# Patient Record
Sex: FEMALE | Race: WHITE | NOT HISPANIC OR LATINO | Employment: UNEMPLOYED | URBAN - METROPOLITAN AREA
[De-identification: names, ages, dates, MRNs, and addresses within clinical notes are randomized per-mention and may not be internally consistent; named-entity substitution may affect disease eponyms.]

---

## 2018-07-24 ENCOUNTER — DOCUMENTATION ONLY (OUTPATIENT)
Dept: FAMILY MEDICINE | Facility: CLINIC | Age: 58
End: 2018-07-24

## 2018-07-24 NOTE — PROGRESS NOTES
Health Maintenance Due   Topic Date Due    Hepatitis C Screening  1960    Lipid Panel  1960    TETANUS VACCINE  08/18/1980    Pap Smear with HPV Cotest  08/18/1983    Mammogram  08/18/2002

## 2018-07-25 ENCOUNTER — TELEPHONE (OUTPATIENT)
Dept: FAMILY MEDICINE | Facility: CLINIC | Age: 58
End: 2018-07-25

## 2018-07-25 ENCOUNTER — OFFICE VISIT (OUTPATIENT)
Dept: FAMILY MEDICINE | Facility: CLINIC | Age: 58
End: 2018-07-25
Payer: COMMERCIAL

## 2018-07-25 VITALS
TEMPERATURE: 98 F | HEART RATE: 90 BPM | OXYGEN SATURATION: 98 % | SYSTOLIC BLOOD PRESSURE: 126 MMHG | DIASTOLIC BLOOD PRESSURE: 88 MMHG | HEIGHT: 63 IN | BODY MASS INDEX: 26.37 KG/M2 | RESPIRATION RATE: 16 BRPM | WEIGHT: 148.81 LBS

## 2018-07-25 DIAGNOSIS — G47.00 INSOMNIA, UNSPECIFIED TYPE: ICD-10-CM

## 2018-07-25 DIAGNOSIS — G25.81 RESTLESS LEG SYNDROME: ICD-10-CM

## 2018-07-25 DIAGNOSIS — F13.20 BENZODIAZEPINE DEPENDENCE: ICD-10-CM

## 2018-07-25 DIAGNOSIS — F90.2 ATTENTION DEFICIT HYPERACTIVITY DISORDER (ADHD), COMBINED TYPE: ICD-10-CM

## 2018-07-25 DIAGNOSIS — L02.612 FOOT ABSCESS, LEFT: Primary | ICD-10-CM

## 2018-07-25 PROCEDURE — 99203 OFFICE O/P NEW LOW 30 MIN: CPT | Mod: S$GLB,,, | Performed by: INTERNAL MEDICINE

## 2018-07-25 PROCEDURE — 3008F BODY MASS INDEX DOCD: CPT | Mod: CPTII,S$GLB,, | Performed by: INTERNAL MEDICINE

## 2018-07-25 RX ORDER — LORAZEPAM 1 MG/1
1 TABLET ORAL EVERY 6 HOURS PRN
Qty: 30 TABLET | Refills: 1 | Status: SHIPPED | OUTPATIENT
Start: 2018-07-25 | End: 2018-11-27 | Stop reason: SDUPTHER

## 2018-07-25 RX ORDER — DEXTROAMPHETAMINE SACCHARATE, AMPHETAMINE ASPARTATE MONOHYDRATE, DEXTROAMPHETAMINE SULFATE AND AMPHETAMINE SULFATE 5; 5; 5; 5 MG/1; MG/1; MG/1; MG/1
20 CAPSULE, EXTENDED RELEASE ORAL EVERY MORNING
COMMUNITY
End: 2020-02-26

## 2018-07-25 RX ORDER — DOXYCYCLINE 100 MG/1
100 CAPSULE ORAL EVERY 12 HOURS
Qty: 20 CAPSULE | Refills: 0 | Status: ON HOLD | OUTPATIENT
Start: 2018-07-25 | End: 2018-07-27 | Stop reason: HOSPADM

## 2018-07-25 RX ORDER — FLUOXETINE 20 MG/1
20 TABLET ORAL DAILY
Qty: 90 TABLET | Refills: 1 | Status: SHIPPED | OUTPATIENT
Start: 2018-07-25 | End: 2018-10-03

## 2018-07-25 RX ORDER — PRAMIPEXOLE DIHYDROCHLORIDE 1 MG/1
1 TABLET ORAL NIGHTLY
COMMUNITY
End: 2018-07-25 | Stop reason: SDUPTHER

## 2018-07-25 RX ORDER — FLUOXETINE 20 MG/1
20 TABLET ORAL DAILY
COMMUNITY
End: 2018-07-25 | Stop reason: SDUPTHER

## 2018-07-25 RX ORDER — PRAMIPEXOLE DIHYDROCHLORIDE 1 MG/1
2 TABLET ORAL NIGHTLY
Qty: 180 TABLET | Refills: 1 | Status: SHIPPED | OUTPATIENT
Start: 2018-07-25 | End: 2021-06-16

## 2018-07-25 RX ORDER — LORAZEPAM 2 MG/1
0.5 TABLET ORAL NIGHTLY
COMMUNITY
End: 2018-07-25

## 2018-07-25 NOTE — PATIENT INSTRUCTIONS
Warm soaks with Epsom salts as much as possible.  Get Drawing salve and then cover with Thick gauze between the left 3rd and 4th toes    [email or web address] has a website that has a course that teaches you how to sleep.  I highly recommend it.

## 2018-07-25 NOTE — TELEPHONE ENCOUNTER
----- Message from Gini Trivedi sent at 7/25/2018 12:51 PM CDT -----  Contact: [facility]  Calling to get a clarification of instructions on Rx pramipexole (MIRAPEX) 1 MG tablet 180 tablet      [id number] - [address]  [address]  [facility] [place] [address]  Phone: [phone] Fax: [phone]    thanks

## 2018-07-25 NOTE — PROGRESS NOTES
Subjective:       Patient ID: Mary Mike is a 57 y.o. female.    Chief Complaint: Establish Care (refills); wound on foot (possible insect bite); and Foot Pain    HPI         CHIEF COMPLAINT: Rash  HPI: squeezed it.     ONSET/TIMING: Onset        4 d   ago. Sudden: no.. Work related: no. Similar_problems_in_the_past: no.    DURATION:  Continuous..    QUALITY/COURSE:   worse.     LOCATION:     Left foot.     INTENSITY/SEVERITY:  Severity is #   8    (10 point scale).    CONTEXT/WHEN: .--Similar problems: no . .  Past treatments: none  . Exposure_to_others_with_similar_symptoms: no . . Exposure_to_poison _ivy: no. .   New exposures (soaps, lotions, laundry detergents, foods, medications, plants, insects or animals). no    SYMPTOMS/RELATED: .--Possible medication side effect:    The following symptoms are positive if BOLD, negative otherwise.     REVIEW OF SYMPTOMS:  Itching.  Pain. Sharp_pain. Dull_pain. Burning_pain.  Erythema-Skin. Hypopigmentation.  hyperpigmentation . Inflammation. Herald_Patch.. fixed . evanescent.  Blisters. Purulence. Fever. Fatigue. Tick_Bites.         Hx of adhd.  Running out of adderall.     Using ativan to sleep for 15 y.         .             Review of Systems   Constitutional: Positive for fatigue. Negative for fever and unexpected weight change.   HENT: Negative for dental problem, hearing loss, nosebleeds, rhinorrhea, tinnitus, trouble swallowing and voice change.    Eyes: Negative for itching and visual disturbance.   Respiratory: Negative for cough, shortness of breath and wheezing.    Cardiovascular: Negative for chest pain and palpitations.   Gastrointestinal: Negative for abdominal pain, blood in stool, constipation, diarrhea, nausea and vomiting.   Endocrine: Negative for cold intolerance, heat intolerance, polydipsia and polyphagia.   Genitourinary: Negative for difficulty urinating and dysuria.   Musculoskeletal: Negative for arthralgias.   Allergic/Immunologic:  "Negative for environmental allergies and immunocompromised state.   Neurological: Positive for headaches (migraines, occasional). Negative for dizziness, seizures, weakness and numbness.   Hematological: Does not bruise/bleed easily.   Psychiatric/Behavioral: Positive for sleep disturbance. Negative for agitation, dysphoric mood and suicidal ideas. The patient is nervous/anxious.          Objective:      Vitals:    07/25/18 1045   BP: 126/88   Pulse: 90   Resp: 16   Temp: 98.3 °F (36.8 °C)   TempSrc: Oral   SpO2: 98%   Weight: 67.5 kg (148 lb 13 oz)   Height: 5' 3" (1.6 m)   PainSc:   7   PainLoc: Foot     Physical Exam   Constitutional: She is oriented to person, place, and time. She appears well-nourished. No distress.   HENT:   Head: Normocephalic and atraumatic.   Right Ear: External ear normal.   Left Ear: External ear normal.   Mouth/Throat: Oropharynx is clear and moist. No oropharyngeal exudate.   Eyes: Conjunctivae and EOM are normal. Pupils are equal, round, and reactive to light. No scleral icterus.   Neck: Normal range of motion. Neck supple. No thyromegaly present.   Cardiovascular: Normal rate, regular rhythm, normal heart sounds and intact distal pulses.  Exam reveals no gallop and no friction rub.    No murmur heard.  Pulmonary/Chest: Effort normal and breath sounds normal. No respiratory distress. She has no wheezes. She has no rales. She exhibits no tenderness.   Abdominal: Soft. Bowel sounds are normal. She exhibits no distension. There is no tenderness.   Musculoskeletal: Normal range of motion. She exhibits no edema or tenderness.        Feet:    Lymphadenopathy:     She has no cervical adenopathy.   Neurological: She is alert and oriented to person, place, and time. She displays normal reflexes. No cranial nerve deficit. She exhibits normal muscle tone. Coordination normal.   Skin: Skin is warm and dry. Rash noted.   Psychiatric: She has a normal mood and affect. Her behavior is normal. " Judgment and thought content normal.   Nursing note and vitals reviewed.        Assessment:       1. Foot abscess, left    2. Attention deficit hyperactivity disorder (ADHD), combined type    3. Insomnia, unspecified type    4. Benzodiazepine dependence    5. Restless leg syndrome          Plan:   Will wean off the benzodiazepine.    Foot abscess, left  -     doxycycline (VIBRAMYCIN) 100 MG Cap; Take 1 capsule (100 mg total) by mouth every 12 (twelve) hours.  Dispense: 20 capsule; Refill: 0    Attention deficit hyperactivity disorder (ADHD), combined type  -     Ambulatory consult to Psychiatry  -     FLUoxetine 20 MG tablet; Take 1 tablet (20 mg total) by mouth once daily.  Dispense: 90 tablet; Refill: 1    Insomnia, unspecified type    Benzodiazepine dependence  -     LORazepam (ATIVAN) 1 MG tablet; Take 1 tablet (1 mg total) by mouth every 6 (six) hours as needed for Anxiety.  Dispense: 30 tablet; Refill: 1    Restless leg syndrome    Other orders  -     pramipexole (MIRAPEX) 1 MG tablet; Take 2 tablets (2 mg total) by mouth every evening. 2 tabs qhs  Dispense: 180 tablet; Refill: 1      No Follow-up on file.

## 2018-07-26 ENCOUNTER — TELEPHONE (OUTPATIENT)
Dept: FAMILY MEDICINE | Facility: CLINIC | Age: 58
End: 2018-07-26

## 2018-07-26 ENCOUNTER — HOSPITAL ENCOUNTER (OUTPATIENT)
Facility: HOSPITAL | Age: 58
Discharge: HOME | End: 2018-07-27
Attending: EMERGENCY MEDICINE | Admitting: HOSPITALIST
Payer: COMMERCIAL

## 2018-07-26 DIAGNOSIS — L03.116 CELLULITIS OF LEFT LOWER EXTREMITY: Primary | ICD-10-CM

## 2018-07-26 PROBLEM — F98.8 ADD (ATTENTION DEFICIT DISORDER): Status: ACTIVE | Noted: 2018-07-26

## 2018-07-26 PROBLEM — F33.0 MILD EPISODE OF RECURRENT MAJOR DEPRESSIVE DISORDER: Status: ACTIVE | Noted: 2018-07-26

## 2018-07-26 PROBLEM — S91.302A OPEN WOUND OF LEFT FOOT: Status: ACTIVE | Noted: 2018-07-26

## 2018-07-26 PROBLEM — M79.672 LEFT FOOT PAIN: Status: ACTIVE | Noted: 2018-07-26

## 2018-07-26 PROBLEM — G25.81 RLS (RESTLESS LEGS SYNDROME): Status: ACTIVE | Noted: 2018-07-26

## 2018-07-26 LAB
ALBUMIN SERPL BCP-MCNC: 3.7 G/DL
ALP SERPL-CCNC: 81 U/L
ALT SERPL W/O P-5'-P-CCNC: 20 U/L
ANION GAP SERPL CALC-SCNC: 10 MMOL/L
AST SERPL-CCNC: 21 U/L
BASOPHILS # BLD AUTO: 0 K/UL
BASOPHILS NFR BLD: 0.4 %
BILIRUB SERPL-MCNC: 0.6 MG/DL
BUN SERPL-MCNC: 8 MG/DL
CALCIUM SERPL-MCNC: 9.2 MG/DL
CHLORIDE SERPL-SCNC: 105 MMOL/L
CO2 SERPL-SCNC: 26 MMOL/L
CREAT SERPL-MCNC: 0.7 MG/DL
CRP SERPL-MCNC: 35.3 MG/L
DIFFERENTIAL METHOD: ABNORMAL
EOSINOPHIL # BLD AUTO: 0.3 K/UL
EOSINOPHIL NFR BLD: 5 %
ERYTHROCYTE [DISTWIDTH] IN BLOOD BY AUTOMATED COUNT: 13.5 %
EST. GFR  (AFRICAN AMERICAN): >60 ML/MIN/1.73 M^2
EST. GFR  (NON AFRICAN AMERICAN): >60 ML/MIN/1.73 M^2
ESTIMATED AVG GLUCOSE: 94 MG/DL
GLUCOSE SERPL-MCNC: 100 MG/DL
HBA1C MFR BLD HPLC: 4.9 %
HCT VFR BLD AUTO: 38.2 %
HGB BLD-MCNC: 12.6 G/DL
LYMPHOCYTES # BLD AUTO: 0.5 K/UL
LYMPHOCYTES NFR BLD: 8.3 %
MCH RBC QN AUTO: 28.1 PG
MCHC RBC AUTO-ENTMCNC: 33 G/DL
MCV RBC AUTO: 85 FL
MONOCYTES # BLD AUTO: 0.6 K/UL
MONOCYTES NFR BLD: 9.4 %
NEUTROPHILS # BLD AUTO: 5.1 K/UL
NEUTROPHILS NFR BLD: 76.9 %
PLATELET # BLD AUTO: 256 K/UL
PMV BLD AUTO: 7.8 FL
POTASSIUM SERPL-SCNC: 3.9 MMOL/L
PROT SERPL-MCNC: 7 G/DL
RBC # BLD AUTO: 4.49 M/UL
SODIUM SERPL-SCNC: 141 MMOL/L
WBC # BLD AUTO: 6.6 K/UL

## 2018-07-26 PROCEDURE — S0077 INJECTION, CLINDAMYCIN PHOSP: HCPCS | Performed by: NURSE PRACTITIONER

## 2018-07-26 PROCEDURE — 96374 THER/PROPH/DIAG INJ IV PUSH: CPT

## 2018-07-26 PROCEDURE — G0378 HOSPITAL OBSERVATION PER HR: HCPCS

## 2018-07-26 PROCEDURE — 25000003 PHARM REV CODE 250: Performed by: EMERGENCY MEDICINE

## 2018-07-26 PROCEDURE — 83036 HEMOGLOBIN GLYCOSYLATED A1C: CPT

## 2018-07-26 PROCEDURE — 63600175 PHARM REV CODE 636 W HCPCS: Performed by: EMERGENCY MEDICINE

## 2018-07-26 PROCEDURE — 87040 BLOOD CULTURE FOR BACTERIA: CPT

## 2018-07-26 PROCEDURE — 99284 EMERGENCY DEPT VISIT MOD MDM: CPT

## 2018-07-26 PROCEDURE — 80053 COMPREHEN METABOLIC PANEL: CPT

## 2018-07-26 PROCEDURE — 85025 COMPLETE CBC W/AUTO DIFF WBC: CPT

## 2018-07-26 PROCEDURE — 86140 C-REACTIVE PROTEIN: CPT

## 2018-07-26 PROCEDURE — 36415 COLL VENOUS BLD VENIPUNCTURE: CPT

## 2018-07-26 PROCEDURE — 87070 CULTURE OTHR SPECIMN AEROBIC: CPT

## 2018-07-26 PROCEDURE — 63600175 PHARM REV CODE 636 W HCPCS: Performed by: NURSE PRACTITIONER

## 2018-07-26 PROCEDURE — 25000003 PHARM REV CODE 250: Performed by: NURSE PRACTITIONER

## 2018-07-26 RX ORDER — HYDROCODONE BITARTRATE AND ACETAMINOPHEN 5; 325 MG/1; MG/1
1 TABLET ORAL EVERY 6 HOURS PRN
Status: DISCONTINUED | OUTPATIENT
Start: 2018-07-26 | End: 2018-07-27 | Stop reason: HOSPADM

## 2018-07-26 RX ORDER — PRAMIPEXOLE DIHYDROCHLORIDE 1 MG/1
2 TABLET ORAL NIGHTLY
Status: DISCONTINUED | OUTPATIENT
Start: 2018-07-26 | End: 2018-07-27 | Stop reason: HOSPADM

## 2018-07-26 RX ORDER — DOCUSATE SODIUM 100 MG/1
100 CAPSULE, LIQUID FILLED ORAL 2 TIMES DAILY
Status: DISCONTINUED | OUTPATIENT
Start: 2018-07-26 | End: 2018-07-27 | Stop reason: HOSPADM

## 2018-07-26 RX ORDER — RAMELTEON 8 MG/1
8 TABLET ORAL NIGHTLY PRN
Status: DISCONTINUED | OUTPATIENT
Start: 2018-07-26 | End: 2018-07-27 | Stop reason: HOSPADM

## 2018-07-26 RX ORDER — ENOXAPARIN SODIUM 100 MG/ML
40 INJECTION SUBCUTANEOUS EVERY 24 HOURS
Status: DISCONTINUED | OUTPATIENT
Start: 2018-07-26 | End: 2018-07-27 | Stop reason: HOSPADM

## 2018-07-26 RX ORDER — VANCOMYCIN HCL IN 5 % DEXTROSE 1G/250ML
1000 PLASTIC BAG, INJECTION (ML) INTRAVENOUS
Status: COMPLETED | OUTPATIENT
Start: 2018-07-26 | End: 2018-07-26

## 2018-07-26 RX ORDER — ONDANSETRON 2 MG/ML
4 INJECTION INTRAMUSCULAR; INTRAVENOUS EVERY 4 HOURS PRN
Status: DISCONTINUED | OUTPATIENT
Start: 2018-07-26 | End: 2018-07-27 | Stop reason: HOSPADM

## 2018-07-26 RX ORDER — CEFTRIAXONE 1 G/50ML
1 INJECTION, SOLUTION INTRAVENOUS
Status: DISCONTINUED | OUTPATIENT
Start: 2018-07-26 | End: 2018-07-26

## 2018-07-26 RX ORDER — VANCOMYCIN HCL IN 5 % DEXTROSE 1G/250ML
1000 PLASTIC BAG, INJECTION (ML) INTRAVENOUS
Status: DISCONTINUED | OUTPATIENT
Start: 2018-07-26 | End: 2018-07-26

## 2018-07-26 RX ORDER — DEXTROAMPHETAMINE SACCHARATE, AMPHETAMINE ASPARTATE MONOHYDRATE, DEXTROAMPHETAMINE SULFATE AND AMPHETAMINE SULFATE 5; 5; 5; 5 MG/1; MG/1; MG/1; MG/1
20 CAPSULE, EXTENDED RELEASE ORAL EVERY MORNING
Status: DISCONTINUED | OUTPATIENT
Start: 2018-07-27 | End: 2018-07-27

## 2018-07-26 RX ORDER — SODIUM CHLORIDE 9 MG/ML
INJECTION, SOLUTION INTRAVENOUS CONTINUOUS
Status: DISCONTINUED | OUTPATIENT
Start: 2018-07-26 | End: 2018-07-27 | Stop reason: HOSPADM

## 2018-07-26 RX ORDER — LORAZEPAM 1 MG/1
1 TABLET ORAL EVERY 6 HOURS PRN
Status: DISCONTINUED | OUTPATIENT
Start: 2018-07-26 | End: 2018-07-27 | Stop reason: HOSPADM

## 2018-07-26 RX ORDER — KETOROLAC TROMETHAMINE 30 MG/ML
15 INJECTION, SOLUTION INTRAMUSCULAR; INTRAVENOUS EVERY 6 HOURS
Status: COMPLETED | OUTPATIENT
Start: 2018-07-26 | End: 2018-07-27

## 2018-07-26 RX ORDER — FLUOXETINE HYDROCHLORIDE 20 MG/1
20 CAPSULE ORAL DAILY
Status: DISCONTINUED | OUTPATIENT
Start: 2018-07-26 | End: 2018-07-27 | Stop reason: HOSPADM

## 2018-07-26 RX ORDER — ACETAMINOPHEN 325 MG/1
650 TABLET ORAL EVERY 6 HOURS PRN
Status: DISCONTINUED | OUTPATIENT
Start: 2018-07-26 | End: 2018-07-27 | Stop reason: HOSPADM

## 2018-07-26 RX ORDER — CLINDAMYCIN PHOSPHATE 900 MG/50ML
900 INJECTION, SOLUTION INTRAVENOUS
Status: DISCONTINUED | OUTPATIENT
Start: 2018-07-26 | End: 2018-07-27 | Stop reason: HOSPADM

## 2018-07-26 RX ADMIN — VANCOMYCIN HYDROCHLORIDE 1000 MG: 1 INJECTION, POWDER, LYOPHILIZED, FOR SOLUTION INTRAVENOUS at 11:07

## 2018-07-26 RX ADMIN — PRAMIPEXOLE DIHYDROCHLORIDE 2 MG: 1 TABLET ORAL at 08:07

## 2018-07-26 RX ADMIN — CLINDAMYCIN IN 5 PERCENT DEXTROSE 900 MG: 18 INJECTION, SOLUTION INTRAVENOUS at 08:07

## 2018-07-26 RX ADMIN — SODIUM CHLORIDE: 0.9 INJECTION, SOLUTION INTRAVENOUS at 01:07

## 2018-07-26 RX ADMIN — CEFTRIAXONE 1 G: 1 INJECTION, SOLUTION INTRAVENOUS at 06:07

## 2018-07-26 RX ADMIN — LORAZEPAM 1 MG: 1 TABLET ORAL at 08:07

## 2018-07-26 RX ADMIN — CLINDAMYCIN IN 5 PERCENT DEXTROSE 900 MG: 18 INJECTION, SOLUTION INTRAVENOUS at 01:07

## 2018-07-26 RX ADMIN — ENOXAPARIN SODIUM 40 MG: 100 INJECTION SUBCUTANEOUS at 06:07

## 2018-07-26 RX ADMIN — HYDROCODONE BITARTRATE AND ACETAMINOPHEN 1 TABLET: 5; 325 TABLET ORAL at 09:07

## 2018-07-26 RX ADMIN — DOCUSATE SODIUM 100 MG: 100 CAPSULE, LIQUID FILLED ORAL at 08:07

## 2018-07-26 RX ADMIN — HYDROCODONE BITARTRATE AND ACETAMINOPHEN 1 TABLET: 5; 325 TABLET ORAL at 01:07

## 2018-07-26 RX ADMIN — RAMELTEON 8 MG: 8 TABLET, FILM COATED ORAL at 08:07

## 2018-07-26 RX ADMIN — KETOROLAC TROMETHAMINE 15 MG: 30 INJECTION, SOLUTION INTRAMUSCULAR at 06:07

## 2018-07-26 NOTE — ASSESSMENT & PLAN NOTE
With left foot wound and left foot pain/ Possible Insect bite-  Continue Iv vancomycin  Add Iv clindamycin and Iv rocephin  Consult ID  Prn pain medication  Add Iv toradol x 3 doses  Monitor closely   Check Blood cultures x 2  TD booster if not up to date

## 2018-07-26 NOTE — H&P
[facility] Hospital Medicine  History & Physical    Patient Name: Mary Mike  MRN: [id number]  Admission Date: 7/26/2018  Attending Physician: Yordan Vasques MD   Primary Care Provider: Juan Gomes MD         Patient information was obtained from patient and ER records.     Subjective:     Principal Problem:Cellulitis of left foot    Chief Complaint:   Chief Complaint   Patient presents with    Cellulitis     seen by pcp yesterday , 2 doses of antibiotics taken. left foot toe lesion with left foot redness and swelling.        HPI: This  is a 57 y.o. female who presents to the ED 07/26/2018 who underwent emergent evaluation for left foot cellulitis.  Patient states she noted a blister on the bottom of her foot that became more painful and reddened over the last 2 days.  Patient was seen by her PCP yesterday saw her on doxycycline for what he said was an abscess.  The patient denies any fever.  She has no history of diabetes mellitus.  She states she started the antibiotics at approximately 11:00 p.m. yesterday afternoon.  Today she noticed that it was worse then called her PCP but cannot get in to be seen today so she presented to the emergency department.  Patient has been able to walk on the foot with some pain.  She denies any trauma to the area.  She denies any numbness or weakness.  She is unsure if she was bitten by anything.  She states her sandals possibly caused the initial blister on the bottom of her foot.     Patient reports she was wearing her flip flops this past Saturday and first noticed some itching Saturday. Within the nest day or so, she noticed redness and swelling. Her toe became discolored. Yesterday she saw  and was placed on Abx which she took 2 doses of but her toe progressively got worse. She came to ER today for evaluation where she was noted to have significant cellulits and blisters also to the area. Patient placed in hospital for further  evaluation and treatment.     Past Medical History:   Diagnosis Date    ADHD (attention deficit hyperactivity disorder)     Anemia     Depression     Fatty liver     Insomnia     Migraines     Restless leg syndrome        Past Surgical History:   Procedure Laterality Date    BREAST SURGERY       SECTION      GASTRIC RESTRICTION SURGERY      GASTRIC SLEEVE          Review of patient's allergies indicates:  No Known Allergies    No current facility-administered medications on file prior to encounter.      Current Outpatient Prescriptions on File Prior to Encounter   Medication Sig    dextroamphetamine-amphetamine (ADDERALL XR) 20 MG 24 hr capsule Take 20 mg by mouth every morning.    doxycycline (VIBRAMYCIN) 100 MG Cap Take 1 capsule (100 mg total) by mouth every 12 (twelve) hours.    FLUoxetine 20 MG tablet Take 1 tablet (20 mg total) by mouth once daily.    LORazepam (ATIVAN) 1 MG tablet Take 1 tablet (1 mg total) by mouth every 6 (six) hours as needed for Anxiety.    pramipexole (MIRAPEX) 1 MG tablet Take 2 tablets (2 mg total) by mouth every evening. 2 tabs qhs     Family History     None        Social History Main Topics    Smoking status: Never Smoker    Smokeless tobacco: Not on file    Alcohol use Yes      Comment: occassional    Drug use: No    Sexual activity: Not on file     Review of Systems   Constitutional: Negative for activity change, appetite change, chills, fatigue and fever.   HENT: Negative for ear discharge, ear pain, facial swelling and hearing loss.    Eyes: Negative for pain and redness.   Respiratory: Negative for cough and shortness of breath.    Cardiovascular: Negative for chest pain, palpitations and leg swelling.   Gastrointestinal: Positive for constipation and nausea. Negative for abdominal distention, abdominal pain, anal bleeding and vomiting.   Endocrine: Negative for polydipsia and polyphagia.   Genitourinary: Negative for difficulty urinating,  dysuria, flank pain and hematuria.   Musculoskeletal: Negative for neck pain and neck stiffness.   Skin: Positive for color change.        Redness left top foot and left third toe discolored with blisters   Allergic/Immunologic: Negative for food allergies.   Neurological: Negative for seizures, syncope, facial asymmetry, speech difficulty and weakness.   Hematological: Does not bruise/bleed easily.     Objective:     Vital Signs (Most Recent):  Temp: 98 °F (36.7 °C) (07/26/18 1500)  Pulse: 74 (07/26/18 1500)  Resp: 20 (07/26/18 1500)  BP: 122/83 (07/26/18 1500)  SpO2: 98 % (07/26/18 1500) Vital Signs (24h Range):  Temp:  [97.9 °F (36.6 °C)-98.4 °F (36.9 °C)] 98 °F (36.7 °C)  Pulse:  [70-91] 74  Resp:  [16-20] 20  SpO2:  [98 %-100 %] 98 %  BP: (122-160)/(72-83) 122/83     Weight: 67.1 kg (148 lb)  Body mass index is 26.22 kg/m².    Physical Exam   Constitutional: She is oriented to person, place, and time. She appears well-developed and well-nourished. No distress.   HENT:   Head: Normocephalic and atraumatic.   Eyes: Conjunctivae and EOM are normal. Pupils are equal, round, and reactive to light. Right eye exhibits no discharge. Left eye exhibits no discharge.   Neck: Normal range of motion. Neck supple. No JVD present.   Cardiovascular: Normal rate, regular rhythm, normal heart sounds and intact distal pulses.  Exam reveals no gallop and no friction rub.    No murmur heard.  Pulmonary/Chest: Effort normal and breath sounds normal. No stridor. No respiratory distress. She has no wheezes. She has no rales. She exhibits no tenderness.   Abdominal: Soft. Bowel sounds are normal. She exhibits no distension. There is no tenderness. There is no guarding.   Genitourinary:   Genitourinary Comments: Not examined   Musculoskeletal: Normal range of motion. She exhibits edema.   Edema Left foot   Neurological: She is alert and oriented to person, place, and time. No cranial nerve deficit.   Skin: Skin is warm and dry.  Capillary refill takes less than 2 seconds. She is not diaphoretic. There is erythema.   Left foot redness and edema  Cellulitis left third toe with blisters- See admit picture   Psychiatric: She has a normal mood and affect. Her behavior is normal. Judgment and thought content normal.         CRANIAL NERVES     CN III, IV, VI   Pupils are equal, round, and reactive to light.  Extraocular motions are normal.        Significant Labs: Reviewed    Significant Imaging: Reviewed    Assessment/Plan:     * Cellulitis of left foot    With left foot wound and left foot pain/ Possible Insect bite-  Continue Iv vancomycin  Add Iv clindamycin and Iv rocephin  Consult ID  Prn pain medication  Add Iv toradol x 3 doses  Monitor closely   Check Blood cultures x 2  TD booster if not up to date          RLS (restless legs syndrome)    Continue home medication          Mild episode of recurrent major depressive disorder    Continue home medication          ADD (attention deficit disorder)    Continue home medication            VTE Risk Mitigation         Ordered     enoxaparin injection 40 mg  Daily      07/26/18 1720     IP VTE LOW RISK PATIENT  Once      07/26/18 1254           EUSEBIO Fan  Department of Hospital Medicine   [facility]    Time spent seeing patient( greater than 1/2 spent in direct contact) : 74 minutes

## 2018-07-26 NOTE — CONSULTS
Mary Stout [place] [id number] is a 57 y.o. female who has been consulted for vancomycin dosing.    The patient has the following labs: Scr 0.7 Crcl 83.6 WBC 6.6     Current weight is 67.1 kg (148 lb)    Pt being treated with vancomycin for cellulitis of foot.    The patient received 1000 mg on 7/26/18 at 1112.    The patient will be started on vancomycin at a dose of 1000 mg every 12 hours (14.9 mg/kg/dose). The vancomycin trough has been ordered for 7/28/18 at 1030.  Target goal is 10-15.     Patient will be followed by pharmacy for changes in renal function, toxicity, and efficacy.      Thank you for allowing us to participate in this patient's care.     Osvaldo Beltran, PharmD

## 2018-07-26 NOTE — HPI
This  is a 57 y.o. female who presents to the ED 07/26/2018 who underwent emergent evaluation for left foot cellulitis.  Patient states she noted a blister on the bottom of her foot that became more painful and reddened over the last 2 days.  Patient was seen by her PCP yesterday saw her on doxycycline for what he said was an abscess.  The patient denies any fever.  She has no history of diabetes mellitus.  She states she started the antibiotics at approximately 11:00 p.m. yesterday afternoon.  Today she noticed that it was worse then called her PCP but cannot get in to be seen today so she presented to the emergency department.  Patient has been able to walk on the foot with some pain.  She denies any trauma to the area.  She denies any numbness or weakness.  She is unsure if she was bitten by anything.  She states her sandals possibly caused the initial blister on the bottom of her foot.     Patient reports she was wearing her flip flops this past Saturday and first noticed some itching Saturday. Within the nest day or so, she noticed redness and swelling. Her toe became discolored. Yesterday she saw  and was placed on Abx which she took 2 doses of but her toe progressively got worse. She came to ER today for evaluation where she was noted to have significant cellulits and blisters also to the area. Patient placed in hospital for further evaluation and treatment.

## 2018-07-26 NOTE — CONSULTS
Mary Stout Charlo [id number] is a 57 y.o. female who has been consulted for vancomycin dosing.    Vancomycin trough has been changed to 7/27 at 2230.      Patient will be followed by pharmacy for changes in renal function, toxicity, and efficacy.    Thank you for allowing us to participate in this patient's care.     Olena Ortiz, CorneliusD

## 2018-07-26 NOTE — SUBJECTIVE & OBJECTIVE
Past Medical History:   Diagnosis Date    ADHD (attention deficit hyperactivity disorder)     Anemia     Depression     Fatty liver     Insomnia     Migraines     Restless leg syndrome        Past Surgical History:   Procedure Laterality Date    BREAST SURGERY       SECTION      GASTRIC RESTRICTION SURGERY      GASTRIC SLEEVE          Review of patient's allergies indicates:  No Known Allergies    No current facility-administered medications on file prior to encounter.      Current Outpatient Prescriptions on File Prior to Encounter   Medication Sig    dextroamphetamine-amphetamine (ADDERALL XR) 20 MG 24 hr capsule Take 20 mg by mouth every morning.    doxycycline (VIBRAMYCIN) 100 MG Cap Take 1 capsule (100 mg total) by mouth every 12 (twelve) hours.    FLUoxetine 20 MG tablet Take 1 tablet (20 mg total) by mouth once daily.    LORazepam (ATIVAN) 1 MG tablet Take 1 tablet (1 mg total) by mouth every 6 (six) hours as needed for Anxiety.    pramipexole (MIRAPEX) 1 MG tablet Take 2 tablets (2 mg total) by mouth every evening. 2 tabs qhs     Family History     None        Social History Main Topics    Smoking status: Never Smoker    Smokeless tobacco: Not on file    Alcohol use Yes      Comment: occassional    Drug use: No    Sexual activity: Not on file     Review of Systems   Constitutional: Negative for activity change, appetite change, chills, fatigue and fever.   HENT: Negative for ear discharge, ear pain, facial swelling and hearing loss.    Eyes: Negative for pain and redness.   Respiratory: Negative for cough and shortness of breath.    Cardiovascular: Negative for chest pain, palpitations and leg swelling.   Gastrointestinal: Positive for constipation and nausea. Negative for abdominal distention, abdominal pain, anal bleeding and vomiting.   Endocrine: Negative for polydipsia and polyphagia.   Genitourinary: Negative for difficulty urinating, dysuria, flank pain and hematuria.    Musculoskeletal: Negative for neck pain and neck stiffness.   Skin: Positive for color change.        Redness left top foot and left third toe discolored with blisters   Allergic/Immunologic: Negative for food allergies.   Neurological: Negative for seizures, syncope, facial asymmetry, speech difficulty and weakness.   Hematological: Does not bruise/bleed easily.     Objective:     Vital Signs (Most Recent):  Temp: 98 °F (36.7 °C) (07/26/18 1500)  Pulse: 74 (07/26/18 1500)  Resp: 20 (07/26/18 1500)  BP: 122/83 (07/26/18 1500)  SpO2: 98 % (07/26/18 1500) Vital Signs (24h Range):  Temp:  [97.9 °F (36.6 °C)-98.4 °F (36.9 °C)] 98 °F (36.7 °C)  Pulse:  [70-91] 74  Resp:  [16-20] 20  SpO2:  [98 %-100 %] 98 %  BP: (122-160)/(72-83) 122/83     Weight: 67.1 kg (148 lb)  Body mass index is 26.22 kg/m².    Physical Exam   Constitutional: She is oriented to person, place, and time. She appears well-developed and well-nourished. No distress.   HENT:   Head: Normocephalic and atraumatic.   Eyes: Conjunctivae and EOM are normal. Pupils are equal, round, and reactive to light. Right eye exhibits no discharge. Left eye exhibits no discharge.   Neck: Normal range of motion. Neck supple. No JVD present.   Cardiovascular: Normal rate, regular rhythm, normal heart sounds and intact distal pulses.  Exam reveals no gallop and no friction rub.    No murmur heard.  Pulmonary/Chest: Effort normal and breath sounds normal. No stridor. No respiratory distress. She has no wheezes. She has no rales. She exhibits no tenderness.   Abdominal: Soft. Bowel sounds are normal. She exhibits no distension. There is no tenderness. There is no guarding.   Genitourinary:   Genitourinary Comments: Not examined   Musculoskeletal: Normal range of motion. She exhibits edema.   Edema Left foot   Neurological: She is alert and oriented to person, place, and time. No cranial nerve deficit.   Skin: Skin is warm and dry. Capillary refill takes less than 2 seconds.  She is not diaphoretic. There is erythema.   Left foot redness and edema  Cellulitis left third toe with blisters- See admit picture   Psychiatric: She has a normal mood and affect. Her behavior is normal. Judgment and thought content normal.         CRANIAL NERVES     CN III, IV, VI   Pupils are equal, round, and reactive to light.  Extraocular motions are normal.        Significant Labs: Reviewed    Significant Imaging: Reviewed

## 2018-07-26 NOTE — ED PROVIDER NOTES
Encounter Date: 2018       History     Chief Complaint   Patient presents with    Cellulitis     seen by pcp yesterday , 2 doses of antibiotics taken. left foot toe lesion with left foot redness and swelling.     Patient is a 57 y.o. female who presents to the ED 2018 who underwent emergent evaluation for left foot cellulitis.  Patient states she noted a blister on the bottom of her foot that became more painful and reddened over the last 2 days.  Patient was seen by her PCP yesterday saw her on doxycycline for what he said was an abscess.  The patient denies any fever.  She has no history of diabetes mellitus.  She states she started the antibiotics at approximately 11:00 p.m. yesterday afternoon.  Today she noticed that it was worse then called her PCP but cannot get in to be seen today so she presented to the emergency department.  Patient has been able to walk on the foot with some pain.  She denies any trauma to the area.  She denies any numbness or weakness.  She is unsure if she was bitten by anything.  She states her sandals possibly caused the initial blister on the bottom of her foot.                Review of patient's allergies indicates:  No Known Allergies  Past Medical History:   Diagnosis Date    ADHD (attention deficit hyperactivity disorder)     Depression     Insomnia     Restless leg syndrome      Past Surgical History:   Procedure Laterality Date    BREAST SURGERY       SECTION      GASTRIC RESTRICTION SURGERY       History reviewed. No pertinent family history.  Social History   Substance Use Topics    Smoking status: Never Smoker    Smokeless tobacco: Not on file    Alcohol use Yes      Comment: occassional     Review of Systems   Constitutional: Negative for chills and fever.   Respiratory: Negative for chest tightness and shortness of breath.    Cardiovascular: Negative for chest pain.   Musculoskeletal: Positive for arthralgias (left foot pain). Negative for  myalgias.   Skin: Positive for wound. Negative for rash.   Allergic/Immunologic: Negative for immunocompromised state.   Neurological: Negative for weakness and numbness.   Hematological: Negative for adenopathy.       Physical Exam     Initial Vitals   BP Pulse Resp Temp SpO2   07/26/18 0941 07/26/18 0941 07/26/18 0939 07/26/18 0941 07/26/18 0941   (!) 158/74 91 18 98.4 °F (36.9 °C) 98 %      MAP       --                Physical Exam    Nursing note and vitals reviewed.  Constitutional: Vital signs are normal. She appears well-developed and well-nourished.   HENT:   Head: Normocephalic and atraumatic.   Eyes: Pupils are equal, round, and reactive to light.   Neck: Neck supple.   Cardiovascular: Normal rate, regular rhythm, normal heart sounds and intact distal pulses. Exam reveals no gallop and no friction rub.    No murmur heard.  Pulmonary/Chest: Breath sounds normal. She has no wheezes. She has no rhonchi. She has no rales.   Abdominal: Normal appearance.   Musculoskeletal: She exhibits edema and tenderness.        Left ankle: Normal.        Left foot: There is tenderness and swelling. There is normal range of motion, no bony tenderness, normal capillary refill, no crepitus, no deformity and no laceration.        Feet:    Neurological: She is alert and oriented to person, place, and time. She has normal strength.   Skin: Skin is warm, dry and intact. There is erythema.   Left 3rd toe with inter web fissure and erythema with swelling; proximal dorsal surface of foot with blistering; no palpable abscess or fluctuance; dorsal surface of foot with erythema and mild swelling.    Psychiatric: She has a normal mood and affect. Her speech is normal and behavior is normal.         ED Course   Procedures  Labs Reviewed   CBC W/ AUTO DIFFERENTIAL - Abnormal; Notable for the following:        Result Value    MPV 7.8 (*)     Lymph # 0.5 (*)     Gran% 76.9 (*)     Lymph% 8.3 (*)     All other components within normal limits    COMPREHENSIVE METABOLIC PANEL          Imaging Results    None          Medical Decision Making:   Differential Diagnosis:   Cellulitis  Insect bite  necrotizing fascitis       APC / Resident Notes:   Patient is a 57 y.o. female who presents to the ED 07/26/2018 who underwent emergent evaluation for cellulitis of dorsal surface of left foot and left 3rd toe.  Patient denies any injury or trauma.  I do not think there is any acute fracture or dislocation.  She does have swelling and erythema to the dorsal surface of her left foot as well as an inter web 3rd and 4th toe fissure with dorsal surface blistering.  There is no drainage or fluctuance.  There is no abscess.  I do not think this is drainable.  Patient has been on antibiotics for approximately 24 hr and states that the area of erythema pain or worsening.  Patient is afebrile with normal vital signs. She is not diabetic.  She has no signs of neurovascular compromise.  Plus two distal pulses.  Do not think sepsis.  However given patient has been on antibiotics for approximately 24 hr and is having worsening erythema and swelling with blistering will admit patient for IV antibiotic therapy for cellulitis.  Case discussed with hospital medicine team who is accepting of this admission for further evaluation and treatment.  CBC noted. No leukocytosis.  CMP is pending.  Hospital team to follow-up.  Patient is admitted to hospital medicine team stable condition.              Attending Attestation:           Physician Attestation for Scribe:  Physician Attestation Statement for Scribe #1: I, Uzma Hsu, reviewed documentation, as scribed by in my presence, and it is both accurate and complete.     Comments: IUzma, NP-C, personally performed the services described in this documentation. All medical record entries made by the scribe were at my direction and in my presence.  I have reviewed the chart and agree that the record reflects my personal performance  and is accurate and complete. CHRISTINE Sexton.  12:26 PM 07/26/2018                Clinical Impression:   The encounter diagnosis was Cellulitis of left lower extremity.      Disposition:   Disposition: Placed in Observation  Condition: Fair                        Uzma Hsu NP  07/26/18 1221

## 2018-07-26 NOTE — TELEPHONE ENCOUNTER
----- Message from Ashley Ayoub sent at 7/26/2018  8:26 AM CDT -----  Contact: pt  Pt is requesting to speak with nurse in regards to an abscess on her toe. Pt was seen by Dr Gomes on yesterday but states she is in a lot of pain, need advise on what to do for pain,please call pt to advise  Call back   Thanks

## 2018-07-27 VITALS
OXYGEN SATURATION: 96 % | BODY MASS INDEX: 26.22 KG/M2 | HEART RATE: 93 BPM | SYSTOLIC BLOOD PRESSURE: 86 MMHG | WEIGHT: 148 LBS | DIASTOLIC BLOOD PRESSURE: 50 MMHG | RESPIRATION RATE: 18 BRPM | HEIGHT: 63 IN | TEMPERATURE: 99 F

## 2018-07-27 PROBLEM — E83.42 HYPOMAGNESEMIA: Status: ACTIVE | Noted: 2018-07-27
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
## 2018-07-27 LAB
ANION GAP SERPL CALC-SCNC: 8 MMOL/L
BASOPHILS NFR BLD: 0 %
BUN SERPL-MCNC: 8 MG/DL
CALCIUM SERPL-MCNC: 8.5 MG/DL
CHLORIDE SERPL-SCNC: 109 MMOL/L
CO2 SERPL-SCNC: 22 MMOL/L
CREAT SERPL-MCNC: 0.7 MG/DL
DIFFERENTIAL METHOD: ABNORMAL
EOSINOPHIL NFR BLD: 0 %
ERYTHROCYTE [DISTWIDTH] IN BLOOD BY AUTOMATED COUNT: 14 %
EST. GFR  (AFRICAN AMERICAN): >60 ML/MIN/1.73 M^2
EST. GFR  (NON AFRICAN AMERICAN): >60 ML/MIN/1.73 M^2
GLUCOSE SERPL-MCNC: 127 MG/DL
HCT VFR BLD AUTO: 36 %
HGB BLD-MCNC: 11.9 G/DL
LYMPHOCYTES NFR BLD: 2 %
MAGNESIUM SERPL-MCNC: 1.6 MG/DL
MCH RBC QN AUTO: 28.2 PG
MCHC RBC AUTO-ENTMCNC: 33 G/DL
MCV RBC AUTO: 86 FL
MONOCYTES NFR BLD: 3 %
NEUTROPHILS NFR BLD: 74 %
NEUTS BAND NFR BLD MANUAL: 21 %
PLATELET # BLD AUTO: 168 K/UL
PMV BLD AUTO: 7.9 FL
POTASSIUM SERPL-SCNC: 3.8 MMOL/L
RBC # BLD AUTO: 4.2 M/UL
SODIUM SERPL-SCNC: 139 MMOL/L
WBC # BLD AUTO: 3.6 K/UL

## 2018-07-27 PROCEDURE — 85007 BL SMEAR W/DIFF WBC COUNT: CPT | Mod: NCS

## 2018-07-27 PROCEDURE — 90714 TD VACC NO PRESV 7 YRS+ IM: CPT | Performed by: NURSE PRACTITIONER

## 2018-07-27 PROCEDURE — 25000003 PHARM REV CODE 250: Performed by: EMERGENCY MEDICINE

## 2018-07-27 PROCEDURE — 85027 COMPLETE CBC AUTOMATED: CPT

## 2018-07-27 PROCEDURE — 36415 COLL VENOUS BLD VENIPUNCTURE: CPT

## 2018-07-27 PROCEDURE — 83735 ASSAY OF MAGNESIUM: CPT

## 2018-07-27 PROCEDURE — 90471 IMMUNIZATION ADMIN: CPT | Performed by: NURSE PRACTITIONER

## 2018-07-27 PROCEDURE — S0077 INJECTION, CLINDAMYCIN PHOSP: HCPCS | Performed by: NURSE PRACTITIONER

## 2018-07-27 PROCEDURE — 80048 BASIC METABOLIC PNL TOTAL CA: CPT

## 2018-07-27 PROCEDURE — 63600175 PHARM REV CODE 636 W HCPCS: Performed by: NURSE PRACTITIONER

## 2018-07-27 PROCEDURE — 25000003 PHARM REV CODE 250: Performed by: NURSE PRACTITIONER

## 2018-07-27 PROCEDURE — G0378 HOSPITAL OBSERVATION PER HR: HCPCS

## 2018-07-27 RX ORDER — DOCUSATE SODIUM 100 MG/1
100 CAPSULE, LIQUID FILLED ORAL 2 TIMES DAILY
Refills: 0 | COMMUNITY
Start: 2018-07-27 | End: 2018-08-20

## 2018-07-27 RX ORDER — ACETAMINOPHEN 325 MG/1
650 TABLET ORAL EVERY 6 HOURS PRN
Refills: 0 | COMMUNITY
Start: 2018-07-27 | End: 2018-08-20

## 2018-07-27 RX ORDER — CLINDAMYCIN HYDROCHLORIDE 300 MG/1
300 CAPSULE ORAL EVERY 6 HOURS
Qty: 40 CAPSULE | Refills: 0 | Status: SHIPPED | OUTPATIENT
Start: 2018-07-27 | End: 2018-07-27

## 2018-07-27 RX ORDER — LANOLIN ALCOHOL/MO/W.PET/CERES
400 CREAM (GRAM) TOPICAL 2 TIMES DAILY
Status: DISCONTINUED | OUTPATIENT
Start: 2018-07-27 | End: 2018-07-27 | Stop reason: HOSPADM

## 2018-07-27 RX ORDER — LANOLIN ALCOHOL/MO/W.PET/CERES
400 CREAM (GRAM) TOPICAL DAILY
Refills: 0 | COMMUNITY
Start: 2018-07-27 | End: 2020-05-18

## 2018-07-27 RX ORDER — CLINDAMYCIN HYDROCHLORIDE 300 MG/1
300 CAPSULE ORAL EVERY 8 HOURS
Qty: 30 CAPSULE | Refills: 0 | Status: SHIPPED | OUTPATIENT
Start: 2018-07-27 | End: 2018-08-06

## 2018-07-27 RX ORDER — IBUPROFEN 200 MG
800 TABLET ORAL EVERY 8 HOURS PRN
Refills: 0 | COMMUNITY
Start: 2018-07-27 | End: 2018-08-03

## 2018-07-27 RX ORDER — MAGNESIUM SULFATE HEPTAHYDRATE 40 MG/ML
2 INJECTION, SOLUTION INTRAVENOUS ONCE
Status: COMPLETED | OUTPATIENT
Start: 2018-07-27 | End: 2018-07-27

## 2018-07-27 RX ADMIN — KETOROLAC TROMETHAMINE 15 MG: 30 INJECTION, SOLUTION INTRAMUSCULAR at 12:07

## 2018-07-27 RX ADMIN — CLINDAMYCIN IN 5 PERCENT DEXTROSE 900 MG: 18 INJECTION, SOLUTION INTRAVENOUS at 05:07

## 2018-07-27 RX ADMIN — SODIUM CHLORIDE: 0.9 INJECTION, SOLUTION INTRAVENOUS at 12:07

## 2018-07-27 RX ADMIN — DOCUSATE SODIUM 100 MG: 100 CAPSULE, LIQUID FILLED ORAL at 08:07

## 2018-07-27 RX ADMIN — THERA TABS 1 TABLET: TAB at 10:07

## 2018-07-27 RX ADMIN — CLOSTRIDIUM TETANI TOXOID ANTIGEN (FORMALDEHYDE INACTIVATED) AND CORYNEBACTERIUM DIPHTHERIAE TOXOID ANTIGEN (FORMALDEHYDE INACTIVATED) 0.5 ML: 5; 2 INJECTION, SUSPENSION INTRAMUSCULAR at 06:07

## 2018-07-27 RX ADMIN — HYDROCODONE BITARTRATE AND ACETAMINOPHEN 1 TABLET: 5; 325 TABLET ORAL at 10:07

## 2018-07-27 RX ADMIN — MAGNESIUM SULFATE IN WATER 2 G: 40 INJECTION, SOLUTION INTRAVENOUS at 10:07

## 2018-07-27 RX ADMIN — SODIUM CHLORIDE: 0.9 INJECTION, SOLUTION INTRAVENOUS at 10:07

## 2018-07-27 RX ADMIN — MAGNESIUM OXIDE TAB 400 MG (241.3 MG ELEMENTAL MG) 400 MG: 400 (241.3 MG) TAB at 10:07

## 2018-07-27 RX ADMIN — KETOROLAC TROMETHAMINE 15 MG: 30 INJECTION, SOLUTION INTRAMUSCULAR at 05:07

## 2018-07-27 RX ADMIN — FLUOXETINE 20 MG: 20 CAPSULE ORAL at 08:07

## 2018-07-27 NOTE — PROGRESS NOTES
Left foot/toe pictures taken, see chart review media photographs.  Left foot cleaned with wound cleanser.   Clean dry mark applied between toes.

## 2018-07-27 NOTE — PLAN OF CARE
07/27/18 1133   Final Note   Assessment Type Final Discharge Note   Discharge Disposition Home   What phone number can be called within the next 1-3 days to see how you are doing after discharge? ([phone])   Hospital Follow Up  Appt(s) scheduled? No  (MD nurse will call pt with appointment time)

## 2018-07-27 NOTE — CONSULTS
Consult Note  Infectious Disease    Reason for Consult:  cellulitis    HPI: Mary Mike is a 57 y.o. female who first felt itching and discomfort between left 3/4 toes 5 days ago. Within 2 -3 days she had developed blistering followed by bruising and blistering. She saw Dr. Gomes yesterday and was prescribed doxycycline for cellulitis/abscess. She took 2 doses but experienced increased swelling and erythema of the foot and came to the ED and was admitted, and placed on Vanc initially, then clindamycin and rocephin were added. She had no fever or chills, had mild nausea, which was potentially from the doxycycline and had some tenderness in the left groin. She has no history of cellulitis or of Staph skin infections. Last tetanus was . She does not recall witnessing an insect on her skin. She has not been swimming. She has not been barefoot in her yard.     Review of patient's allergies indicates:  No Known Allergies  Past Medical History:   Diagnosis Date    ADHD (attention deficit hyperactivity disorder)     Anemia     Depression     Fatty liver     Insomnia     Migraines     Restless leg syndrome      Past Surgical History:   Procedure Laterality Date    BREAST SURGERY       SECTION      GASTRIC RESTRICTION SURGERY      GASTRIC SLEEVE        Social History     Social History    Marital status:      Spouse name: N/A    Number of children: N/A    Years of education: N/A     Social History Main Topics    Smoking status: Never Smoker    Smokeless tobacco: None    Alcohol use Yes      Comment: occassional    Drug use: No    Sexual activity: Not Asked     Other Topics Concern    None     Social History Narrative    None     History reviewed. No pertinent family history.    Pertinent medications noted:     Review of Systems:   No chills, fever, sweats  mild nausea, no vomiting, diarrhea,  or focal abd pain  No swelling of joints, redness of joints, injuries,   No  neurological symptoms  No diabetes,  Shed id apply pressure to the plantar portion of the blister to try to express whatever was in it. It was subsequent to this that the bruising and worsening escalated.     EXAM & DIAGNOSTICS REVIEWED:   Vitals:     Temp:  [97.9 °F (36.6 °C)-98.4 °F (36.9 °C)]   Temp: 97.9 °F (36.6 °C) (07/26/18 1952)  Pulse: 73 (07/26/18 1952)  Resp: 17 (07/26/18 1952)  BP: 121/82 (07/26/18 1952)  SpO2: 100 % (07/26/18 1952)  No intake or output data in the 24 hours ending 07/26/18 2009    General:  In NAD. Looks non toxic. Alert and attentive, cooperative  Eyes:  Anicteric, PERRL, EOMI  ENT:  Mouth w/ pink MMM, no lesions/exudate, good dentition  Neck:  Trachea midline, supple, no adenopathy appreciated  Lungs: clear  Heart:  RRR, no gallop/murmur noted  Abd:  soft, NT, ND, normal BS, no masses/organomegaly appreciated.   :  Voids  Musc:  Joints without effusion, swelling,  erythema, synovitis,   Skin:  Generally warm, dry, normal for color. No rashes. No palmar or plantar lesions. No subungual petechiae  Wound: Left foot, 3/4 interspace, is macerated/moist. There is a multiloculated blister, the dorsal portion of which is hemorrhagic. There is mild cellulitis to the anterior ankle. There is no lymphangitis. She appreciates a tender area in left groin but I do not feel any enlarge lymph nodes.  I cleansed the area with betadine and used an 18g needle to puncture the blisters and I took a culture.   Neuro: AAOx3, speech clear, moves all extrems equally  Extrem: No edema, erythema, phlebitis, cellulitis,   VAD:    Lines/Tubes/Drains:    General Labs reviewed:    Recent Labs  Lab 07/26/18  1125   WBC 6.60   RBC 4.49   HGB 12.6   HCT 38.2      MCV 85   MCH 28.1   MCHC 33.0       Recent Labs  Lab 07/26/18  1125   CALCIUM 9.2   PROT 7.0      K 3.9   CO2 26      BUN 8   CREATININE 0.7   ALKPHOS 81   ALT 20   AST 21   BILITOT 0.6     Micro:  Microbiology Results (last 7 days)      Procedure Component Value Units Date/Time    Aerobic culture [[id number]] Collected:  07/26/18 1932    Order Status:  Sent Specimen:  Wound from Foot, Left Updated:  07/26/18 1933    Blood culture [[id number]] Collected:  07/26/18 1842    Order Status:  Sent Specimen:  Blood Updated:  07/26/18 1843        Imaging Reviewed:      IMPRESSION & PLAN     Imp: insect bite, doubt brown recluse, with secondary cellulitis    Rec: vanc or clinda, no need for both. Agree with NSAID. Rocephin could be stopped           I took a sterile culture of the small blisters in interdigital space          Anticipate home tomorrow on po clindamycin instead of doxy

## 2018-07-27 NOTE — CONSULTS
Mary [place]out [place] [id number] is a 57 y.o. female who had been consulted for vancomycin dosing.    Vancomycin has been discontinued.  Pharmacy consult for vancomycin dosing in no longer required.      Thank you for allowing us to participate in this patient's care.     Odin Jose, PharmD

## 2018-07-27 NOTE — PROGRESS NOTES
Pt. Discharged at this time via wheelchair with spouse.  Discharge instructions reviewed with patient with stated understanding.  New medication and follow up appointments reviewed at this time.  No questions/concerns at this time.

## 2018-07-27 NOTE — PLAN OF CARE
Problem: Patient Care Overview  Goal: Plan of Care Review  Patient alert and oriented.  Up ad purvi.  Wound culture obtained per Dr. Méndez, sent to lab.  Full code.  PRN medication given for pain.  Call light in reach.  Bed in low/locked position.  No complaints at this time.

## 2018-07-27 NOTE — HOSPITAL COURSE
The patient was monitored closely during her stay. She was initially given Iv clindamycin, vancomycin, and a dose of Iv rocephin. An xray of right foot and there was soft tissue swelling in the forefoot noted.  A foreign body was not identified.  A fracture was not seen.  Bone erosion or periosteal reaction consistent with osteomyelitis was not seen on these plain films of the left foot. The patient received an ID consult and was continued on Iv clindamycin. She was noted to have overall improvement in her condition and was discharged to home on po clindamycin. The patient was to follow up with ID as outpatient per patient request. Blood culture showed no growth at time of discharge and wound culture was pending.

## 2018-07-27 NOTE — PLAN OF CARE
Met with pt to complete her assessment.  Pt, who is independent with her self care, denies having any DME or home health services lives with her spouse.  Pt verified her PCP as BC/BS and stated that she has only seen Dr. Gomes one time and is planning to find someone else to be her PCP.  Pt's discharge disposition is home with no anticipated needs.      Called Dr. Wang's office to schedule pt's follow up and was informed that the nurse would call pt with the appointment time.  Updated the AVS and pt's nurse.     07/27/18 1010   Discharge Assessment   Assessment Type Discharge Planning Assessment   Confirmed/corrected address and phone number on facesheet? Yes   Assessment information obtained from? Patient   Communicated expected length of stay with patient/caregiver yes   Prior to hospitilization cognitive status: Alert/Oriented   Prior to hospitalization functional status: Independent   Current cognitive status: Alert/Oriented   Current Functional Status: Independent   Lives With spouse   Able to Return to Prior Arrangements yes   Is patient able to care for self after discharge? Yes   Who are your caregiver(s) and their phone number(s)? (spouse Lucius Cee,  [phone])   Patient's perception of discharge disposition home or selfcare   Readmission Within The Last 30 Days no previous admission in last 30 days   Patient currently being followed by outpatient case management? No   Patient currently receives any other outside agency services? No   Equipment Currently Used at Home none   Do you have any problems affording any of your prescribed medications? No  (pharmacy is [facility])   Is the patient taking medications as prescribed? yes   Does the patient have transportation home? Yes   Transportation Available family or friend will provide   Discharge Plan A Home   Patient/Family In Agreement With Plan yes

## 2018-07-27 NOTE — DISCHARGE SUMMARY
[facility] Hospital Medicine  Discharge Summary      Patient Name: Mary Mike  MRN: [id number]  Admission Date: 7/26/2018  Hospital Length of Stay: 0 days  Discharge Date and Time:  07/27/2018 11:20 AM  Attending Physician: Yordan Vasques MD   Discharging Provider: EUSEBIO Fan  Primary Care Provider: Juan Gomes MD    HPI:   This  is a 57 y.o. female who presents to the ED 07/26/2018 who underwent emergent evaluation for left foot cellulitis.  Patient states she noted a blister on the bottom of her foot that became more painful and reddened over the last 2 days.  Patient was seen by her PCP yesterday saw her on doxycycline for what he said was an abscess.  The patient denies any fever.  She has no history of diabetes mellitus.  She states she started the antibiotics at approximately 11:00 p.m. yesterday afternoon.  Today she noticed that it was worse then called her PCP but cannot get in to be seen today so she presented to the emergency department.  Patient has been able to walk on the foot with some pain.  She denies any trauma to the area.  She denies any numbness or weakness.  She is unsure if she was bitten by anything.  She states her sandals possibly caused the initial blister on the bottom of her foot.     Patient reports she was wearing her flip flops this past Saturday and first noticed some itching Saturday. Within the nest day or so, she noticed redness and swelling. Her toe became discolored. Yesterday she saw  and was placed on Abx which she took 2 doses of but her toe progressively got worse. She came to ER today for evaluation where she was noted to have significant cellulits and blisters also to the area. Patient placed in hospital for further evaluation and treatment.     * No surgery found *      Hospital Course:   The patient was monitored closely during her stay. She was initially given Iv clindamycin, vancomycin, and a dose of Iv rocephin. An  xray of right foot and there was soft tissue swelling in the forefoot noted.  A foreign body was not identified.  A fracture was not seen.  Bone erosion or periosteal reaction consistent with osteomyelitis was not seen on these plain films of the left foot. The patient received an ID consult and was continued on Iv clindamycin. She was noted to have overall improvement in her condition and was discharged to home on po clindamycin. The patient was to follow up with ID as outpatient per patient request. Blood culture showed no growth at time of discharge and wound culture was pending.      Consults:   Consults         Status Ordering Provider     Inpatient consult to Infectious Diseases  Once     Provider:  Concha Wang MD    Completed RAD MAJANO        Service: Hospital Medicine    Final Active Diagnoses:    Diagnosis Date Noted POA    PRINCIPAL PROBLEM:  Cellulitis of left foot [L03.116] 07/26/2018 Yes    Hypomagnesemia [E83.42] 07/27/2018 Yes    Insect bite [W57.XXXA] 07/27/2018 Yes    Open wound of left foot [S91.302A] 07/26/2018 Yes    Left foot pain [M79.672] 07/26/2018 Yes    ADD (attention deficit disorder) [F98.8] 07/26/2018 Yes    Mild episode of recurrent major depressive disorder [F33.0] 07/26/2018 Yes    RLS (restless legs syndrome) [G25.81] 07/26/2018 Yes      Problems Resolved During this Admission:    Diagnosis Date Noted Date Resolved POA       Discharged Condition: stable    Disposition: Home or Self Care    Follow Up:  Follow-up Information     Concha Wang MD In 1 week.    Specialty:  Infectious Diseases  Contact information:  [address] [place] [address]  [address] [place] [address] [id number] [phone]                 Patient Instructions:     Diet Cardiac   Order Comments: Drink plenty of fluids     Notify your health care provider if you experience any of the following:  temperature >100.4     Notify your health care provider if you experience any of the following:  persistent nausea and  vomiting or diarrhea     Notify your health care provider if you experience any of the following:  severe uncontrolled pain     Notify your health care provider if you experience any of the following:  redness, tenderness, or signs of infection (pain, swelling, redness, odor or green/yellow discharge around incision site)     Notify your health care provider if you experience any of the following:   Order Comments: Any decline in condition     Activity as tolerated   Order Comments: Keep foot wound clean and dry     Significant Diagnostic Studies:     CRP 35.3    HGA1C 4.9    X-Ray Foot Complete Left- Soft tissue swelling otherwise negative plain x-rays of the left foot.    Labs:   CMP   Recent Labs  Lab 07/26/18  1125 07/27/18  0502    139   K 3.9 3.8    109   CO2 26 22*    127*   BUN 8 8   CREATININE 0.7 0.7   CALCIUM 9.2 8.5*   PROT 7.0  --    ALBUMIN 3.7  --    BILITOT 0.6  --    ALKPHOS 81  --    AST 21  --    ALT 20  --    ANIONGAP 10 8   ESTGFRAFRICA >60 >60   EGFRNONAA >60 >60    and CBC   Recent Labs  Lab 07/26/18  1125 07/27/18  0502   WBC 6.60 3.60*   HGB 12.6 11.9*   HCT 38.2 36.0*    168       Pending Diagnostic Studies:     None         Medications:  Reconciled Home Medications:      Medication List      START taking these medications    acetaminophen 325 MG tablet  Commonly known as:  TYLENOL  Take 2 tablets (650 mg total) by mouth every 6 (six) hours as needed.     clindamycin 300 MG capsule  Commonly known as:  CLEOCIN  Take 1 capsule (300 mg total) by mouth every 8 (eight) hours. for 10 days     docusate sodium 100 MG capsule  Commonly known as:  COLACE  Take 1 capsule (100 mg total) by mouth 2 (two) times daily.     ibuprofen 200 MG tablet  Commonly known as:  ADVIL,MOTRIN  Take 4 tablets (800 mg total) by mouth every 8 (eight) hours as needed for Pain (TAke with food- Ok to alternate with tylenol).     magnesium oxide 400 mg tablet  Commonly known as:  MAG-OX  Take 1  tablet (400 mg total) by mouth once daily.     multivitamin tablet  Commonly known as:  THERAGRAN  Take 1 tablet by mouth once daily.  Start taking on:  7/28/2018        CONTINUE taking these medications    dextroamphetamine-amphetamine 20 MG 24 hr capsule  Commonly known as:  ADDERALL XR  Take 20 mg by mouth every morning.     FLUoxetine 20 MG tablet  Take 1 tablet (20 mg total) by mouth once daily.     LORazepam 1 MG tablet  Commonly known as:  ATIVAN  Take 1 tablet (1 mg total) by mouth every 6 (six) hours as needed for Anxiety.     pramipexole 1 MG tablet  Commonly known as:  MIRAPEX  Take 2 tablets (2 mg total) by mouth every evening. 2 tabs qhs        STOP taking these medications    doxycycline 100 MG Cap  Commonly known as:  VIBRAMYCIN            Indwelling Lines/Drains at time of discharge:   Lines/Drains/Airways          No matching active lines, drains, or airways        Time spent on the discharge of patient: 54 minutes  Patient was seen and examined on the date of discharge and determined to be suitable for discharge.       EUSEBIO Fan  Department of Hospital Medicine  [facility]

## 2018-07-30 ENCOUNTER — TELEPHONE (OUTPATIENT)
Dept: MEDSURG UNIT | Facility: HOSPITAL | Age: 58
End: 2018-07-30

## 2018-07-30 ENCOUNTER — TELEPHONE (OUTPATIENT)
Dept: FAMILY MEDICINE | Facility: CLINIC | Age: 58
End: 2018-07-30

## 2018-07-30 LAB — BACTERIA SPEC AEROBE CULT: NORMAL

## 2018-07-30 NOTE — TELEPHONE ENCOUNTER
----- Message from Gini Trivedi sent at 7/30/2018 12:09 PM CDT -----  Contact: tj  Calling to see if she can email proof of ADD. She does not have a fax and states it is on her cell phone. Please call her [phone] (home)   thanks

## 2018-07-31 ENCOUNTER — PATIENT MESSAGE (OUTPATIENT)
Dept: FAMILY MEDICINE | Facility: CLINIC | Age: 58
End: 2018-07-31

## 2018-07-31 DIAGNOSIS — Z12.39 BREAST CANCER SCREENING: ICD-10-CM

## 2018-07-31 DIAGNOSIS — Z12.11 COLON CANCER SCREENING: ICD-10-CM

## 2018-07-31 DIAGNOSIS — Z11.59 NEED FOR HEPATITIS C SCREENING TEST: Primary | ICD-10-CM

## 2018-08-01 LAB — BACTERIA BLD CULT: NORMAL

## 2018-08-20 ENCOUNTER — OFFICE VISIT (OUTPATIENT)
Dept: FAMILY MEDICINE | Facility: CLINIC | Age: 58
End: 2018-08-20
Payer: COMMERCIAL

## 2018-08-20 VITALS
HEART RATE: 78 BPM | DIASTOLIC BLOOD PRESSURE: 76 MMHG | SYSTOLIC BLOOD PRESSURE: 130 MMHG | WEIGHT: 148 LBS | HEIGHT: 63 IN | BODY MASS INDEX: 26.22 KG/M2 | OXYGEN SATURATION: 98 %

## 2018-08-20 DIAGNOSIS — M81.8 OTHER OSTEOPOROSIS WITHOUT CURRENT PATHOLOGICAL FRACTURE: ICD-10-CM

## 2018-08-20 DIAGNOSIS — D50.9 MICROCYTIC ANEMIA: ICD-10-CM

## 2018-08-20 DIAGNOSIS — M79.671 RIGHT FOOT PAIN: Primary | ICD-10-CM

## 2018-08-20 DIAGNOSIS — Z00.00 HEALTHCARE MAINTENANCE: ICD-10-CM

## 2018-08-20 DIAGNOSIS — Z11.59 NEED FOR HEPATITIS C SCREENING TEST: ICD-10-CM

## 2018-08-20 DIAGNOSIS — Z12.11 COLON CANCER SCREENING: ICD-10-CM

## 2018-08-20 DIAGNOSIS — F51.01 PRIMARY INSOMNIA: ICD-10-CM

## 2018-08-20 DIAGNOSIS — Z80.0 FAMILY HISTORY OF COLON CANCER: ICD-10-CM

## 2018-08-20 PROBLEM — S91.302A OPEN WOUND OF LEFT FOOT: Status: RESOLVED | Noted: 2018-07-26 | Resolved: 2018-08-20
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
## 2018-08-20 PROBLEM — L03.116 CELLULITIS OF LEFT FOOT: Status: RESOLVED | Noted: 2018-07-26 | Resolved: 2018-08-20

## 2018-08-20 PROCEDURE — 3008F BODY MASS INDEX DOCD: CPT | Mod: ,,, | Performed by: NURSE PRACTITIONER

## 2018-08-20 PROCEDURE — 99214 OFFICE O/P EST MOD 30 MIN: CPT | Mod: ,,, | Performed by: NURSE PRACTITIONER

## 2018-08-20 RX ORDER — TRAZODONE HYDROCHLORIDE 50 MG/1
100 TABLET ORAL NIGHTLY
Qty: 60 TABLET | Refills: 3 | Status: SHIPPED | OUTPATIENT
Start: 2018-08-20 | End: 2019-04-10 | Stop reason: SDUPTHER

## 2018-08-20 NOTE — PATIENT INSTRUCTIONS
Insomnia  Insomnia is repeated difficulty going to sleep or staying asleep, or both. Whether you have insomnia is not defined by a specific amount of sleep. Different people need different amounts of sleep, and you may need more or less sleep at different times of your life.  There are 3 major types of insomnia:  short-term, chronic, and other.  Short-term, or acute insomnia lasts less than 3 months.  The symptoms are temporary and can be linked directly to a stressor, such as the death of a loved one, financial problems, or a new physical problem.  Short-term insomnia stops when the stressor resolves or the person adapts to its presence.  Chronic insomnia occurs at least 3 times a week and lasts longer than 3 months.  Chronic insomnia can occur when either the cause of the sleeping problem is not clear, or the insomnia does not get better when the stressor is resolved. A number of other criteria are also used to make the diagnosis of chronic insomnia.   Other insomnia is the third type of insomnia-related sleep disorders.  This description applies to people who have problems getting to sleep or staying asleep, but do not meet all of the factors that describe either short-term or chronic insomnia.    Many things cause insomnia. Different people may have different causes. It can be from an underlying medical or psychological condition, or lifestyle. It can also be primary insomnia, which means no cause can be found.  Causes of insomnia include:  · Chronic medical problems- heart disease, gastrointestinal problems, hormonal changes, breathing problems  · Anxiety  · Stress  · Depression  · Pain  · Work schedule  · Sleep apnea  · Illegal drugs  · Certain medicines  Many different medidcines can affect your sleep, such as stimulants, caffeine, alcohol, some decongestants, and diet pills. Other medicines may include some types of blood pressure pills, steroids, asthma medicines, antihistamines, antidepressants,  seizure medicines and statins. Not all of these will affect your sleep, and they shouldnt be stopped without talking to your doctor.  Symptoms of insomnia can include:  · Lying awake for long periods at night before falling asleep  · Waking up several times during the night  · Waking up early in the morning and not being able to get back to sleep  · Feeling tired and not refreshed by sleep  · Not being able to function properly during the day and finding it hard to concentrate  · Irritability  · Tiredness and fatigue during the day  Home care  1. Review your medicines with your doctor or pharmacist to find out if they can cause insomnia. Not all medicines will affect your sleep, but they shouldn't be stopped without reviewing them with your doctor. There may be serious side effects and consequences from suddenly stopping your medicines. Not taking them may cause strokes, heart attacks, and many other problems.  2. Caffeine, smoking and alcohol also affect sleep. Limit your daily use and do not use these before bedtime. Alcohol may make you sleepy at first, but as its effects wear off, you may awaken a few hours later and have trouble returning to sleep.  3. Do not exercise, eat or drink large amounts of liquid within 2 hours of your bedtime.  4. Improve your sleep habits. Have a fixed bed and wake-up time. Try to keep noise, light and heat in your bedroom at a comfortable level. Try using earplugs or eyeshades if needed.   5. Avoid watching TV in bed.  6. If you do not fall asleep within 30 minutes, try to relax by reading or listening to soft music.  7. Limit daytime napping to one 30 minute period, early in the day.  8. Get regular exercise. Find other ways to lessen your stress level.  9. If a medicine was prescribed to help reset your sleep patterns, take it as directed. Sleeping pills are intended for short-term use, only. If taken for too long, the effect wears off while the risk of physical addiction and  psychological dependence increases.  Sleep diary  If the cause isnt obvious and it is not improving, try keeping a sleep diary for a couple of weeks. Include in it:  · The time you go to bed  · How long it takes to fall asleep  · How many times you wake up  · What time you wake up  · Your meal times and what you eat  · What time you drink alcohol  · Your exercise habits and times  Follow-up care  Follow up with your healthcare provider, or as advised. If X-rays or CT scans were done, you will be notified if there is a change in the reading, especially if it affects treatment.  Call 911  Call 911 if any of these occur:  · Trouble breathing  · Confusion or trouble waking  · Fainting or loss of consciousness  · Rapid heart rate  · New chest, arm, shoulder, neck or upper back pain  · Trouble with speech or vision, weakness of an arm or leg  · Trouble walking or talking, loss of balance, numbness or weakness in one side of your body, facial droop  When to seek medical advice  Call your healthcare provider right away if any of these occur:  · Extreme restlessness or irritability  · Confusion or hallucinations (seeing or hearing things that are not there)  · Anxiety, depression  · Several days without sleeping  Date Last Reviewed: 11/19/2015  © 9016-9882 [employer]. [address] [place] [address], [place] [address]. All rights reserved. This information is not intended as a substitute for professional medical care. Always follow your healthcare professional's instructions.

## 2018-08-20 NOTE — PROGRESS NOTES
SUBJECTIVE:      Patient ID: Mary Mike is a 58 y.o. female.    Chief Complaint: Establish Care (referrals colonoscopy and anemia)    Pt is here to establish care.  She recently moved back to the area and reports she needs to have some tests done.  She has a family history of colon cancer and states that it is time for her to have colonoscopy.  She has been anemic in the past and has received iron infusions.  She also has c/o right foot pain with a bunion that is worsening.  She reports history of osteoporosis in her spine but states that her last bone density was in the last 1-2 years.  She is on medications for ADD and insomnia as well as anxiety.  She states she does not need refill on medication at this time.      Pain   This is a chronic problem. The current episode started more than 1 year ago. The problem occurs constantly. The problem has been gradually worsening. Associated symptoms include arthralgias. Pertinent negatives include no abdominal pain, anorexia, change in bowel habit, chest pain, chills, congestion, coughing, diaphoresis, fatigue, fever, headaches, joint swelling, myalgias, nausea, neck pain, numbness, rash, sore throat, swollen glands, urinary symptoms, vertigo, visual change, vomiting or weakness. The symptoms are aggravated by standing. She has tried NSAIDs and acetaminophen for the symptoms. The treatment provided mild relief.   Mental Health Problem   The primary symptoms include dysphoric mood. The primary symptoms do not include delusions, hallucinations, bizarre behavior, disorganized speech, negative symptoms or somatic symptoms. This is a chronic problem.   The degree of incapacity that she is experiencing as a consequence of her illness is moderate. Additional symptoms of the illness include anhedonia, insomnia, attention impairment and distractible. Additional symptoms of the illness do not include hypersomnia, appetite change, unexpected weight change, fatigue,  agitation, psychomotor retardation, feelings of worthlessness, euphoric mood, increased goal-directed activity, flight of ideas, inflated self-esteem, decreased need for sleep, poor judgment, visual change, headaches, abdominal pain or seizures. She does not have a plan to commit suicide. She does not contemplate harming herself. She has not already injured self. She does not contemplate injuring another person. She has not already  injured another person.       Past Surgical History:   Procedure Laterality Date    BREAST SURGERY       SECTION      FRACTURE SURGERY      GASTRIC RESTRICTION SURGERY      GASTRIC SLEEVE        Family History   Problem Relation Age of Onset    Arthritis Mother     Colon cancer Mother     Diabetes Mother     Early death Mother     Miscarriages / Stillbirths Mother     Arthritis Father     Heart disease Father     Hypertension Father     Stroke Father     Vision loss Father     Vaginal cancer Paternal Grandmother     Heart disease Paternal Grandfather       Social History     Socioeconomic History    Marital status:      Spouse name: None    Number of children: None    Years of education: None    Highest education level: None   Social Needs    Financial resource strain: None    Food insecurity - worry: None    Food insecurity - inability: None    Transportation needs - medical: None    Transportation needs - non-medical: None   Occupational History    None   Tobacco Use    Smoking status: Never Smoker    Smokeless tobacco: Never Used   Substance and Sexual Activity    Alcohol use: Yes     Comment: occassional    Drug use: No    Sexual activity: Yes   Other Topics Concern    None   Social History Narrative    None     Current Outpatient Medications   Medication Sig Dispense Refill    dextroamphetamine-amphetamine (ADDERALL XR) 20 MG 24 hr capsule Take 20 mg by mouth every morning.      FLUoxetine 20 MG tablet Take 1 tablet (20 mg  total) by mouth once daily. 90 tablet 1    LORazepam (ATIVAN) 1 MG tablet Take 1 tablet (1 mg total) by mouth every 6 (six) hours as needed for Anxiety. 30 tablet 1    magnesium oxide (MAG-OX) 400 mg tablet Take 1 tablet (400 mg total) by mouth once daily.  0    multivitamin (THERAGRAN) tablet Take 1 tablet by mouth once daily.      pramipexole (MIRAPEX) 1 MG tablet Take 2 tablets (2 mg total) by mouth every evening. 2 tabs qhs 180 tablet 1    traZODone (DESYREL) 50 MG tablet Take 2 tablets (100 mg total) by mouth every evening. Take 1-2 tabs at night prn insomnia 60 tablet 3     No current facility-administered medications for this visit.      Review of patient's allergies indicates:  No Known Allergies   Past Medical History:   Diagnosis Date    ADHD (attention deficit hyperactivity disorder)     Allergy     Anemia     Anxiety     Depression     Fatty liver     Insomnia     Migraines     Osteoporosis     Restless leg syndrome      Past Surgical History:   Procedure Laterality Date    BREAST SURGERY       SECTION      FRACTURE SURGERY      GASTRIC RESTRICTION SURGERY      GASTRIC SLEEVE          Review of Systems   Constitutional: Negative for activity change, appetite change, chills, diaphoresis, fatigue, fever and unexpected weight change.   HENT: Negative for congestion, postnasal drip, rhinorrhea, sore throat and voice change.    Eyes: Negative for pain, discharge and visual disturbance.   Respiratory: Negative for apnea, cough, shortness of breath and wheezing.    Cardiovascular: Negative for chest pain, palpitations and leg swelling.   Gastrointestinal: Negative for abdominal pain, anorexia, change in bowel habit, constipation, diarrhea, nausea and vomiting.   Endocrine: Negative for polydipsia, polyphagia and polyuria.   Genitourinary: Negative for difficulty urinating, dysuria, frequency, urgency and vaginal discharge.   Musculoskeletal: Positive for arthralgias. Negative for  "gait problem, joint swelling, myalgias and neck pain.   Skin: Negative for color change, pallor and rash.   Allergic/Immunologic: Negative for immunocompromised state.   Neurological: Negative for dizziness, vertigo, seizures, syncope, weakness, numbness and headaches.   Hematological: Negative for adenopathy.   Psychiatric/Behavioral: Positive for decreased concentration, dysphoric mood and sleep disturbance. Negative for agitation, confusion, hallucinations, self-injury and suicidal ideas. The patient is nervous/anxious and has insomnia.       OBJECTIVE:      Vitals:    08/20/18 1451   BP: 130/76   Pulse: 78   SpO2: 98%   Weight: 67.1 kg (148 lb)   Height: 5' 3" (1.6 m)     Physical Exam   Constitutional: She is oriented to person, place, and time. She appears well-developed and well-nourished. No distress.   HENT:   Head: Normocephalic and atraumatic.   Right Ear: Tympanic membrane, external ear and ear canal normal.   Left Ear: Tympanic membrane, external ear and ear canal normal.   Nose: Nose normal.   Mouth/Throat: Uvula is midline and oropharynx is clear and moist. No oropharyngeal exudate, posterior oropharyngeal edema or posterior oropharyngeal erythema.   Eyes: Conjunctivae, EOM and lids are normal. Pupils are equal, round, and reactive to light. Right eye exhibits no discharge. Left eye exhibits no discharge. No scleral icterus.   Neck: Normal range of motion. Neck supple. Carotid bruit is not present. No thyromegaly present.   Cardiovascular: Normal rate, regular rhythm, normal heart sounds and intact distal pulses. Exam reveals no gallop and no friction rub.   No murmur heard.  Pulmonary/Chest: Effort normal and breath sounds normal. No stridor. No respiratory distress. She has no wheezes. She has no rales.   Abdominal: Soft. Bowel sounds are normal. She exhibits no distension and no mass. There is no hepatosplenomegaly. There is no tenderness. There is no rigidity, no rebound, no guarding and no CVA " tenderness.   Musculoskeletal: Normal range of motion. She exhibits tenderness (right foot). She exhibits no edema.        Right foot: There is bony tenderness and deformity (bunion).   Lymphadenopathy:     She has no cervical adenopathy.   Neurological: She is alert and oriented to person, place, and time. She displays normal reflexes. Coordination normal.   Skin: Skin is warm, dry and intact. Capillary refill takes less than 2 seconds. No rash noted. She is not diaphoretic. No erythema.   Psychiatric: She has a normal mood and affect. Her behavior is normal. Thought content normal. She expresses no suicidal plans.      Assessment:       1. Right foot pain    2. Primary insomnia    3. Colon cancer screening    4. Family history of colon cancer    5. Microcytic anemia    6. Other osteoporosis without current pathological fracture    7. Need for hepatitis C screening test    8. Healthcare maintenance        Plan:       Right foot pain  -     Ambulatory Referral to Podiatry    Primary insomnia   Side effects of new medication discussed with patient; understanding voiced.  Wean off of Ativan with 1/2 tab with 1/2 tab trazodone at hs for 3-5 days then stop ativan and increase trazodone to 1 tab at hs; may take up to two tabs at hs  -     traZODone (DESYREL) 50 MG tablet; Take 2 tablets (100 mg total) by mouth every evening. Take 1-2 tabs at night prn insomnia  Dispense: 60 tablet; Refill: 3    Colon cancer screening  -     Ambulatory referral to Gastroenterology    Family history of colon cancer   Needs colonoscopy    Microcytic anemia  -     CBC auto differential; Future; Expected date: 08/20/2018  -     Ferritin; Future; Expected date: 08/20/2018  -     Iron and TIBC; Future; Expected date: 08/20/2018  -     Reticulocytes; Future; Expected date: 08/20/2018  -     TSH; Future; Expected date: 08/20/2018    Other osteoporosis without current pathological fracture  -     Vitamin D; Future; Expected date:  08/20/2018    Need for hepatitis C screening test   Need old records  -     Hepatitis C antibody; Future; Expected date: 08/20/2018    Healthcare maintenance  -     Lipid panel; Future; Expected date: 08/20/2018    ADD in Adult   Need old records of psychological testing prior to prescribing stimulant medication    Follow-up if symptoms worsen or fail to improve.      8/20/2018 JOCELYN Parikh, FNP

## 2018-10-03 ENCOUNTER — OFFICE VISIT (OUTPATIENT)
Dept: FAMILY MEDICINE | Facility: CLINIC | Age: 58
End: 2018-10-03
Payer: COMMERCIAL

## 2018-10-03 VITALS
HEART RATE: 80 BPM | BODY MASS INDEX: 25.49 KG/M2 | DIASTOLIC BLOOD PRESSURE: 70 MMHG | HEIGHT: 63 IN | WEIGHT: 143.88 LBS | SYSTOLIC BLOOD PRESSURE: 118 MMHG | RESPIRATION RATE: 18 BRPM | OXYGEN SATURATION: 99 %

## 2018-10-03 DIAGNOSIS — F51.01 PRIMARY INSOMNIA: ICD-10-CM

## 2018-10-03 DIAGNOSIS — M79.671 RIGHT FOOT PAIN: Primary | ICD-10-CM

## 2018-10-03 DIAGNOSIS — Z01.818 PRE-OP EXAM: ICD-10-CM

## 2018-10-03 DIAGNOSIS — Z23 NEED FOR INFLUENZA VACCINATION: ICD-10-CM

## 2018-10-03 PROCEDURE — 90686 IIV4 VACC NO PRSV 0.5 ML IM: CPT | Mod: ,,, | Performed by: NURSE PRACTITIONER

## 2018-10-03 PROCEDURE — 90471 IMMUNIZATION ADMIN: CPT | Mod: ,,, | Performed by: NURSE PRACTITIONER

## 2018-10-03 PROCEDURE — 99213 OFFICE O/P EST LOW 20 MIN: CPT | Mod: 25,,, | Performed by: NURSE PRACTITIONER

## 2018-10-03 PROCEDURE — 3008F BODY MASS INDEX DOCD: CPT | Mod: ,,, | Performed by: NURSE PRACTITIONER

## 2018-10-03 RX ORDER — FLUOXETINE HYDROCHLORIDE 20 MG/1
1 CAPSULE ORAL NIGHTLY
Refills: 1 | COMMUNITY
Start: 2018-08-27 | End: 2021-10-21 | Stop reason: SDUPTHER

## 2018-10-03 NOTE — PROGRESS NOTES
SUBJECTIVE:      Patient ID: Mary Mike is a 58 y.o. female.    Chief Complaint: Pre-op Exam (shortening osteotomy 2nd metatarsal, repair ligament 2nd MPJ right foot, bunionectomy with osteotomy right foot, 18 Dr. Henderson)    Kary is here today for pre-op clearance with Dr. Henderson.  She is scheduled on 10/17.  She states she has been doing well since her last visit.  She denies current symptoms of infection. She denies chest pain and SOB at rest and on exertion.  She is able to complete 4 METs (cllimbing up steps, sweeping and mopping without a break) of activity without symptoms as well.          Past Surgical History:   Procedure Laterality Date    BREAST SURGERY       SECTION      FRACTURE SURGERY      GASTRIC RESTRICTION SURGERY      GASTRIC SLEEVE        Family History   Problem Relation Age of Onset    Arthritis Mother     Colon cancer Mother     Diabetes Mother     Early death Mother     Miscarriages / Stillbirths Mother     Arthritis Father     Heart disease Father     Hypertension Father     Stroke Father     Vision loss Father     Vaginal cancer Paternal Grandmother     Heart disease Paternal Grandfather       Social History     Socioeconomic History    Marital status:      Spouse name: None    Number of children: None    Years of education: None    Highest education level: None   Social Needs    Financial resource strain: None    Food insecurity - worry: None    Food insecurity - inability: None    Transportation needs - medical: None    Transportation needs - non-medical: None   Occupational History    None   Tobacco Use    Smoking status: Never Smoker    Smokeless tobacco: Never Used   Substance and Sexual Activity    Alcohol use: Yes     Comment: occassional    Drug use: No    Sexual activity: Yes   Other Topics Concern    None   Social History Narrative    None     Current Outpatient Medications   Medication Sig Dispense Refill     dextroamphetamine-amphetamine (ADDERALL XR) 20 MG 24 hr capsule Take 20 mg by mouth every morning.      LORazepam (ATIVAN) 1 MG tablet Take 1 tablet (1 mg total) by mouth every 6 (six) hours as needed for Anxiety. 30 tablet 1    magnesium oxide (MAG-OX) 400 mg tablet Take 1 tablet (400 mg total) by mouth once daily.  0    multivitamin (THERAGRAN) tablet Take 1 tablet by mouth once daily.      pramipexole (MIRAPEX) 1 MG tablet Take 2 tablets (2 mg total) by mouth every evening. 2 tabs qhs 180 tablet 1    traZODone (DESYREL) 50 MG tablet Take 2 tablets (100 mg total) by mouth every evening. Take 1-2 tabs at night prn insomnia 60 tablet 3    FLUoxetine (PROZAC) 20 MG capsule Take 1 capsule by mouth nightly.  1     No current facility-administered medications for this visit.      Review of patient's allergies indicates:  No Known Allergies   Past Medical History:   Diagnosis Date    ADHD (attention deficit hyperactivity disorder)     Allergy     Anemia     Anxiety     Depression     Fatty liver     Insomnia     Migraines     Osteoporosis     Restless leg syndrome      Past Surgical History:   Procedure Laterality Date    BREAST SURGERY       SECTION      FRACTURE SURGERY      GASTRIC RESTRICTION SURGERY      GASTRIC SLEEVE 2012         Review of Systems   Constitutional: Negative for activity change, appetite change, chills, diaphoresis, fatigue, fever and unexpected weight change.   HENT: Negative for congestion, nosebleeds, postnasal drip, rhinorrhea, sore throat and voice change.    Eyes: Negative for pain, discharge and visual disturbance.   Respiratory: Negative for apnea, cough, chest tightness, shortness of breath and wheezing.    Cardiovascular: Negative for chest pain, palpitations and leg swelling.   Gastrointestinal: Negative for abdominal pain, constipation, diarrhea, nausea and vomiting.   Endocrine: Negative for cold intolerance, heat intolerance, polydipsia, polyphagia and  "polyuria.   Genitourinary: Negative for difficulty urinating, dysuria, flank pain, frequency, hematuria, urgency and vaginal discharge.   Musculoskeletal: Positive for arthralgias. Negative for gait problem and myalgias.   Skin: Negative for color change, pallor and rash.   Allergic/Immunologic: Negative for immunocompromised state.   Neurological: Negative for dizziness, syncope, weakness, light-headedness, numbness and headaches.   Hematological: Negative for adenopathy. Does not bruise/bleed easily.   Psychiatric/Behavioral: Positive for decreased concentration and sleep disturbance. Negative for confusion, dysphoric mood, self-injury and suicidal ideas.      OBJECTIVE:      Vitals:    10/03/18 1450   BP: 118/70   Pulse: 80   Resp: 18   SpO2: 99%   Weight: 65.3 kg (143 lb 14.4 oz)   Height: 5' 3" (1.6 m)     Physical Exam   Constitutional: She is oriented to person, place, and time. She appears well-developed and well-nourished. No distress.   HENT:   Head: Normocephalic and atraumatic.   Right Ear: Tympanic membrane, external ear and ear canal normal.   Left Ear: Tympanic membrane, external ear and ear canal normal.   Nose: Nose normal.   Mouth/Throat: Uvula is midline and oropharynx is clear and moist. No oropharyngeal exudate, posterior oropharyngeal edema or posterior oropharyngeal erythema.   Eyes: Conjunctivae, EOM and lids are normal. Pupils are equal, round, and reactive to light. Right eye exhibits no discharge. Left eye exhibits no discharge. No scleral icterus.   Neck: Normal range of motion. Neck supple. Carotid bruit is not present. No tracheal deviation present. No thyromegaly present.   Cardiovascular: Normal rate, regular rhythm, normal heart sounds and intact distal pulses. Exam reveals no gallop and no friction rub.   No murmur heard.  Pulmonary/Chest: Effort normal and breath sounds normal. No stridor. No respiratory distress. She has no wheezes. She has no rales.   Abdominal: Soft. Bowel " sounds are normal. She exhibits no distension and no mass. There is no hepatosplenomegaly. There is no tenderness. There is no rigidity, no rebound, no guarding and no CVA tenderness.   Musculoskeletal: Normal range of motion. She exhibits no edema or tenderness.   Lymphadenopathy:     She has no cervical adenopathy.   Neurological: She is alert and oriented to person, place, and time. She displays normal reflexes.   Skin: Skin is warm, dry and intact. Capillary refill takes less than 2 seconds. No rash noted. She is not diaphoretic. No erythema.   Psychiatric: She has a normal mood and affect. Her behavior is normal. Judgment and thought content normal. She expresses no suicidal plans.   Vitals reviewed.     Assessment:       1. Right foot pain    2. Pre-op exam    3. Primary insomnia    4. Need for influenza vaccination        Plan:       Right foot pain   Continue follow up with Dr. Henderson    Pre-op exam   Medically cleared for surgery; able to complete 4 mets of activity without difficulty    Primary insomnia   Discussed weaning off of ativan again today.  She states she is currently taking 1/2 of an ativan and 1/2 of a trazodone and sleeping well.  States she will try stopping the ativan and increasing the trazodone to a whole pill and 2 if needed.    Need for influenza vaccination  -     Influenza - Quadrivalent (3 years & older) (PF)        Follow-up if symptoms worsen or fail to improve.      10/3/2018 JOCELYN Parikh, FNP

## 2018-10-03 NOTE — PATIENT INSTRUCTIONS
Aerobic Exercise for a Healthy Heart  Exercise is a lot more than an energy booster and a stress reliever. It also strengthens your heart muscle, lowers your blood pressure and cholesterol, and burns calories. It can also improve your resting muscle tone, and your mood.     Remember, some activity is better than none.    Choose an aerobic activity  Choose an activity that makes your heart and lungs work harder than they do when you rest or walk normally. This aerobic exercise can improve the way your heart and other muscles use oxygen. Make it fun by exercising with a friend and choosing an activity you enjoy. Here are some ideas:  · Walking  · Swimming  · Bicycling  · Stair climbing  · Dancing  · Jogging  · Gardening  Exercise regularly  If you havent been exercising regularly,  get your doctors OK first. Then start slowly.  Here are some tips:  · Begin exercising 3 times a week for 5 to 10 minutes at a time.  · When you feel comfortable, add a few minutes each session.  · Slowly build up to exercising 3 to 4 times each week. Each session should last for 40 minutes, on average, and involve moderate- to vigorous-intensity physical activity.  · If you have been given nitroglycerin, be sure to carry it when you exercise.  · If you get chest pain (angina) when youre exercising, stop what youre doing, take your nitroglycerin, and call your doctor.  Date Last Reviewed: 6/2/2016 © 2000-2017 [employer]. [address] [place] [address]. All rights reserved. This information is not intended as a substitute for professional medical care. Always follow your healthcare professional's instructions.        Eating Heart-Healthy Food: Using the DASH Plan    Eating for your heart doesnt have to be hard or boring. You just need to know how to make healthier choices. The DASH eating plan has been developed to help you do just that. DASH stands for Dietary Approaches to Stop Hypertension. It is a plan  that has been proven to be healthier for your heart and to lower your risk for high blood pressure. It can also help lower your risk for cancer, heart disease, osteoporosis, and diabetes.  Choosing from each food group  Choose foods from each of the food groups below each day. Try to get the recommended number of servings for each food group. The serving numbers are based on a diet of 2,000 calories a day. Talk to your doctor if youre unsure about your calorie needs. Along with getting the correct servings, the DASH plan also recommends a sodium intake less than 2,300 mg per day.        Grains  Servings: 6 to 8 a day  A serving is:  · 1 slice bread  · 1 ounce dry cereal  · Half a cup cooked rice, pasta or cereal  Best choices: Whole grains and any grains high in fiber. Vegetables  Servings: 4 to 5 a day  A serving is:  · 1 cup raw leafy vegetable  · Half a cup cut-up raw or cooked vegetable  · Half a cup vegetable juice  Best choices: Fresh or frozen vegetables prepared without added salt or fat.   Fruits  Servings: 4 to 5 a day  A serving is:  · 1 medium fruit  · One-quarter cup dried fruit  · Half a cup fresh, frozen, or canned fruit  · Half a cup of 100% fruit juices  Best choices: A variety of fresh fruits of different colors. Whole fruits are a better choice than fruit juices. Low-fat or fat-free dairy  Servings: 2 to 3 a day  A serving is:  · 1 cup milk  · 1 cup yogurt  · One and a half ounces cheese  Best choices: Skim or 1% milk, low-fat or fat-free yogurt or buttermilk, and low-fat cheeses.         Lean meats, poultry, fish  Servings: 6 or fewer a day  A serving is:  · 1 ounce cooked meats, poultry, or fish  · 1 egg  Best choices: Lean poultry and fish. Trim away visible fat. Broil, grill, roast, or boil instead of frying. Remove skin from poultry before eating. Limit how much red meat you eat.  Nuts, seeds, beans  Servings: 4 to 5 a week  A serving is:  · One-third cup nuts (one and a half ounces)  · 2  tablespoons nut butter or seeds  · Half a cup cooked dry beans or legumes  Best choices: Dry roasted nuts with no salt added, lentils, kidney beans, garbanzo beans, and whole castillo beans.   Fats and oils  Servings: 2 to 3 a day  A serving is:  · 1 teaspoon vegetable oil  · 1 teaspoon soft margarine  · 1 tablespoon mayonnaise  · 2 tablespoons salad dressing  Best choices: Nut and vegetable oils (nontropical vegetable oils), such as olive and canola oil. Sweets  Servings: 5 a week or fewer  A serving is:  · 1 tablespoon sugar, maple syrup, or honey  · 1 tablespoon jam or jelly  · 1 half-ounce jelly beans (about 15)  · 1 cup lemonade  Best choices: Dried fruit can be a satisfying sweet. Choose low-fat sweets. And watch your serving sizes!      For more on the DASH eating plan, visit:  www.nhlbi.nih.gov/health/health-topics/topics/dash   Date Last Reviewed: 6/1/2016  © 0987-4422 [employer]. [address] [place] [address]. All rights reserved. This information is not intended as a substitute for professional medical care. Always follow your healthcare professional's instructions.        Getting a Flu Vaccine  The flu (influenza) is caused by a virus that is easily spread. A flu vaccine is your best chance to avoid the flu. The vaccine is given in the form of a shot (injection) or a nasal spray. Its best to get vaccinated each year when the flu vaccine is available in your area. This can be done at your healthcare providers office or a health clinic. Drugstores, senior centers, and workplaces often offer flu vaccines, too. If you want to know when the vaccine is available or if you have questions about getting vaccinated, ask your healthcare provider.  Flu facts  · The flu vaccine will not give you the flu.  · The flu is caused by a virus. It cant be treated with antibiotics.  · The flu can be life-threatening, especially for people in high-risk groups.  · Influenza is not the same as stomach  "flu, the 24-hour bug that causes vomiting and diarrhea. This is most likely because of a GI (gastrointestinal) infection--not the flu.   Flu symptoms  Flu symptoms tend to come on quickly. Fever, headache, fatigue, cough, sore throat, runny nose, and muscle aches are symptoms of the flu. Children may have upset stomach or vomiting, but adults usually dont. Some symptoms such as fatigue and cough can last a few weeks.  How a flu vaccine protects you    There are many types (strains) of flu viruses. Medical experts predict which strains are most likely to make people sick each year. Flu vaccines are made from these strains. With the shot, "killed" (inactivated) flu viruses are injected into your body. With the nasal spray, live and weakened viruses are sprayed into your nose. The viruses in both vaccines cannot make you sick. But they do cause the body to make antibodies to fight these flu strains. If you are exposed to the same strains later in the flu season, the antibodies will fight off the virus. Your healthcare provider can tell you which type of flu vaccine is right for you.  Who should get the flu vaccine?  The CDC recommends that infants over the age of 6 months and all children and adults should get a flu shot every year.  Some people are at an increased risk of developing serious complications from the flu. It's extremely important that these people get the vaccine. They include those with:  · Long-term heart and lung conditions  · Other serious health conditions:  ¨ Endocrine disorders such as diabetes  ¨ Kidney or liver disorders  ¨ Weak immune system from disease or medical treatment. This could be a person with HIV or AIDS or those taking long-term steroids or medicines to treat cancer  ¨ Blood disorders such as sickle cell disease  It is also very important that others who have an increased risk of being exposed to the flu or are around people with increased risk for complications get the vaccine. " They are:  · Healthcare providers and other staff who provide care in hospitals, nursing homes, home health, and other facilities  · Household members, including children of people in high-risk groups  Types of flu vaccines  The flu vaccine is available as a shot and as a nasal spray. Your healthcare provider will recommend the vaccine that is best for you.  Flu shot  The shot is available in a few different forms. There is a high-dose vaccine for those over age 65 and a vaccine for those with egg allergies. It's safe for most people. Talk with your provider if you have had:  · A severe allergic reaction to a previous flu vaccine  · Guillain-Barré syndrome. This is a severe paralyzing condition.  Nasal spray  The nasal spray is not recommended for the 1016-2629 flu season. The CDC says this is because the nasal spray did not seem to protect against the flu over the last several flu seasons. In the past, it was meant for people ages 2 to 49.  Date Last Reviewed: 8/27/2014  © 0029-1235 [employer]. [address] [place] [address], [place] [address]. All rights reserved. This information is not intended as a substitute for professional medical care. Always follow your healthcare professional's instructions.

## 2018-10-15 ENCOUNTER — PATIENT OUTREACH (OUTPATIENT)
Dept: ADMINISTRATIVE | Facility: HOSPITAL | Age: 58
End: 2018-10-15

## 2018-10-15 NOTE — LETTER
October 23, 2018    Mary Mike  [address]  [place] [address]             [facility]  [address]  [facility] [address]  Phone: [phone] DearPfamilia Mike       [facility] is committed to your overall health.  To help you get the most out of each of your visits, we will review your information to make sure you are up to date on all of your recommended tests and/or procedures.       As a new patient to Dr.Julia Medina , we may not have your complete medical records.  She has found that your chart shows you may be due for a Lipid Panel, Hep. C Screen, Mammogram, & Colorectal Cancer Screening.     If you have had any of the above done at another facility, please bring the records or information with you so that your record at [facility] will be complete.       If you are currently taking medication, please bring it with you to your appointment for review.     Also, if you have any type of Advanced Directives, please bring them with you to your office visit so we may scan them into your chart.         Nirali Boudreaux LPN Clinical Care Coordinator   [facility]   [address] [facility] [facility] [place] [address]   Phone [phone]   Fax [phone]

## 2018-10-23 NOTE — PROGRESS NOTES
"Attempted to outreach patient for pre-visit via "[employer]", no answer after a week. Sending outreach via Mail Out Letter now.    "

## 2018-10-25 ENCOUNTER — DOCUMENTATION ONLY (OUTPATIENT)
Dept: FAMILY MEDICINE | Facility: CLINIC | Age: 58
End: 2018-10-25

## 2018-10-25 NOTE — PROGRESS NOTES
Pre-Visit Chart Review  For Appointment Scheduled on 10/29/2018    Health Maintenance Due   Topic Date Due    Hepatitis C Screening  1960    Lipid Panel  1960    Mammogram  07/07/2017    Colonoscopy  07/29/2018

## 2018-11-27 DIAGNOSIS — F13.20 BENZODIAZEPINE DEPENDENCE: ICD-10-CM

## 2018-11-27 RX ORDER — LORAZEPAM 1 MG/1
1 TABLET ORAL NIGHTLY PRN
Qty: 30 TABLET | Refills: 0 | Status: SHIPPED | OUTPATIENT
Start: 2018-11-27 | End: 2019-01-09 | Stop reason: SDUPTHER

## 2018-11-28 LAB — CRC RECOMMENDATION EXT: NORMAL

## 2019-01-09 DIAGNOSIS — F13.20 BENZODIAZEPINE DEPENDENCE: ICD-10-CM

## 2019-01-09 RX ORDER — LORAZEPAM 1 MG/1
1 TABLET ORAL NIGHTLY PRN
Qty: 30 TABLET | Refills: 0 | Status: SHIPPED | OUTPATIENT
Start: 2019-01-09 | End: 2019-10-16

## 2019-01-14 ENCOUNTER — OFFICE VISIT (OUTPATIENT)
Dept: PODIATRY | Facility: CLINIC | Age: 59
End: 2019-01-14
Payer: COMMERCIAL

## 2019-01-14 VITALS
TEMPERATURE: 97 F | BODY MASS INDEX: 26.93 KG/M2 | HEIGHT: 63 IN | DIASTOLIC BLOOD PRESSURE: 84 MMHG | HEART RATE: 100 BPM | SYSTOLIC BLOOD PRESSURE: 128 MMHG | WEIGHT: 152 LBS

## 2019-01-14 DIAGNOSIS — M79.671 FOOT PAIN, RIGHT: ICD-10-CM

## 2019-01-14 DIAGNOSIS — M20.41 HAMMER TOE OF RIGHT FOOT: ICD-10-CM

## 2019-01-14 DIAGNOSIS — M20.11 HALLUX VALGUS OF RIGHT FOOT: Primary | ICD-10-CM

## 2019-01-14 PROCEDURE — 99024 POSTOP FOLLOW-UP VISIT: CPT | Mod: ,,, | Performed by: PODIATRIST

## 2019-01-14 PROCEDURE — 99024 PR POST-OP FOLLOW-UP VISIT: ICD-10-PCS | Mod: ,,, | Performed by: PODIATRIST

## 2019-01-14 PROCEDURE — 73630 PR  X-RAY FOOT 3+ VW: ICD-10-PCS | Mod: RT,,, | Performed by: PODIATRIST

## 2019-01-14 PROCEDURE — 73630 X-RAY EXAM OF FOOT: CPT | Mod: RT,,, | Performed by: PODIATRIST

## 2019-01-14 RX ORDER — LORAZEPAM 1 MG/1
TABLET ORAL
COMMUNITY
End: 2019-03-26

## 2019-01-14 RX ORDER — RISEDRONATE SODIUM 35 MG/1
TABLET, FILM COATED ORAL
Refills: 11 | COMMUNITY
Start: 2018-12-27 | End: 2020-05-18

## 2019-01-14 NOTE — PROGRESS NOTES
[address] [facility] [place] [place] [address]  Phone: [phone]   [phone]    Patient's PCP:EUSEBIO Parikh  Referring Provider:No ref. provider found    Subjective:      Chief Complaint: Post-Op    Date of Surgery: 10/17/18  Procedure: Bunionectomy with osteotomy and Plantar plate repair right second metatarsal and shortening right second metatarsal    HPI:   Mary Mike is a 58 y.o. female who returns to the clinic today for her fourth postop visit.  Mary Mike rates pain a 7//10 on a pain scale. Compliance of Care: Running shoes,Stretching,Non-impact exercising.    Vitals:    19 1651   BP: 128/84   Pulse: 100   Temp: 97.2 °F (36.2 °C)       Past Surgical History:   Procedure Laterality Date    BREAST SURGERY      BUNIONECTOMY Right      SECTION      FRACTURE SURGERY      GASTRIC RESTRICTION SURGERY      GASTRIC SLEEVE        Past Medical History:   Diagnosis Date    ADHD (attention deficit hyperactivity disorder)     Allergy     Anemia     Anxiety     Depression     Fatty liver     Insomnia     Migraines     Osteoporosis     Restless leg syndrome      Family History   Problem Relation Age of Onset    Arthritis Mother     Colon cancer Mother     Diabetes Mother     Early death Mother     Miscarriages / Stillbirths Mother     Arthritis Father     Heart disease Father     Hypertension Father     Stroke Father     Vision loss Father     Vaginal cancer Paternal Grandmother     Heart disease Paternal Grandfather         Social History:   Marital Status:   Alcohol History:  reports that she drinks alcohol.  Tobacco History:  reports that  has never smoked. she has never used smokeless tobacco.  Drug History:  reports that she does not use drugs.    Review of patient's allergies indicates:  No Known Allergies    Current Outpatient Medications   Medication Sig Dispense Refill    FLUoxetine (PROZAC) 20 MG capsule Take 1 capsule  by mouth nightly.  1    LORazepam (ATIVAN) 1 MG tablet Take 1 tablet (1 mg total) by mouth nightly as needed. 30 tablet 0    multivitamin (THERAGRAN) tablet Take 1 tablet by mouth once daily.      pramipexole (MIRAPEX) 1 MG tablet Take 2 tablets (2 mg total) by mouth every evening. 2 tabs qhs 180 tablet 1    risedronate (ACTONEL) 35 MG tablet   11    traZODone (DESYREL) 50 MG tablet Take 2 tablets (100 mg total) by mouth every evening. Take 1-2 tabs at night prn insomnia 60 tablet 3    dextroamphetamine-amphetamine (ADDERALL XR) 20 MG 24 hr capsule Take 20 mg by mouth every morning.      LORazepam (ATIVAN) 1 MG tablet take as directed      magnesium oxide (MAG-OX) 400 mg tablet Take 1 tablet (400 mg total) by mouth once daily.  0     No current facility-administered medications for this visit.        Review of Systems   Constitutional: Negative for chills, fatigue, fever and unexpected weight change.   HENT: Negative for hearing loss and trouble swallowing.    Eyes: Negative for photophobia and visual disturbance.   Respiratory: Negative for cough, shortness of breath and wheezing.    Cardiovascular: Negative for chest pain, palpitations and leg swelling.   Gastrointestinal: Negative for abdominal pain and nausea.   Genitourinary: Negative for dysuria and frequency.   Musculoskeletal: Positive for arthralgias. Negative for back pain and joint swelling.   Skin: Negative for rash.   Neurological: Negative for tremors, seizures, weakness, numbness and headaches.   Hematological: Bruises/bleeds easily.         Objective:        Post-op surgery of the right bunionectomy with first metatarsal osteotomy and screw fixation, shortening osteotomy second metatarsal right foot with plantar plate repair in arthrodesis PIPJ second toe with normal healing, no signs of infection or dehiscence of wound. Hardware in place. Normal post op exam today. No redness, no drainage, no increase in local temperature, no significant  swelling, sutures.steri-strips are intact.     Imaging: AP, lateral, lateral oblique weightbearing right foot  X-rays reveal normal healing of the first metatarsal osteotomy for the bunionectomy and osteotomy with screws in place no problems with hardware. Normal healing of the shortening osteotomy of the second metatarsal in normal healing of the arthrodesis of the PIPJ of the second toe of the right foot.    Ortho/SPM Exam  Physical Exam        Assessment and Plan:         12 weeks status post bunionectomy with osteotomy with normal healing right foot #212 weeks status post shortening osteotomy second metatarsal with screw fixation normal healing. #3 normal healing arthrodesis PIPJ second toe right foot    Plan:  Gradually increase activity level gradually tried different shoes work on range of motion of the joints ice as needed OTC NSAIDs as needed. Return to see me in 2 months.                  This note was created using Dragon voice recognition software that occasionally misinterpreted phrases or words.

## 2019-02-12 ENCOUNTER — OFFICE VISIT (OUTPATIENT)
Dept: PODIATRY | Facility: CLINIC | Age: 59
End: 2019-02-12
Payer: COMMERCIAL

## 2019-02-12 VITALS
HEART RATE: 86 BPM | DIASTOLIC BLOOD PRESSURE: 86 MMHG | BODY MASS INDEX: 27.29 KG/M2 | TEMPERATURE: 98 F | SYSTOLIC BLOOD PRESSURE: 128 MMHG | WEIGHT: 154 LBS | HEIGHT: 63 IN

## 2019-02-12 DIAGNOSIS — M20.41 HAMMER TOE OF RIGHT FOOT: ICD-10-CM

## 2019-02-12 DIAGNOSIS — M79.671 FOOT PAIN, RIGHT: Primary | ICD-10-CM

## 2019-02-12 DIAGNOSIS — M20.11 HALLUX VALGUS OF RIGHT FOOT: ICD-10-CM

## 2019-02-12 PROCEDURE — 3008F PR BODY MASS INDEX (BMI) DOCUMENTED: ICD-10-PCS | Mod: ,,, | Performed by: PODIATRIST

## 2019-02-12 PROCEDURE — 99213 OFFICE O/P EST LOW 20 MIN: CPT | Mod: 25,,, | Performed by: PODIATRIST

## 2019-02-12 PROCEDURE — 3008F BODY MASS INDEX DOCD: CPT | Mod: ,,, | Performed by: PODIATRIST

## 2019-02-12 PROCEDURE — 73630 X-RAY EXAM OF FOOT: CPT | Mod: RT,,, | Performed by: PODIATRIST

## 2019-02-12 PROCEDURE — 73630 PR  X-RAY FOOT 3+ VW: ICD-10-PCS | Mod: RT,,, | Performed by: PODIATRIST

## 2019-02-12 PROCEDURE — 99213 PR OFFICE/OUTPT VISIT, EST, LEVL III, 20-29 MIN: ICD-10-PCS | Mod: 25,,, | Performed by: PODIATRIST

## 2019-02-12 NOTE — PROGRESS NOTES
[address] [facility] [place]. [place] [address]  Phone: [phone]   [phone]    Patient's PCP:EUSEBIO Parikh  Referring Provider: No ref. provider found    Subjective:      Chief Complaint:: Post-op Evaluation (right second)    HPI  Mary Mike is a 58 y.o. female who presents with a complaint of right second toe won't bend or lay flat on floor lasting. Current symptoms include pain.  Aggravating factors are walking. Symptoms have increased.Treatment to date have included none. Patients rates pain 4/10 on pain scale.      Vitals:    19 1515   BP: 128/86   Pulse: 86   Temp: 98.3 °F (36.8 °C)       Past Surgical History:   Procedure Laterality Date    BREAST SURGERY      BUNIONECTOMY Right      SECTION      FRACTURE SURGERY      GASTRIC RESTRICTION SURGERY      GASTRIC SLEEVE        Past Medical History:   Diagnosis Date    ADHD (attention deficit hyperactivity disorder)     Allergy     Anemia     Anxiety     Depression     Fatty liver     Insomnia     Migraines     Osteoporosis     Restless leg syndrome      Family History   Problem Relation Age of Onset    Arthritis Mother     Colon cancer Mother     Diabetes Mother     Early death Mother     Miscarriages / Stillbirths Mother     Arthritis Father     Heart disease Father     Hypertension Father     Stroke Father     Vision loss Father     Vaginal cancer Paternal Grandmother     Heart disease Paternal Grandfather         Social History:   Marital Status:   Alcohol History:  reports that she drinks alcohol.  Tobacco History:  reports that  has never smoked. she has never used smokeless tobacco.  Drug History:  reports that she does not use drugs.    Review of patient's allergies indicates:  No Known Allergies    Current Outpatient Medications   Medication Sig Dispense Refill    dextroamphetamine-amphetamine (ADDERALL XR) 20 MG 24 hr capsule Take 20 mg by mouth every morning.       FLUoxetine (PROZAC) 20 MG capsule Take 1 capsule by mouth nightly.  1    LORazepam (ATIVAN) 1 MG tablet Take 1 tablet (1 mg total) by mouth nightly as needed. 30 tablet 0    LORazepam (ATIVAN) 1 MG tablet take as directed      magnesium oxide (MAG-OX) 400 mg tablet Take 1 tablet (400 mg total) by mouth once daily.  0    multivitamin (THERAGRAN) tablet Take 1 tablet by mouth once daily.      pramipexole (MIRAPEX) 1 MG tablet Take 2 tablets (2 mg total) by mouth every evening. 2 tabs qhs 180 tablet 1    risedronate (ACTONEL) 35 MG tablet   11    traZODone (DESYREL) 50 MG tablet Take 2 tablets (100 mg total) by mouth every evening. Take 1-2 tabs at night prn insomnia 60 tablet 3     No current facility-administered medications for this visit.        Review of Systems   Constitutional: Negative for chills, fatigue, fever and unexpected weight change.   HENT: Negative for hearing loss and trouble swallowing.    Eyes: Negative for photophobia and visual disturbance.   Respiratory: Negative for cough, shortness of breath and wheezing.    Cardiovascular: Negative for chest pain, palpitations and leg swelling.   Gastrointestinal: Negative for abdominal pain and nausea.   Genitourinary: Negative for dysuria and frequency.   Musculoskeletal: Negative for arthralgias, back pain and joint swelling.   Skin: Negative for rash.   Neurological: Negative for tremors, seizures, weakness, numbness and headaches.   Hematological: Does not bruise/bleed easily.         Objective:        Physical Exam:   Foot Exam    Right Foot/Ankle     Comments  Right second toe surgery site looks well-healed second toe slightly elevated at MPJ does not purchase the floor. There is some swelling and stiffness in the joint.        Physical Exam    Imaging:   X-Ray Foot Complete Right  AP, lateral, lateral oblique weightbearing x-rays right foot:  Well-healed bunionectomy with osteotomy and screw fixation. The second metatarsal osteotomy is well  healed the second MPJs and rectus position and transverse plane. There is slight elevation of the second toe at the second MPJ. No other pathology seen.           Assessment:       1. Foot pain, right    2. Hammer toe of right foot    3. Hallux valgus of right foot - Right Foot      Plan:   Foot pain, right  -     X-Ray Foot Complete Right  -     Ambulatory Referral to Physical/Occupational Therapy    Hammer toe of right foot  -     X-Ray Foot Complete Right  -     Ambulatory Referral to Physical/Occupational Therapy    Hallux valgus of right foot - Right Foot  -     X-Ray Foot Complete Right  -     Ambulatory Referral to Physical/Occupational Therapy    Will send patient to physical therapy 3 times a week for 4 weeks for increase range of motion decreased pain and swelling second MPJ right foot. She will return to see me and 6 weeks.  Follow-up in about 6 weeks (around 3/26/2019).    Procedures - None    Counseling:     I provided patient education verbally regarding:   Patient diagnosis, treatment options, as well as alternatives, risks, and benefits.     This note was created using Dragon voice recognition software that occasionally misinterpreted phrases or words.

## 2019-03-26 ENCOUNTER — OFFICE VISIT (OUTPATIENT)
Dept: PODIATRY | Facility: CLINIC | Age: 59
End: 2019-03-26
Payer: COMMERCIAL

## 2019-03-26 VITALS
HEIGHT: 63 IN | WEIGHT: 154 LBS | SYSTOLIC BLOOD PRESSURE: 131 MMHG | DIASTOLIC BLOOD PRESSURE: 85 MMHG | HEART RATE: 82 BPM | BODY MASS INDEX: 27.29 KG/M2

## 2019-03-26 DIAGNOSIS — M77.41 METATARSALGIA OF RIGHT FOOT: ICD-10-CM

## 2019-03-26 DIAGNOSIS — M19.079 INFLAMMATION OF FOOT JOINT: Primary | ICD-10-CM

## 2019-03-26 PROCEDURE — 3008F PR BODY MASS INDEX (BMI) DOCUMENTED: ICD-10-PCS | Mod: ,,, | Performed by: PODIATRIST

## 2019-03-26 PROCEDURE — 99213 OFFICE O/P EST LOW 20 MIN: CPT | Mod: 25,,, | Performed by: PODIATRIST

## 2019-03-26 PROCEDURE — 73630 X-RAY EXAM OF FOOT: CPT | Mod: RT,,, | Performed by: PODIATRIST

## 2019-03-26 PROCEDURE — 99213 PR OFFICE/OUTPT VISIT, EST, LEVL III, 20-29 MIN: ICD-10-PCS | Mod: 25,,, | Performed by: PODIATRIST

## 2019-03-26 PROCEDURE — 3008F BODY MASS INDEX DOCD: CPT | Mod: ,,, | Performed by: PODIATRIST

## 2019-03-26 PROCEDURE — 73630 PR  X-RAY FOOT 3+ VW: ICD-10-PCS | Mod: RT,,, | Performed by: PODIATRIST

## 2019-03-26 NOTE — PROGRESS NOTES
[address] [facility] [address]  [place] [address]  Phone: [phone]   [phone]    Patient's PCP:EUSEBIO Parikh  Referring Provider: No ref. provider found    Subjective:      Chief Complaint:: No chief complaint on file.    HPI  Mary Mike is a 58 y.o. female who presents with a follow up from surgery on 10-17-19 Bunionectomy with osteotomy right, repair plantar plate tear right second metatarsal and shortening osteotomy right second metatarsal .  Current symptoms include pain and burning pad of foot.It feels like I am walking on the bones.I have been doing physical therapy which is helping my second toe. Aggravating factors are walking. Patients rates pain 7/10 on pain scale.      Vitals:    19 1516   BP: 131/85   Pulse: 82     Shoe Size:     Past Surgical History:   Procedure Laterality Date    BREAST SURGERY      BUNIONECTOMY Right      SECTION      FRACTURE SURGERY      GASTRIC RESTRICTION SURGERY      GASTRIC SLEEVE        Past Medical History:   Diagnosis Date    ADHD (attention deficit hyperactivity disorder)     Allergy     Anemia     Anxiety     Depression     Fatty liver     Insomnia     Migraines     Osteoporosis     Restless leg syndrome      Family History   Problem Relation Age of Onset    Arthritis Mother     Colon cancer Mother     Diabetes Mother     Early death Mother     Miscarriages / Stillbirths Mother     Arthritis Father     Heart disease Father     Hypertension Father     Stroke Father     Vision loss Father     Vaginal cancer Paternal Grandmother     Heart disease Paternal Grandfather         Social History:   Marital Status:   Alcohol History:  reports that she drinks alcohol.  Tobacco History:  reports that she has never smoked. She has never used smokeless tobacco.  Drug History:  reports that she does not use drugs.    Review of patient's allergies indicates:  No Known Allergies    Current Outpatient  Medications   Medication Sig Dispense Refill    dextroamphetamine-amphetamine (ADDERALL XR) 20 MG 24 hr capsule Take 20 mg by mouth every morning.      FLUoxetine (PROZAC) 20 MG capsule Take 1 capsule by mouth nightly.  1    LORazepam (ATIVAN) 1 MG tablet Take 1 tablet (1 mg total) by mouth nightly as needed. 30 tablet 0    magnesium oxide (MAG-OX) 400 mg tablet Take 1 tablet (400 mg total) by mouth once daily.  0    multivitamin (THERAGRAN) tablet Take 1 tablet by mouth once daily.      pramipexole (MIRAPEX) 1 MG tablet Take 2 tablets (2 mg total) by mouth every evening. 2 tabs qhs 180 tablet 1    risedronate (ACTONEL) 35 MG tablet   11    traZODone (DESYREL) 50 MG tablet Take 2 tablets (100 mg total) by mouth every evening. Take 1-2 tabs at night prn insomnia 60 tablet 3     No current facility-administered medications for this visit.        Review of Systems   Constitutional: Negative for chills, fatigue, fever and unexpected weight change.   HENT: Negative for hearing loss and trouble swallowing.    Eyes: Negative for photophobia and visual disturbance.   Respiratory: Negative for cough, shortness of breath and wheezing.    Cardiovascular: Negative for chest pain, palpitations and leg swelling.   Gastrointestinal: Negative for abdominal pain and nausea.   Genitourinary: Negative for dysuria and frequency.   Musculoskeletal: Negative for arthralgias, back pain and joint swelling.   Skin: Negative for rash.   Neurological: Negative for tremors, seizures, weakness, numbness and headaches.   Hematological: Does not bruise/bleed easily.         Objective:        Physical Exam:   Foot Exam  Physical Exam   Musculoskeletal:        Feet:        Imaging:   X-Ray Foot Complete Right  AP, lateral, lateral oblique weightbearing x-rays right foot:  Well-healed bunionectomy with osteotomy with screws in place good alignment of first MPJ. Well-healed shortening osteotomy/Weil osteotomy second metatarsal right foot with  screw in place. Good alignment of second MPJ. Fusion of PIPJ second toe. Some slight changes on condyle. Good alignment of third fourth and fifth metatarsal phalangeal joint no fractures no tumors.           Assessment:       1. Inflammation of foot joint    2. Metatarsalgia of right foot      Plan:   Inflammation of foot joint  -     X-Ray Foot Complete Right    Metatarsalgia of right foot  -     X-Ray Foot Complete Right    Today were applying a accommodative pad metatarsal pad to over-the-counter Spenco in attempt to relieve pressure on the metatarsal heads of the right foot. She is a Place season running shoes in no barefoot ice the area daily take anti-inflammatories as needed. She'll continue physical therapy increase range of motion decreased pain and swelling. Return to see me in 4 weeks.  Follow up in about 1 month (around 4/23/2019).    Procedures - None    Counseling:     I provided patient education verbally regarding:   Patient diagnosis, treatment options, as well as alternatives, risks, and benefits.     This note was created using Dragon voice recognition software that occasionally misinterpreted phrases or words.

## 2019-04-10 DIAGNOSIS — F51.01 PRIMARY INSOMNIA: ICD-10-CM

## 2019-04-10 RX ORDER — TRAZODONE HYDROCHLORIDE 50 MG/1
TABLET ORAL
Qty: 60 TABLET | Refills: 0 | Status: SHIPPED | OUTPATIENT
Start: 2019-04-10 | End: 2019-05-09 | Stop reason: SDUPTHER

## 2019-04-25 ENCOUNTER — OFFICE VISIT (OUTPATIENT)
Dept: PODIATRY | Facility: CLINIC | Age: 59
End: 2019-04-25
Payer: COMMERCIAL

## 2019-04-25 VITALS
HEART RATE: 85 BPM | BODY MASS INDEX: 27.29 KG/M2 | SYSTOLIC BLOOD PRESSURE: 130 MMHG | WEIGHT: 154 LBS | HEIGHT: 63 IN | DIASTOLIC BLOOD PRESSURE: 80 MMHG

## 2019-04-25 DIAGNOSIS — M79.671 FOOT PAIN, RIGHT: ICD-10-CM

## 2019-04-25 DIAGNOSIS — M19.079 INFLAMMATION OF FOOT JOINT: Primary | ICD-10-CM

## 2019-04-25 DIAGNOSIS — M77.41 METATARSALGIA OF RIGHT FOOT: ICD-10-CM

## 2019-04-25 PROCEDURE — 99213 PR OFFICE/OUTPT VISIT, EST, LEVL III, 20-29 MIN: ICD-10-PCS | Mod: ,,, | Performed by: PODIATRIST

## 2019-04-25 PROCEDURE — 3008F BODY MASS INDEX DOCD: CPT | Mod: ,,, | Performed by: PODIATRIST

## 2019-04-25 PROCEDURE — 3008F PR BODY MASS INDEX (BMI) DOCUMENTED: ICD-10-PCS | Mod: ,,, | Performed by: PODIATRIST

## 2019-04-25 PROCEDURE — 99213 OFFICE O/P EST LOW 20 MIN: CPT | Mod: ,,, | Performed by: PODIATRIST

## 2019-04-25 NOTE — PROGRESS NOTES
[address] [facility] [address] [place] [address]  Phone: [phone]   [phone]    Patient's PCP:EUSEBIO Parikh  Referring Provider: No ref. provider found    Subjective:      Chief Complaint:: No chief complaint on file.    HPI  Mary Mike is a 58 y.o. female who presents with a follow up right second toe.Doing about the same.I am done with physical therapy .Most of my pain is under my foot.I am able to move the toes more.  Pain scale 8/10.  Vitals:    19 1510   BP: 130/80   Pulse: 85     Shoe Size:     Past Surgical History:   Procedure Laterality Date    BREAST SURGERY      BUNIONECTOMY Right      SECTION      FRACTURE SURGERY      GASTRIC RESTRICTION SURGERY      GASTRIC SLEEVE        Past Medical History:   Diagnosis Date    ADHD (attention deficit hyperactivity disorder)     Allergy     Anemia     Anxiety     Depression     Fatty liver     Insomnia     Migraines     Osteoporosis     Restless leg syndrome      Family History   Problem Relation Age of Onset    Arthritis Mother     Colon cancer Mother     Diabetes Mother     Early death Mother     Miscarriages / Stillbirths Mother     Arthritis Father     Heart disease Father     Hypertension Father     Stroke Father     Vision loss Father     Vaginal cancer Paternal Grandmother     Heart disease Paternal Grandfather         Social History:   Marital Status:   Alcohol History:  reports that she drinks alcohol.  Tobacco History:  reports that she has never smoked. She has never used smokeless tobacco.  Drug History:  reports that she does not use drugs.    Review of patient's allergies indicates:  No Known Allergies    Current Outpatient Medications   Medication Sig Dispense Refill    dextroamphetamine-amphetamine (ADDERALL XR) 20 MG 24 hr capsule Take 20 mg by mouth every morning.      FLUoxetine (PROZAC) 20 MG capsule Take 1 capsule by mouth nightly.  1    LORazepam (ATIVAN) 1  MG tablet Take 1 tablet (1 mg total) by mouth nightly as needed. 30 tablet 0    magnesium oxide (MAG-OX) 400 mg tablet Take 1 tablet (400 mg total) by mouth once daily.  0    multivitamin (THERAGRAN) tablet Take 1 tablet by mouth once daily.      pramipexole (MIRAPEX) 1 MG tablet Take 2 tablets (2 mg total) by mouth every evening. 2 tabs qhs 180 tablet 1    risedronate (ACTONEL) 35 MG tablet   11    traZODone (DESYREL) 50 MG tablet TAKE 1-2 TABLETS BY MOUTH AT NIGHT AS NEEDED FOR INSOMNIA 60 tablet 0     No current facility-administered medications for this visit.        Review of Systems      Objective:        Physical Exam:   Foot Exam    General  General Appearance: appears stated age and healthy   Orientation: alert and oriented to person, place, and time   Affect: appropriate   Gait: antalgic       Right Foot/Ankle     Inspection and Palpation  Ecchymosis: none  Tenderness: lesser metatarsophalangeal joints (Third, fourth MPJ)  Swelling: lesser metatarsophalangeal joints (Third, fourth MPJ)  Arch: normal  Hammertoes: absent  Claw Toes: absent  Hallux valgus: no  Skin Exam: skin intact;     Neurovascular  Dorsalis pedis: 2+  Posterior tibial: 2+  Saphenous nerve sensation: normal  Tibial nerve sensation: normal  Superficial peroneal nerve sensation: normal  Deep peroneal nerve sensation: normal  Sural nerve sensation: normal          Physical Exam   Cardiovascular:   Pulses:       Dorsalis pedis pulses are 2+ on the right side.        Posterior tibial pulses are 2+ on the right side.   Musculoskeletal:        Feet:        Imaging:            Assessment:       1. Foot pain, right    2. Inflammation of foot joint    3. Metatarsalgia of right foot      Plan:   Foot pain, right    Inflammation of foot joint    Metatarsalgia of right foot    1 because of continued pain in the right foot and pain being shifted to the third and fourth metatarsophalangeal joints. Because of failure of conservative care including  physical therapy I'm ordering an MRI of the right foot to evaluate the plantar plate of third and fourth MPJs.  No follow-ups on file.    Procedures - None    Counseling:     I provided patient education verbally regarding:   Patient diagnosis, treatment options, as well as alternatives, risks, and benefits.     This note was created using Dragon voice recognition software that occasionally misinterpreted phrases or words.

## 2019-05-09 ENCOUNTER — OFFICE VISIT (OUTPATIENT)
Dept: PODIATRY | Facility: CLINIC | Age: 59
End: 2019-05-09
Payer: COMMERCIAL

## 2019-05-09 VITALS
HEIGHT: 63 IN | WEIGHT: 154 LBS | SYSTOLIC BLOOD PRESSURE: 120 MMHG | BODY MASS INDEX: 27.29 KG/M2 | DIASTOLIC BLOOD PRESSURE: 80 MMHG | RESPIRATION RATE: 18 BRPM

## 2019-05-09 DIAGNOSIS — M19.079 INFLAMMATION OF FOOT JOINT: Primary | ICD-10-CM

## 2019-05-09 DIAGNOSIS — M79.671 FOOT PAIN, RIGHT: ICD-10-CM

## 2019-05-09 DIAGNOSIS — F51.01 PRIMARY INSOMNIA: ICD-10-CM

## 2019-05-09 PROCEDURE — 3008F BODY MASS INDEX DOCD: CPT | Mod: ,,, | Performed by: PODIATRIST

## 2019-05-09 PROCEDURE — 99213 OFFICE O/P EST LOW 20 MIN: CPT | Mod: ,,, | Performed by: PODIATRIST

## 2019-05-09 PROCEDURE — 3008F PR BODY MASS INDEX (BMI) DOCUMENTED: ICD-10-PCS | Mod: ,,, | Performed by: PODIATRIST

## 2019-05-09 PROCEDURE — 99213 PR OFFICE/OUTPT VISIT, EST, LEVL III, 20-29 MIN: ICD-10-PCS | Mod: ,,, | Performed by: PODIATRIST

## 2019-05-09 RX ORDER — TRAZODONE HYDROCHLORIDE 50 MG/1
TABLET ORAL
Qty: 60 TABLET | Refills: 0 | Status: SHIPPED | OUTPATIENT
Start: 2019-05-09 | End: 2019-06-11 | Stop reason: SDUPTHER

## 2019-05-09 NOTE — PROGRESS NOTES
[address] [facility] [address] [place] [address]  Phone: [phone]   [phone]    Patient's PCP:EUSEBIO Parikh  Referring Provider: No ref. provider found    Subjective:      Chief Complaint:: Results (review mri right foot)    VERNELL Mike is a 58 y.o. female who presents for follow up MRI of right foot (review). Current symptoms include constant pain and throbbing.  Aggravating factors are prolonged standing/walking. Symptoms have not improved since last visit. Patients rates pain 5/10 on pain scale.      Vitals:    19 1632   BP: 120/80   Resp: 18     Shoe Size: 8    Past Surgical History:   Procedure Laterality Date    BREAST SURGERY      BUNIONECTOMY Right      SECTION      FRACTURE SURGERY      GASTRIC RESTRICTION SURGERY      GASTRIC SLEEVE        Past Medical History:   Diagnosis Date    ADHD (attention deficit hyperactivity disorder)     Allergy     Anemia     Anxiety     Depression     Fatty liver     Insomnia     Migraines     Osteoporosis     Restless leg syndrome      Family History   Problem Relation Age of Onset    Arthritis Mother     Colon cancer Mother     Diabetes Mother     Early death Mother     Miscarriages / Stillbirths Mother     Arthritis Father     Heart disease Father     Hypertension Father     Stroke Father     Vision loss Father     Vaginal cancer Paternal Grandmother     Heart disease Paternal Grandfather         Social History:   Marital Status:   Alcohol History:  reports that she drinks alcohol.  Tobacco History:  reports that she has never smoked. She has never used smokeless tobacco.  Drug History:  reports that she does not use drugs.    Review of patient's allergies indicates:  No Known Allergies    Current Outpatient Medications   Medication Sig Dispense Refill    dextroamphetamine-amphetamine (ADDERALL XR) 20 MG 24 hr capsule Take 20 mg by mouth every morning.      FLUoxetine (PROZAC) 20 MG  capsule Take 1 capsule by mouth nightly.  1    LORazepam (ATIVAN) 1 MG tablet Take 1 tablet (1 mg total) by mouth nightly as needed. 30 tablet 0    magnesium oxide (MAG-OX) 400 mg tablet Take 1 tablet (400 mg total) by mouth once daily.  0    multivitamin (THERAGRAN) tablet Take 1 tablet by mouth once daily.      pramipexole (MIRAPEX) 1 MG tablet Take 2 tablets (2 mg total) by mouth every evening. 2 tabs qhs 180 tablet 1    risedronate (ACTONEL) 35 MG tablet   11    traZODone (DESYREL) 50 MG tablet TAKE 1-2 TABLETS BY MOUTH AT NIGHT AS NEEDED FOR INSOMNIA 60 tablet 0     No current facility-administered medications for this visit.        Review of Systems      Objective:        Physical Exam:   Foot Exam  Physical Exam   Musculoskeletal:        Feet:        Imaging:   MRI Lower Extremity Without Contrast Right  MRI of the right foot    Clinical history is pain    Multiplanar images of the right foot were obtained.    There are postsurgical changes from bunionectomy in the distal first metatarsal  and the second metatarsal phalangeal joint. There is magnetic susceptibility  artifact from the hardware.    There is no marrow signal abnormality. There is no joint effusion. The  visualized flexor and extensor tendons are normal. The soft tissues are  unremarkable.    IMPRESSION: Postsurgical changes involving the distal first metatarsal and  second metatarsal phalangeal joint. There is no marrow edema, joint effusion or  soft tissue mass.    Read and electronically signed by: Charito Rodas MD on 5/2/2019 5:11 PM CDT    CHARITO RODAS MD           Assessment:       1. Inflammation of foot joint    2. Foot pain, right      Plan:   Inflammation of foot joint    Foot pain, right    I recommend she work on range of motion of the second MPJ ice the area anti-inflammatories as needed and gradually increase activity level. She is return at her desire for injection of cortisone to second MPJ and manipulation of the  joint.  Follow up if symptoms worsen or fail to improve.    Procedures - None    Counseling:     I provided patient education verbally regarding:   Patient diagnosis, treatment options, as well as alternatives, risks, and benefits.     This note was created using Dragon voice recognition software that occasionally misinterpreted phrases or words.

## 2019-06-11 DIAGNOSIS — F51.01 PRIMARY INSOMNIA: ICD-10-CM

## 2019-06-11 RX ORDER — TRAZODONE HYDROCHLORIDE 50 MG/1
TABLET ORAL
Qty: 60 TABLET | Refills: 0 | Status: SHIPPED | OUTPATIENT
Start: 2019-06-11 | End: 2021-10-21 | Stop reason: SDUPTHER

## 2019-08-11 ENCOUNTER — HOSPITAL ENCOUNTER (EMERGENCY)
Facility: HOSPITAL | Age: 59
Discharge: HOME | End: 2019-08-11
Attending: EMERGENCY MEDICINE
Payer: COMMERCIAL

## 2019-08-11 VITALS
BODY MASS INDEX: 27.29 KG/M2 | RESPIRATION RATE: 16 BRPM | DIASTOLIC BLOOD PRESSURE: 85 MMHG | TEMPERATURE: 98 F | WEIGHT: 154 LBS | SYSTOLIC BLOOD PRESSURE: 177 MMHG | HEIGHT: 63 IN | HEART RATE: 74 BPM | OXYGEN SATURATION: 98 %

## 2019-08-11 DIAGNOSIS — L25.9 CONTACT DERMATITIS, UNSPECIFIED CONTACT DERMATITIS TYPE, UNSPECIFIED TRIGGER: Primary | ICD-10-CM

## 2019-08-11 LAB
ALBUMIN SERPL BCP-MCNC: 3.8 G/DL (ref 3.5–5.2)
ALP SERPL-CCNC: 69 U/L (ref 55–135)
ALT SERPL W/O P-5'-P-CCNC: 29 U/L (ref 10–44)
ANION GAP SERPL CALC-SCNC: 9 MMOL/L (ref 8–16)
AST SERPL-CCNC: 28 U/L (ref 10–40)
BASOPHILS # BLD AUTO: 0.01 K/UL (ref 0–0.2)
BASOPHILS NFR BLD: 0.3 % (ref 0–1.9)
BILIRUB SERPL-MCNC: 0.2 MG/DL (ref 0.1–1)
BUN SERPL-MCNC: 8 MG/DL (ref 6–20)
CALCIUM SERPL-MCNC: 9.3 MG/DL (ref 8.7–10.5)
CHLORIDE SERPL-SCNC: 106 MMOL/L (ref 95–110)
CO2 SERPL-SCNC: 28 MMOL/L (ref 23–29)
CREAT SERPL-MCNC: 0.8 MG/DL (ref 0.5–1.4)
CRP SERPL-MCNC: 0.6 MG/L (ref 0–8.2)
DIFFERENTIAL METHOD: ABNORMAL
EOSINOPHIL # BLD AUTO: 0.1 K/UL (ref 0–0.5)
EOSINOPHIL NFR BLD: 3.9 % (ref 0–8)
ERYTHROCYTE [DISTWIDTH] IN BLOOD BY AUTOMATED COUNT: 13 % (ref 11.5–14.5)
EST. GFR  (AFRICAN AMERICAN): >60 ML/MIN/1.73 M^2
EST. GFR  (NON AFRICAN AMERICAN): >60 ML/MIN/1.73 M^2
GLUCOSE SERPL-MCNC: 92 MG/DL (ref 70–110)
HCT VFR BLD AUTO: 38.3 % (ref 37–48.5)
HGB BLD-MCNC: 12.1 G/DL (ref 12–16)
IMM GRANULOCYTES # BLD AUTO: 0.01 K/UL (ref 0–0.04)
LYMPHOCYTES # BLD AUTO: 0.8 K/UL (ref 1–4.8)
LYMPHOCYTES NFR BLD: 23.4 % (ref 18–48)
MCH RBC QN AUTO: 27.4 PG (ref 27–31)
MCHC RBC AUTO-ENTMCNC: 31.6 G/DL (ref 32–36)
MCV RBC AUTO: 87 FL (ref 82–98)
MONOCYTES # BLD AUTO: 0.4 K/UL (ref 0.3–1)
MONOCYTES NFR BLD: 10.4 % (ref 4–15)
NEUTROPHILS # BLD AUTO: 2.1 K/UL (ref 1.8–7.7)
NEUTROPHILS NFR BLD: 61.7 % (ref 38–73)
NRBC BLD-RTO: 0 /100 WBC
PLATELET # BLD AUTO: 232 K/UL (ref 150–350)
PMV BLD AUTO: 10.1 FL (ref 9.2–12.9)
POTASSIUM SERPL-SCNC: 3.9 MMOL/L (ref 3.5–5.1)
PROT SERPL-MCNC: 6.7 G/DL (ref 6–8.4)
RBC # BLD AUTO: 4.41 M/UL (ref 4–5.4)
SODIUM SERPL-SCNC: 143 MMOL/L (ref 136–145)
WBC # BLD AUTO: 3.37 K/UL (ref 3.9–12.7)

## 2019-08-11 PROCEDURE — 36415 COLL VENOUS BLD VENIPUNCTURE: CPT

## 2019-08-11 PROCEDURE — 80053 COMPREHEN METABOLIC PANEL: CPT

## 2019-08-11 PROCEDURE — 86140 C-REACTIVE PROTEIN: CPT

## 2019-08-11 PROCEDURE — 99283 EMERGENCY DEPT VISIT LOW MDM: CPT

## 2019-08-11 PROCEDURE — 85025 COMPLETE CBC W/AUTO DIFF WBC: CPT

## 2019-08-11 RX ORDER — TRIAMCINOLONE ACETONIDE 5 MG/G
CREAM TOPICAL 2 TIMES DAILY
Qty: 15 G | Refills: 1 | Status: SHIPPED | OUTPATIENT
Start: 2019-08-11 | End: 2019-10-16

## 2019-08-12 NOTE — ED NOTES
"Mary Mike presents to the ED with c/o rash to chest and posterior back that has been persistent for the past few months. Associated complaints are "Feeling run down." Patient reports having blood work done recently and reports a "Low WBC." AAOx3. Patient denies any fever and is afebrile upon arrival to the ED. Mucous membranes are pink and moist. Skin is warm, dry and intact. Lungs are clear bilaterally, respirations are regular and unlabored. Denies SOB, cough, congestion or rhinorrhea. BS active x4, no tenderness with palpation, abd is soft and not distended. Denies any appetite or activity change. S1S2, capillary refill is < 2 seconds. Denies dysuria, difficulty urinating, frequency, numbness, tingling or weakness. ELVIS MAZARIEGOS    "

## 2019-08-12 NOTE — ED PROVIDER NOTES
"Encounter Date: 2019    SCRIBE #1 NOTE: I, France Alves, am scribing for, and in the presence of, Dr. Josh Irving.       History     Chief Complaint   Patient presents with    Rash     Worening over past couple months       Time seen by provider: 8:46 PM on 2019    Mary Mike is a 58 y.o. female who presents the ED with complaints of worsening and spreading rash since x2 months ago. The patient reports that the rashes are hot, burning, and itching. She states that that the spots have spread to her chest and down her back. The patient mentions having blood work done x1 month ago which showed a very low white blood cell count. She constantly feels "rundown". The patient denies onset of any other symptoms at this time. PMHx of ADHD, insomnia, RLS, depression, fatty liver, migraines, anemia, anxiety, and osteoporosis. SHx of  section, breast surgery, gastric restriction surgery, gastric sleeve, fracture, and bunionectomy. NKDA noted.    The history is provided by the patient.     Review of patient's allergies indicates:  No Known Allergies  Past Medical History:   Diagnosis Date    ADHD (attention deficit hyperactivity disorder)     Allergy     Anemia     Anxiety     Depression     Fatty liver     Insomnia     Migraines     Osteoporosis     Restless leg syndrome      Past Surgical History:   Procedure Laterality Date    BREAST SURGERY      BUNIONECTOMY Right      SECTION      FRACTURE SURGERY      GASTRIC RESTRICTION SURGERY      GASTRIC SLEEVE        Family History   Problem Relation Age of Onset    Arthritis Mother     Colon cancer Mother     Diabetes Mother     Early death Mother     Miscarriages / Stillbirths Mother     Arthritis Father     Heart disease Father     Hypertension Father     Stroke Father     Vision loss Father     Vaginal cancer Paternal Grandmother     Heart disease Paternal Grandfather      Social History     Tobacco Use    " Smoking status: Never Smoker    Smokeless tobacco: Never Used   Substance Use Topics    Alcohol use: Yes     Comment: occassional    Drug use: No     Review of Systems   Constitutional: Negative for activity change, appetite change, chills, fatigue and fever.   Eyes: Negative for visual disturbance.   Respiratory: Negative for apnea and shortness of breath.    Cardiovascular: Negative for chest pain and palpitations.   Gastrointestinal: Negative for abdominal distention and abdominal pain.   Genitourinary: Negative for difficulty urinating.   Musculoskeletal: Negative for neck pain.   Skin: Positive for rash. Negative for pallor.   Neurological: Negative for headaches.   Hematological: Does not bruise/bleed easily.   Psychiatric/Behavioral: Negative for agitation.       Physical Exam     Initial Vitals [08/11/19 2037]   BP Pulse Resp Temp SpO2   (!) 177/85 74 16 98.1 °F (36.7 °C) 98 %      MAP       --         Physical Exam    Nursing note and vitals reviewed.  Constitutional: She appears well-developed and well-nourished.   HENT:   Head: Normocephalic and atraumatic.   Eyes: Conjunctivae are normal.   Neck: Normal range of motion. Neck supple.   Cardiovascular: Normal rate, regular rhythm and normal heart sounds. Exam reveals no gallop and no friction rub.    No murmur heard.  Pulmonary/Chest: Effort normal and breath sounds normal. No respiratory distress. She has no wheezes. She has no rhonchi. She has no rales.   Abdominal: Soft. She exhibits no distension. There is no splenomegaly or hepatomegaly. There is no tenderness.   No splenomegaly or hepatomegaly.   Musculoskeletal: Normal range of motion.   Neurological: She is alert and oriented to person, place, and time.   Skin: Skin is warm and dry. Rash noted. Rash is maculopapular. There is erythema.   3 cm erythematous maculopapular patches noted.   Psychiatric: She has a normal mood and affect.         ED Course   Procedures  Labs Reviewed   CBC W/ AUTO  DIFFERENTIAL   COMPREHENSIVE METABOLIC PANEL   C-REACTIVE PROTEIN          Imaging Results    None          Medical Decision Making:   History:   Old Medical Records: I decided to obtain old medical records.  Clinical Tests:   Lab Tests: Reviewed and Ordered  ED Management:  58-year-old female presents with a maculopapular erythematous rash of the shoulders and back.  She does have 1 area that is symmetrical raising suspicion of possible contact dermatitis most likely secondary to nickel allergy.  She has a history of persistent leukopenia.  WBC 3 weeks ago was 2.1 but today is 3.4.  She is given topical steroids for possible contact dermatitis and encouraged to follow up with dermatology for consideration of biopsy if symptoms persist.       APC / Resident Notes:   I, Dr. Josh Irving III, personally performed the services described in this documentation. All medical record entries made by the scribe were at my direction and in my presence.  I have reviewed the chart and agree that the record reflects my personal performance and is accurate and complete       Scribe Attestation:   Scribe #1: I performed the above scribed service and the documentation accurately describes the services I performed. I attest to the accuracy of the note.               Clinical Impression:   No diagnosis found.      Disposition:   Disposition: Discharged  Condition: Stable                        Josh Irving III, MD  08/11/19 1730

## 2019-08-12 NOTE — ED NOTES
Upon discharge, patient is AAOx4, no cardiac or respiratory complications. Follow up care and  Medications have been reviewed with patient and has been instructed to return to the ER if needed. Patient verbalized understanding and ambulated to the lobby without difficulty. DELILAH LEAL.

## 2019-10-07 ENCOUNTER — TELEPHONE (OUTPATIENT)
Dept: ORTHOPEDICS | Facility: CLINIC | Age: 59
End: 2019-10-07

## 2019-10-07 DIAGNOSIS — M25.572 ACUTE LEFT ANKLE PAIN: Primary | ICD-10-CM

## 2019-10-07 NOTE — TELEPHONE ENCOUNTER
Spoke to patient. Scheduled with Dr. Canela for 10/8/19 and Uzma Maynard for 10/9/19. Directions given. Patient stated understanding.

## 2019-10-07 NOTE — TELEPHONE ENCOUNTER
----- Message from Mikayla Payton sent at 10/7/2019  3:26 PM CDT -----  Contact: patient  Patient called to schedule an appointment with orthopedics due to a fall   And wishes to speak with a nurse regarding this matter.      she  can be reached at [phone]      Thanks  KB

## 2019-10-08 ENCOUNTER — HOSPITAL ENCOUNTER (OUTPATIENT)
Dept: RADIOLOGY | Facility: HOSPITAL | Age: 59
Discharge: HOME | End: 2019-10-08
Attending: ORTHOPAEDIC SURGERY
Payer: COMMERCIAL

## 2019-10-08 ENCOUNTER — OFFICE VISIT (OUTPATIENT)
Dept: ORTHOPEDICS | Facility: CLINIC | Age: 59
End: 2019-10-08
Payer: COMMERCIAL

## 2019-10-08 VITALS — HEART RATE: 75 BPM | SYSTOLIC BLOOD PRESSURE: 139 MMHG | DIASTOLIC BLOOD PRESSURE: 78 MMHG

## 2019-10-08 DIAGNOSIS — S93.492A SPRAIN OF ANTERIOR TALOFIBULAR LIGAMENT OF LEFT ANKLE, INITIAL ENCOUNTER: Primary | ICD-10-CM

## 2019-10-08 DIAGNOSIS — M25.572 ACUTE LEFT ANKLE PAIN: ICD-10-CM

## 2019-10-08 PROCEDURE — 73610 XR ANKLE COMPLETE 3 VIEW LEFT: ICD-10-PCS | Mod: 26,LT,, | Performed by: RADIOLOGY

## 2019-10-08 PROCEDURE — 73610 X-RAY EXAM OF ANKLE: CPT | Mod: 26,LT,, | Performed by: RADIOLOGY

## 2019-10-08 PROCEDURE — 99202 PR OFFICE/OUTPT VISIT, NEW, LEVL II, 15-29 MIN: ICD-10-PCS | Mod: S$GLB,,, | Performed by: ORTHOPAEDIC SURGERY

## 2019-10-08 PROCEDURE — 73610 X-RAY EXAM OF ANKLE: CPT | Mod: TC,PO,LT

## 2019-10-08 PROCEDURE — 99202 OFFICE O/P NEW SF 15 MIN: CPT | Mod: S$GLB,,, | Performed by: ORTHOPAEDIC SURGERY

## 2019-10-08 PROCEDURE — 99999 PR PBB SHADOW E&M-EST. PATIENT-LVL II: ICD-10-PCS | Mod: PBBFAC,,, | Performed by: ORTHOPAEDIC SURGERY

## 2019-10-08 PROCEDURE — 99999 PR PBB SHADOW E&M-EST. PATIENT-LVL II: CPT | Mod: PBBFAC,,, | Performed by: ORTHOPAEDIC SURGERY

## 2019-10-08 RX ORDER — ERGOCALCIFEROL 1.25 MG/1
CAPSULE ORAL
Refills: 1 | COMMUNITY
Start: 2019-08-26 | End: 2020-07-01

## 2019-10-08 RX ORDER — ESOMEPRAZOLE MAGNESIUM 40 MG/1
CAPSULE, DELAYED RELEASE ORAL
COMMUNITY
End: 2019-10-16

## 2019-10-09 ENCOUNTER — OFFICE VISIT (OUTPATIENT)
Dept: SPINE | Facility: CLINIC | Age: 59
End: 2019-10-09
Payer: COMMERCIAL

## 2019-10-09 VITALS
DIASTOLIC BLOOD PRESSURE: 82 MMHG | WEIGHT: 159.06 LBS | HEIGHT: 63 IN | BODY MASS INDEX: 28.18 KG/M2 | HEART RATE: 80 BPM | SYSTOLIC BLOOD PRESSURE: 127 MMHG

## 2019-10-09 DIAGNOSIS — M54.50 ACUTE LEFT-SIDED LOW BACK PAIN WITHOUT SCIATICA: Primary | ICD-10-CM

## 2019-10-09 PROCEDURE — 3008F BODY MASS INDEX DOCD: CPT | Mod: CPTII,S$GLB,, | Performed by: PHYSICIAN ASSISTANT

## 2019-10-09 PROCEDURE — 99999 PR PBB SHADOW E&M-EST. PATIENT-LVL IV: CPT | Mod: PBBFAC,,, | Performed by: PHYSICIAN ASSISTANT

## 2019-10-09 PROCEDURE — 99999 PR PBB SHADOW E&M-EST. PATIENT-LVL IV: ICD-10-PCS | Mod: PBBFAC,,, | Performed by: PHYSICIAN ASSISTANT

## 2019-10-09 PROCEDURE — 99203 PR OFFICE/OUTPT VISIT, NEW, LEVL III, 30-44 MIN: ICD-10-PCS | Mod: S$GLB,,, | Performed by: PHYSICIAN ASSISTANT

## 2019-10-09 PROCEDURE — 3008F PR BODY MASS INDEX (BMI) DOCUMENTED: ICD-10-PCS | Mod: CPTII,S$GLB,, | Performed by: PHYSICIAN ASSISTANT

## 2019-10-09 PROCEDURE — 99203 OFFICE O/P NEW LOW 30 MIN: CPT | Mod: S$GLB,,, | Performed by: PHYSICIAN ASSISTANT

## 2019-10-09 NOTE — PATIENT INSTRUCTIONS
How Your Back Works  A healthy back allows you to bend and stretch without pain. The spine has three natural curves, which keep your body balanced. Strong, flexible muscles support your spine. Soft, cushioning disks separate the hard bones of your spine, allowing it to bend and move.    The parts of the spine  · The vertebrae are the 24 bones that make up the spine.  · The spinous process is the part of each vertebra you can feel through your skin.  · Each of these bones has a canal that runs top to bottom. Together these canals form a tunnel called the spinal canal.  · The lamina of each vertebra forms the back of the spinal canal.  · Running through the canal are nerves.  · A foramen is a small opening where a nerve leaves the spinal canal.  · Disks serve as cushions between vertebrae. A disks soft center absorbs shock during movement.     Two vertebrae and a disk     The supporting muscles  Strong, flexible muscles help maintain your three natural curves. They hold your spine in proper alignment. This helps support your upper body. Strong core muscular including the stomach, buttock, and thigh muscles help take the strain off your back.  Date Last Reviewed: 8/31/2015  © 4789-3155 [employer]. [address] [place] [address]. All rights reserved. This information is not intended as a substitute for professional medical care. Always follow your healthcare professional's instructions.        Relieving Back Pain  Back pain is a common problem. You can strain back muscles by lifting too much weight or just by moving the wrong way. Back strain can be uncomfortable, even painful. And it can take weeks or months to improve. To help yourself feel better and prevent future back strains, try these tips.  Important Note: Do not give aspirin to children or teens without first discussing it with your healthcare provider.      ? Ice    Ice reduces muscle pain and swelling. It helps most during the  first 24 to 48 hours after an injury.  · Wrap an ice pack or a bag of frozen peas in a thin towel. (Never place ice directly on your skin.)  · Place the ice where your back hurts the most.  · Dont ice for more than 20 minutes at a time.  · You can use ice several times a day.  ? Medicines  Over-the-counter pain relievers can include acetaminophen and anti-inflammatory medicines, which includes aspirin or ibuprofen. They can help ease discomfort. Some also reduce swelling.  · Tell your healthcare provider about any medicines you are already taking.  · Take medicines only as directed.  ? Heat  After the first 48 hours, heat can relax sore muscles and improve blood flow.  · Try a warm bath or shower. Or use a heating pad set on low. To prevent a burn, keep a cloth between you and the heating pad.  · Dont use a heating pad for more than 15 minutes at a time. Never sleep on a heating pad.  Date Last Reviewed: 9/1/2015  © 3740-2223 "[employer] [address] [place] [address]. All rights reserved. This information is not intended as a substitute for professional medical care. Always follow your healthcare professional's instructions.        Self-Care for Low Back Pain    Most people have low back pain now and then. In many cases, it isnt serious and self-care can help. Sometimes low back pain can be a sign of a bigger problem. Call your healthcare provider if your pain returns often or gets worse over time. For the long-term care of your back, get regular exercise, lose any excess weight and learn good posture.  Take a short rest  Lying down during the day may be beneficial for short periods of time if severe pain increases with sitting or standing. Long-term bed rest could be detrimental.  Reduce pain and swelling  Cold reduces swelling. Both cold and heat can reduce pain. Protect your skin by placing a towel between your body and the ice or heat source.  · For the first few days, apply an ice  pack for 15 to 20 minutes .  · After the first few days, try heat for 15 minutes at a time to ease pain. Never sleep on a heating pad.  · Over-the-counter medicine can help control pain and swelling. Try aspirin or ibuprofen.  Exercise  Exercise can help your back heal. It also helps your back get stronger and more flexible, preventing any reinjury. Ask your healthcare provider about specific exercises for your back.  Use good posture to avoid reinjury  · When moving, bend at the hips and knees. Dont bend at the waist or twist around.  · When lifting, keep the object close to your body. Dont try to lift more than you can handle.  · When sitting, keep your lower back supported. Use a rolled-up towel as needed.  Seek immediate medical care if:  · Youre unable to stand or walk.  · You have a temperature over 100.4°F (38.0°C)  · You have frequent, painful, or bloody urination.  · You have severe abdominal pain.  · You have a sharp, stabbing pain.  · Your pain is constant.  · You have pain or numbness in your leg.  · You feel pain in a new area of your back.  · You notice that the pain isnt decreasing after more than a week.   Date Last Reviewed: 9/29/2015  © 1109-6418 [employer]. [address] [place] [address], [place] [address]. All rights reserved. This information is not intended as a substitute for professional medical care. Always follow your healthcare professional's instructions.        Exercises to Strengthen Your Lower Back  Strong lower back and abdominal muscles work together to support your spine. The exercises below will help strengthen the lower back. It is important that you begin exercising slowly and increase levels gradually.  Always begin any exercise program with stretching. If you feel pain while doing any of these exercises, stop and talk to your doctor about a more specific exercise program that better suits your condition.   Low back stretch  The point of stretching is to make you more  flexible and increase your range of motion. Stretch only as much as you are able. Stretch slowly. Do not push your stretch to the limit. If at any point you feel pain while stretching, this is your (temporary) limit.  · Lie on your back with your knees bent and both feet on the ground.  · Slowly raise your left knee to your chest as you flatten your lower back against the floor. Hold for 5 seconds.  · Relax and repeat the exercise with your right knee.  · Do 10 of these exercises for each leg.  · Repeat hugging both knees to your chest at the same time.  Building lower back strength  Start your exercise routine with 10 to 30 minutes a day, 1 to 3 times a day.  Initial exercises  Lying on your back:  1. Ankle pumps: Move your foot up and down, towards your head, and then away. Repeat 10 times with each foot.  2. Heel slides: Slowly bend your knee, drawing the heel of your foot towards you. Then slide your heel/foot from you, straightening your knee. Do not lift your foot off the floor (this is not a leg lift).  3. Abdominal contraction: Bend your knees and put your hands on your stomach. Tighten your stomach muscles. Hold for 5 seconds, then relax. Repeat 10 times.  4. Straight leg raise: Bend one leg at the knee and keep the other leg straight. Tighten your stomach muscles. Slowly lift your straight leg 6 to 12 inches off the floor and hold for up to 5 seconds. Repeat 10 times on each side.  Standin. Wall squats: Stand with your back against the wall. Move your feet about 12 inches away from the wall. Tighten your stomach muscles, and slowly bend your knees until they are at about a 45 degree angle. Do not go down too far. Hold about 5 seconds. Then slowly return to your starting position. Repeat 10 times.  2. Heel raises: Stand facing the wall. Slowly raise the heels of your feet up and down, while keeping your toes on the floor. If you have trouble balancing, you can touch the wall with your hands. Repeat 10  times.  More advanced exercises  When you feel comfortable enough, try these exercises.  1. Kneeling lumbar extension: Begin on your hands and knees. At the same time, raise and straighten your right arm and left leg until they are parallel to the ground. Hold for 2 seconds and come back slowly to a starting position. Repeat with left arm and right leg, alternating 10 times.  2. Prone lumbar extension: Lie face down, arms extended overhead, palms on the floor. At the same time, raise your right arm and left leg as high as comfortably possible. Hold for 10 seconds and slowly return to start. Repeat with left arm and right leg, alternating 10 times. Gradually build up to 20 times. (Advanced: Repeat this exercise raising both arms and both legs a few inches off the floor at the same time. Hold for 5 seconds and release.)  3. Pelvic tilt: Lie on the floor on your back with your knees bent at 90 degrees. Your feet should be flat on the floor. Inhale, exhale, then slowly contract your abdominal muscles bringing your navel toward your spine. Let your pelvis rock back until your lower back is flat on the floor. Hold for 10 seconds while breathing smoothly.  4. Abdominal crunch: Perform a pelvic tilt (above) flattening your lower back against the floor. Holding the tension in your abdominal muscles, take another breath and raise your shoulder blades off the ground (this is not a full sit-up). Keep your head in line with your body (dont bend your neck forward). Hold for 2 seconds, then slowly lower.  Date Last Reviewed: 6/1/2016  © 6125-2811 [employer]. [address] [place] [address]. All rights reserved. This information is not intended as a substitute for professional medical care. Always follow your healthcare professional's instructions.        Back Exercises: Arm Reach    Do this exercise on your hands and knees. Keep your knees under your hips and your hands under your shoulders. Keep your  spine in a neutral position (not arched or sagging). Be sure to maintain your necks natural curve:  · Stretch one arm straight out in front of you. Dont raise your head or let your supporting shoulder sag.  · Hold for 5 seconds.  · Return to starting position.  · Repeat 5 times.  · Switch arms.  Date Last Reviewed: 8/16/2015  © 2955-3232 [employer]. [address] [place] [address]. All rights reserved. This information is not intended as a substitute for professional medical care. Always follow your healthcare professional's instructions.        Back Exercises: Leg Reach         Do this exercise on your hands and knees. Keep your knees under your hips and your hands under your shoulders. Keep your spine in a neutral position (not arched or sagging). Be sure to maintain your necks natural curve:  · Extend one leg straight back. Dont arch your back or let your head or body sag.  · Hold for 5 seconds. Return to starting position.  · Repeat 5 times.  · Switch legs.   Date Last Reviewed: 8/16/2015 © 2000-2017 [employer]. [address] [place] [address]. All rights reserved. This information is not intended as a substitute for professional medical care. Always follow your healthcare professional's instructions.        Back Exercises: Back Release  Do this exercise on your hands and knees. Keep your knees under your hips and your hands under your shoulders.      · Relax your abdominal and buttocks muscles, lift your head, and let your back sag. Be sure to keep your weight evenly distributed. Dont sit back on your hips.   · Hold for 5 seconds.  · Return to starting position.  · Tuck your head and lift (arch) your back.  · Hold for 5 seconds  · Return to starting position.  · Repeat 5 times.  Date Last Reviewed: 8/16/2015 © 2000-2017 [employer]. [address] [place] [address]. All rights reserved. This information is not intended as a  substitute for professional medical care. Always follow your healthcare professional's instructions.        Back Exercises: Bridge  The bridge exercise strengthens your abdominal, buttock, and hamstring muscles. This helps keep your back stable and aligned when you walk.  · Lie on the floor with your back and palms flat. Bend your knees. Keep your feet flat on the floor.  · Contract your abdominal and buttock muscles. Slowly lift your buttocks off the floor until there is a straight line from your knees to your shoulders.  · Hold for 5 to 15  seconds. Repeat 5 to10 times.    Date Last Reviewed: 8/31/2015  © 6445-7996 [employer]. [address] [place] [address]. All rights reserved. This information is not intended as a substitute for professional medical care. Always follow your healthcare professional's instructions.        Back Exercises: Back Press    Do this exercise on your hands and knees. Keep your knees under your hips and your hands under your shoulders. Keep your spine in a neutral position (not arched or sagging). Be sure to maintain your necks natural curve:  · Tighten your stomach and buttock muscles to press your back upward. Let your head drop slightly.  · Hold for 5 seconds. Return to starting position.  · Repeat 5 times.  Date Last Reviewed: 10/11/2015  © 9905-6199 [employer]. [address] [place] [address]. All rights reserved. This information is not intended as a substitute for professional medical care. Always follow your healthcare professional's instructions.        Back Exercises: Leg Pull    To start, lie on your back with your knees bent and feet flat on the floor. Dont press your neck or lower back to the floor. Breathe deeply. You should feel comfortable and relaxed in this position.  · Pull one knee to your chest.  · Hold for 30 to 60 seconds. Return to starting position.  · Repeat 2 times.  · Switch legs.  · For a double leg pull,  pull both legs to your chest at the same time. Repeat 2 times.  For your safety, check with your healthcare provider before starting an exercise program.   Date Last Reviewed: 8/16/2015  © 3132-2007 [employer]. [address] [place] [address]. All rights reserved. This information is not intended as a substitute for professional medical care. Always follow your healthcare professional's instructions.        Back Exercises: Back Extension with Elbow Press    To start, lie face down on your stomach, feet slightly apart, forehead on the floor. Breathe deeply. You should feel comfortable and relaxed in this position.  · Press up on your forearms. Keep your stomach and hips on the floor. Stay within your painfree range.  · Hold for 20 seconds. Lower slowly.  · Repeat 2 times.  · Return to starting position.  Date Last Reviewed: 10/13/2015  © 5943-1227 [employer]. [address] [place] [address]. All rights reserved. This information is not intended as a substitute for professional medical care. Always follow your healthcare professional's instructions.        Back Exercises: Lower Back Rotation    To start, lie on your back with your knees bent and feet flat on the floor. Dont press your neck or lower back to the floor. Breathe deeply. You should feel comfortable and relaxed in this position.  · Drop both knees to one side. Turn your head to the other side. Keep your shoulders flat on the floor.  · Do not push through pain.  · Hold for 20 seconds.  · Slowly switch sides.  · Repeat 2 to 5 times.  Date Last Reviewed: 10/11/2015  © 0551-7293 [employer]. [address] [place] [address]. All rights reserved. This information is not intended as a substitute for professional medical care. Always follow your healthcare professional's instructions.

## 2019-10-09 NOTE — PROGRESS NOTES
Back and Spine New Patient    Patient ID: Mary Mike is a 59 y.o. female.    Chief Complaint   Patient presents with    Low-back Pain     She has had low back pain and it has gradually gotten worse over the last year. She fell 3 weeks ago and has had pain on the left side in the area of her left hip. Pain is constant. Ice and creams like icy hot help pain. 1st thing in the morning and walking up stairs, and raising her left leg makes pain worse.       Review of Systems   Constitutional: Negative for activity change, appetite change, chills, fever and unexpected weight change.   HENT: Negative for tinnitus, trouble swallowing and voice change.    Respiratory: Negative for apnea, cough, chest tightness and shortness of breath.    Cardiovascular: Negative for chest pain and palpitations.   Gastrointestinal: Negative for constipation, diarrhea, nausea and vomiting.   Genitourinary: Negative for difficulty urinating, dysuria, frequency and urgency.   Musculoskeletal: Positive for arthralgias, back pain and myalgias. Negative for gait problem, neck pain and neck stiffness.   Skin: Negative for wound.   Neurological: Negative for dizziness, tremors, seizures, facial asymmetry, speech difficulty, weakness, light-headedness, numbness and headaches.   Psychiatric/Behavioral: Negative for confusion and decreased concentration. The patient is nervous/anxious.        Past Medical History:   Diagnosis Date    ADHD (attention deficit hyperactivity disorder)     Allergy     Anemia     Anxiety     Depression     Fatty liver     Insomnia     Migraines     Osteoporosis     Restless leg syndrome      Social History     Socioeconomic History    Marital status:      Spouse name: Not on file    Number of children: Not on file    Years of education: Not on file    Highest education level: Not on file   Occupational History    Not on file   Social Needs    Financial resource strain: Not on file    Food  insecurity:     Worry: Not on file     Inability: Not on file    Transportation needs:     Medical: Not on file     Non-medical: Not on file   Tobacco Use    Smoking status: Never Smoker    Smokeless tobacco: Never Used   Substance and Sexual Activity    Alcohol use: Yes     Comment: occassional    Drug use: No    Sexual activity: Yes   Lifestyle    Physical activity:     Days per week: Not on file     Minutes per session: Not on file    Stress: Not on file   Relationships    Social connections:     Talks on phone: Not on file     Gets together: Not on file     Attends Jewish service: Not on file     Active member of club or organization: Not on file     Attends meetings of clubs or organizations: Not on file     Relationship status: Not on file   Other Topics Concern    Not on file   Social History Narrative    Not on file     Family History   Problem Relation Age of Onset    Arthritis Mother     Colon cancer Mother     Diabetes Mother     Early death Mother     Miscarriages / Stillbirths Mother     Arthritis Father     Heart disease Father     Hypertension Father     Stroke Father     Vision loss Father     Vaginal cancer Paternal Grandmother     Heart disease Paternal Grandfather      Review of patient's allergies indicates:  No Known Allergies    Current Outpatient Medications:     dextroamphetamine-amphetamine (ADDERALL XR) 20 MG 24 hr capsule, Take 20 mg by mouth every morning., Disp: , Rfl:     ergocalciferol (ERGOCALCIFEROL) 50,000 unit Cap, TK 1 C PO 1 TIME A WK, Disp: , Rfl: 1    esomeprazole (NEXIUM) 40 MG capsule, take as directed, Disp: , Rfl:     FLUoxetine (PROZAC) 20 MG capsule, Take 1 capsule by mouth nightly., Disp: , Rfl: 1    LORazepam (ATIVAN) 1 MG tablet, Take 1 tablet (1 mg total) by mouth nightly as needed., Disp: 30 tablet, Rfl: 0    multivitamin (THERAGRAN) tablet, Take 1 tablet by mouth once daily., Disp: , Rfl:     pramipexole (MIRAPEX) 1 MG tablet, Take 2  "tablets (2 mg total) by mouth every evening. 2 tabs qhs, Disp: 180 tablet, Rfl: 1    risedronate (ACTONEL) 35 MG tablet, , Disp: , Rfl: 11    traZODone (DESYREL) 50 MG tablet, TAKE 1-2 TABLETS BY MOUTH AT NIGHT AS NEEDED FOR INSOMNIA, Disp: 60 tablet, Rfl: 0    triamcinolone acetonide 0.5% (KENALOG) 0.5 % Crea, Apply topically 2 (two) times daily., Disp: 15 g, Rfl: 1    magnesium oxide (MAG-OX) 400 mg tablet, Take 1 tablet (400 mg total) by mouth once daily. (Patient not taking: Reported on 10/9/2019), Disp: , Rfl: 0    Vitals:    10/09/19 1434   BP: 127/82   BP Location: Left arm   Patient Position: Sitting   BP Method: Medium (Automatic)   Pulse: 80   Weight: 72.1 kg (159 lb 1 oz)   Height: 5' 3" (1.6 m)       Physical Exam   Constitutional: She is oriented to person, place, and time. She appears well-developed and well-nourished.   HENT:   Head: Normocephalic and atraumatic.   Eyes: Pupils are equal, round, and reactive to light.   Neck: Normal range of motion. Neck supple.   Cardiovascular: Normal rate.   Pulmonary/Chest: Effort normal.   Musculoskeletal: Normal range of motion. She exhibits no edema.   Neurological: She is alert and oriented to person, place, and time. She has a normal Finger-Nose-Finger Test, a normal Heel to Shin Test, a normal Romberg Test and a normal Tandem Gait Test. Gait normal.   Reflex Scores:       Tricep reflexes are 2+ on the right side and 2+ on the left side.       Bicep reflexes are 2+ on the right side and 2+ on the left side.       Brachioradialis reflexes are 2+ on the right side and 2+ on the left side.       Patellar reflexes are 2+ on the right side and 2+ on the left side.       Achilles reflexes are 2+ on the right side and 2+ on the left side.  Skin: Skin is warm, dry and intact.   Psychiatric: She has a normal mood and affect. Her speech is normal and behavior is normal. Judgment and thought content normal.   Nursing note and vitals reviewed.      Neurologic Exam "     Mental Status   Oriented to person, place, and time.   Oriented to person.   Oriented to place.   Oriented to time.   Follows 3 step commands.   Attention: normal. Concentration: normal.   Speech: speech is normal   Level of consciousness: alert  Knowledge: consistent with education.   Able to name object. Able to read. Able to repeat. Able to write. Normal comprehension.     Cranial Nerves     CN II   Visual acuity: normal  Right visual field deficit: none  Left visual field deficit: none     CN III, IV, VI   Pupils are equal, round, and reactive to light.  Right pupil: Size: 3 mm. Shape: regular. Reactivity: brisk. Consensual response: intact.   Left pupil: Size: 3 mm. Shape: regular. Reactivity: brisk. Consensual response: intact.   CN III: no CN III palsy  CN VI: no CN VI palsy  Nystagmus: none   Diplopia: none  Ophthalmoparesis: none  Conjugate gaze: present    CN V   Right facial sensation deficit: none  Left facial sensation deficit: none    CN VII   Right facial weakness: none  Left facial weakness: none    CN VIII   Hearing: intact    CN IX, X   CN IX normal.   CN X normal.     CN XI   Right sternocleidomastoid strength: normal  Left sternocleidomastoid strength: normal  Right trapezius strength: normal  Left trapezius strength: normal    CN XII   Fasciculations: absent  Tongue deviation: none    Motor Exam   Muscle bulk: normal  Overall muscle tone: normal  Right arm pronator drift: absent  Left arm pronator drift: absent    Strength   Right neck flexion: 5/5  Left neck flexion: 5/5  Right neck extension: 5/5  Left neck extension: 5/5  Right deltoid: 5/5  Left deltoid: 5/5  Right biceps: 5/5  Left biceps: 5/5  Right triceps: 5/5  Left triceps: 5/5  Right wrist flexion: 5/5  Left wrist flexion: 5/5  Right wrist extension: 5/5  Left wrist extension: 5/5  Right interossei: 5/5  Left interossei: 5/5  Right abdominals: 5/5  Left abdominals: 5/5  Right iliopsoas: 5/5  Left iliopsoas: 5/5  Right quadriceps:  5/5  Left quadriceps: 5/5  Right hamstrin/5  Left hamstrin/5  Right glutei: 5/5  Left glutei: 5/5  Right anterior tibial: 5/5  Left anterior tibial: 5/5  Right posterior tibial: 5/5  Left posterior tibial: 5/5  Right peroneal: 5/5  Left peroneal: 5/5  Right gastroc: 5/5  Left gastroc: 5/5    Sensory Exam   Right arm light touch: normal  Left arm light touch: normal  Right leg light touch: normal  Left leg light touch: normal  Right arm vibration: normal  Left arm vibration: normal  Right arm pinprick: normal  Left arm pinprick: normal    Gait, Coordination, and Reflexes     Gait  Gait: normal    Coordination   Romberg: negative  Finger to nose coordination: normal  Heel to shin coordination: normal  Tandem walking coordination: normal    Tremor   Resting tremor: absent  Intention tremor: absent  Action tremor: absent    Reflexes   Right brachioradialis: 2+  Left brachioradialis: 2+  Right biceps: 2+  Left biceps: 2+  Right triceps: 2+  Left triceps: 2+  Right patellar: 2+  Left patellar: 2+  Right achilles: 2+  Left achilles: 2+  Right Toussaint: absent  Left Toussaint: absent  Right ankle clonus: absent  Left ankle clonus: absent      Provider dictation:  59 year old female with ADD, RLS, Anxiety, depression, osteoporosis is self referred for evaluation of lower back pain.  She has had chronic central and left paracentral back pain for years that has worsened over the last year after foot/ ankle surgery.  She fell 3 weeks ago from a chair when trying to take a trellis down.  She fell on the left side of the body spraining the left ankle and developing increased left back pain.  She has pain in the left lower back and superior buttock.  She denies radicular leg pain, numbness, tingling. Pain increases with lifting the left leg to go up stairs.  She is not taking any medications for pain.  Nor has she tried PT or injections through the years.  Oswestry score: 37%.  PHQ:  14.    She is neurologically intact with  5/5 strength, 2+ DTR, no sensory deficits.  There is no tenderness to percussion over the spine and no no tenderness over the SI joints.    She has not had any imaging.     In conclusion, Ms. Mike has chronic back pain with acute exacerbation after a recent fall.  She has no neurological deficits and no radicular symptoms supporting a myofascial cause to pain - sprain/ strain.  I recommend home exercise and NSAIDs.  We also discussed PT, which she prefers to hold on until completing home exercise.  Follow up if no improvement.      Visit Diagnosis:  Acute left-sided low back pain without sciatica        Total time spent counseling greater than fifty percent of total visit time.  Counseling included discussion regarding imaging findings, diagnosis possibilities, treatment options, risks and benefits.   The patient had many questions regarding the options and long-term effects.

## 2019-10-18 NOTE — PROGRESS NOTES
"HPI: Mary Mike is a  59 y.o. female who complains of left ankle pain. The pain began acutely 3 weeks ago when she had a fall off a chair she was standing on to reach something. DOI: 9/17/2019. She rates her pain as 5/10 today. The pain is worse with walking and standing.     PAST MEDICAL/SURGICAL/FAMILY/SOCIAL/ HISTORY: REVIEWED    ALLERGIES/MEDICATIONS: REVIEWED       Review of Systems:     Constitution: Negative.   HEENT: Negative.   Eyes: Negative.   Cardiovascular: Negative.   Respiratory: Negative.   Endocrine: Negative.   Hematologic/Lymphatic: Negative.   Skin: Negative.   Musculoskeletal: Positive for left ankle pain   Gastrointestinal: Negative.   Genitourinary: Negative.   Neurological: Negative.   Psychiatric/Behavioral: Negative.   Allergic/Immunologic: Negative.       PHYSICAL EXAM:  Vitals:    10/08/19 1442   BP: 139/78   Pulse: 75     Ht Readings from Last 1 Encounters:   10/09/19 5' 3" (1.6 m)     Wt Readings from Last 1 Encounters:   10/09/19 72.1 kg (159 lb 1 oz)       GENERAL: Well developed, well nourished, no acute distress.  Very pleasant.   SKIN: Skin is intact. No atrophy, abrasions or lesions are noted.   Neurological: Normal mental status. Appropriate and conversant. Alert and oriented x 3.  GAIT: Walks with non-antalgic gait.    Left  lower extremity:  2+ dorsalis pedis pulse.  Capillary refill < 3 seconds.  Mildly decreased range of motion tibiotalar and subtalar joints. Normal alignment of the forefoot and the hindfoot.  5/5 strength EHL, FHL, tibialis anterior, gastrocsoleus, tibialis posterior and peroneals. Sensation to light touch intact sural, saphenous, superficial peroneal and deep peroneal nerves. Mild  swelling, no ecchymosis or deformity. No lymphadenopathy, no masses or tumors palpated.   tenderness to palpation at the ATFL. Negative anterior drawer test.     XRAYS:   3 views of left ankle obtained and reviewed today reveal possible small fibular avulsion fragment. " Ankle mortise is intact.       ASSESSMENT: Left ankle sprain      PLAN:  I spent 20 minutes in consulation with the patient today. More than half the time was spent counseling the patient on her condition. Ankle sleeve and handout on foot and ankle exercises given. Return to clinic if symptoms persist.

## 2020-02-26 ENCOUNTER — OFFICE VISIT (OUTPATIENT)
Dept: FAMILY MEDICINE | Facility: CLINIC | Age: 60
End: 2020-02-26
Payer: COMMERCIAL

## 2020-02-26 VITALS
HEIGHT: 63 IN | SYSTOLIC BLOOD PRESSURE: 139 MMHG | WEIGHT: 161 LBS | HEART RATE: 93 BPM | DIASTOLIC BLOOD PRESSURE: 88 MMHG | BODY MASS INDEX: 28.53 KG/M2

## 2020-02-26 DIAGNOSIS — R53.83 OTHER FATIGUE: ICD-10-CM

## 2020-02-26 DIAGNOSIS — R10.84 GENERALIZED ABDOMINAL PAIN: ICD-10-CM

## 2020-02-26 DIAGNOSIS — R10.32 LLQ PAIN: ICD-10-CM

## 2020-02-26 DIAGNOSIS — R10.9 LEFT FLANK PAIN: Primary | ICD-10-CM

## 2020-02-26 PROCEDURE — 3008F PR BODY MASS INDEX (BMI) DOCUMENTED: ICD-10-PCS | Mod: S$GLB,,, | Performed by: INTERNAL MEDICINE

## 2020-02-26 PROCEDURE — 99214 OFFICE O/P EST MOD 30 MIN: CPT | Mod: S$GLB,,, | Performed by: INTERNAL MEDICINE

## 2020-02-26 PROCEDURE — 3008F BODY MASS INDEX DOCD: CPT | Mod: S$GLB,,, | Performed by: INTERNAL MEDICINE

## 2020-02-26 PROCEDURE — 99214 PR OFFICE/OUTPT VISIT, EST, LEVL IV, 30-39 MIN: ICD-10-PCS | Mod: S$GLB,,, | Performed by: INTERNAL MEDICINE

## 2020-02-26 RX ORDER — DEXTROAMPHETAMINE SACCHARATE, AMPHETAMINE ASPARTATE, DEXTROAMPHETAMINE SULFATE AND AMPHETAMINE SULFATE 2.5; 2.5; 2.5; 2.5 MG/1; MG/1; MG/1; MG/1
1 TABLET ORAL DAILY
COMMUNITY
Start: 2020-02-11 | End: 2021-06-16

## 2020-02-26 NOTE — PATIENT INSTRUCTIONS
Flank Pain, Uncertain Cause  The flank is the area between your upper abdomen and your back. Pain there is often caused by a problem with your kidneys. It might be a kidney infection or a kidney stone. Other causes of flank pain include spinal arthritis, a pinched nerve from a back injury, or a back muscle strain or spasm.  The cause of your flank pain is not certain. You may need other tests.  Home care  Follow these tips when caring for yourself at home:  · You may use acetaminophen or ibuprofen to control pain, unless your health care provider prescribed another medicine. If you have chronic liver or kidney disease, talk with your provider before taking these medicines. Also talk with your provider first if youve ever had a stomach ulcer or GI bleeding.  · If the pain is coming from your muscles, you may get relief with ice or heat. During the first 2 days after the injury, put an ice pack on the painful area for 20 minutes every 2 to 4 hours. This will reduce swelling and pain. A hot shower, hot bath, or heating pad works well for a muscle spasm. You can start with ice, then switch to heat after 2 days. You might find that alternating ice and heat works well. Use the method that feels the best to you.  Follow-up care  Follow up with your healthcare provider if your symptoms dont get better over the next few days.  When to seek medical advice  Call your healthcare provider right away if any of these happen:  · Repeated vomiting  · Fever of 100.4ºF (38ºC) or higher, or as directed by your health care provider  · Flank pain that gets worse  · Pain that spreads to the front of your belly (abdomen)  · Dizziness, weakness, or fainting  · Blood in your urine  · Burning feeling when you urinate or the need to urinate often  · Pain in one of your legs that gets worse  · Numbness or weakness in a leg  Date Last Reviewed: 10/1/2016  © 9332-6378 [employer]. [address] [place] [address], [place] [address]. All  rights reserved. This information is not intended as a substitute for professional medical care. Always follow your healthcare professional's instructions.

## 2020-02-26 NOTE — PROGRESS NOTES
Subjective:       Patient ID: Mary Mike is a 59 y.o. female.    Chief Complaint: Flank Pain (in left side ); Left Hip pain; and Fatigue (I am not feeling myself)    Patient is a 59-year-old  female who usually follows up with miss Laurel Neal who knows her well.  Today her visit in the office is primarily to address her left flank, left lower quadrant and left hip pain which started approximately 2 months ago.  She wants to discuss multiple other issues including weight gain, feeling tired, having fatty liver and the need to go on diet pills.  But these will be deferred back to her provider Ms. Laurel Neal when she feels better or the issue of left flank pain is resolved.    Again, she does not clearly recall any history of fall or injury though she vaguely recall that she might have fallen or hit her left hip.  There is no significant alteration in her bowel movement.  She does get colonoscopies regularly which are unremarkable and thus far has not been diagnosed with diverticulitis.  Her mother had history of colon cancer.  There is no weight loss at this point in fact she has gained weight.    She also complains of I am not feeling myself but is unable to pinpoint.  She states that she has gained weight.  She states that she has fatty liver and was prescribed diet pills to lose weight.  (I did not see any abnormal liver function tests).    She also takes amphetamines in past.  She was also room air packs, Prozac and trazodone.  I am not sure if these medications contribute to some degree of sedation or fatigue.    History of abdominal plasty following weight loss also has been noted.  She also had a breast reduction surgery in past.    Flank Pain   This is a new problem. The current episode started more than 1 month ago (Two months approximately). The problem occurs constantly. The problem has been waxing and waning since onset. The pain is present in the lumbar spine, gluteal and  costovertebral angle. The quality of the pain is described as aching, cramping and stabbing. The pain is at a severity of 5/10. The pain is moderate. Associated symptoms include abdominal pain (llq pain). Pertinent negatives include no chest pain, dysuria, fever, headaches or pelvic pain.   Back Pain   This is a new problem. The current episode started more than 1 month ago (2 months). The problem occurs constantly. The problem has been waxing and waning since onset. The pain is present in the lumbar spine and sacro-iliac. The quality of the pain is described as aching and cramping. The pain does not radiate. The pain is at a severity of 5/10. The pain is moderate. The pain is the same all the time. Associated symptoms include abdominal pain (llq pain). Pertinent negatives include no chest pain, dysuria, fever, headaches or pelvic pain. Risk factors include sedentary lifestyle and menopause. The treatment provided no relief.   Fatigue   This is a new problem. The current episode started more than 1 month ago. The problem occurs constantly. The problem has been unchanged. Associated symptoms include abdominal pain (llq pain) and fatigue. Pertinent negatives include no anorexia (wt gain), arthralgias, chest pain, chills, congestion, coughing, fever, headaches, joint swelling or rash. Nothing aggravates the symptoms. She has tried nothing for the symptoms. The treatment provided no relief.       Past Medical History:   Diagnosis Date    ADHD (attention deficit hyperactivity disorder)     Allergy     Anemia     Anxiety     Depression     Fatty liver     Insomnia     Migraines     Osteoporosis     Restless leg syndrome      Social History     Socioeconomic History    Marital status:      Spouse name: Not on file    Number of children: Not on file    Years of education: Not on file    Highest education level: Not on file   Occupational History    Not on file   Social Needs    Financial resource  strain: Not on file    Food insecurity:     Worry: Not on file     Inability: Not on file    Transportation needs:     Medical: Not on file     Non-medical: Not on file   Tobacco Use    Smoking status: Never Smoker    Smokeless tobacco: Never Used   Substance and Sexual Activity    Alcohol use: Yes     Comment: occassional    Drug use: No    Sexual activity: Yes   Lifestyle    Physical activity:     Days per week: Not on file     Minutes per session: Not on file    Stress: Not at all   Relationships    Social connections:     Talks on phone: Not on file     Gets together: Not on file     Attends [employer] service: Not on file     Active member of club or organization: Not on file     Attends meetings of clubs or organizations: Not on file     Relationship status: Not on file   Other Topics Concern    Not on file   Social History Narrative    Not on file     Past Surgical History:   Procedure Laterality Date    BREAST SURGERY      BUNIONECTOMY Right      SECTION      FRACTURE SURGERY      GASTRIC RESTRICTION SURGERY      GASTRIC SLEEVE 2012       Family History   Problem Relation Age of Onset    Arthritis Mother     Colon cancer Mother     Diabetes Mother     Early death Mother     Miscarriages / Stillbirths Mother     Arthritis Father     Heart disease Father     Hypertension Father     Stroke Father     Vision loss Father     Vaginal cancer Paternal Grandmother     Heart disease Paternal Grandfather        Review of Systems   Constitutional: Positive for fatigue. Negative for activity change, chills, fever and unexpected weight change (Weight gain over years.).   HENT: Negative for congestion, postnasal drip and sinus pressure.    Eyes: Negative for pain, discharge and visual disturbance.   Respiratory: Negative for cough, chest tightness and shortness of breath.    Cardiovascular: Negative for chest pain, palpitations and leg swelling.   Gastrointestinal: Positive for  "abdominal pain (llq pain). Negative for abdominal distention, anal bleeding, anorexia (wt gain), constipation and diarrhea.        History of abdominoplasty.   Endocrine: Negative for cold intolerance, heat intolerance, polydipsia, polyphagia and polyuria.   Genitourinary: Positive for flank pain. Negative for difficulty urinating, dysuria, frequency, menstrual problem and pelvic pain.        History of ovarian cyst removal.  Side of removal is unclear.   Musculoskeletal: Positive for back pain. Negative for arthralgias and joint swelling.   Skin: Negative for color change, pallor and rash.   Allergic/Immunologic: Negative for environmental allergies, food allergies and immunocompromised state.   Neurological: Negative for dizziness, tremors, seizures, syncope, light-headedness and headaches.   Hematological: Negative for adenopathy. Does not bruise/bleed easily.   Psychiatric/Behavioral: Negative for agitation, confusion and dysphoric mood. The patient is not nervous/anxious.          Objective:      Blood pressure 139/88, pulse 93, height 5' 3" (1.6 m), weight 73 kg (161 lb). Body mass index is 28.52 kg/m².  Physical Exam   Constitutional: She is oriented to person, place, and time. She appears well-developed and well-nourished. She is cooperative. No distress.   HENT:   Head: Normocephalic and atraumatic.   Right Ear: Tympanic membrane normal.   Left Ear: Tympanic membrane normal.   Eyes: Pupils are equal, round, and reactive to light. Conjunctivae, EOM and lids are normal. Lids are everted and swept, no foreign bodies found. Right pupil is round and reactive. Left pupil is round and reactive.   Neck: Trachea normal and normal range of motion. Neck supple.   Cardiovascular: Normal rate, regular rhythm, S1 normal, S2 normal and normal heart sounds.   Pulmonary/Chest: Breath sounds normal.   Abdominal: Soft. Bowel sounds are normal. There is no rigidity and no guarding.       Musculoskeletal: Normal range of " motion. She exhibits no edema or deformity.        Lumbar back: She exhibits tenderness. She exhibits normal range of motion, no bony tenderness, no swelling, no edema and no deformity.        Back:    Lymphadenopathy:     She has no cervical adenopathy.     She has no axillary adenopathy.   Neurological: She is alert and oriented to person, place, and time. She displays no atrophy and no tremor. No sensory deficit. She exhibits normal muscle tone.   Reflex Scores:       Tricep reflexes are 1+ on the right side and 1+ on the left side.       Bicep reflexes are 1+ on the right side and 1+ on the left side.       Brachioradialis reflexes are 1+ on the right side and 1+ on the left side.       Patellar reflexes are 2+ on the right side and 1+ on the left side.       Achilles reflexes are 1+ on the right side and 1+ on the left side.  Patient's knee jerk is slightly brisk on the left side as compared to the right side.  SLR is negative.  MARILIN test is negative.  Patient is able to bend on her back.   Skin: Skin is warm and dry.   Psychiatric: She has a normal mood and affect. Her behavior is normal.   Nursing note and vitals reviewed.        Assessment:       1. Left flank pain    2. Generalized abdominal pain    3. LLQ pain    4. Other fatigue           No visits with results within 3 Month(s) from this visit.   Latest known visit with results is:   Admission on 08/11/2019, Discharged on 08/11/2019   Component Date Value Ref Range Status    WBC 08/11/2019 3.37* 3.90 - 12.70 K/uL Final    RBC 08/11/2019 4.41  4.00 - 5.40 M/uL Final    Hemoglobin 08/11/2019 12.1  12.0 - 16.0 g/dL Final    Hematocrit 08/11/2019 38.3  37.0 - 48.5 % Final    Mean Corpuscular Volume 08/11/2019 87  82 - 98 fL Final    Mean Corpuscular Hemoglobin 08/11/2019 27.4  27.0 - 31.0 pg Final    Mean Corpuscular Hemoglobin Conc 08/11/2019 31.6* 32.0 - 36.0 g/dL Final    RDW 08/11/2019 13.0  11.5 - 14.5 % Final    Platelets 08/11/2019 232  150  - 350 K/uL Final    MPV 08/11/2019 10.1  9.2 - 12.9 fL Final    Gran # (ANC) 08/11/2019 2.1  1.8 - 7.7 K/uL Final    Immature Grans (Abs) 08/11/2019 0.01  0.00 - 0.04 K/uL Final    Lymph # 08/11/2019 0.8* 1.0 - 4.8 K/uL Final    Mono # 08/11/2019 0.4  0.3 - 1.0 K/uL Final    Eos # 08/11/2019 0.1  0.0 - 0.5 K/uL Final    Baso # 08/11/2019 0.01  0.00 - 0.20 K/uL Final    nRBC 08/11/2019 0  0 /100 WBC Final    Gran% 08/11/2019 61.7  38.0 - 73.0 % Final    Lymph% 08/11/2019 23.4  18.0 - 48.0 % Final    Mono% 08/11/2019 10.4  4.0 - 15.0 % Final    Eosinophil% 08/11/2019 3.9  0.0 - 8.0 % Final    Basophil% 08/11/2019 0.3  0.0 - 1.9 % Final    Differential Method 08/11/2019 Automated   Final    Sodium 08/11/2019 143  136 - 145 mmol/L Final    Potassium 08/11/2019 3.9  3.5 - 5.1 mmol/L Final    Chloride 08/11/2019 106  95 - 110 mmol/L Final    CO2 08/11/2019 28  23 - 29 mmol/L Final    Glucose 08/11/2019 92  70 - 110 mg/dL Final    BUN, Bld 08/11/2019 8  6 - 20 mg/dL Final    Creatinine 08/11/2019 0.8  0.5 - 1.4 mg/dL Final    Calcium 08/11/2019 9.3  8.7 - 10.5 mg/dL Final    Total Protein 08/11/2019 6.7  6.0 - 8.4 g/dL Final    Albumin 08/11/2019 3.8  3.5 - 5.2 g/dL Final    Total Bilirubin 08/11/2019 0.2  0.1 - 1.0 mg/dL Final    Alkaline Phosphatase 08/11/2019 69  55 - 135 U/L Final    AST 08/11/2019 28  10 - 40 U/L Final    ALT 08/11/2019 29  10 - 44 U/L Final    Anion Gap 08/11/2019 9  8 - 16 mmol/L Final    eGFR if African American 08/11/2019 >60  >60 mL/min/1.73 m^2 Final    eGFR if non African American 08/11/2019 >60  >60 mL/min/1.73 m^2 Final    CRP 08/11/2019 0.6  0.0 - 8.2 mg/L Final         Plan:           Left flank pain  -     Urinalysis; Future; Expected date: 02/26/2020  -     Comprehensive metabolic panel; Future; Expected date: 02/26/2020    Generalized abdominal pain    LLQ pain  -     CT Abdomen Pelvis With Contrast; Future; Expected date: 02/26/2020    Other fatigue  -      Comprehensive metabolic panel; Future; Expected date: 02/26/2020  -     TSH; Future; Expected date: 02/26/2020  -     CBC auto differential; Future; Expected date: 02/26/2020      The main issue for patient today seems to be a relatively new onset of left lower quadrant and left flank pain.  This has been going for 2 months.    My differential diagnosis is as follows  .  1.-kidney stone with ureteral problems or bladder problems.  2.-possibility of Uro gynecological issues including ovarian cysts.  She had 1 ovary removed.  3.-possibility of diverticulosis or diverticulitis.  4.-any other intra-abdominal pathologies.  History of endometriosis    Certainly her abdominoplasty and pulling of her skin may alter the dermatomal perception of pain.  Has these may not reflect the 2 nerve roots or origins of the pain.    He she also has back pain and it looks like and feels like degenerative disc though her motor strength and sensory is normal.  SLR is negative.    She also complains of fatigue and I will check a blood count, chemistry and thyroid to be sure that we are not missing anything.  Fatigue might be as a result of weight gain and I am not sure if she still has the fatty liver.  Last set of labs done in August 2019 had shown normal liver function test.  Certainly multiple medications including trazodone, Mirapex and the Prozac in combination with amphetamines could create this problem.  Sure about sleep apnea or caffeine use at this point.  I will initiate the workup at this point and convey my impression to MsTony Neal also.  I will encourage her to follow up with Ms. Laurel Neal also.  In the interim I will notify the patient of results of initial investigations.    Follow-up with Ms. Neal in couple of weeks.    Patient seen with miss Joselyn simmons as chaperone.    Spent megan 25 minutes with patient which involved review of pts medical conditions, labs, medications and with 50% of time face-to-face  discussion about medical problems, management and any applicable changes.      Current Outpatient Medications:     dextroamphetamine-amphetamine 10 mg Tab, 1 tablet once daily., Disp: , Rfl:     FLUoxetine (PROZAC) 20 MG capsule, Take 1 capsule by mouth nightly., Disp: , Rfl: 1    multivitamin (THERAGRAN) tablet, Take 1 tablet by mouth once daily., Disp: , Rfl:     pramipexole (MIRAPEX) 1 MG tablet, Take 2 tablets (2 mg total) by mouth every evening. 2 tabs qhs, Disp: 180 tablet, Rfl: 1    traZODone (DESYREL) 50 MG tablet, TAKE 1-2 TABLETS BY MOUTH AT NIGHT AS NEEDED FOR INSOMNIA, Disp: 60 tablet, Rfl: 0    ergocalciferol (ERGOCALCIFEROL) 50,000 unit Cap, TK 1 C PO 1 TIME A WK, Disp: , Rfl: 1    magnesium oxide (MAG-OX) 400 mg tablet, Take 1 tablet (400 mg total) by mouth once daily. (Patient not taking: Reported on 2/26/2020), Disp: , Rfl: 0    risedronate (ACTONEL) 35 MG tablet, , Disp: , Rfl: 11

## 2020-02-27 LAB
ALBUMIN SERPL-MCNC: 4.1 G/DL (ref 3.6–5.1)
ALBUMIN/GLOB SERPL: 1.5 (CALC) (ref 1–2.5)
ALP SERPL-CCNC: 63 U/L (ref 37–153)
ALT SERPL-CCNC: 15 U/L (ref 6–29)
AST SERPL-CCNC: 15 U/L (ref 10–35)
BASOPHILS # BLD AUTO: 31 CELLS/UL (ref 0–200)
BASOPHILS NFR BLD AUTO: 0.7 %
BILIRUB SERPL-MCNC: 0.4 MG/DL (ref 0.2–1.2)
BUN SERPL-MCNC: 13 MG/DL (ref 7–25)
BUN/CREAT SERPL: NORMAL (CALC) (ref 6–22)
CALCIUM SERPL-MCNC: 9.6 MG/DL (ref 8.6–10.4)
CHLORIDE SERPL-SCNC: 102 MMOL/L (ref 98–110)
CO2 SERPL-SCNC: 31 MMOL/L (ref 20–32)
CREAT SERPL-MCNC: 0.8 MG/DL (ref 0.5–1.05)
EOSINOPHIL # BLD AUTO: 101 CELLS/UL (ref 15–500)
EOSINOPHIL NFR BLD AUTO: 2.3 %
ERYTHROCYTE [DISTWIDTH] IN BLOOD BY AUTOMATED COUNT: 13.4 % (ref 11–15)
GFRSERPLBLD MDRD-ARVRAT: 82 ML/MIN/1.73M2
GLOBULIN SER CALC-MCNC: 2.8 G/DL (CALC) (ref 1.9–3.7)
GLUCOSE SERPL-MCNC: 83 MG/DL (ref 65–99)
HCT VFR BLD AUTO: 40 % (ref 35–45)
HGB BLD-MCNC: 12.9 G/DL (ref 11.7–15.5)
LYMPHOCYTES # BLD AUTO: 911 CELLS/UL (ref 850–3900)
LYMPHOCYTES NFR BLD AUTO: 20.7 %
MCH RBC QN AUTO: 27 PG (ref 27–33)
MCHC RBC AUTO-ENTMCNC: 32.3 G/DL (ref 32–36)
MCV RBC AUTO: 83.9 FL (ref 80–100)
MONOCYTES # BLD AUTO: 405 CELLS/UL (ref 200–950)
MONOCYTES NFR BLD AUTO: 9.2 %
NEUTROPHILS # BLD AUTO: 2952 CELLS/UL (ref 1500–7800)
NEUTROPHILS NFR BLD AUTO: 67.1 %
PLATELET # BLD AUTO: 261 THOUSAND/UL (ref 140–400)
PMV BLD REES-ECKER: 10.9 FL (ref 7.5–12.5)
POTASSIUM SERPL-SCNC: 4.3 MMOL/L (ref 3.5–5.3)
PROT SERPL-MCNC: 6.9 G/DL (ref 6.1–8.1)
RBC # BLD AUTO: 4.77 MILLION/UL (ref 3.8–5.1)
SODIUM SERPL-SCNC: 140 MMOL/L (ref 135–146)
TSH SERPL-ACNC: 0.55 MIU/L (ref 0.4–4.5)
WBC # BLD AUTO: 4.4 THOUSAND/UL (ref 3.8–10.8)

## 2020-04-02 ENCOUNTER — CLINICAL SUPPORT (OUTPATIENT)
Dept: URGENT CARE | Facility: CLINIC | Age: 60
End: 2020-04-02
Payer: COMMERCIAL

## 2020-04-02 VITALS
DIASTOLIC BLOOD PRESSURE: 78 MMHG | OXYGEN SATURATION: 98 % | RESPIRATION RATE: 14 BRPM | SYSTOLIC BLOOD PRESSURE: 124 MMHG | WEIGHT: 189 LBS | HEIGHT: 63 IN | BODY MASS INDEX: 33.49 KG/M2 | TEMPERATURE: 100 F | HEART RATE: 74 BPM

## 2020-04-02 DIAGNOSIS — S22.41XA CLOSED FRACTURE OF MULTIPLE RIBS OF RIGHT SIDE, INITIAL ENCOUNTER: Primary | ICD-10-CM

## 2020-04-02 PROCEDURE — 71101 PR  X-RAY RIBS, CHEST 3+ VW: ICD-10-PCS | Mod: S$GLB,,, | Performed by: NURSE PRACTITIONER

## 2020-04-02 PROCEDURE — 71101 X-RAY EXAM UNILAT RIBS/CHEST: CPT | Mod: S$GLB,,, | Performed by: NURSE PRACTITIONER

## 2020-04-02 PROCEDURE — 99204 OFFICE O/P NEW MOD 45 MIN: CPT | Mod: 25,S$GLB,, | Performed by: NURSE PRACTITIONER

## 2020-04-02 PROCEDURE — 99204 PR OFFICE/OUTPT VISIT, NEW, LEVL IV, 45-59 MIN: ICD-10-PCS | Mod: 25,S$GLB,, | Performed by: NURSE PRACTITIONER

## 2020-04-02 RX ORDER — TRAMADOL HYDROCHLORIDE 50 MG/1
50 TABLET ORAL EVERY 6 HOURS
Qty: 20 TABLET | Refills: 0 | Status: SHIPPED | OUTPATIENT
Start: 2020-04-02 | End: 2020-04-07
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
## 2020-04-02 NOTE — PATIENT INSTRUCTIONS
Rib Fracture (Broken Rib)  Your ribs are curved bones in your chest. They help protect your lungs and expand and contract when you breathe. Children's ribs bend easily and can often withstand a blow or fall. But adult ribs are more likely to break (fracture) under stress. Even coughing or a hard sneeze can fracture a rib.    When to go to the Emergency Room (ER)  Although they can be painful, most rib fractures aren't serious. But they often make it hard to cough or breathe deeply. Get medical care right away if you have:  · Trouble breathing.  · Nausea, vomiting, or stomach pain with a sore or bruised rib.  · Pain that worsens over time.  · An injury to the chest or stomach.  What to expect in the ER  Here is what will happen in the ER:   · A healthcare provider will ask about your injury and examine you carefully.  · An X-ray of your chest will likely be taken to show any major damage to ribs and lungs. However, ribs can undergo small breaks that do not show up on X-rays, even though they still hurt.  · You may be given medicine to ease your discomfort.  · Rarely, rib fractures can cause a lung to collapse or lead to bleeding in the chest. In these cases, a tube will be inserted into the chest to reinflate the lung or drain the blood.  Follow-up  You are likely to heal in 6 to 8 weeks. Most rib fractures heal on their own with no lasting effects. Call your healthcare provider right away if you notice any of these symptoms:  · Increased chest pain  · Shortness of breath  · Fever  · Coughing up blood  Date Last Reviewed: 9/26/2015  © 0184-5922 [employer]. [address] [place] [address], [place] [address]. All rights reserved. This information is not intended as a substitute for professional medical care. Always follow your healthcare professional's instructions.        Rib Fracture    You broke one or more ribs. This is called a rib fracture. Rib fractures do not require a cast like other bones. They will  heal by themselves in about 4-6 weeks. The first 3-4 weeks will be the most painful because deep breathing, coughing, or changing position from sitting to lying down, may cause the broken ends to move slightly.  Home care  · Rest. You should not be doing any heavy lifting or strenuous exertion until the pain goes away.  · It hurts to breathe when you have a broken rib. This puts you at risk of getting pneumonia from poor airflow through your lungs. To prevent this:  ¨ Take several very deep breaths once an hour while you're awake. Exhale through pursed lips as if you are blowing up a balloon. If possible, actually blow up a balloon or a rubber glove. This exercise builds up pressure inside the lung and prevents collapse of the small air sacs of the lung. This exercise may cause some pain at the site of injury, which is normal.  ¨ You may have gotten a breathing exercise device called an incentive spirometer. Use it at least 4 times a day, or as directed.  · Apply an ice pack over the injured area for 15 to 20 minutes every 1 to 2 hours. You should do this for the first 24 to 48 hours. You can make an ice pack by filling a plastic bag that seals at the top with ice cubes and then wrapping it with a thin towel. Continue with ice packs as needed for the relief of pain and swelling.  · You may use over-the-counter pain medicine to control pain, unless another pain medicine was prescribed. If you have chronic liver or kidney disease or ever had a stomach ulcer or GI bleeding, talk with your healthcare provider before using these medicines.  · If your pain is not controlled, contact your healthcare provider. Sometimes a stronger pain medicine may be needed. A nerve block can be done in case of severe pain. It will numb the nerve between the ribs.  Follow-up care  Follow up with your healthcare provider, or as advised. Rarely, a broken rib will cause complications within the first few days that may not be evident during  your initial exam. This can include collapsed lung, bleeding around the lung or into the abdomen, or pneumonia. Therefore, watch for the signs below.  If X-rays were taken, you will be told of any new findings that may affect your care.  Call 911  Call 911 if you have:  · Dizziness, weakness or fainting  · Shortness of breath with or without chest discomfort  · New or worsening abdominal pain  · Discomfort in other areas of your upper body such as your shoulders, jaw, neck, or arms  When to seek medical advice  Call your healthcare provider right away if any of these occur:  · Increasing chest pain with breathing  · Fever of 100.4°F (38°C) or above lasting for 24 to 48 hours  · Congested cough  Date Last Reviewed: 12/3/2015  © 1129-7984 [employer]. [address] [place] [address], [place] [address]. All rights reserved. This information is not intended as a substitute for professional medical care. Always follow your healthcare professional's instructions.

## 2020-05-13 ENCOUNTER — HOSPITAL ENCOUNTER (OUTPATIENT)
Dept: RADIOLOGY | Facility: HOSPITAL | Age: 60
Discharge: HOME | End: 2020-05-13
Attending: INTERNAL MEDICINE
Payer: COMMERCIAL

## 2020-05-13 DIAGNOSIS — R10.32 LLQ PAIN: ICD-10-CM

## 2020-05-13 PROCEDURE — 74177 CT ABD & PELVIS W/CONTRAST: CPT | Mod: TC,PO

## 2020-05-13 PROCEDURE — 25500020 PHARM REV CODE 255: Mod: PO | Performed by: INTERNAL MEDICINE

## 2020-05-13 RX ADMIN — IOHEXOL 100 ML: 350 INJECTION, SOLUTION INTRAVENOUS at 01:05

## 2020-05-13 NOTE — PROGRESS NOTES
I had seen this patient in February and ordered a CT scan which she has been able to accomplish now.  Probably COVID-19 delay.  Does she have a follow-up with you?  The CT scan looks okay and does not confirm any of my suspicion of diverticulitis or kidney stone.

## 2020-05-18 ENCOUNTER — OFFICE VISIT (OUTPATIENT)
Dept: FAMILY MEDICINE | Facility: CLINIC | Age: 60
End: 2020-05-18
Payer: COMMERCIAL

## 2020-05-18 VITALS
DIASTOLIC BLOOD PRESSURE: 89 MMHG | BODY MASS INDEX: 28.17 KG/M2 | WEIGHT: 159 LBS | TEMPERATURE: 99 F | HEIGHT: 63 IN | SYSTOLIC BLOOD PRESSURE: 129 MMHG | HEART RATE: 81 BPM

## 2020-05-18 DIAGNOSIS — R53.83 OTHER FATIGUE: ICD-10-CM

## 2020-05-18 DIAGNOSIS — M54.50 CHRONIC LEFT-SIDED LOW BACK PAIN WITHOUT SCIATICA: ICD-10-CM

## 2020-05-18 DIAGNOSIS — G89.29 CHRONIC LEFT-SIDED LOW BACK PAIN WITHOUT SCIATICA: ICD-10-CM

## 2020-05-18 DIAGNOSIS — K57.90: Primary | ICD-10-CM

## 2020-05-18 PROCEDURE — 3008F BODY MASS INDEX DOCD: CPT | Mod: S$GLB,,, | Performed by: INTERNAL MEDICINE

## 2020-05-18 PROCEDURE — 99213 PR OFFICE/OUTPT VISIT, EST, LEVL III, 20-29 MIN: ICD-10-PCS | Mod: S$GLB,,, | Performed by: INTERNAL MEDICINE

## 2020-05-18 PROCEDURE — 3008F PR BODY MASS INDEX (BMI) DOCUMENTED: ICD-10-PCS | Mod: S$GLB,,, | Performed by: INTERNAL MEDICINE

## 2020-05-18 PROCEDURE — 99213 OFFICE O/P EST LOW 20 MIN: CPT | Mod: S$GLB,,, | Performed by: INTERNAL MEDICINE

## 2020-05-18 NOTE — PATIENT INSTRUCTIONS
Diverticulosis    Diverticulosis means that small pouches have formed in the wall of your large intestine (colon). Most often, this problem causes no symptoms and is common as people age. But the pouches in the colon are at risk of becoming infected. When this happens, the condition is called diverticulitis. Although most people with diverticulosis never develop diverticulitis, it is still not uncommon. Rectal bleeding can also occur and in less common situations, a type of colon inflammation called colitis.  While most people do not have symptoms, some people with diverticulosis may have:  · Abdominal cramps and pain  · Bloating  · Constipation  · Change in bowel habits  Causes  The exact cause of diverticulosis (and diverticulitis) has not been proved, but a few things are associated with the condition:  · Low-fiber diet  · Constipation  · Lack of exercise  Your healthcare provider will talk with you about how to manage your condition. Diet changes may be all that are needed to help control diverticulosis and prevent progression to diverticulitis. If you develop diverticulitis, you will likely need other treatments.  Home care  You may be told to take fiber supplements daily. Fiber adds bulk to the stool so that it passes through the colon more easily. Stool softeners may be recommended. You may also be given medications for pain relief. Be sure to take all medications as directed.  In the past, people were told to avoid corn, nuts, and seeds. This is no longer necessary.  Follow these guidelines when caring for yourself at home:  · Eat unprocessed foods that are high in fiber. Whole grains, fruits, and vegetables are good choices.  · Drink 6 to 8 glasses of water every day unless your healthcare provider has you limit how much fluid you should have.  · Watch for changes in your bowel movements. Tell your provider if you notice any changes.  · Begin an exercise program. Ask your provider how to get started.  Generally, walking is the best.  · Get plenty of rest and sleep.  Follow-up care  Follow up with your healthcare provider, or as advised. Regular visits may be needed to check on your health. Sometimes special procedures such as colonoscopy, are needed after an episode of diverticulitis or blooding. Be sure to keep all your appointments.  If a stool sample was taken, or cultures were done, you should be told if they are positive, or if your treatment needs to be changed. You can call as directed for the results.  If X-rays were done, a radiologist will look at them. You will be told if there is a change in your treatment.  If antibiotics were prescribed, be sure to finish them all.  When to seek medical advice  Call your healthcare provider right away if any of these occur:  · Fever of 100.4°F (38°C) or higher, or as directed by your healthcare provider  · Severe cramps in the lower left side of the abdomen or pain that is getting worse  · Tenderness in the lower left side of the abdomen or worsening pain throughout the abdomen  · Diarrhea or constipation that doesn't get better within 24 hours  · Nausea and vomiting  · Bleeding from the rectum  Call 911  Call emergency services if any of the following occur:  · Trouble breathing  · Confusion  · Very drowsy or trouble awakening  · Fainting or loss of consciousness  · Rapid heart rate  · Chest pain  Date Last Reviewed: 12/30/2015 © 2000-2017 [employer]. [address] [place] [address]. All rights reserved. This information is not intended as a substitute for professional medical care. Always follow your healthcare professional's instructions.        Understanding Diverticulosis and Diverticulitis     Pouches or diverticula usually occur in the lower part of the colon called the sigmoid.     The colon (large intestine) is the last part of the digestive tract. It absorbs water from stool and changes it from a liquid to a solid. In certain  cases, small pouches called diverticula can form in the colon wall. This condition is called diverticulosis. The pouches can become infected. If this happens, it becomes a more serious problem called diverticulitis. These problems can be painful. But they can be managed.  Managing your condition  Diet changes or medicines may be prescribed.   If you have diverticulosis  Recommendations include:  · Diet changes are often enough to control symptoms. The main changes are adding fiber (roughage) and drinking more water. Fiber absorbs water as it travels through your colon. This helps your stool stay soft and move smoothly. Water helps this process.  · If needed, you may be told to take over-the-counter stool softeners.  · To help relieve pain, antispasmodic medicines may be prescribed.  · Watch for changes in your bowel movements. Tell the healthcare provider if you notice any changes.  · Begin an exercise program. Ask your healthcare provider how to get started.  · Get plenty of rest and sleep.   If you have diverticulitis  Treatment depends on how bad your symptoms are.  · For mild symptoms. You may be put on a liquid diet for a short time. Antibiotics are usually prescribed. If these two steps relieve your symptoms, you may then be prescribed a high-fiber diet. If you still have symptoms, your healthcare provider will discuss more treatment choices with you.  · For severe symptoms. You may need to be admitted to the hospital. There, you can be given IV antibiotics and fluids. You will also be put on a low-fiber or liquid diet. Although not common, surgery is needed in some people with severe symptoms.  [place] to colon health     Diverticulitis occurs when the pouches become infected or inflamed.     Help keep your colon healthy with a diet that includes plenty of high-fiber fruits, vegetables, and whole grains. Drink plenty of liquids like water and juice. Maintain a healthy lifestyle including regular exercise, stress  management, and adequate rest and sleep.   Date Last Reviewed: 7/1/2016  © 1781-6910 [employer], [employer]. [address] [place] [address], [place] [address]. All rights reserved. This information is not intended as a substitute for professional medical care. Always follow your healthcare professional's instructions.        Back Care Tips    Caring for your back  These are things you can do to prevent a recurrence of acute back pain and to reduce symptoms from chronic back pain:  · Maintain a healthy weight. If you are overweight, losing weight will help most types of back pain.  · Exercise is an important part of recovery from most types of back pain. The muscles behind and in front of the spine support the back. This means strengthening both the back muscles and the abdominal muscles will provide better support for your spine.   · Swimming and brisk walking are good overall exercises to improve your fitness level.  · Practice safe lifting methods (below).  · Practice good posture when sitting, standing and walking. Avoid prolonged sitting. This puts more stress on the lower back than standing or walking.  · Wear quality shoes with sufficient arch support. Foot and ankle alignment can affect back symptoms. Women should avoid wearing high heels.  · Therapeutic massage can help relax the back muscles without stretching them.  · During the first 24 to 72 hours after an acute injury or flare-up of chronic back pain, apply an ice pack to the painful area for 20 minutes and then remove it for 20 minutes, over a period of 60 to 90 minutes, or several times a day. As a safety precaution, do not use a heating pad at bedtime. Sleeping on a heating pad can lead to skin burns or tissue damage.  · You can alternate ice and heat therapies.  Medications  Talk to your healthcare provider before using medicines, especially if you have other medical problems or are taking other medicines.  · You may use acetaminophen or ibuprofen to  control pain, unless your healthcare provider prescribed other pain medicine. If you have chronic conditions like diabetes, liver or kidney disease, stomach ulcers, or gastrointestinal bleeding, or are taking blood thinners, talk with your healthcare provider before taking any medicines.  · Be careful if you are given prescription pain medicines, narcotics, or medicine for muscle spasm. They can cause drowsiness, affect your coordination, reflexes, and judgment. Do not drive or operate heavy machinery while taking these types of medicines. Take prescription pain medicine only as prescribed by your healthcare provider.  Lumbar stretch  Here is a simple stretching exercise that will help relax muscle spasm and keep your back more limber. If exercise makes your back pain worse, dont do it.  · Lie on your back with your knees bent and both feet on the ground.  · Slowly raise your left knee to your chest as you flatten your lower back against the floor. Hold for 5 seconds.  · Relax and repeat the exercise with your right knee.  · Do 10 of these exercises for each leg.  Safe lifting method  · Dont bend over at the waist to lift an object off the floor.  Instead, bend your knees and hips in a squat.   · Keep your back and head upright  · Hold the object close to your body, directly in front of you.  · Straighten your legs to lift the object.   · Lower the object to the floor in the reverse fashion.  · If you must slide something across the floor, push it.  Posture tips  Sitting  Sit in chairs with straight backs or low-back support. Keep your knees lower than your hips, with your feet flat on the floor.  When driving, sit up straight. Adjust the seat forward so you are not leaning toward the steering wheel.  A small pillow or rolled towel behind your lower back may help if you are driving long distances.   Standing  When standing for long periods, shift most of your weight to one leg at a time. Alternate legs every few  minutes.   Sleeping  The best way to sleep is on your side with your knees bent. Put a low pillow under your head to support your neck in a neutral spine position. Avoid thick pillows that bend your neck to one side. Put a pillow between your legs to further relax your lower back. If you sleep on your back, put pillows under your knees to support your legs in a slightly flexed position. Use a firm mattress. If your mattress sags, replace it, or use a 1/2-inch plywood board under the mattress to add support.  Follow-up care  Follow up with your healthcare provider, or as advised.  If X-rays, a CT scan or an MRI scan were taken, they will be reviewed by a radiologist. You will be notified of any new findings that may affect your care.  Call 911  Seek emergency medical care if any of the following occur:  · Trouble breathing  · Confusion  · Very drowsy  · Fainting or loss of consciousness  · Rapid or very slow heart rate  · Loss of  bowel or bladder control  When to seek medical care  Call your healthcare provider if any of the following occur:  · Pain becomes worse or spreads to your arms or legs  · Weakness or numbness in one or both arms or legs  · Numbness in the groin area  Date Last Reviewed: 6/1/2016  © 4112-9783 [employer]. [address] [place] [address], [place] [address]. All rights reserved. This information is not intended as a substitute for professional medical care. Always follow your healthcare professional's instructions.

## 2020-07-01 ENCOUNTER — OFFICE VISIT (OUTPATIENT)
Dept: FAMILY MEDICINE | Facility: CLINIC | Age: 60
End: 2020-07-01
Payer: COMMERCIAL

## 2020-07-01 VITALS
SYSTOLIC BLOOD PRESSURE: 120 MMHG | HEART RATE: 78 BPM | WEIGHT: 162 LBS | HEIGHT: 63 IN | DIASTOLIC BLOOD PRESSURE: 76 MMHG | BODY MASS INDEX: 28.7 KG/M2

## 2020-07-01 DIAGNOSIS — M54.50 ACUTE LEFT-SIDED LOW BACK PAIN WITHOUT SCIATICA: Primary | ICD-10-CM

## 2020-07-01 DIAGNOSIS — R53.83 OTHER FATIGUE: ICD-10-CM

## 2020-07-01 PROCEDURE — 3008F PR BODY MASS INDEX (BMI) DOCUMENTED: ICD-10-PCS | Mod: S$GLB,,, | Performed by: INTERNAL MEDICINE

## 2020-07-01 PROCEDURE — 99214 OFFICE O/P EST MOD 30 MIN: CPT | Mod: S$GLB,,, | Performed by: INTERNAL MEDICINE

## 2020-07-01 PROCEDURE — 99214 PR OFFICE/OUTPT VISIT, EST, LEVL IV, 30-39 MIN: ICD-10-PCS | Mod: S$GLB,,, | Performed by: INTERNAL MEDICINE

## 2020-07-01 PROCEDURE — 3008F BODY MASS INDEX DOCD: CPT | Mod: S$GLB,,, | Performed by: INTERNAL MEDICINE

## 2020-07-01 RX ORDER — MELOXICAM 15 MG/1
15 TABLET ORAL DAILY
Qty: 30 TABLET | Refills: 0 | Status: SHIPPED | OUTPATIENT
Start: 2020-07-01 | End: 2020-07-30 | Stop reason: SDUPTHER

## 2020-07-01 RX ORDER — METHOCARBAMOL 750 MG/1
750 TABLET, FILM COATED ORAL NIGHTLY PRN
Qty: 20 TABLET | Refills: 0 | Status: SHIPPED | OUTPATIENT
Start: 2020-07-01 | End: 2020-07-21

## 2020-07-01 NOTE — PATIENT INSTRUCTIONS
Relieving Back Pain  Back pain is a common problem. You can strain back muscles by lifting too much weight or just by moving the wrong way. Back strain can be uncomfortable, even painful. And it can take weeks or months to improve. To help yourself feel better and prevent future back strains, try these tips.  Important Note: Do not give aspirin to children or teens without first discussing it with your healthcare provider.      ? Ice    Ice reduces muscle pain and swelling. It helps most during the first 24 to 48 hours after an injury.  · Wrap an ice pack or a bag of frozen peas in a thin towel. (Never place ice directly on your skin.)  · Place the ice where your back hurts the most.  · Dont ice for more than 20 minutes at a time.  · You can use ice several times a day.  ? Medicines  Over-the-counter pain relievers can include acetaminophen and anti-inflammatory medicines, which includes aspirin or ibuprofen. They can help ease discomfort. Some also reduce swelling.  · Tell your healthcare provider about any medicines you are already taking.  · Take medicines only as directed.  ? Heat  After the first 48 hours, heat can relax sore muscles and improve blood flow.  · Try a warm bath or shower. Or use a heating pad set on low. To prevent a burn, keep a cloth between you and the heating pad.  · Dont use a heating pad for more than 15 minutes at a time. Never sleep on a heating pad.  Date Last Reviewed: 9/1/2015  © 9776-7809 [employer]. [address] [place] [address]. All rights reserved. This information is not intended as a substitute for professional medical care. Always follow your healthcare professional's instructions.        Back Safety: Poor Posture Hurts  An unhealthy spine often starts with bad habits. Poor movement patterns and posture problems are common causes of back pain. Disk, bone, nerve, and soft tissue problems can all be affected by poor posture. They can lead to pain,  stiffness, and other symptoms.    Poor posture backfires  Poor posture can cause pain. Too much slouching puts increased pressure on the disks. An excessive lumbar curve can overload and inflame the vertebrae. As a result, the back muscles may tighten or spasm to splint and protect the spine. This adds to the pain you feel.    Proper posture: The key to safe movement  Your spine bears your weight throughout the day. This is true whether youre sleeping, standing, or bending. Certain positions strain your spine more than others. But by maintaining proper posture in all positions, you can reduce the stress on your spine.       To improve your standing posture, follow these steps:  · Breathe deeply.  · Relax your shoulders, hips, and ankles. · Think of the ears, shoulders, hips, and ankles as a series of dots. Now, adjust your body to connect the dots in a straight line.  · Tuck your buttocks in just a bit if you need to.      Date Last Reviewed: 10/28/2015  © 0143-7895 [employer]. [address] [place] [address]. All rights reserved. This information is not intended as a substitute for professional medical care. Always follow your healthcare professional's instructions.        Caring for Your Back Throughout the Day  Take care of your back throughout the day. You will likely have fewer back problems if you do. Try to warm up before you move. Shift positions often. Also do your best to form healthy habits.    Warm up for the day  Do a few slow, catlike stretches before starting your day. This simple warmup can soften your disks, stretch your back muscles, and help prevent injuries.  Shift positions often  At work and at home, change positions often. This helps keep your body from getting stiff. Stand up or lean back while you sit. If you can, get up and move every 1/2 hour.  Form healthy habits  Here are some suggestions:   · Keep a healthy weight. When you weigh too much, your back is under  excess strain. But losing just a few extra pounds can help a lot.  · Try not to overeat. Learn about serving sizes. The size of a serving depends on the food and the food group. Many foods list serving sizes on the labels.  · Handle minor aches with cold and heat. Apply cold the first 24 to 48 hours. Use heat after that. Always place a thin cloth between your skin and the source of cold or heat.  · Take medicines as directed. This helps keep pain under control. Always read labels, and call your healthcare provider or pharmacist if you have any questions.  Walk each day  A daily walk keeps your back and thigh muscles stretched and strong. This gives your back better support. Be sure to walk with your spines three curves aligned, by keeping your head, hips, and toes connected by a vertical line.   Date Last Reviewed: 10/18/2015  © 5920-3031 [employer]. [address] [place] [address], [place] [address]. All rights reserved. This information is not intended as a substitute for professional medical care. Always follow your healthcare professional's instructions.

## 2020-07-01 NOTE — PROGRESS NOTES
Subjective:       Patient ID: Mary Mike is a 59 y.o. female.    Chief Complaint: Back Pain and Fatigue    Miss Hernandez is a 59-year-old  female who comes for an urgent follow-up complaining of moderately significant pain in the left lower back and sacroiliac region.  This seems to be radiating to the left lateral thigh and she also complains of some incontinence in bowels.    Back Pain  This is a new problem. The current episode started in the past 7 days. The problem occurs constantly. The problem is unchanged. The pain is present in the gluteal and lumbar spine. The quality of the pain is described as shooting, aching and cramping. The pain radiates to the left thigh. The pain is moderate. The pain is the same all the time. The symptoms are aggravated by position and twisting. Associated symptoms include bladder incontinence, bowel incontinence and headaches. Pertinent negatives include no abdominal pain, chest pain, paresis or perianal numbness. Risk factors include lack of exercise and sedentary lifestyle. The treatment provided moderate relief.   Fatigue  This is a new problem. The current episode started more than 1 month ago (6 months). The problem occurs constantly. The problem has been gradually worsening. Associated symptoms include fatigue, headaches and urinary symptoms (Mild incontinence.). Pertinent negatives include no abdominal pain, chest pain, chills, congestion, coughing, neck pain or vertigo.       Past Medical History:   Diagnosis Date    ADHD (attention deficit hyperactivity disorder)     Allergy     Anemia     Anxiety     Depression     Fatty liver     Insomnia     Migraines     Osteoporosis     Restless leg syndrome      Social History     Socioeconomic History    Marital status:      Spouse name: Not on file    Number of children: Not on file    Years of education: Not on file    Highest education level: Not on file   Occupational History    Not on  file   Social Needs    Financial resource strain: Not on file    Food insecurity     Worry: Not on file     Inability: Not on file    Transportation needs     Medical: Not on file     Non-medical: Not on file   Tobacco Use    Smoking status: Never Smoker    Smokeless tobacco: Never Used   Substance and Sexual Activity    Alcohol use: Yes     Comment: occassional    Drug use: No    Sexual activity: Yes   Lifestyle    Physical activity     Days per week: Not on file     Minutes per session: Not on file    Stress: Not at all   Relationships    Social connections     Talks on phone: Not on file     Gets together: Not on file     Attends [employer] service: Not on file     Active member of club or organization: Not on file     Attends meetings of clubs or organizations: Not on file     Relationship status: Not on file   Other Topics Concern    Not on file   Social History Narrative    Not on file     Past Surgical History:   Procedure Laterality Date    BREAST SURGERY      BUNIONECTOMY Right      SECTION      FRACTURE SURGERY      GASTRIC RESTRICTION SURGERY      GASTRIC SLEEVE 2012       Family History   Problem Relation Age of Onset    Arthritis Mother     Colon cancer Mother     Diabetes Mother     Early death Mother     Miscarriages / Stillbirths Mother     Arthritis Father     Heart disease Father     Hypertension Father     Stroke Father     Vision loss Father     Vaginal cancer Paternal Grandmother     Heart disease Paternal Grandfather        Review of Systems   Constitutional: Positive for activity change and fatigue. Negative for appetite change, chills and unexpected weight change.   HENT: Negative for congestion, hearing loss, rhinorrhea and sinus pressure.    Respiratory: Negative for apnea, cough, chest tightness and shortness of breath.    Cardiovascular: Negative for chest pain.   Gastrointestinal: Positive for bowel incontinence. Negative for abdominal pain and blood  "in stool.   Genitourinary: Positive for bladder incontinence.        Mildof bowel and bladder incontinence.   Musculoskeletal: Positive for back pain. Negative for neck pain.   Neurological: Positive for headaches. Negative for vertigo.         Objective:      Blood pressure 120/76, pulse 78, height 5' 3" (1.6 m), weight 73.5 kg (162 lb). Body mass index is 28.7 kg/m².  Physical Exam  Vitals signs and nursing note reviewed.   Constitutional:       General: She is not in acute distress.     Appearance: She is well-developed.   HENT:      Head: Normocephalic and atraumatic.      Right Ear: Tympanic membrane normal.      Left Ear: Tympanic membrane normal.   Eyes:      General: Lids are normal. Lids are everted, no foreign bodies appreciated.      Conjunctiva/sclera: Conjunctivae normal.      Pupils:      Right eye: Pupil is round and reactive.      Left eye: Pupil is round and reactive.   Neck:      Musculoskeletal: Normal range of motion and neck supple.      Trachea: Trachea normal.   Cardiovascular:      Rate and Rhythm: Normal rate and regular rhythm.      Heart sounds: Normal heart sounds, S1 normal and S2 normal.   Pulmonary:      Breath sounds: Normal breath sounds.   Abdominal:      General: Bowel sounds are normal.      Palpations: Abdomen is soft. Abdomen is not rigid.      Tenderness: There is no guarding.       Musculoskeletal: Normal range of motion.         General: No deformity.      Lumbar back: She exhibits tenderness. She exhibits normal range of motion, no bony tenderness, no swelling, no edema and no deformity.        Back:    Lymphadenopathy:      Cervical: No cervical adenopathy.   Skin:     General: Skin is warm and dry.   Neurological:      Mental Status: She is alert and oriented to person, place, and time.      Sensory: No sensory deficit.      Motor: No weakness, tremor, atrophy or abnormal muscle tone.      Coordination: Coordination is intact.      Gait: Gait is intact.      Deep Tendon " Reflexes:      Reflex Scores:       Tricep reflexes are 2+ on the right side and 2+ on the left side.       Bicep reflexes are 2+ on the right side and 2+ on the left side.       Brachioradialis reflexes are 2+ on the right side and 2+ on the left side.       Patellar reflexes are 2+ on the right side and 2+ on the left side.       Achilles reflexes are 2+ on the right side and 2+ on the left side.     Comments: SLR is slightly positive from the left side.  MARILIN test is negative.  Bilateral trochanter tenderness   Psychiatric:         Behavior: Behavior normal. Behavior is cooperative.           Assessment:       1. Acute left-sided low back pain without sciatica    2. Other fatigue           No visits with results within 3 Month(s) from this visit.   Latest known visit with results is:   Office Visit on 02/26/2020   Component Date Value Ref Range Status    Glucose 02/26/2020 83  65 - 99 mg/dL Final    BUN, Bld 02/26/2020 13  7 - 25 mg/dL Final    Creatinine 02/26/2020 0.80  0.50 - 1.05 mg/dL Final    eGFR if non African American 02/26/2020 82  > OR = 60 mL/min/1.73m2 Final    eGFR if African American 02/26/2020 95  > OR = 60 mL/min/1.73m2 Final    BUN/Creatinine Ratio 02/26/2020 NOT APPLICABLE  6 - 22 (calc) Final    Sodium 02/26/2020 140  135 - 146 mmol/L Final    Potassium 02/26/2020 4.3  3.5 - 5.3 mmol/L Final    Chloride 02/26/2020 102  98 - 110 mmol/L Final    CO2 02/26/2020 31  20 - 32 mmol/L Final    Calcium 02/26/2020 9.6  8.6 - 10.4 mg/dL Final    Total Protein 02/26/2020 6.9  6.1 - 8.1 g/dL Final    Albumin 02/26/2020 4.1  3.6 - 5.1 g/dL Final    Globulin, Total 02/26/2020 2.8  1.9 - 3.7 g/dL (calc) Final    Albumin/Globulin Ratio 02/26/2020 1.5  1.0 - 2.5 (calc) Final    Total Bilirubin 02/26/2020 0.4  0.2 - 1.2 mg/dL Final    Alkaline Phosphatase 02/26/2020 63  37 - 153 U/L Final    AST 02/26/2020 15  10 - 35 U/L Final    ALT 02/26/2020 15  6 - 29 U/L Final    TSH 02/26/2020 0.55   0.40 - 4.50 mIU/L Final    WBC 02/26/2020 4.4  3.8 - 10.8 Thousand/uL Final    RBC 02/26/2020 4.77  3.80 - 5.10 Million/uL Final    Hemoglobin 02/26/2020 12.9  11.7 - 15.5 g/dL Final    Hematocrit 02/26/2020 40.0  35.0 - 45.0 % Final    Mean Corpuscular Volume 02/26/2020 83.9  80.0 - 100.0 fL Final    Mean Corpuscular Hemoglobin 02/26/2020 27.0  27.0 - 33.0 pg Final    Mean Corpuscular Hemoglobin Conc 02/26/2020 32.3  32.0 - 36.0 g/dL Final    RDW 02/26/2020 13.4  11.0 - 15.0 % Final    Platelets 02/26/2020 261  140 - 400 Thousand/uL Final    MPV 02/26/2020 10.9  7.5 - 12.5 fL Final    Neutrophils Absolute 02/26/2020 2,952  1,500 - 7,800 cells/uL Final    Lymph # 02/26/2020 911  850 - 3,900 cells/uL Final    Mono # 02/26/2020 405  200 - 950 cells/uL Final    Eos # 02/26/2020 101  15 - 500 cells/uL Final    Baso # 02/26/2020 31  0 - 200 cells/uL Final    Neutrophils Relative 02/26/2020 67.1  % Final    Lymph% 02/26/2020 20.7  % Final    Mono% 02/26/2020 9.2  % Final    Eosinophil% 02/26/2020 2.3  % Final    Basophil% 02/26/2020 0.7  % Final         Plan:           Acute left-sided low back pain without sciatica  -     meloxicam (MOBIC) 15 MG tablet; Take 1 tablet (15 mg total) by mouth once daily.  Dispense: 30 tablet; Refill: 0  -     methocarbamoL (ROBAXIN) 750 MG Tab; Take 1 tablet (750 mg total) by mouth nightly as needed (Nighttime as needed for muscle spasms.).  Dispense: 20 tablet; Refill: 0    Other fatigue     Patient has acute low back pain.  The possibilities are as follows.    1.  Lumbar radiculopathy without sciatica.  2.-kidney stone  3.-diverticulosis with possibly early diverticulitis  4.-very unlikely that this is representation of shingles.    I will treat her for lumbar radiculopathy with rest and some stretch exercises.  Try meloxicam and some muscle relaxer at nighttime.  Meloxicam is for the arthritis.  By Monday she should let us know.    She also watch her urine for any  blood or any discomfort while watching urine.    She will watch her stool for any more constipation, blood or lower abdominal pain    .  She also watch for any rash.    I am not sure if the fatigue is a combination of stress, COVID-19 pandemic  fatigue or her pain and will keep an eye on that.    The Adderall at any rate does not seem to give her any relief and focus and she should gradually wean off that medication.  Subsequently based upon her situation will consider tapering off trazodone also.    Follow-up in a couple of weeks.    Spent megan 25 minutes with patient which involved review of pts medical conditions, labs, medications and with 50% of time face-to-face discussion about medical problems, management and any applicable changes.        Current Outpatient Medications:     dextroamphetamine-amphetamine 10 mg Tab, 1 tablet once daily., Disp: , Rfl:     FLUoxetine (PROZAC) 20 MG capsule, Take 1 capsule by mouth nightly., Disp: , Rfl: 1    multivitamin (THERAGRAN) tablet, Take 1 tablet by mouth once daily., Disp: , Rfl:     pramipexole (MIRAPEX) 1 MG tablet, Take 2 tablets (2 mg total) by mouth every evening. 2 tabs qhs, Disp: 180 tablet, Rfl: 1    meloxicam (MOBIC) 15 MG tablet, Take 1 tablet (15 mg total) by mouth once daily., Disp: 30 tablet, Rfl: 0    methocarbamoL (ROBAXIN) 750 MG Tab, Take 1 tablet (750 mg total) by mouth nightly as needed (Nighttime as needed for muscle spasms.)., Disp: 20 tablet, Rfl: 0    traZODone (DESYREL) 50 MG tablet, TAKE 1-2 TABLETS BY MOUTH AT NIGHT AS NEEDED FOR INSOMNIA, Disp: 60 tablet, Rfl: 0

## 2020-07-18 ENCOUNTER — OFFICE VISIT (OUTPATIENT)
Dept: PRIMARY CARE CLINIC | Facility: CLINIC | Age: 60
End: 2020-07-18
Payer: COMMERCIAL

## 2020-07-18 VITALS
OXYGEN SATURATION: 97 % | TEMPERATURE: 98 F | SYSTOLIC BLOOD PRESSURE: 136 MMHG | DIASTOLIC BLOOD PRESSURE: 81 MMHG | HEART RATE: 87 BPM | RESPIRATION RATE: 18 BRPM

## 2020-07-18 DIAGNOSIS — Z03.818 ENCOUNTER FOR OBSERVATION FOR SUSPECTED EXPOSURE TO OTHER BIOLOGICAL AGENTS RULED OUT: ICD-10-CM

## 2020-07-18 PROCEDURE — U0003 INFECTIOUS AGENT DETECTION BY NUCLEIC ACID (DNA OR RNA); SEVERE ACUTE RESPIRATORY SYNDROME CORONAVIRUS 2 (SARS-COV-2) (CORONAVIRUS DISEASE [COVID-19]), AMPLIFIED PROBE TECHNIQUE, MAKING USE OF HIGH THROUGHPUT TECHNOLOGIES AS DESCRIBED BY CMS-2020-01-R: HCPCS

## 2020-07-18 PROCEDURE — 99213 OFFICE O/P EST LOW 20 MIN: CPT | Mod: S$GLB,,, | Performed by: NURSE PRACTITIONER

## 2020-07-18 PROCEDURE — 99213 PR OFFICE/OUTPT VISIT, EST, LEVL III, 20-29 MIN: ICD-10-PCS | Mod: S$GLB,,, | Performed by: NURSE PRACTITIONER

## 2020-07-18 NOTE — PATIENT INSTRUCTIONS

## 2020-07-18 NOTE — PROGRESS NOTES
Subjective:        Time seen by provider: 3:09 PM on 07/18/2020    Mary Mike is a 59 y.o. female with anemia who presents for an evaluation of possible COVID-19. She complains of sore throat and HA's. The patient states her symptoms began a few days ago and reports she was exposed to her nephew, who recently received positive results since she was last with him 1 week ago. She denies taking OTC medication or any other symptoms at this time. No pertinent PSHx.    Review of Systems   Constitutional: Negative for activity change, appetite change, fatigue and fever.   HENT: Positive for sore throat. Negative for congestion and rhinorrhea.    Respiratory: Negative for cough, chest tightness, shortness of breath and wheezing.    Cardiovascular: Negative for chest pain and palpitations.   Gastrointestinal: Negative for diarrhea, nausea and vomiting.   Musculoskeletal: Negative for arthralgias and myalgias.   Skin: Negative for rash.   Neurological: Positive for headaches. Negative for weakness, light-headedness and numbness.       Objective:      Physical Exam  Vitals signs and nursing note reviewed.   Constitutional:       General: She is not in acute distress.     Appearance: She is well-developed. She is not diaphoretic.   HENT:      Head: Normocephalic and atraumatic.      Nose: Nose normal.   Eyes:      Conjunctiva/sclera: Conjunctivae normal.   Neck:      Musculoskeletal: Normal range of motion.   Cardiovascular:      Rate and Rhythm: Normal rate and regular rhythm.      Heart sounds: Normal heart sounds. No murmur.   Pulmonary:      Effort: No respiratory distress.      Breath sounds: Normal breath sounds. No wheezing.   Musculoskeletal: Normal range of motion.   Skin:     General: Skin is warm and dry.   Neurological:      Mental Status: She is alert and oriented to person, place, and time.         Assessment:       1. Encounter for observation for suspected exposure to other biological agents ruled out         Plan:       1. Encounter for observation for suspected exposure to other biological agents ruled out  - COVID-19 Routine Screening    2. Discharge home and await results.   3. Return to clinic or ED for new or worsening symptoms.   4. Follow-up with PCP as needed.     Scribe Attestation:   I, Marge Ortiz, am scribing for, and in the presence of, CHRISTINE Hurt. I performed the above scribed service and the documentation accurately describes the services I performed. I attest to the accuracy of the note.    I, CHRISTINE Albarran, personally performed the services described in this documentation. All medical record entries made by the scribe were at my direction and in my presence.  I have reviewed the chart and agree that the record reflects my personal performance and is accurate and complete. CHRISTINE Albarran.  3:16 PM 07/18/2020

## 2020-07-19 LAB — SARS-COV-2 RNA RESP QL NAA+PROBE: NOT DETECTED

## 2020-07-21 ENCOUNTER — OFFICE VISIT (OUTPATIENT)
Dept: FAMILY MEDICINE | Facility: CLINIC | Age: 60
End: 2020-07-21
Payer: COMMERCIAL

## 2020-07-21 VITALS
DIASTOLIC BLOOD PRESSURE: 89 MMHG | SYSTOLIC BLOOD PRESSURE: 128 MMHG | HEIGHT: 63 IN | BODY MASS INDEX: 28.7 KG/M2 | HEART RATE: 83 BPM | WEIGHT: 162 LBS

## 2020-07-21 DIAGNOSIS — R10.33 PERIUMBILICAL ABDOMINAL PAIN: Primary | ICD-10-CM

## 2020-07-21 DIAGNOSIS — Z12.31 ENCOUNTER FOR SCREENING MAMMOGRAM FOR BREAST CANCER: ICD-10-CM

## 2020-07-21 DIAGNOSIS — Z11.59 NEED FOR HEPATITIS C SCREENING TEST: ICD-10-CM

## 2020-07-21 DIAGNOSIS — M53.3 PAIN OF LEFT SACROILIAC JOINT: ICD-10-CM

## 2020-07-21 PROCEDURE — 3008F BODY MASS INDEX DOCD: CPT | Mod: S$GLB,,, | Performed by: INTERNAL MEDICINE

## 2020-07-21 PROCEDURE — 99214 PR OFFICE/OUTPT VISIT, EST, LEVL IV, 30-39 MIN: ICD-10-PCS | Mod: S$GLB,,, | Performed by: INTERNAL MEDICINE

## 2020-07-21 PROCEDURE — 3008F PR BODY MASS INDEX (BMI) DOCUMENTED: ICD-10-PCS | Mod: S$GLB,,, | Performed by: INTERNAL MEDICINE

## 2020-07-21 PROCEDURE — 99214 OFFICE O/P EST MOD 30 MIN: CPT | Mod: S$GLB,,, | Performed by: INTERNAL MEDICINE

## 2020-07-21 NOTE — Clinical Note
I am requesting this patient for a Pap smear and gynecological checkup.  Also just to be sure if she has any gynecological cause of abdominal pain.

## 2020-07-21 NOTE — PATIENT INSTRUCTIONS
Abdominal Pain    Abdominal pain is pain in the stomach or belly area. Everyone has this pain from time to time. In many cases it goes away on its own. But abdominal pain can sometimes be due to a serious problem, such as appendicitis. So its important to know when to seek help.  Causes of abdominal pain  There are many possible causes of abdominal pain. Common causes in adults include:  · Constipation, diarrhea, or gas  · Stomach acid flowing back up into the esophagus (acid reflux or heartburn)  · Severe acid reflux, called GERD (gastroesophageal reflux disease)  · A sore in the lining of the stomach or small intestine (peptic ulcer)  · Inflammation of the gallbladder, liver, or pancreas  · Gallstones or kidney stones  · Appendicitis   · Intestinal blockage   · An internal organ pushing through a muscle or other tissue (hernia)  · Urinary tract infections  · In women, menstrual cramps, fibroids, or endometriosis  · Inflammation or infection of the intestines  Diagnosing the cause of abdominal pain  Your healthcare provider will do a physical exam help find the cause of your pain. If needed, tests will be ordered. Belly pain has many possible causes. So it can be hard to find the reason for your pain. Giving details about your pain can help. Tell your provider where and when you feel the pain, and what makes it better or worse. Also let your provider know if you have other symptoms such as:  · Fever  · Tiredness  · Upset stomach (nausea)  · Vomiting  · Changes in bathroom habits  Treating abdominal pain  Some causes of pain need emergency medical treatment right away. These include appendicitis or a bowel blockage. Other problems can be treated with rest, fluids, or medicines. Your healthcare provider can give you specific instructions for treatment or self-care based on what is causing your pain.  If you have vomiting or diarrhea, sip water or other clear fluids. When you are ready to eat solid foods again,  start with small amounts of easy-to-digest, low-fat foods. These include apple sauce, toast, or crackers.   When to seek medical care  Call 911 or go to the hospital right away if you:  · Cant pass stool and are vomiting  · Are vomiting blood or have bloody diarrhea or black, tarry diarrhea  · Have chest, neck, or shoulder pain  · Feel like you might pass out  · Have pain in your shoulder blades with nausea  · Have sudden, severe belly pain  · Have new, severe pain unlike any you have felt before  · Have a belly that is rigid, hard, and tender to touch  Call your healthcare provider if you have:  · Pain for more than 5 days  · Bloating for more than 2 days  · Diarrhea for more than 5 days  · A fever of 100.4°F (38.0°C) or higher, or as directed by your provider  · Pain that gets worse  · Weight loss for no reason  · Continued lack of appetite  · Blood in your stool  How to prevent abdominal pain  Here are some tips to help prevent abdominal pain:  · Eat smaller amounts of food at one time.  · Avoid greasy, fried, or other high-fat foods.  · Avoid foods that give you gas.  · Exercise regularly.  · Drink plenty of fluids.  To help prevent GERD symptoms:  · Quit smoking.  · Reduce alcohol and certain foods that increase stomach acid.  · Avoid aspirin and over-the-counter pain and fever medicines (NSAIDS or nonsteroidal anti-inflammatory drugs), if possible  · Lose extra weight.  · Finish eating at least 2 hours before you go to bed or lie down.  · Raise the head of your bed.  Date Last Reviewed: 7/1/2016  © 6169-2051 [employer]. [address] [place] [address], [place] [address]. All rights reserved. This information is not intended as a substitute for professional medical care. Always follow your healthcare professional's instructions.

## 2020-07-21 NOTE — PROGRESS NOTES
Subjective:       Patient ID: Mary Mike is a 59 y.o. female.    Chief Complaint: Back Pain and Abdominal Pain    Miss Hernandez is a pleasant 59-year-old  female who comes back for follow-up.  For the last several months or so we have been struggling with unexplained intermittent but regular mid abdominal and periumbilical pain which seems to start the low back and radiates to both sides of abdomen.  This has essentially unchanged.  She cannot relate to any food or frequency at this point.  Mild nausea but no kobe vomiting.  No blood in the stool.  She has not lost any weight.  No fevers.    I had done some blood work including chemistry and blood counts which were unremarkable.  I also followed up with CT scan and it was essentially unremarkable except for findings of diverticulosis but without diverticulitis.  Liver, kidney, adrenal, gallbladder, pancreas were all unremarkable.  Examined and visualized part of the colon was unremarkable.  There was no evidence of aneurysm.  Appendix was normal.  Mild atherosclerosis was noted in the aorta which was nonsignificant.  No lymphadenopathy was noted.  No evidence of gynecological problem was noted.    Back Pain  This is a chronic problem. The current episode started more than 1 month ago. The problem occurs intermittently. The problem has been waxing and waning since onset. The pain is present in the costovertebral angle. Associated symptoms include abdominal pain (llq pain). Pertinent negatives include no chest pain, dysuria, fever, headaches or pelvic pain. Risk factors include sedentary lifestyle, menopause and obesity.   Abdominal Pain  This is a recurrent problem. The current episode started more than 1 month ago. The onset quality is undetermined. The problem occurs intermittently. The problem has been waxing and waning. The pain is located in the periumbilical region, right flank and left flank. Pertinent negatives include no arthralgias,  constipation, diarrhea, dysuria, fever, frequency or headaches.       Past Medical History:   Diagnosis Date    ADHD (attention deficit hyperactivity disorder)     Allergy     Anemia     Anxiety     Depression     Fatty liver     Insomnia     Migraines     Osteoporosis     Restless leg syndrome      Social History     Socioeconomic History    Marital status:      Spouse name: Not on file    Number of children: Not on file    Years of education: Not on file    Highest education level: Not on file   Occupational History    Not on file   Social Needs    Financial resource strain: Not on file    Food insecurity     Worry: Not on file     Inability: Not on file    Transportation needs     Medical: Not on file     Non-medical: Not on file   Tobacco Use    Smoking status: Never Smoker    Smokeless tobacco: Never Used   Substance and Sexual Activity    Alcohol use: Yes     Comment: occassional    Drug use: No    Sexual activity: Yes   Lifestyle    Physical activity     Days per week: Not on file     Minutes per session: Not on file    Stress: Not at all   Relationships    Social connections     Talks on phone: Not on file     Gets together: Not on file     Attends Buddhist service: Not on file     Active member of club or organization: Not on file     Attends meetings of clubs or organizations: Not on file     Relationship status: Not on file   Other Topics Concern    Not on file   Social History Narrative    Not on file     Past Surgical History:   Procedure Laterality Date    BREAST SURGERY      BUNIONECTOMY Right      SECTION      FRACTURE SURGERY      GASTRIC RESTRICTION SURGERY      GASTRIC SLEEVE        Family History   Problem Relation Age of Onset    Arthritis Mother     Colon cancer Mother     Diabetes Mother     Early death Mother     Miscarriages / Stillbirths Mother     Arthritis Father     Heart disease Father     Hypertension Father     Stroke  "Father     Vision loss Father     Vaginal cancer Paternal Grandmother     Heart disease Paternal Grandfather        Review of Systems   Constitutional: Positive for fatigue. Negative for activity change, chills, fever and unexpected weight change (Weight gain over years.).   HENT: Negative for congestion, postnasal drip and sinus pressure.    Eyes: Negative for pain, discharge and visual disturbance.   Respiratory: Negative for cough, chest tightness and shortness of breath.    Cardiovascular: Negative for chest pain, palpitations and leg swelling.   Gastrointestinal: Positive for abdominal pain (llq pain). Negative for abdominal distention, anal bleeding, constipation and diarrhea.        History of abdominoplasty.   Endocrine: Negative for cold intolerance, heat intolerance, polydipsia, polyphagia and polyuria.   Genitourinary: Positive for flank pain. Negative for difficulty urinating, dysuria, frequency, menstrual problem and pelvic pain.        History of ovarian cyst removal.  Side of removal is unclear.   Musculoskeletal: Positive for back pain. Negative for arthralgias and joint swelling.   Skin: Negative for color change, pallor and rash.   Allergic/Immunologic: Negative for environmental allergies, food allergies and immunocompromised state.   Neurological: Negative for dizziness, tremors, seizures, syncope, light-headedness and headaches.   Hematological: Negative for adenopathy. Does not bruise/bleed easily.   Psychiatric/Behavioral: Negative for agitation, confusion and dysphoric mood. The patient is not nervous/anxious.          Objective:      Blood pressure 128/89, pulse 83, height 5' 3" (1.6 m), weight 73.5 kg (162 lb). Body mass index is 28.7 kg/m².  Physical Exam  Vitals signs and nursing note reviewed.   Constitutional:       General: She is not in acute distress.     Appearance: Normal appearance. She is well-developed.   HENT:      Head: Normocephalic and atraumatic.      Nose: Nose normal. "      Mouth/Throat:      Mouth: Mucous membranes are moist.   Eyes:      General: Lids are normal. Lids are everted, no foreign bodies appreciated.      Conjunctiva/sclera: Conjunctivae normal.      Pupils:      Right eye: Pupil is round and reactive.      Left eye: Pupil is round and reactive.   Neck:      Musculoskeletal: Normal range of motion and neck supple. No muscular tenderness.      Trachea: Trachea normal.   Cardiovascular:      Rate and Rhythm: Normal rate and regular rhythm.      Pulses: Normal pulses.      Heart sounds: Normal heart sounds, S1 normal and S2 normal.   Pulmonary:      Effort: Pulmonary effort is normal.      Breath sounds: Normal breath sounds.   Abdominal:      General: Abdomen is flat. Bowel sounds are normal.      Palpations: Abdomen is soft. Abdomen is not rigid.      Tenderness: There is no guarding.       Musculoskeletal: Normal range of motion.        Back:    Lymphadenopathy:      Cervical: No cervical adenopathy.   Skin:     General: Skin is warm and dry.   Neurological:      General: No focal deficit present.      Mental Status: She is alert and oriented to person, place, and time.   Psychiatric:         Mood and Affect: Mood normal.         Behavior: Behavior normal. Behavior is cooperative.           Assessment:       1. Periumbilical abdominal pain    2. Encounter for screening mammogram for breast cancer    3. Need for hepatitis C screening test    4. Pain of left sacroiliac joint           Office Visit on 07/18/2020   Component Date Value Ref Range Status    SARS-CoV2 (COVID-19) Qualitative P* 07/18/2020 Not Detected  Not Detected Final         Plan:           Periumbilical abdominal pain  -     Urinalysis; Future; Expected date: 07/21/2020    Encounter for screening mammogram for breast cancer  -     Mammo Digital Screening Bilat; Future; Expected date: 07/28/2020    Need for hepatitis C screening test  -     Hepatitis C antibody; Future; Expected date: 07/21/2020    Pain  of left sacroiliac joint  -     Sedimentation rate; Future; Expected date: 07/21/2020  -     C-Reactive Protein; Future; Expected date: 07/21/2020      Patient continues to be somewhat tender around the periumbilical region.  CT scan is essentially unremarkable.    I am going to suggest patient the following.    Cut off all any artificial sweeteners likes planned off, NutraSweet or suite and low for 1 week.  See how she does.    Next week-if no better cut off all dairy products like milk, cheese, yogurt or milk based products.    If still no better in the 3rd week, stop all gluten products like wheat, bread, starches, past the or anything med of weight.  She can eat rice or she can eat other seeds like Quinoa.    Try to take some Dulcolax or Colace 2 or 3 times a day to keep the bowels moving and slightly more on the more she or and semi solid side.  She should not get constipated.    Try to find any correlation between taking meals and having pain.  For example some people have pain in the abdomen 1 hr after meals or some people have 2 or 3 hr after meals and make a correlation.  Also the timing and frequency of pains.  Keep it daily kind and diarrhea.  Very less likely though she might have abdominal angina or superior mesenteric artery syndrome.  Possible symptoms of intestinal ischemia.  However no blood in the stool.    More than likely probably she might have IBS constipation type.    Keep gynecological checkup with miss Kaya rossi.    Labs ordered.  Urinalysis ordered.    Current Outpatient Medications:     dextroamphetamine-amphetamine 10 mg Tab, 1 tablet once daily., Disp: , Rfl:     FLUoxetine (PROZAC) 20 MG capsule, Take 1 capsule by mouth nightly., Disp: , Rfl: 1    meloxicam (MOBIC) 15 MG tablet, Take 1 tablet (15 mg total) by mouth once daily., Disp: 30 tablet, Rfl: 0    methocarbamoL (ROBAXIN) 750 MG Tab, Take 1 tablet (750 mg total) by mouth nightly as needed (Nighttime as needed for muscle  spasms.)., Disp: 20 tablet, Rfl: 0    multivitamin (THERAGRAN) tablet, Take 1 tablet by mouth once daily., Disp: , Rfl:     pramipexole (MIRAPEX) 1 MG tablet, Take 2 tablets (2 mg total) by mouth every evening. 2 tabs qhs, Disp: 180 tablet, Rfl: 1    traZODone (DESYREL) 50 MG tablet, TAKE 1-2 TABLETS BY MOUTH AT NIGHT AS NEEDED FOR INSOMNIA, Disp: 60 tablet, Rfl: 0

## 2020-07-22 ENCOUNTER — TELEPHONE (OUTPATIENT)
Dept: FAMILY MEDICINE | Facility: CLINIC | Age: 60
End: 2020-07-22

## 2020-07-22 DIAGNOSIS — Z78.0 ASYMPTOMATIC MENOPAUSE: Primary | ICD-10-CM

## 2020-07-30 DIAGNOSIS — M54.50 ACUTE LEFT-SIDED LOW BACK PAIN WITHOUT SCIATICA: ICD-10-CM

## 2020-07-30 RX ORDER — MELOXICAM 15 MG/1
15 TABLET ORAL DAILY
Qty: 30 TABLET | Refills: 0 | Status: SHIPPED | OUTPATIENT
Start: 2020-07-30 | End: 2021-06-16

## 2020-07-31 ENCOUNTER — HOSPITAL ENCOUNTER (OUTPATIENT)
Dept: RADIOLOGY | Facility: HOSPITAL | Age: 60
Discharge: HOME | End: 2020-07-31
Attending: INTERNAL MEDICINE
Payer: COMMERCIAL

## 2020-07-31 VITALS — HEIGHT: 63 IN | WEIGHT: 162.06 LBS | BODY MASS INDEX: 28.71 KG/M2

## 2020-07-31 DIAGNOSIS — Z12.31 ENCOUNTER FOR SCREENING MAMMOGRAM FOR BREAST CANCER: ICD-10-CM

## 2020-07-31 DIAGNOSIS — Z78.0 ASYMPTOMATIC MENOPAUSE: ICD-10-CM

## 2020-07-31 PROCEDURE — 77080 DXA BONE DENSITY AXIAL: CPT | Mod: TC,PO

## 2020-07-31 PROCEDURE — 77067 SCR MAMMO BI INCL CAD: CPT | Mod: TC,PO

## 2020-08-01 NOTE — PROGRESS NOTES
Miss Mary Mike does have a follow-up with you sometimes next month for a gynecological checkup.  I did review her bone density testing and she does have osteoporosis in the lumbar spine.  Kindly discuss this with her and encouraged her to continue with calcium and vitamin D3 supplement and consideration for medications like alendronate.  Dr. Cristy MONTILLA

## 2020-08-12 ENCOUNTER — TELEPHONE (OUTPATIENT)
Dept: FAMILY MEDICINE | Facility: CLINIC | Age: 60
End: 2020-08-12

## 2020-08-12 NOTE — TELEPHONE ENCOUNTER
----- Message from Jimbo Muniz MD sent at 8/11/2020  6:04 PM CDT -----  Mammogram is within normal range.  Next mammogram in 1 year time.

## 2020-08-13 ENCOUNTER — OFFICE VISIT (OUTPATIENT)
Dept: FAMILY MEDICINE | Facility: CLINIC | Age: 60
End: 2020-08-13
Payer: COMMERCIAL

## 2020-08-13 VITALS
OXYGEN SATURATION: 97 % | TEMPERATURE: 99 F | WEIGHT: 164.5 LBS | HEART RATE: 81 BPM | DIASTOLIC BLOOD PRESSURE: 88 MMHG | BODY MASS INDEX: 29.15 KG/M2 | SYSTOLIC BLOOD PRESSURE: 118 MMHG | HEIGHT: 63 IN

## 2020-08-13 DIAGNOSIS — Z01.419 ENCOUNTER FOR WELL WOMAN EXAM WITH ROUTINE GYNECOLOGICAL EXAM: Primary | ICD-10-CM

## 2020-08-13 DIAGNOSIS — M81.0 OSTEOPOROSIS WITHOUT CURRENT PATHOLOGICAL FRACTURE, UNSPECIFIED OSTEOPOROSIS TYPE: ICD-10-CM

## 2020-08-13 DIAGNOSIS — N94.10 DYSPAREUNIA IN FEMALE: ICD-10-CM

## 2020-08-13 DIAGNOSIS — R10.2 PELVIC PAIN: ICD-10-CM

## 2020-08-13 PROCEDURE — 3008F PR BODY MASS INDEX (BMI) DOCUMENTED: ICD-10-PCS | Mod: S$GLB,,, | Performed by: NURSE PRACTITIONER

## 2020-08-13 PROCEDURE — 99396 PREV VISIT EST AGE 40-64: CPT | Mod: S$GLB,,, | Performed by: NURSE PRACTITIONER

## 2020-08-13 PROCEDURE — 3008F BODY MASS INDEX DOCD: CPT | Mod: S$GLB,,, | Performed by: NURSE PRACTITIONER

## 2020-08-13 PROCEDURE — 99396 PR PREVENTIVE VISIT,EST,40-64: ICD-10-PCS | Mod: S$GLB,,, | Performed by: NURSE PRACTITIONER

## 2020-08-13 RX ORDER — ALENDRONATE SODIUM 70 MG/1
70 TABLET ORAL
Qty: 4 TABLET | Refills: 5 | Status: SHIPPED | OUTPATIENT
Start: 2020-08-13 | End: 2021-06-16

## 2020-08-13 NOTE — PROGRESS NOTES
SUBJECTIVE:      Patient ID: Mary Mike is a 59 y.o. female.    Chief Complaint: Gynecologic Exam (stomach pains )    Established patient of Dr. Muniz here for well-woman exam today.  She is currently up-to-date on her screening mammogram and denies any breast complaints today.  She reports her last Pap smear was about 2-3 years ago by gyn and was normal.  She is currently  but is not currently sexually active due to pelvic pain and vaginal pain during sex.  She is unsure what age she went through menopause as she has a history of endometriosis and did Lupron shots in her 30s due to excessive bleeding and pain.  She does also have a history fibroids and 1 ovary was removed.  She also notes a lot of vaginal dryness and burning.  She denies any urinary complaints and is currently seeing a GI doctor for constipation and diverticulosis  She had a recent bone density test completed which showed osteoporosis and osteopenia.  She has no current fractures and admits she does not exercise regularly.      Family History   Problem Relation Age of Onset    Arthritis Mother     Colon cancer Mother     Diabetes Mother     Early death Mother     Miscarriages / Stillbirths Mother     Arthritis Father     Heart disease Father     Hypertension Father     Stroke Father     Vision loss Father     Vaginal cancer Paternal Grandmother     Heart disease Paternal Grandfather     Breast cancer Maternal Aunt     Breast cancer Maternal Aunt       Social History     Socioeconomic History    Marital status:      Spouse name: Not on file    Number of children: Not on file    Years of education: Not on file    Highest education level: Not on file   Occupational History    Not on file   Social Needs    Financial resource strain: Not on file    Food insecurity     Worry: Not on file     Inability: Not on file    Transportation needs     Medical: Not on file     Non-medical: Not on file   Tobacco Use     Smoking status: Never Smoker    Smokeless tobacco: Never Used   Substance and Sexual Activity    Alcohol use: Yes     Comment: occassional    Drug use: No    Sexual activity: Yes   Lifestyle    Physical activity     Days per week: Not on file     Minutes per session: Not on file    Stress: Not at all   Relationships    Social connections     Talks on phone: Not on file     Gets together: Not on file     Attends Sikh service: Not on file     Active member of club or organization: Not on file     Attends meetings of clubs or organizations: Not on file     Relationship status: Not on file   Other Topics Concern    Not on file   Social History Narrative    Not on file     Current Outpatient Medications   Medication Sig Dispense Refill    dextroamphetamine-amphetamine 10 mg Tab 1 tablet once daily.      FLUoxetine (PROZAC) 20 MG capsule Take 1 capsule by mouth nightly.  1    meloxicam (MOBIC) 15 MG tablet Take 1 tablet (15 mg total) by mouth once daily. 30 tablet 0    multivitamin (THERAGRAN) tablet Take 1 tablet by mouth once daily.      pramipexole (MIRAPEX) 1 MG tablet Take 2 tablets (2 mg total) by mouth every evening. 2 tabs qhs 180 tablet 1    traZODone (DESYREL) 50 MG tablet TAKE 1-2 TABLETS BY MOUTH AT NIGHT AS NEEDED FOR INSOMNIA 60 tablet 0    alendronate (FOSAMAX) 70 MG tablet Take 1 tablet (70 mg total) by mouth every 7 days. 4 tablet 5     No current facility-administered medications for this visit.      Review of patient's allergies indicates:  No Known Allergies   Past Medical History:   Diagnosis Date    ADHD (attention deficit hyperactivity disorder)     Allergy     Anemia     Anxiety     Depression     Fatty liver     Insomnia     Migraines     Osteoporosis     Restless leg syndrome      Past Surgical History:   Procedure Laterality Date    BREAST BIOPSY Left     benign 15 +years ago    BREAST SURGERY      BUNIONECTOMY Right      SECTION      FRACTURE  "SURGERY      GASTRIC RESTRICTION SURGERY      GASTRIC SLEEVE 2012      TOTAL REDUCTION MAMMOPLASTY      2016       Review of Systems   Constitutional: Negative for appetite change, chills, fever and unexpected weight change.   HENT: Negative for congestion, rhinorrhea and sore throat.    Respiratory: Negative for cough and shortness of breath.    Cardiovascular: Negative for chest pain and palpitations.   Gastrointestinal: Positive for abdominal pain (already being addressed by GI and PCP ) and constipation. Negative for blood in stool, diarrhea, nausea and vomiting.   Genitourinary: Positive for dyspareunia, pelvic pain and vaginal pain. Negative for difficulty urinating, dysuria, frequency, genital sores, hematuria, menstrual problem, urgency, vaginal bleeding and vaginal discharge.        Hx of ovary removal and endometriosis- unsure which side    Musculoskeletal: Negative for myalgias.   Skin: Negative for pallor and rash.   Neurological: Negative for dizziness, weakness and headaches.   Hematological: Negative for adenopathy.   Psychiatric/Behavioral: Negative for dysphoric mood and sleep disturbance. The patient is not nervous/anxious.       OBJECTIVE:      Vitals:    08/13/20 1505   BP: 118/88   BP Location: Left arm   Patient Position: Sitting   BP Method: Large (Manual)   Pulse: 81   Temp: 98.5 °F (36.9 °C)   TempSrc: Oral   SpO2: 97%   Weight: 74.6 kg (164 lb 8 oz)   Height: 5' 3" (1.6 m)     Physical Exam  Vitals signs and nursing note reviewed. Exam conducted with a chaperone present.   Constitutional:       General: She is not in acute distress.     Appearance: Normal appearance. She is obese. She is not ill-appearing or diaphoretic.   HENT:      Head: Normocephalic and atraumatic.      Mouth/Throat:      Mouth: Mucous membranes are moist.   Eyes:      General: No scleral icterus.     Conjunctiva/sclera: Conjunctivae normal.      Pupils: Pupils are equal, round, and reactive to light.   Neck:      " Musculoskeletal: Normal range of motion and neck supple.   Cardiovascular:      Rate and Rhythm: Normal rate and regular rhythm.      Pulses: Normal pulses.      Heart sounds: No murmur.   Pulmonary:      Effort: Pulmonary effort is normal. No respiratory distress.      Breath sounds: Normal breath sounds. No wheezing or rhonchi.   Abdominal:      General: Bowel sounds are normal. There is no distension.      Palpations: Abdomen is soft.      Tenderness: There is abdominal tenderness in the suprapubic area and left lower quadrant. There is no right CVA tenderness, left CVA tenderness, guarding or rebound.   Genitourinary:     Pubic Area: No rash.       Labia:         Right: No rash, tenderness, lesion or injury.         Left: No rash, tenderness, lesion or injury.       Vagina: No signs of injury and foreign body. Tenderness present. No vaginal discharge, erythema, bleeding, lesions or prolapsed vaginal walls.      Cervix: Normal.      Uterus: Tender. Not deviated, not enlarged, not fixed and no uterine prolapse.       Comments: Vaginal atrophy noted on exam   Musculoskeletal: Normal range of motion.   Lymphadenopathy:      Lower Body: No right inguinal adenopathy. No left inguinal adenopathy.   Skin:     General: Skin is warm and dry.      Coloration: Skin is not pale.      Findings: No erythema or rash.   Neurological:      Mental Status: She is alert and oriented to person, place, and time.   Psychiatric:         Mood and Affect: Mood normal.         Behavior: Behavior normal.        Assessment:       1. Encounter for well woman exam with routine gynecological exam    2. Pelvic pain    3. Dyspareunia in female    4. Osteoporosis without current pathological fracture, unspecified osteoporosis type    5. BMI 29.0-29.9,adult        Plan:       Encounter for well woman exam with routine gynecological exam  -     Pap Test - Chlamydia/Gonococcus/Trichomonas, CHONG & HPV    Pelvic pain  -     US Transvaginal Non OB;  Future; Expected date: 08/13/2020    Dyspareunia in female  -     US Transvaginal Non OB; Future; Expected date: 08/13/2020    Osteoporosis without current pathological fracture, unspecified osteoporosis type  -     alendronate (FOSAMAX) 70 MG tablet; Take 1 tablet (70 mg total) by mouth every 7 days.  Dispense: 4 tablet; Refill: 5    *Side effects of new medication discussed with patient; discussed specific instruction on how to take Fosamax and provided handouts; advised otc supplementation with Vit D and calcium daily; understanding voiced.    BMI 29.0-29.9,adult        Follow up if symptoms worsen or fail to improve.      8/13/2020 JOCELYN Horton, FNP

## 2020-08-13 NOTE — PATIENT INSTRUCTIONS
Osteoporosis Medications: Bisphosphonates  Depending on your needs, your healthcare provider may prescribe medicines to prevent or treat osteoporosis.    Bisphosphonates  Several medicines make up the class of drugs known as bisphosphonates. Bisphosphonates are the most common type of medicine used to help prevent and treat bone loss. Bisphosphonates are given in pill or injectable form as an IV infusion. They must be taken exactly as directed. Benefits may include:  · Reducing bone loss  · Increasing bone density in the hip and spine  · Reducing risk of fractures in the spine, hip, and wrist  Side effects may include:  · Heartburn  · Nausea  · Abdominal pain  · Bone or muscle pain  Taking bisphosphonates pills  Always read medicine information closely. Certain bisphosphonates must be taken:  · On an empty stomach.  · With a full glass of water (8 oz) first thing in the morning.  · At least 30 minutes to one hour before any food, drink, or other medications.  · While sitting or standing. You should not lie down for at least 30 minutes after taking the medicine.  Newer medicines can be taken weekly or monthly. Talk with your doctor to find out which one is right for you.   Date Last Reviewed: 10/11/2015  © 6311-7490 [employer]. [address] [place] [address]. All rights reserved. This information is not intended as a substitute for professional medical care. Always follow your healthcare professional's instructions.        What Is Osteoporosis?  Osteoporosis is a disease that weakens the bones. Weakened bones are more likely to fracture (break). Osteoporosis affects men and women, but postmenopausal women are most at risk. To help prevent osteoporosis, you need to exercise and nourish your bones throughout your life.    Childhood  The body builds the most bone during these years. That's why boys and girls need foods rich in calcium. They also need plenty of exercise. A healthy diet and  exercise helps bones grow strong.  Young adulthood to age 30  During young adulthood, bones become their strongest. This is called peak bone mass. The same good habits that kept bones healthy in childhood help keep bone healthy in adulthood.  Age 30 to menopause  Bone mass declines slightly during these years. Your body makes just enough new bone to maintain peak bone mass. To keep your bones at their peak mass, be sure to exercise and get plenty of calcium.  After menopause  Menopause is when a woman stops having monthly periods. After menopause, the body makes less estrogen (female hormone). This increases bone loss. At this point, treatment may be needed to reduce the risk for fracture. Exercise and calcium can also help keep your bones strong.  Later in life  In later years, both men and women need to take extra care of their bones. By this point, the body loses more bone than it makes. If too much bone is lost, you may be at risk for fractures. With age, the quality and quantity of bone declines. You can lessen bone loss by staying active and increasing your calcium intake. Calcium supplements and other osteoporosis treatments do have risks, so talk to your healthcare provider if you have concerns. If you have osteoporosis, you can also learn ways to increase everyday safety.  Date Last Reviewed: 10/17/2015  ©2007 [employer]. [address] [place] [address], [place] [address]. All Rights reserved. This information is not intended as a substitute for professional medical care. Always follow your healthcare providers instructions.        Living with Osteoporosis: Regular Exercise  If you have osteoporosis, exercise is vital for your health. It can prevent bone fractures and spine changes. It will slow bone loss. Exercise will strengthen your body. It can also be fun. A variety of exercises is best. See below for exercises that can help you. Before you start, though, talk to your healthcare provider to be  sure these exercises are right for you.    Resistance exercises. These build muscle strength and maintain bone mass. They also make you less prone to injury. Exercises include lifting small weights, doing push-ups and sit-ups, using elastic exercise bands, and using weight machines.  Weight-bearing activities. These help your whole body. They also help you maintain bone mass. Activities include walking, dancing, and housework.  Nonweight-bearing exercises. These help prevent back strain and pain. They do this by building the trunk and leg muscles. Exercises that help with flexibility can prevent falls. Examples include swimming, water exercise, and stretching.  Staying safe  Here are tips to stay safe:   · Always check with your healthcare provider before starting any new exercise program.  · Use weights only as instructed.  · Stop any exercise that causes pain.   Date Last Reviewed: 10/17/2015  © 9408-5437 [employer]. [address] [place] [address], [place] [address]. All rights reserved. This information is not intended as a substitute for professional medical care. Always follow your healthcare professional's instructions.

## 2020-08-14 ENCOUNTER — LAB VISIT (OUTPATIENT)
Dept: LAB | Facility: HOSPITAL | Age: 60
End: 2020-08-14
Attending: INTERNAL MEDICINE
Payer: COMMERCIAL

## 2020-08-14 DIAGNOSIS — M53.3 PAIN OF LEFT SACROILIAC JOINT: ICD-10-CM

## 2020-08-14 DIAGNOSIS — R10.33 PERIUMBILICAL ABDOMINAL PAIN: ICD-10-CM

## 2020-08-14 DIAGNOSIS — Z11.59 NEED FOR HEPATITIS C SCREENING TEST: ICD-10-CM

## 2020-08-14 LAB
BILIRUB UR QL STRIP: NEGATIVE
CLARITY UR: CLEAR
COLOR UR: COLORLESS
CRP SERPL-MCNC: 0.11 MG/DL
ERYTHROCYTE [SEDIMENTATION RATE] IN BLOOD BY WESTERGREN METHOD: 10 MM/HR (ref 0–20)
GLUCOSE UR QL STRIP: NEGATIVE
HGB UR QL STRIP: NEGATIVE
KETONES UR QL STRIP: NEGATIVE
LEUKOCYTE ESTERASE UR QL STRIP: NEGATIVE
NITRITE UR QL STRIP: NEGATIVE
PH UR STRIP: 7 [PH] (ref 5–8)
PROT UR QL STRIP: NEGATIVE
SP GR UR STRIP: 1.01 (ref 1–1.03)
URN SPEC COLLECT METH UR: ABNORMAL
UROBILINOGEN UR STRIP-ACNC: NEGATIVE EU/DL

## 2020-08-14 PROCEDURE — 86140 C-REACTIVE PROTEIN: CPT

## 2020-08-14 PROCEDURE — 86803 HEPATITIS C AB TEST: CPT

## 2020-08-14 PROCEDURE — 81003 URINALYSIS AUTO W/O SCOPE: CPT

## 2020-08-14 PROCEDURE — 85651 RBC SED RATE NONAUTOMATED: CPT

## 2020-08-14 PROCEDURE — 36415 COLL VENOUS BLD VENIPUNCTURE: CPT

## 2020-08-15 LAB — HCV AB S/CO SERPL IA: <0.1 S/CO RATIO (ref 0–0.9)

## 2020-08-19 LAB
C TRACH RRNA CVX QL NAA+PROBE: NEGATIVE
CYTOLOGIST CVX/VAG CYTO: NORMAL
CYTOLOGY CVX/VAG DOC CYTO: NORMAL
DX ICD CODE: NORMAL
HPV I/H RISK 1 DNA CVX QL PROBE+SIG AMP: NEGATIVE
Lab: NORMAL
N GONORRHOEA RRNA CVX QL NAA+PROBE: NEGATIVE
OTHER STN SPEC: NORMAL
STAT OF ADQ CVX/VAG CYTO-IMP: NORMAL
T VAGINALIS RRNA SPEC QL NAA+PROBE: NEGATIVE

## 2020-08-20 ENCOUNTER — HOSPITAL ENCOUNTER (OUTPATIENT)
Dept: RADIOLOGY | Facility: HOSPITAL | Age: 60
Discharge: HOME | End: 2020-08-20
Attending: NURSE PRACTITIONER
Payer: COMMERCIAL

## 2020-08-20 DIAGNOSIS — R10.2 PELVIC PAIN: ICD-10-CM

## 2020-08-20 DIAGNOSIS — N94.10 DYSPAREUNIA IN FEMALE: ICD-10-CM

## 2020-08-20 PROCEDURE — 76830 TRANSVAGINAL US NON-OB: CPT | Mod: TC,PO

## 2020-11-04 ENCOUNTER — PATIENT MESSAGE (OUTPATIENT)
Dept: FAMILY MEDICINE | Facility: CLINIC | Age: 60
End: 2020-11-04

## 2020-11-06 ENCOUNTER — OFFICE VISIT (OUTPATIENT)
Dept: FAMILY MEDICINE | Facility: CLINIC | Age: 60
End: 2020-11-06
Payer: COMMERCIAL

## 2020-11-06 ENCOUNTER — LAB VISIT (OUTPATIENT)
Dept: LAB | Facility: HOSPITAL | Age: 60
End: 2020-11-06
Attending: NURSE PRACTITIONER
Payer: COMMERCIAL

## 2020-11-06 VITALS
HEIGHT: 63 IN | OXYGEN SATURATION: 98 % | HEART RATE: 86 BPM | SYSTOLIC BLOOD PRESSURE: 146 MMHG | BODY MASS INDEX: 30.3 KG/M2 | DIASTOLIC BLOOD PRESSURE: 82 MMHG | WEIGHT: 171 LBS | TEMPERATURE: 98 F

## 2020-11-06 DIAGNOSIS — E66.09 CLASS 1 OBESITY DUE TO EXCESS CALORIES WITH SERIOUS COMORBIDITY AND BODY MASS INDEX (BMI) OF 30.0 TO 30.9 IN ADULT: ICD-10-CM

## 2020-11-06 DIAGNOSIS — R14.0 ABDOMINAL DISTENSION (GASEOUS): Primary | ICD-10-CM

## 2020-11-06 DIAGNOSIS — R10.32 LEFT LOWER QUADRANT PAIN: ICD-10-CM

## 2020-11-06 DIAGNOSIS — R10.12 LEFT UPPER QUADRANT PAIN: ICD-10-CM

## 2020-11-06 DIAGNOSIS — Z23 IMMUNIZATION DUE: ICD-10-CM

## 2020-11-06 DIAGNOSIS — R14.0 ABDOMINAL DISTENSION (GASEOUS): ICD-10-CM

## 2020-11-06 LAB
ALBUMIN SERPL BCP-MCNC: 3.9 G/DL (ref 3.5–5.2)
ALP SERPL-CCNC: 66 U/L (ref 55–135)
ALT SERPL W/O P-5'-P-CCNC: 32 U/L (ref 10–44)
ANION GAP SERPL CALC-SCNC: 11 MMOL/L (ref 8–16)
AST SERPL-CCNC: 26 U/L (ref 10–40)
BACTERIA #/AREA URNS HPF: NEGATIVE /HPF
BILIRUB SERPL-MCNC: 0.7 MG/DL (ref 0.1–1)
BILIRUB UR QL STRIP: NEGATIVE
BUN SERPL-MCNC: 10 MG/DL (ref 6–20)
CALCIUM SERPL-MCNC: 9.2 MG/DL (ref 8.7–10.5)
CHLORIDE SERPL-SCNC: 104 MMOL/L (ref 95–110)
CLARITY UR: CLEAR
CO2 SERPL-SCNC: 25 MMOL/L (ref 23–29)
COLOR UR: YELLOW
CREAT SERPL-MCNC: 0.8 MG/DL (ref 0.5–1.4)
EST. GFR  (AFRICAN AMERICAN): >60 ML/MIN/1.73 M^2
EST. GFR  (NON AFRICAN AMERICAN): >60 ML/MIN/1.73 M^2
GLUCOSE SERPL-MCNC: 78 MG/DL (ref 70–110)
GLUCOSE UR QL STRIP: NEGATIVE
HGB UR QL STRIP: NEGATIVE
HYALINE CASTS #/AREA URNS LPF: 0 /LPF
KETONES UR QL STRIP: NEGATIVE
LEUKOCYTE ESTERASE UR QL STRIP: ABNORMAL
MICROSCOPIC COMMENT: ABNORMAL
NITRITE UR QL STRIP: NEGATIVE
PH UR STRIP: 6 [PH] (ref 5–8)
POTASSIUM SERPL-SCNC: 4.3 MMOL/L (ref 3.5–5.1)
PROT SERPL-MCNC: 7.2 G/DL (ref 6–8.4)
PROT UR QL STRIP: NEGATIVE
RBC #/AREA URNS HPF: 1 /HPF (ref 0–4)
SODIUM SERPL-SCNC: 140 MMOL/L (ref 136–145)
SP GR UR STRIP: 1.01 (ref 1–1.03)
SQUAMOUS #/AREA URNS HPF: 1 /HPF
URN SPEC COLLECT METH UR: ABNORMAL
UROBILINOGEN UR STRIP-ACNC: NEGATIVE EU/DL
WBC #/AREA URNS HPF: 1 /HPF (ref 0–5)

## 2020-11-06 PROCEDURE — 83013 H PYLORI (C-13) BREATH: CPT

## 2020-11-06 PROCEDURE — 90471 IMMUNIZATION ADMIN: CPT | Mod: S$GLB,,, | Performed by: NURSE PRACTITIONER

## 2020-11-06 PROCEDURE — 90686 FLU VACCINE (QUAD) GREATER THAN OR EQUAL TO 3YO PRESERVATIVE FREE IM: ICD-10-PCS | Mod: S$GLB,,, | Performed by: NURSE PRACTITIONER

## 2020-11-06 PROCEDURE — 81001 URINALYSIS AUTO W/SCOPE: CPT

## 2020-11-06 PROCEDURE — 36415 COLL VENOUS BLD VENIPUNCTURE: CPT

## 2020-11-06 PROCEDURE — 99214 OFFICE O/P EST MOD 30 MIN: CPT | Mod: 25,S$GLB,, | Performed by: NURSE PRACTITIONER

## 2020-11-06 PROCEDURE — 80053 COMPREHEN METABOLIC PANEL: CPT

## 2020-11-06 PROCEDURE — 90471 FLU VACCINE (QUAD) GREATER THAN OR EQUAL TO 3YO PRESERVATIVE FREE IM: ICD-10-PCS | Mod: S$GLB,,, | Performed by: NURSE PRACTITIONER

## 2020-11-06 PROCEDURE — 3008F PR BODY MASS INDEX (BMI) DOCUMENTED: ICD-10-PCS | Mod: S$GLB,,, | Performed by: NURSE PRACTITIONER

## 2020-11-06 PROCEDURE — 99214 PR OFFICE/OUTPT VISIT, EST, LEVL IV, 30-39 MIN: ICD-10-PCS | Mod: 25,S$GLB,, | Performed by: NURSE PRACTITIONER

## 2020-11-06 PROCEDURE — 90686 IIV4 VACC NO PRSV 0.5 ML IM: CPT | Mod: S$GLB,,, | Performed by: NURSE PRACTITIONER

## 2020-11-06 PROCEDURE — 3008F BODY MASS INDEX DOCD: CPT | Mod: S$GLB,,, | Performed by: NURSE PRACTITIONER

## 2020-11-06 NOTE — PATIENT INSTRUCTIONS
Abdominal Pain    Abdominal pain is pain in the stomach or belly area. Everyone has this pain from time to time. In many cases it goes away on its own. But abdominal pain can sometimes be due to a serious problem, such as appendicitis. So its important to know when to seek help.  Causes of abdominal pain  There are many possible causes of abdominal pain. Common causes in adults include:  · Constipation, diarrhea, or gas  · Stomach acid flowing back up into the esophagus (acid reflux or heartburn)  · Severe acid reflux, called GERD (gastroesophageal reflux disease)  · A sore in the lining of the stomach or small intestine (peptic ulcer)  · Inflammation of the gallbladder, liver, or pancreas  · Gallstones or kidney stones  · Appendicitis   · Intestinal blockage   · An internal organ pushing through a muscle or other tissue (hernia)  · Urinary tract infections  · In women, menstrual cramps, fibroids, or endometriosis  · Inflammation or infection of the intestines  Diagnosing the cause of abdominal pain  Your healthcare provider will do a physical exam help find the cause of your pain. If needed, tests will be ordered. Belly pain has many possible causes. So it can be hard to find the reason for your pain. Giving details about your pain can help. Tell your provider where and when you feel the pain, and what makes it better or worse. Also let your provider know if you have other symptoms such as:  · Fever  · Tiredness  · Upset stomach (nausea)  · Vomiting  · Changes in bathroom habits  Treating abdominal pain  Some causes of pain need emergency medical treatment right away. These include appendicitis or a bowel blockage. Other problems can be treated with rest, fluids, or medicines. Your healthcare provider can give you specific instructions for treatment or self-care based on what is causing your pain.  If you have vomiting or diarrhea, sip water or other clear fluids. When you are ready to eat solid foods again,  start with small amounts of easy-to-digest, low-fat foods. These include apple sauce, toast, or crackers.   When to seek medical care  Call 911 or go to the hospital right away if you:  · Cant pass stool and are vomiting  · Are vomiting blood or have bloody diarrhea or black, tarry diarrhea  · Have chest, neck, or shoulder pain  · Feel like you might pass out  · Have pain in your shoulder blades with nausea  · Have sudden, severe belly pain  · Have new, severe pain unlike any you have felt before  · Have a belly that is rigid, hard, and tender to touch  Call your healthcare provider if you have:  · Pain for more than 5 days  · Bloating for more than 2 days  · Diarrhea for more than 5 days  · A fever of 100.4°F (38.0°C) or higher, or as directed by your provider  · Pain that gets worse  · Weight loss for no reason  · Continued lack of appetite  · Blood in your stool  How to prevent abdominal pain  Here are some tips to help prevent abdominal pain:  · Eat smaller amounts of food at one time.  · Avoid greasy, fried, or other high-fat foods.  · Avoid foods that give you gas.  · Exercise regularly.  · Drink plenty of fluids.  To help prevent GERD symptoms:  · Quit smoking.  · Reduce alcohol and certain foods that increase stomach acid.  · Avoid aspirin and over-the-counter pain and fever medicines (NSAIDS or nonsteroidal anti-inflammatory drugs), if possible  · Lose extra weight.  · Finish eating at least 2 hours before you go to bed or lie down.  · Raise the head of your bed.  Date Last Reviewed: 7/1/2016  © 8882-0939 [employer]. [address] [place] [address], [place] [address]. All rights reserved. This information is not intended as a substitute for professional medical care. Always follow your healthcare professional's instructions.

## 2020-11-06 NOTE — PROGRESS NOTES
Subjective:       Patient ID: Mary Mike is a 60 y.o. female.    Chief Complaint: Abdominal Pain and Back Pain    Abdominal Pain  This is a new problem. The current episode started 1 to 4 weeks ago. The onset quality is gradual. The problem occurs constantly. The problem has been gradually worsening. The pain is located in the LLQ, LUQ and epigastric region. The pain is at a severity of 7/10. The pain is moderate. The quality of the pain is a sensation of fullness and colicky. The abdominal pain does not radiate. Associated symptoms include belching, constipation, diarrhea, flatus and nausea. Pertinent negatives include no anorexia, arthralgias, dysuria, fever, frequency, headaches, hematochezia, hematuria, melena, myalgias, vomiting or weight loss. Associated symptoms comments: Weight gain, bloating. The pain is aggravated by eating, movement and certain positions. The pain is relieved by recumbency and being still (bowel movements). She has tried antacids (rest) for the symptoms. The treatment provided no relief.   Nausea  This is a new problem. The current episode started 1 to 4 weeks ago. The problem occurs daily. The problem has been waxing and waning. Associated symptoms include abdominal pain, a change in bowel habit, fatigue and nausea. Pertinent negatives include no anorexia, arthralgias, chest pain, congestion, coughing, fever, headaches, myalgias, rash, sore throat, urinary symptoms or vomiting. The symptoms are aggravated by eating. She has tried rest, position changes and lying down for the symptoms. The treatment provided no relief.     Review of Systems   Constitutional: Positive for fatigue. Negative for activity change, appetite change, fever and weight loss.        Obesity   HENT: Negative for congestion, ear discharge, ear pain, sore throat, trouble swallowing and voice change.    Eyes: Negative for photophobia, pain, discharge and visual disturbance.   Respiratory: Negative for cough,  chest tightness and shortness of breath.    Cardiovascular: Negative for chest pain and palpitations.   Gastrointestinal: Positive for abdominal distention, abdominal pain, change in bowel habit, constipation, diarrhea, flatus and nausea. Negative for anal bleeding, anorexia, blood in stool, hematochezia, melena, rectal pain and vomiting.   Endocrine: Negative for cold intolerance and heat intolerance.   Genitourinary: Negative for difficulty urinating, dysuria, frequency and hematuria.   Musculoskeletal: Negative for arthralgias, gait problem and myalgias.   Skin: Negative for rash.   Allergic/Immunologic: Negative for immunocompromised state.   Neurological: Negative for speech difficulty and headaches.   Psychiatric/Behavioral: Negative for confusion, self-injury and suicidal ideas.       Past Medical History:   Diagnosis Date    ADHD (attention deficit hyperactivity disorder)     Allergy     Anemia     Anxiety     Depression     Fatty liver     Insomnia     Migraines     Osteoporosis     Restless leg syndrome       Past Surgical History:   Procedure Laterality Date    BREAST BIOPSY Left     benign 15 +years ago    BREAST SURGERY      BUNIONECTOMY Right      SECTION      FRACTURE SURGERY      GASTRIC RESTRICTION SURGERY      GASTRIC SLEEVE 2012      TOTAL REDUCTION MAMMOPLASTY      2016       Family History   Problem Relation Age of Onset    Arthritis Mother     Colon cancer Mother     Diabetes Mother     Early death Mother     Miscarriages / Stillbirths Mother     Arthritis Father     Heart disease Father     Hypertension Father     Stroke Father     Vision loss Father     Vaginal cancer Paternal Grandmother     Heart disease Paternal Grandfather     Breast cancer Maternal Aunt     Breast cancer Maternal Aunt        Social History     Socioeconomic History    Marital status:      Spouse name: Not on file    Number of children: Not on file    Years of education:  Not on file    Highest education level: Not on file   Occupational History    Not on file   Social Needs    Financial resource strain: Not on file    Food insecurity     Worry: Not on file     Inability: Not on file    Transportation needs     Medical: Not on file     Non-medical: Not on file   Tobacco Use    Smoking status: Never Smoker    Smokeless tobacco: Never Used   Substance and Sexual Activity    Alcohol use: Yes     Comment: occassional    Drug use: No    Sexual activity: Yes   Lifestyle    Physical activity     Days per week: Not on file     Minutes per session: Not on file    Stress: Not at all   Relationships    Social connections     Talks on phone: Not on file     Gets together: Not on file     Attends [employer] service: Not on file     Active member of club or organization: Not on file     Attends meetings of clubs or organizations: Not on file     Relationship status: Not on file   Other Topics Concern    Not on file   Social History Narrative    Not on file       Current Outpatient Medications   Medication Sig Dispense Refill    alendronate (FOSAMAX) 70 MG tablet Take 1 tablet (70 mg total) by mouth every 7 days. 4 tablet 5    dextroamphetamine-amphetamine 10 mg Tab 1 tablet once daily.      FLUoxetine (PROZAC) 20 MG capsule Take 1 capsule by mouth nightly.  1    linaCLOtide (LINZESS) 145 mcg Cap capsule Take 145 mcg by mouth before breakfast.      meloxicam (MOBIC) 15 MG tablet Take 1 tablet (15 mg total) by mouth once daily. 30 tablet 0    multivitamin (THERAGRAN) tablet Take 1 tablet by mouth once daily.      pramipexole (MIRAPEX) 1 MG tablet Take 2 tablets (2 mg total) by mouth every evening. 2 tabs qhs 180 tablet 1    traZODone (DESYREL) 50 MG tablet TAKE 1-2 TABLETS BY MOUTH AT NIGHT AS NEEDED FOR INSOMNIA 60 tablet 0     No current facility-administered medications for this visit.        Review of patient's allergies indicates:  No Known Allergies  Objective:      Blood  "pressure (!) 146/82, pulse 86, temperature 98.1 °F (36.7 °C), height 5' 3" (1.6 m), weight 77.6 kg (171 lb), SpO2 98 %. Body mass index is 30.29 kg/m².   Physical Exam  Vitals signs and nursing note reviewed.   Constitutional:       General: She is not in acute distress.     Appearance: Normal appearance. She is well-developed.   HENT:      Head: Normocephalic and atraumatic.      Right Ear: Tympanic membrane, ear canal and external ear normal.      Left Ear: Tympanic membrane, ear canal and external ear normal.      Nose: Nose normal.      Mouth/Throat:      Mouth: Mucous membranes are moist.   Eyes:      General: Lids are normal. Lids are everted, no foreign bodies appreciated.      Conjunctiva/sclera: Conjunctivae normal.      Pupils: Pupils are equal, round, and reactive to light.      Right eye: Pupil is round and reactive.      Left eye: Pupil is round and reactive.   Neck:      Musculoskeletal: Normal range of motion and neck supple. No muscular tenderness.      Trachea: Trachea normal.   Cardiovascular:      Rate and Rhythm: Normal rate and regular rhythm.      Pulses: Normal pulses.      Heart sounds: Normal heart sounds, S1 normal and S2 normal.   Pulmonary:      Effort: Pulmonary effort is normal.      Breath sounds: Normal breath sounds.   Abdominal:      General: Abdomen is flat. Bowel sounds are normal.      Palpations: Abdomen is soft. Abdomen is not rigid.      Tenderness: There is abdominal tenderness in the epigastric area, left upper quadrant and left lower quadrant. There is no right CVA tenderness, left CVA tenderness, guarding or rebound.   Musculoskeletal: Normal range of motion.   Lymphadenopathy:      Cervical: No cervical adenopathy.   Skin:     General: Skin is warm and dry.      Capillary Refill: Capillary refill takes less than 2 seconds.   Neurological:      General: No focal deficit present.      Mental Status: She is alert and oriented to person, place, and time.   Psychiatric:        "  Mood and Affect: Mood normal.         Behavior: Behavior normal. Behavior is cooperative.         Thought Content: Thought content normal.         Judgment: Judgment normal.             Assessment:       1. Abdominal distension (gaseous)    2. Left lower quadrant pain    3. Left upper quadrant pain    4. Immunization due    5. Class 1 obesity due to excess calories with serious comorbidity and body mass index (BMI) of 30.0 to 30.9 in adult        Plan:       Mary was seen today for abdominal pain and back pain.    Diagnoses and all orders for this visit:    Abdominal distension (gaseous)  -     CT Abdomen Pelvis W Wo Contrast; Future  -     Helicobacter pylori Urea Breath Test; Future    Left lower quadrant pain  -     CT Abdomen Pelvis W Wo Contrast; Future  -     Helicobacter pylori Urea Breath Test; Future  -     Comprehensive Metabolic Panel; Future  -     Urinalysis; Future    Left upper quadrant pain  -     CT Abdomen Pelvis W Wo Contrast; Future  -     Helicobacter pylori Urea Breath Test; Future    Immunization due  -     Influenza - Quadrivalent (PF)  Completed.    Class 1 obesity due to excess calories with serious comorbidity and body mass index (BMI) of 30.0 to 30.9 in adult  The patient is asked to make an attempt to improve diet and exercise patterns to aid in medical management of this problem.       Will call and notify of lab results and CT results when received.

## 2020-11-08 ENCOUNTER — HOSPITAL ENCOUNTER (OUTPATIENT)
Dept: RADIOLOGY | Facility: HOSPITAL | Age: 60
Discharge: HOME | End: 2020-11-08
Attending: NURSE PRACTITIONER
Payer: COMMERCIAL

## 2020-11-08 DIAGNOSIS — R14.0 ABDOMINAL DISTENSION (GASEOUS): ICD-10-CM

## 2020-11-08 DIAGNOSIS — R10.12 LEFT UPPER QUADRANT PAIN: ICD-10-CM

## 2020-11-08 DIAGNOSIS — R10.32 LEFT LOWER QUADRANT PAIN: ICD-10-CM

## 2020-11-08 PROCEDURE — 74178 CT ABD&PLV WO CNTR FLWD CNTR: CPT | Mod: TC

## 2020-11-08 PROCEDURE — 25500020 PHARM REV CODE 255: Performed by: NURSE PRACTITIONER

## 2020-11-08 RX ADMIN — IOHEXOL 100 ML: 350 INJECTION, SOLUTION INTRAVENOUS at 10:11

## 2020-11-09 ENCOUNTER — TELEPHONE (OUTPATIENT)
Dept: FAMILY MEDICINE | Facility: CLINIC | Age: 60
End: 2020-11-09

## 2020-11-09 LAB — UREA BREATH TEST QL: NEGATIVE

## 2020-12-08 ENCOUNTER — TELEPHONE (OUTPATIENT)
Dept: HEMATOLOGY/ONCOLOGY | Facility: CLINIC | Age: 60
End: 2020-12-08

## 2020-12-08 NOTE — TELEPHONE ENCOUNTER
Received message from Dr. Nicholson's nurse Kinza Howe, AGUILAR stating patient desires consult with Dr. Nicholson, as she has been feeling fatigued.  However, there are no recent labs in patients chart and no referral from provider.  LM encouraging call back to clinic to discuss.

## 2021-04-16 DIAGNOSIS — R39.9 ABNORMAL CYSTOSCOPY: Primary | ICD-10-CM

## 2021-06-16 ENCOUNTER — HOSPITAL ENCOUNTER (OUTPATIENT)
Dept: RADIOLOGY | Facility: CLINIC | Age: 61
Discharge: HOME | End: 2021-06-16
Attending: PODIATRIST
Payer: COMMERCIAL

## 2021-06-16 ENCOUNTER — OFFICE VISIT (OUTPATIENT)
Dept: PODIATRY | Facility: CLINIC | Age: 61
End: 2021-06-16
Payer: COMMERCIAL

## 2021-06-16 VITALS — WEIGHT: 171 LBS | HEART RATE: 57 BPM | HEIGHT: 63 IN | OXYGEN SATURATION: 97 % | BODY MASS INDEX: 30.3 KG/M2

## 2021-06-16 DIAGNOSIS — S99.921S PLANTAR PLATE INJURY, RIGHT, SEQUELA: ICD-10-CM

## 2021-06-16 DIAGNOSIS — M19.071 OSTEOARTHRITIS OF RIGHT ANKLE OR FOOT: Primary | ICD-10-CM

## 2021-06-16 DIAGNOSIS — M79.674 TOE PAIN, RIGHT: ICD-10-CM

## 2021-06-16 DIAGNOSIS — M77.8 CAPSULITIS OF RIGHT FOOT: ICD-10-CM

## 2021-06-16 PROCEDURE — 3008F PR BODY MASS INDEX (BMI) DOCUMENTED: ICD-10-PCS | Mod: CPTII,S$GLB,, | Performed by: PODIATRIST

## 2021-06-16 PROCEDURE — 3008F BODY MASS INDEX DOCD: CPT | Mod: CPTII,S$GLB,, | Performed by: PODIATRIST

## 2021-06-16 PROCEDURE — 99214 PR OFFICE/OUTPT VISIT, EST, LEVL IV, 30-39 MIN: ICD-10-PCS | Mod: S$GLB,,, | Performed by: PODIATRIST

## 2021-06-16 PROCEDURE — 1125F PR PAIN SEVERITY QUANTIFIED, PAIN PRESENT: ICD-10-PCS | Mod: S$GLB,,, | Performed by: PODIATRIST

## 2021-06-16 PROCEDURE — 73630 XR FOOT COMPLETE 3 VIEW RIGHT: ICD-10-PCS | Mod: RT,S$GLB,, | Performed by: RADIOLOGY

## 2021-06-16 PROCEDURE — 1125F AMNT PAIN NOTED PAIN PRSNT: CPT | Mod: S$GLB,,, | Performed by: PODIATRIST

## 2021-06-16 PROCEDURE — 99214 OFFICE O/P EST MOD 30 MIN: CPT | Mod: S$GLB,,, | Performed by: PODIATRIST

## 2021-06-16 PROCEDURE — 73630 X-RAY EXAM OF FOOT: CPT | Mod: RT,S$GLB,, | Performed by: RADIOLOGY

## 2021-06-18 ENCOUNTER — TELEPHONE (OUTPATIENT)
Dept: PODIATRY | Facility: CLINIC | Age: 61
End: 2021-06-18

## 2021-06-18 DIAGNOSIS — M79.674 TOE PAIN, RIGHT: ICD-10-CM

## 2021-06-18 DIAGNOSIS — M77.8 CAPSULITIS OF RIGHT FOOT: Primary | ICD-10-CM

## 2021-06-18 DIAGNOSIS — M77.8: ICD-10-CM

## 2021-06-23 ENCOUNTER — HOSPITAL ENCOUNTER (OUTPATIENT)
Dept: PREADMISSION TESTING | Facility: HOSPITAL | Age: 61
Discharge: HOME | End: 2021-06-23
Attending: PODIATRIST
Payer: COMMERCIAL

## 2021-06-23 ENCOUNTER — HOSPITAL ENCOUNTER (OUTPATIENT)
Dept: RADIOLOGY | Facility: HOSPITAL | Age: 61
Discharge: HOME | End: 2021-06-23
Attending: PODIATRIST
Payer: COMMERCIAL

## 2021-06-23 VITALS
WEIGHT: 140 LBS | BODY MASS INDEX: 25.76 KG/M2 | HEIGHT: 62 IN | OXYGEN SATURATION: 98 % | RESPIRATION RATE: 18 BRPM | DIASTOLIC BLOOD PRESSURE: 74 MMHG | SYSTOLIC BLOOD PRESSURE: 111 MMHG | HEART RATE: 76 BPM | TEMPERATURE: 99 F

## 2021-06-23 DIAGNOSIS — M79.674 TOE PAIN, RIGHT: ICD-10-CM

## 2021-06-23 DIAGNOSIS — M77.8 CAPSULITIS OF RIGHT FOOT: ICD-10-CM

## 2021-06-23 LAB
ALBUMIN SERPL BCP-MCNC: 3.8 G/DL (ref 3.5–5.2)
ALP SERPL-CCNC: 57 U/L (ref 55–135)
ALT SERPL W/O P-5'-P-CCNC: 21 U/L (ref 10–44)
ANION GAP SERPL CALC-SCNC: 9 MMOL/L (ref 8–16)
AST SERPL-CCNC: 26 U/L (ref 10–40)
BASOPHILS # BLD AUTO: 0.03 K/UL (ref 0–0.2)
BASOPHILS NFR BLD: 0.7 % (ref 0–1.9)
BILIRUB SERPL-MCNC: 0.6 MG/DL (ref 0.1–1)
BUN SERPL-MCNC: 8 MG/DL (ref 6–20)
CALCIUM SERPL-MCNC: 8.8 MG/DL (ref 8.7–10.5)
CHLORIDE SERPL-SCNC: 103 MMOL/L (ref 95–110)
CO2 SERPL-SCNC: 28 MMOL/L (ref 23–29)
CREAT SERPL-MCNC: 0.8 MG/DL (ref 0.5–1.4)
DIFFERENTIAL METHOD: ABNORMAL
EOSINOPHIL # BLD AUTO: 0.2 K/UL (ref 0–0.5)
EOSINOPHIL NFR BLD: 4.8 % (ref 0–8)
ERYTHROCYTE [DISTWIDTH] IN BLOOD BY AUTOMATED COUNT: 14.8 % (ref 11.5–14.5)
EST. GFR  (AFRICAN AMERICAN): >60 ML/MIN/1.73 M^2
EST. GFR  (NON AFRICAN AMERICAN): >60 ML/MIN/1.73 M^2
GLUCOSE SERPL-MCNC: 95 MG/DL (ref 70–110)
HCT VFR BLD AUTO: 39.5 % (ref 37–48.5)
HGB BLD-MCNC: 12.1 G/DL (ref 12–16)
IMM GRANULOCYTES # BLD AUTO: 0.01 K/UL (ref 0–0.04)
IMM GRANULOCYTES NFR BLD AUTO: 0.2 % (ref 0–0.5)
LYMPHOCYTES # BLD AUTO: 1 K/UL (ref 1–4.8)
LYMPHOCYTES NFR BLD: 24.6 % (ref 18–48)
MCH RBC QN AUTO: 26.3 PG (ref 27–31)
MCHC RBC AUTO-ENTMCNC: 30.6 G/DL (ref 32–36)
MCV RBC AUTO: 86 FL (ref 82–98)
MONOCYTES # BLD AUTO: 0.4 K/UL (ref 0.3–1)
MONOCYTES NFR BLD: 10.3 % (ref 4–15)
NEUTROPHILS # BLD AUTO: 2.5 K/UL (ref 1.8–7.7)
NEUTROPHILS NFR BLD: 59.4 % (ref 38–73)
NRBC BLD-RTO: 0 /100 WBC
PLATELET # BLD AUTO: 256 K/UL (ref 150–450)
PMV BLD AUTO: 11.6 FL (ref 9.2–12.9)
POTASSIUM SERPL-SCNC: 4 MMOL/L (ref 3.5–5.1)
PROT SERPL-MCNC: 6.5 G/DL (ref 6–8.4)
RBC # BLD AUTO: 4.6 M/UL (ref 4–5.4)
SODIUM SERPL-SCNC: 140 MMOL/L (ref 136–145)
WBC # BLD AUTO: 4.19 K/UL (ref 3.9–12.7)

## 2021-06-23 PROCEDURE — 85025 COMPLETE CBC W/AUTO DIFF WBC: CPT | Performed by: PODIATRIST

## 2021-06-23 PROCEDURE — 36415 COLL VENOUS BLD VENIPUNCTURE: CPT | Performed by: PODIATRIST

## 2021-06-23 PROCEDURE — 71046 X-RAY EXAM CHEST 2 VIEWS: CPT | Mod: TC

## 2021-06-23 PROCEDURE — 93005 ELECTROCARDIOGRAM TRACING: CPT | Performed by: INTERNAL MEDICINE

## 2021-06-23 PROCEDURE — 93010 EKG 12-LEAD: ICD-10-PCS | Mod: ,,, | Performed by: INTERNAL MEDICINE

## 2021-06-23 PROCEDURE — 80053 COMPREHEN METABOLIC PANEL: CPT | Performed by: PODIATRIST

## 2021-06-23 PROCEDURE — 93010 ELECTROCARDIOGRAM REPORT: CPT | Mod: ,,, | Performed by: INTERNAL MEDICINE

## 2021-06-24 ENCOUNTER — ANESTHESIA EVENT (OUTPATIENT)
Dept: SURGERY | Facility: HOSPITAL | Age: 61
End: 2021-06-24
Payer: COMMERCIAL

## 2021-06-24 ENCOUNTER — HOSPITAL ENCOUNTER (OUTPATIENT)
Facility: HOSPITAL | Age: 61
Discharge: HOME | End: 2021-06-24
Attending: PODIATRIST | Admitting: PODIATRIST
Payer: COMMERCIAL

## 2021-06-24 ENCOUNTER — ANESTHESIA (OUTPATIENT)
Dept: SURGERY | Facility: HOSPITAL | Age: 61
End: 2021-06-24
Payer: COMMERCIAL

## 2021-06-24 VITALS
OXYGEN SATURATION: 99 % | RESPIRATION RATE: 16 BRPM | SYSTOLIC BLOOD PRESSURE: 129 MMHG | HEART RATE: 79 BPM | DIASTOLIC BLOOD PRESSURE: 68 MMHG | TEMPERATURE: 97 F

## 2021-06-24 DIAGNOSIS — M79.674 TOE PAIN, RIGHT: ICD-10-CM

## 2021-06-24 DIAGNOSIS — M77.8 CAPSULITIS OF RIGHT FOOT: Primary | ICD-10-CM

## 2021-06-24 DIAGNOSIS — M79.671 RIGHT FOOT PAIN: ICD-10-CM

## 2021-06-24 PROCEDURE — 63600175 PHARM REV CODE 636 W HCPCS: Performed by: NURSE ANESTHETIST, CERTIFIED REGISTERED

## 2021-06-24 PROCEDURE — 25000003 PHARM REV CODE 250: Performed by: NURSE ANESTHETIST, CERTIFIED REGISTERED

## 2021-06-24 PROCEDURE — C9290 INJ, BUPIVACAINE LIPOSOME: HCPCS | Performed by: PODIATRIST

## 2021-06-24 PROCEDURE — 37000008 HC ANESTHESIA 1ST 15 MINUTES: Performed by: PODIATRIST

## 2021-06-24 PROCEDURE — 27201423 OPTIME MED/SURG SUP & DEVICES STERILE SUPPLY: Performed by: PODIATRIST

## 2021-06-24 PROCEDURE — 27000673 HC TUBING BLOOD Y: Performed by: ANESTHESIOLOGY

## 2021-06-24 PROCEDURE — 63600175 PHARM REV CODE 636 W HCPCS: Performed by: PODIATRIST

## 2021-06-24 PROCEDURE — 25000003 PHARM REV CODE 250: Performed by: PODIATRIST

## 2021-06-24 PROCEDURE — 71000015 HC POSTOP RECOV 1ST HR: Performed by: PODIATRIST

## 2021-06-24 PROCEDURE — 27202107 HC XP QUATRO SENSOR: Performed by: ANESTHESIOLOGY

## 2021-06-24 PROCEDURE — 37000009 HC ANESTHESIA EA ADD 15 MINS: Performed by: PODIATRIST

## 2021-06-24 PROCEDURE — 36000707: Performed by: PODIATRIST

## 2021-06-24 PROCEDURE — 25000003 PHARM REV CODE 250: Performed by: ANESTHESIOLOGY

## 2021-06-24 PROCEDURE — 28288 PARTIAL REMOVAL OF FOOT BONE: CPT | Mod: T6,,, | Performed by: PODIATRIST

## 2021-06-24 PROCEDURE — 71000033 HC RECOVERY, INTIAL HOUR: Performed by: PODIATRIST

## 2021-06-24 PROCEDURE — 27000080 OPTIME MED/SURG SUP & DEVICES GENERAL CLASSIFICATION: Performed by: PODIATRIST

## 2021-06-24 PROCEDURE — 28288 PR PART REMV BONE METATARSAL HEAD,EA: ICD-10-PCS | Mod: T6,,, | Performed by: PODIATRIST

## 2021-06-24 PROCEDURE — 27100025 HC TUBING, SET FLUID WARMER: Performed by: ANESTHESIOLOGY

## 2021-06-24 PROCEDURE — 36000706: Performed by: PODIATRIST

## 2021-06-24 PROCEDURE — 27000671 HC TUBING MICROBORE EXT: Performed by: ANESTHESIOLOGY

## 2021-06-24 RX ORDER — MIDAZOLAM HYDROCHLORIDE 1 MG/ML
INJECTION INTRAMUSCULAR; INTRAVENOUS
Status: DISCONTINUED | OUTPATIENT
Start: 2021-06-24 | End: 2021-06-24

## 2021-06-24 RX ORDER — SCOLOPAMINE TRANSDERMAL SYSTEM 1 MG/1
1 PATCH, EXTENDED RELEASE TRANSDERMAL
Status: DISCONTINUED | OUTPATIENT
Start: 2021-06-24 | End: 2021-06-24 | Stop reason: HOSPADM

## 2021-06-24 RX ORDER — LIDOCAINE HYDROCHLORIDE 20 MG/ML
JELLY TOPICAL
Status: DISCONTINUED | OUTPATIENT
Start: 2021-06-24 | End: 2021-06-24

## 2021-06-24 RX ORDER — OXYCODONE HYDROCHLORIDE 5 MG/1
5 TABLET ORAL
Status: DISCONTINUED | OUTPATIENT
Start: 2021-06-24 | End: 2021-06-24 | Stop reason: HOSPADM

## 2021-06-24 RX ORDER — DIPHENHYDRAMINE HYDROCHLORIDE 50 MG/ML
12.5 INJECTION INTRAMUSCULAR; INTRAVENOUS
Status: DISCONTINUED | OUTPATIENT
Start: 2021-06-24 | End: 2021-06-24 | Stop reason: HOSPADM

## 2021-06-24 RX ORDER — PHENYLEPHRINE HYDROCHLORIDE 10 MG/ML
INJECTION INTRAVENOUS
Status: DISCONTINUED | OUTPATIENT
Start: 2021-06-24 | End: 2021-06-24

## 2021-06-24 RX ORDER — HYDROCODONE BITARTRATE AND ACETAMINOPHEN 10; 325 MG/1; MG/1
1 TABLET ORAL EVERY 4 HOURS PRN
Status: DISCONTINUED | OUTPATIENT
Start: 2021-06-24 | End: 2021-06-24 | Stop reason: HOSPADM

## 2021-06-24 RX ORDER — GABAPENTIN 100 MG/1
100 CAPSULE ORAL ONCE
Status: COMPLETED | OUTPATIENT
Start: 2021-06-24 | End: 2021-06-24

## 2021-06-24 RX ORDER — HYDROCODONE BITARTRATE AND ACETAMINOPHEN 10; 325 MG/1; MG/1
1 TABLET ORAL EVERY 6 HOURS PRN
Qty: 30 TABLET | Refills: 0 | Status: SHIPPED | OUTPATIENT
Start: 2021-06-24 | End: 2021-10-21

## 2021-06-24 RX ORDER — MEPERIDINE HYDROCHLORIDE 50 MG/ML
12.5 INJECTION INTRAMUSCULAR; INTRAVENOUS; SUBCUTANEOUS EVERY 10 MIN PRN
Status: DISCONTINUED | OUTPATIENT
Start: 2021-06-24 | End: 2021-06-24 | Stop reason: HOSPADM

## 2021-06-24 RX ORDER — ONDANSETRON 4 MG/1
8 TABLET, ORALLY DISINTEGRATING ORAL EVERY 8 HOURS PRN
Qty: 30 TABLET | Refills: 0 | Status: SHIPPED | OUTPATIENT
Start: 2021-06-24 | End: 2022-05-19

## 2021-06-24 RX ORDER — ONDANSETRON 2 MG/ML
4 INJECTION INTRAMUSCULAR; INTRAVENOUS DAILY PRN
Status: DISCONTINUED | OUTPATIENT
Start: 2021-06-24 | End: 2021-06-24 | Stop reason: HOSPADM

## 2021-06-24 RX ORDER — BUPIVACAINE HYDROCHLORIDE 5 MG/ML
INJECTION, SOLUTION EPIDURAL; INTRACAUDAL
Status: DISCONTINUED | OUTPATIENT
Start: 2021-06-24 | End: 2021-06-24 | Stop reason: HOSPADM

## 2021-06-24 RX ORDER — HYDROCODONE BITARTRATE AND ACETAMINOPHEN 5; 325 MG/1; MG/1
1 TABLET ORAL EVERY 4 HOURS PRN
Status: DISCONTINUED | OUTPATIENT
Start: 2021-06-24 | End: 2021-06-24 | Stop reason: HOSPADM

## 2021-06-24 RX ORDER — SUCCINYLCHOLINE CHLORIDE 20 MG/ML
INJECTION INTRAMUSCULAR; INTRAVENOUS
Status: DISCONTINUED | OUTPATIENT
Start: 2021-06-24 | End: 2021-06-24

## 2021-06-24 RX ORDER — HYDROMORPHONE HYDROCHLORIDE 1 MG/ML
0.2 INJECTION, SOLUTION INTRAMUSCULAR; INTRAVENOUS; SUBCUTANEOUS
Status: DISCONTINUED | OUTPATIENT
Start: 2021-06-24 | End: 2021-06-24 | Stop reason: HOSPADM

## 2021-06-24 RX ORDER — FAMOTIDINE 10 MG/ML
INJECTION INTRAVENOUS
Status: DISCONTINUED | OUTPATIENT
Start: 2021-06-24 | End: 2021-06-24

## 2021-06-24 RX ORDER — ONDANSETRON 4 MG/1
4 TABLET, ORALLY DISINTEGRATING ORAL ONCE
Status: DISCONTINUED | OUTPATIENT
Start: 2021-06-24 | End: 2021-06-24 | Stop reason: HOSPADM

## 2021-06-24 RX ORDER — ONDANSETRON 4 MG/1
8 TABLET, ORALLY DISINTEGRATING ORAL EVERY 8 HOURS PRN
Status: DISCONTINUED | OUTPATIENT
Start: 2021-06-24 | End: 2021-06-24 | Stop reason: HOSPADM

## 2021-06-24 RX ORDER — DOXYCYCLINE 100 MG/1
100 CAPSULE ORAL 2 TIMES DAILY
Qty: 14 CAPSULE | Refills: 0 | Status: SHIPPED | OUTPATIENT
Start: 2021-06-24 | End: 2021-07-01

## 2021-06-24 RX ORDER — DEXAMETHASONE SODIUM PHOSPHATE 4 MG/ML
INJECTION, SOLUTION INTRA-ARTICULAR; INTRALESIONAL; INTRAMUSCULAR; INTRAVENOUS; SOFT TISSUE
Status: DISCONTINUED | OUTPATIENT
Start: 2021-06-24 | End: 2021-06-24

## 2021-06-24 RX ORDER — FENTANYL CITRATE 50 UG/ML
INJECTION, SOLUTION INTRAMUSCULAR; INTRAVENOUS
Status: DISCONTINUED | OUTPATIENT
Start: 2021-06-24 | End: 2021-06-24

## 2021-06-24 RX ORDER — LIDOCAINE HYDROCHLORIDE 40 MG/ML
SOLUTION TOPICAL
Status: DISCONTINUED | OUTPATIENT
Start: 2021-06-24 | End: 2021-06-24

## 2021-06-24 RX ORDER — PROPOFOL 10 MG/ML
VIAL (ML) INTRAVENOUS
Status: DISCONTINUED | OUTPATIENT
Start: 2021-06-24 | End: 2021-06-24

## 2021-06-24 RX ORDER — ACETAMINOPHEN 10 MG/ML
INJECTION, SOLUTION INTRAVENOUS
Status: DISCONTINUED | OUTPATIENT
Start: 2021-06-24 | End: 2021-06-24

## 2021-06-24 RX ORDER — LIDOCAINE HYDROCHLORIDE 20 MG/ML
INJECTION, SOLUTION EPIDURAL; INFILTRATION; INTRACAUDAL; PERINEURAL
Status: DISCONTINUED | OUTPATIENT
Start: 2021-06-24 | End: 2021-06-24

## 2021-06-24 RX ORDER — SODIUM CHLORIDE, SODIUM LACTATE, POTASSIUM CHLORIDE, CALCIUM CHLORIDE 600; 310; 30; 20 MG/100ML; MG/100ML; MG/100ML; MG/100ML
INJECTION, SOLUTION INTRAVENOUS CONTINUOUS PRN
Status: DISCONTINUED | OUTPATIENT
Start: 2021-06-24 | End: 2021-06-24

## 2021-06-24 RX ORDER — OXYCODONE HYDROCHLORIDE 5 MG/1
5 TABLET ORAL EVERY 4 HOURS PRN
Status: DISCONTINUED | OUTPATIENT
Start: 2021-06-24 | End: 2021-06-24

## 2021-06-24 RX ORDER — DIPHENHYDRAMINE HYDROCHLORIDE 50 MG/ML
INJECTION INTRAMUSCULAR; INTRAVENOUS
Status: DISCONTINUED | OUTPATIENT
Start: 2021-06-24 | End: 2021-06-24

## 2021-06-24 RX ORDER — ONDANSETRON 2 MG/ML
INJECTION INTRAMUSCULAR; INTRAVENOUS
Status: DISCONTINUED | OUTPATIENT
Start: 2021-06-24 | End: 2021-06-24

## 2021-06-24 RX ORDER — PROMETHAZINE HYDROCHLORIDE 25 MG/1
25 TABLET ORAL EVERY 6 HOURS PRN
Status: DISCONTINUED | OUTPATIENT
Start: 2021-06-24 | End: 2021-06-24 | Stop reason: HOSPADM

## 2021-06-24 RX ADMIN — SODIUM CHLORIDE, SODIUM LACTATE, POTASSIUM CHLORIDE, AND CALCIUM CHLORIDE: .6; .31; .03; .02 INJECTION, SOLUTION INTRAVENOUS at 12:06

## 2021-06-24 RX ADMIN — GABAPENTIN 100 MG: 100 CAPSULE ORAL at 11:06

## 2021-06-24 RX ADMIN — FENTANYL CITRATE 50 MCG: 50 INJECTION INTRAMUSCULAR; INTRAVENOUS at 12:06

## 2021-06-24 RX ADMIN — ACETAMINOPHEN 1000 MG: 10 INJECTION, SOLUTION INTRAVENOUS at 12:06

## 2021-06-24 RX ADMIN — PROPOFOL 120 MG: 10 INJECTION, EMULSION INTRAVENOUS at 12:06

## 2021-06-24 RX ADMIN — PHENYLEPHRINE HYDROCHLORIDE 100 MCG: 10 INJECTION INTRAVENOUS at 12:06

## 2021-06-24 RX ADMIN — SUCCINYLCHOLINE CHLORIDE 140 MG: 20 INJECTION, SOLUTION INTRAMUSCULAR; INTRAVENOUS at 12:06

## 2021-06-24 RX ADMIN — DEXTROSE 2 G: 50 INJECTION, SOLUTION INTRAVENOUS at 12:06

## 2021-06-24 RX ADMIN — GLYCOPYRROLATE 0.2 MG: 0.2 INJECTION, SOLUTION INTRAMUSCULAR; INTRAVITREAL at 12:06

## 2021-06-24 RX ADMIN — MIDAZOLAM HYDROCHLORIDE 2 MG: 1 INJECTION, SOLUTION INTRAMUSCULAR; INTRAVENOUS at 12:06

## 2021-06-24 RX ADMIN — LIDOCAINE HYDROCHLORIDE 100 MG: 20 INJECTION, SOLUTION EPIDURAL; INFILTRATION; INTRACAUDAL; PERINEURAL at 12:06

## 2021-06-24 RX ADMIN — FAMOTIDINE 20 MG: 10 INJECTION, SOLUTION INTRAVENOUS at 12:06

## 2021-06-24 RX ADMIN — LIDOCAINE HYDROCHLORIDE 4 ML: 40 SOLUTION TOPICAL at 12:06

## 2021-06-24 RX ADMIN — DEXAMETHASONE SODIUM PHOSPHATE 8 MG: 4 INJECTION, SOLUTION INTRAMUSCULAR; INTRAVENOUS at 12:06

## 2021-06-24 RX ADMIN — SCOPALAMINE 1 PATCH: 1 PATCH, EXTENDED RELEASE TRANSDERMAL at 10:06

## 2021-06-24 RX ADMIN — LIDOCAINE HYDROCHLORIDE 5 ML: 20 JELLY TOPICAL at 12:06

## 2021-06-24 RX ADMIN — ONDANSETRON 4 MG: 2 INJECTION INTRAMUSCULAR; INTRAVENOUS at 12:06

## 2021-06-24 RX ADMIN — DIPHENHYDRAMINE HYDROCHLORIDE 6.25 MG: 50 INJECTION, SOLUTION INTRAMUSCULAR; INTRAVENOUS at 12:06

## 2021-06-29 ENCOUNTER — OFFICE VISIT (OUTPATIENT)
Dept: PODIATRY | Facility: CLINIC | Age: 61
End: 2021-06-29
Payer: COMMERCIAL

## 2021-06-29 VITALS — HEIGHT: 62 IN | WEIGHT: 139 LBS | OXYGEN SATURATION: 98 % | HEART RATE: 68 BPM | BODY MASS INDEX: 25.58 KG/M2

## 2021-06-29 DIAGNOSIS — M77.8 CAPSULITIS OF RIGHT FOOT: Primary | ICD-10-CM

## 2021-06-29 DIAGNOSIS — M79.674 TOE PAIN, RIGHT: ICD-10-CM

## 2021-06-29 DIAGNOSIS — S99.921S PLANTAR PLATE INJURY, RIGHT, SEQUELA: ICD-10-CM

## 2021-06-29 PROCEDURE — 99499 UNLISTED E&M SERVICE: CPT | Mod: S$GLB,,, | Performed by: PODIATRIST

## 2021-06-29 PROCEDURE — 1126F AMNT PAIN NOTED NONE PRSNT: CPT | Mod: S$GLB,,, | Performed by: PODIATRIST

## 2021-06-29 PROCEDURE — 99499 NO LOS: ICD-10-PCS | Mod: S$GLB,,, | Performed by: PODIATRIST

## 2021-06-29 PROCEDURE — 3008F PR BODY MASS INDEX (BMI) DOCUMENTED: ICD-10-PCS | Mod: CPTII,S$GLB,, | Performed by: PODIATRIST

## 2021-06-29 PROCEDURE — 3008F BODY MASS INDEX DOCD: CPT | Mod: CPTII,S$GLB,, | Performed by: PODIATRIST

## 2021-06-29 PROCEDURE — 1126F PR PAIN SEVERITY QUANTIFIED, NO PAIN PRESENT: ICD-10-PCS | Mod: S$GLB,,, | Performed by: PODIATRIST

## 2021-07-06 ENCOUNTER — OFFICE VISIT (OUTPATIENT)
Dept: PODIATRY | Facility: CLINIC | Age: 61
End: 2021-07-06
Payer: COMMERCIAL

## 2021-07-06 VITALS
HEIGHT: 62 IN | RESPIRATION RATE: 16 BRPM | DIASTOLIC BLOOD PRESSURE: 64 MMHG | OXYGEN SATURATION: 97 % | SYSTOLIC BLOOD PRESSURE: 128 MMHG | WEIGHT: 139 LBS | HEART RATE: 67 BPM | BODY MASS INDEX: 25.58 KG/M2

## 2021-07-06 DIAGNOSIS — S99.921S PLANTAR PLATE INJURY, RIGHT, SEQUELA: ICD-10-CM

## 2021-07-06 DIAGNOSIS — M77.8 CAPSULITIS OF RIGHT FOOT: Primary | ICD-10-CM

## 2021-07-06 DIAGNOSIS — M79.674 TOE PAIN, RIGHT: ICD-10-CM

## 2021-07-06 PROCEDURE — 3008F BODY MASS INDEX DOCD: CPT | Mod: CPTII,S$GLB,, | Performed by: PODIATRIST

## 2021-07-06 PROCEDURE — 3008F PR BODY MASS INDEX (BMI) DOCUMENTED: ICD-10-PCS | Mod: CPTII,S$GLB,, | Performed by: PODIATRIST

## 2021-07-06 PROCEDURE — 99024 POSTOP FOLLOW-UP VISIT: CPT | Mod: S$GLB,,, | Performed by: PODIATRIST

## 2021-07-06 PROCEDURE — 1125F PR PAIN SEVERITY QUANTIFIED, PAIN PRESENT: ICD-10-PCS | Mod: S$GLB,,, | Performed by: PODIATRIST

## 2021-07-06 PROCEDURE — 99024 PR POST-OP FOLLOW-UP VISIT: ICD-10-PCS | Mod: S$GLB,,, | Performed by: PODIATRIST

## 2021-07-06 PROCEDURE — 1125F AMNT PAIN NOTED PAIN PRSNT: CPT | Mod: S$GLB,,, | Performed by: PODIATRIST

## 2021-07-07 ENCOUNTER — TELEPHONE (OUTPATIENT)
Dept: PODIATRY | Facility: CLINIC | Age: 61
End: 2021-07-07

## 2021-08-16 ENCOUNTER — OFFICE VISIT (OUTPATIENT)
Dept: PODIATRY | Facility: CLINIC | Age: 61
End: 2021-08-16
Payer: COMMERCIAL

## 2021-08-16 VITALS
HEIGHT: 62 IN | WEIGHT: 138 LBS | SYSTOLIC BLOOD PRESSURE: 126 MMHG | HEART RATE: 68 BPM | BODY MASS INDEX: 25.4 KG/M2 | DIASTOLIC BLOOD PRESSURE: 60 MMHG | OXYGEN SATURATION: 98 % | RESPIRATION RATE: 16 BRPM

## 2021-08-16 DIAGNOSIS — S99.921S PLANTAR PLATE INJURY, RIGHT, SEQUELA: ICD-10-CM

## 2021-08-16 DIAGNOSIS — M77.8 CAPSULITIS OF RIGHT FOOT: Primary | ICD-10-CM

## 2021-08-16 DIAGNOSIS — M79.674 TOE PAIN, RIGHT: ICD-10-CM

## 2021-08-16 PROCEDURE — 3008F PR BODY MASS INDEX (BMI) DOCUMENTED: ICD-10-PCS | Mod: CPTII,S$GLB,, | Performed by: PODIATRIST

## 2021-08-16 PROCEDURE — 1159F MED LIST DOCD IN RCRD: CPT | Mod: CPTII,S$GLB,, | Performed by: PODIATRIST

## 2021-08-16 PROCEDURE — 3078F PR MOST RECENT DIASTOLIC BLOOD PRESSURE < 80 MM HG: ICD-10-PCS | Mod: CPTII,S$GLB,, | Performed by: PODIATRIST

## 2021-08-16 PROCEDURE — 99024 POSTOP FOLLOW-UP VISIT: CPT | Mod: S$GLB,,, | Performed by: PODIATRIST

## 2021-08-16 PROCEDURE — 3008F BODY MASS INDEX DOCD: CPT | Mod: CPTII,S$GLB,, | Performed by: PODIATRIST

## 2021-08-16 PROCEDURE — 1159F PR MEDICATION LIST DOCUMENTED IN MEDICAL RECORD: ICD-10-PCS | Mod: CPTII,S$GLB,, | Performed by: PODIATRIST

## 2021-08-16 PROCEDURE — 3078F DIAST BP <80 MM HG: CPT | Mod: CPTII,S$GLB,, | Performed by: PODIATRIST

## 2021-08-16 PROCEDURE — 1160F PR REVIEW ALL MEDS BY PRESCRIBER/CLIN PHARMACIST DOCUMENTED: ICD-10-PCS | Mod: CPTII,S$GLB,, | Performed by: PODIATRIST

## 2021-08-16 PROCEDURE — 1160F RVW MEDS BY RX/DR IN RCRD: CPT | Mod: CPTII,S$GLB,, | Performed by: PODIATRIST

## 2021-08-16 PROCEDURE — 3074F SYST BP LT 130 MM HG: CPT | Mod: CPTII,S$GLB,, | Performed by: PODIATRIST

## 2021-08-16 PROCEDURE — 99024 PR POST-OP FOLLOW-UP VISIT: ICD-10-PCS | Mod: S$GLB,,, | Performed by: PODIATRIST

## 2021-08-16 PROCEDURE — 1125F PR PAIN SEVERITY QUANTIFIED, PAIN PRESENT: ICD-10-PCS | Mod: CPTII,S$GLB,, | Performed by: PODIATRIST

## 2021-08-16 PROCEDURE — 3074F PR MOST RECENT SYSTOLIC BLOOD PRESSURE < 130 MM HG: ICD-10-PCS | Mod: CPTII,S$GLB,, | Performed by: PODIATRIST

## 2021-08-16 PROCEDURE — 1125F AMNT PAIN NOTED PAIN PRSNT: CPT | Mod: CPTII,S$GLB,, | Performed by: PODIATRIST

## 2021-08-16 RX ORDER — PANTOPRAZOLE SODIUM 40 MG/1
40 TABLET, DELAYED RELEASE ORAL DAILY
COMMUNITY
Start: 2021-08-13 | End: 2021-10-21 | Stop reason: SDUPTHER

## 2021-09-02 PROBLEM — Z98.890 HISTORY OF ENDOSCOPY: Status: ACTIVE | Noted: 2021-08-26

## 2021-09-17 ENCOUNTER — HOSPITAL ENCOUNTER (OUTPATIENT)
Dept: RADIOLOGY | Facility: CLINIC | Age: 61
Discharge: HOME | End: 2021-09-17
Attending: PODIATRIST
Payer: COMMERCIAL

## 2021-09-17 ENCOUNTER — OFFICE VISIT (OUTPATIENT)
Dept: PODIATRY | Facility: CLINIC | Age: 61
End: 2021-09-17
Payer: COMMERCIAL

## 2021-09-17 VITALS — HEIGHT: 62 IN | BODY MASS INDEX: 25.4 KG/M2 | WEIGHT: 138 LBS

## 2021-09-17 DIAGNOSIS — M79.674 TOE PAIN, RIGHT: ICD-10-CM

## 2021-09-17 DIAGNOSIS — M77.8 CAPSULITIS OF RIGHT FOOT: ICD-10-CM

## 2021-09-17 DIAGNOSIS — S99.921S PLANTAR PLATE INJURY, RIGHT, SEQUELA: Primary | ICD-10-CM

## 2021-09-17 PROCEDURE — 1159F MED LIST DOCD IN RCRD: CPT | Mod: CPTII,S$GLB,, | Performed by: PODIATRIST

## 2021-09-17 PROCEDURE — 73630 XR FOOT COMPLETE 3 VIEW RIGHT: ICD-10-PCS | Mod: RT,S$GLB,, | Performed by: RADIOLOGY

## 2021-09-17 PROCEDURE — 99024 POSTOP FOLLOW-UP VISIT: CPT | Mod: S$GLB,,, | Performed by: PODIATRIST

## 2021-09-17 PROCEDURE — 3008F BODY MASS INDEX DOCD: CPT | Mod: CPTII,S$GLB,, | Performed by: PODIATRIST

## 2021-09-17 PROCEDURE — 3008F PR BODY MASS INDEX (BMI) DOCUMENTED: ICD-10-PCS | Mod: CPTII,S$GLB,, | Performed by: PODIATRIST

## 2021-09-17 PROCEDURE — 1159F PR MEDICATION LIST DOCUMENTED IN MEDICAL RECORD: ICD-10-PCS | Mod: CPTII,S$GLB,, | Performed by: PODIATRIST

## 2021-09-17 PROCEDURE — 1160F RVW MEDS BY RX/DR IN RCRD: CPT | Mod: CPTII,S$GLB,, | Performed by: PODIATRIST

## 2021-09-17 PROCEDURE — 73630 X-RAY EXAM OF FOOT: CPT | Mod: RT,S$GLB,, | Performed by: RADIOLOGY

## 2021-09-17 PROCEDURE — 99024 PR POST-OP FOLLOW-UP VISIT: ICD-10-PCS | Mod: S$GLB,,, | Performed by: PODIATRIST

## 2021-09-17 PROCEDURE — 1160F PR REVIEW ALL MEDS BY PRESCRIBER/CLIN PHARMACIST DOCUMENTED: ICD-10-PCS | Mod: CPTII,S$GLB,, | Performed by: PODIATRIST

## 2021-09-17 RX ORDER — PRAMIPEXOLE DIHYDROCHLORIDE 1 MG/1
TABLET ORAL
COMMUNITY
Start: 2021-09-13 | End: 2021-10-21 | Stop reason: SDUPTHER

## 2021-09-17 RX ORDER — ASPIRIN 325 MG
TABLET ORAL
COMMUNITY
End: 2021-10-21

## 2021-09-17 RX ORDER — LORAZEPAM 1 MG/1
1 TABLET ORAL
COMMUNITY
Start: 2018-10-11 | End: 2021-10-21

## 2021-09-17 RX ORDER — LANOLIN ALCOHOL/MO/W.PET/CERES
400 CREAM (GRAM) TOPICAL
COMMUNITY
Start: 2018-07-27 | End: 2022-05-19

## 2021-09-20 ENCOUNTER — OFFICE VISIT (OUTPATIENT)
Dept: DERMATOLOGY | Facility: CLINIC | Age: 61
End: 2021-09-20
Payer: COMMERCIAL

## 2021-09-20 DIAGNOSIS — D48.5 NEOPLASM OF UNCERTAIN BEHAVIOR OF SKIN: Primary | ICD-10-CM

## 2021-09-20 DIAGNOSIS — L81.4 LENTIGO: ICD-10-CM

## 2021-09-20 DIAGNOSIS — L82.1 SEBORRHEIC KERATOSES: ICD-10-CM

## 2021-09-20 PROCEDURE — 99999 PR PBB SHADOW E&M-EST. PATIENT-LVL III: CPT | Mod: PBBFAC,,, | Performed by: STUDENT IN AN ORGANIZED HEALTH CARE EDUCATION/TRAINING PROGRAM

## 2021-09-20 PROCEDURE — 88305 TISSUE EXAM BY PATHOLOGIST: CPT | Mod: 26,,, | Performed by: PATHOLOGY

## 2021-09-20 PROCEDURE — 88342 IMHCHEM/IMCYTCHM 1ST ANTB: CPT | Performed by: PATHOLOGY

## 2021-09-20 PROCEDURE — 99202 PR OFFICE/OUTPT VISIT, NEW, LEVL II, 15-29 MIN: ICD-10-PCS | Mod: 25,S$GLB,, | Performed by: STUDENT IN AN ORGANIZED HEALTH CARE EDUCATION/TRAINING PROGRAM

## 2021-09-20 PROCEDURE — 1160F PR REVIEW ALL MEDS BY PRESCRIBER/CLIN PHARMACIST DOCUMENTED: ICD-10-PCS | Mod: CPTII,S$GLB,, | Performed by: STUDENT IN AN ORGANIZED HEALTH CARE EDUCATION/TRAINING PROGRAM

## 2021-09-20 PROCEDURE — 11102 TANGNTL BX SKIN SINGLE LES: CPT | Mod: S$GLB,,, | Performed by: STUDENT IN AN ORGANIZED HEALTH CARE EDUCATION/TRAINING PROGRAM

## 2021-09-20 PROCEDURE — 88342 IMHCHEM/IMCYTCHM 1ST ANTB: CPT | Mod: 26,,, | Performed by: PATHOLOGY

## 2021-09-20 PROCEDURE — 99999 PR PBB SHADOW E&M-EST. PATIENT-LVL III: ICD-10-PCS | Mod: PBBFAC,,, | Performed by: STUDENT IN AN ORGANIZED HEALTH CARE EDUCATION/TRAINING PROGRAM

## 2021-09-20 PROCEDURE — 88341 PR IHC OR ICC EACH ADD'L SINGLE ANTIBODY  STAINPR: ICD-10-PCS | Mod: 26,,, | Performed by: PATHOLOGY

## 2021-09-20 PROCEDURE — 1159F PR MEDICATION LIST DOCUMENTED IN MEDICAL RECORD: ICD-10-PCS | Mod: CPTII,S$GLB,, | Performed by: STUDENT IN AN ORGANIZED HEALTH CARE EDUCATION/TRAINING PROGRAM

## 2021-09-20 PROCEDURE — 88305 TISSUE EXAM BY PATHOLOGIST: CPT | Performed by: PATHOLOGY

## 2021-09-20 PROCEDURE — 99202 OFFICE O/P NEW SF 15 MIN: CPT | Mod: 25,S$GLB,, | Performed by: STUDENT IN AN ORGANIZED HEALTH CARE EDUCATION/TRAINING PROGRAM

## 2021-09-20 PROCEDURE — 88342 CHG IMMUNOCYTOCHEMISTRY: ICD-10-PCS | Mod: 26,,, | Performed by: PATHOLOGY

## 2021-09-20 PROCEDURE — 1160F RVW MEDS BY RX/DR IN RCRD: CPT | Mod: CPTII,S$GLB,, | Performed by: STUDENT IN AN ORGANIZED HEALTH CARE EDUCATION/TRAINING PROGRAM

## 2021-09-20 PROCEDURE — 88341 IMHCHEM/IMCYTCHM EA ADD ANTB: CPT | Performed by: PATHOLOGY

## 2021-09-20 PROCEDURE — 1159F MED LIST DOCD IN RCRD: CPT | Mod: CPTII,S$GLB,, | Performed by: STUDENT IN AN ORGANIZED HEALTH CARE EDUCATION/TRAINING PROGRAM

## 2021-09-20 PROCEDURE — 88341 IMHCHEM/IMCYTCHM EA ADD ANTB: CPT | Mod: 26,,, | Performed by: PATHOLOGY

## 2021-09-20 PROCEDURE — 88305 TISSUE EXAM BY PATHOLOGIST: ICD-10-PCS | Mod: 26,,, | Performed by: PATHOLOGY

## 2021-09-20 PROCEDURE — 11102 PR TANGENTIAL BIOPSY, SKIN, SINGLE LESION: ICD-10-PCS | Mod: S$GLB,,, | Performed by: STUDENT IN AN ORGANIZED HEALTH CARE EDUCATION/TRAINING PROGRAM

## 2021-09-22 LAB
FINAL PATHOLOGIC DIAGNOSIS: NORMAL
GROSS: NORMAL
Lab: NORMAL
MICROSCOPIC EXAM: NORMAL

## 2021-09-24 ENCOUNTER — TELEPHONE (OUTPATIENT)
Dept: DERMATOLOGY | Facility: CLINIC | Age: 61
End: 2021-09-24

## 2021-10-07 DIAGNOSIS — R93.2 ABNORMAL FINDINGS ON DIAGNOSTIC IMAGING OF LIVER AND BILIARY TRACT: Primary | ICD-10-CM

## 2021-10-12 ENCOUNTER — HOSPITAL ENCOUNTER (OUTPATIENT)
Dept: RADIOLOGY | Facility: HOSPITAL | Age: 61
Discharge: HOME | End: 2021-10-12
Attending: INTERNAL MEDICINE
Payer: COMMERCIAL

## 2021-10-12 DIAGNOSIS — R93.2 ABNORMAL FINDINGS ON DIAGNOSTIC IMAGING OF LIVER AND BILIARY TRACT: ICD-10-CM

## 2021-10-12 PROCEDURE — 74181 MRI ABDOMEN W/O CONTRAST: CPT | Mod: TC

## 2021-10-12 PROCEDURE — 76376 3D RENDER W/INTRP POSTPROCES: CPT | Mod: TC

## 2021-10-21 ENCOUNTER — OFFICE VISIT (OUTPATIENT)
Dept: FAMILY MEDICINE | Facility: CLINIC | Age: 61
End: 2021-10-21
Payer: COMMERCIAL

## 2021-10-21 VITALS
TEMPERATURE: 99 F | SYSTOLIC BLOOD PRESSURE: 135 MMHG | HEIGHT: 62 IN | BODY MASS INDEX: 22.45 KG/M2 | WEIGHT: 122 LBS | HEART RATE: 81 BPM | DIASTOLIC BLOOD PRESSURE: 78 MMHG

## 2021-10-21 DIAGNOSIS — Z23 NEED FOR INFLUENZA VACCINATION: ICD-10-CM

## 2021-10-21 DIAGNOSIS — K21.00 GASTROESOPHAGEAL REFLUX DISEASE WITH ESOPHAGITIS WITHOUT HEMORRHAGE: ICD-10-CM

## 2021-10-21 DIAGNOSIS — G25.81 RLS (RESTLESS LEGS SYNDROME): Primary | Chronic | ICD-10-CM

## 2021-10-21 DIAGNOSIS — Z12.31 SCREENING MAMMOGRAM FOR BREAST CANCER: ICD-10-CM

## 2021-10-21 DIAGNOSIS — F51.01 PRIMARY INSOMNIA: ICD-10-CM
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
## 2021-10-21 DIAGNOSIS — F32.A MOOD DISORDER OF DEPRESSED TYPE: ICD-10-CM

## 2021-10-21 DIAGNOSIS — K91.89 COMPLICATIONS OF GASTRIC BYPASS SURGERY: ICD-10-CM

## 2021-10-21 DIAGNOSIS — M81.0 OSTEOPOROSIS WITHOUT CURRENT PATHOLOGICAL FRACTURE, UNSPECIFIED OSTEOPOROSIS TYPE: ICD-10-CM

## 2021-10-21 PROCEDURE — 90686 IIV4 VACC NO PRSV 0.5 ML IM: CPT | Mod: S$GLB,,, | Performed by: INTERNAL MEDICINE

## 2021-10-21 PROCEDURE — 3008F BODY MASS INDEX DOCD: CPT | Mod: S$GLB,,, | Performed by: INTERNAL MEDICINE

## 2021-10-21 PROCEDURE — 90471 IMMUNIZATION ADMIN: CPT | Mod: S$GLB,,, | Performed by: INTERNAL MEDICINE

## 2021-10-21 PROCEDURE — 90686 FLU VACCINE (QUAD) GREATER THAN OR EQUAL TO 3YO PRESERVATIVE FREE IM: ICD-10-PCS | Mod: S$GLB,,, | Performed by: INTERNAL MEDICINE

## 2021-10-21 PROCEDURE — 3008F PR BODY MASS INDEX (BMI) DOCUMENTED: ICD-10-PCS | Mod: S$GLB,,, | Performed by: INTERNAL MEDICINE

## 2021-10-21 PROCEDURE — 99214 OFFICE O/P EST MOD 30 MIN: CPT | Mod: 25,S$GLB,, | Performed by: INTERNAL MEDICINE

## 2021-10-21 PROCEDURE — 99214 PR OFFICE/OUTPT VISIT, EST, LEVL IV, 30-39 MIN: ICD-10-PCS | Mod: 25,S$GLB,, | Performed by: INTERNAL MEDICINE

## 2021-10-21 PROCEDURE — 1160F PR REVIEW ALL MEDS BY PRESCRIBER/CLIN PHARMACIST DOCUMENTED: ICD-10-PCS | Mod: S$GLB,,, | Performed by: INTERNAL MEDICINE

## 2021-10-21 PROCEDURE — 1160F RVW MEDS BY RX/DR IN RCRD: CPT | Mod: S$GLB,,, | Performed by: INTERNAL MEDICINE

## 2021-10-21 PROCEDURE — 90471 FLU VACCINE (QUAD) GREATER THAN OR EQUAL TO 3YO PRESERVATIVE FREE IM: ICD-10-PCS | Mod: S$GLB,,, | Performed by: INTERNAL MEDICINE

## 2021-10-21 RX ORDER — PANTOPRAZOLE SODIUM 40 MG/1
40 TABLET, DELAYED RELEASE ORAL DAILY
Qty: 90 TABLET | Refills: 2 | Status: SHIPPED | OUTPATIENT
Start: 2021-10-21 | End: 2022-09-27 | Stop reason: SDUPTHER

## 2021-10-21 RX ORDER — TRAZODONE HYDROCHLORIDE 50 MG/1
50 TABLET ORAL NIGHTLY
Qty: 90 TABLET | Refills: 2 | Status: SHIPPED | OUTPATIENT
Start: 2021-10-21 | End: 2022-08-16

## 2021-10-21 RX ORDER — PRAMIPEXOLE DIHYDROCHLORIDE 1 MG/1
1 TABLET ORAL NIGHTLY
Qty: 90 TABLET | Refills: 1 | Status: SHIPPED | OUTPATIENT
Start: 2021-10-21 | End: 2022-02-21 | Stop reason: SDUPTHER

## 2021-10-21 RX ORDER — FLUOXETINE HYDROCHLORIDE 20 MG/1
20 CAPSULE ORAL NIGHTLY
Qty: 90 CAPSULE | Refills: 2 | Status: SHIPPED | OUTPATIENT
Start: 2021-10-21 | End: 2022-07-13

## 2021-11-02 ENCOUNTER — HOSPITAL ENCOUNTER (OUTPATIENT)
Dept: RADIOLOGY | Facility: HOSPITAL | Age: 61
Discharge: HOME | End: 2021-11-02
Attending: INTERNAL MEDICINE
Payer: COMMERCIAL

## 2021-11-02 VITALS — WEIGHT: 121.94 LBS | BODY MASS INDEX: 22.44 KG/M2 | HEIGHT: 62 IN

## 2021-11-02 DIAGNOSIS — Z12.31 SCREENING MAMMOGRAM FOR BREAST CANCER: ICD-10-CM

## 2021-11-02 PROCEDURE — 77067 SCR MAMMO BI INCL CAD: CPT | Mod: TC,PO

## 2021-12-13 ENCOUNTER — HOSPITAL ENCOUNTER (OUTPATIENT)
Dept: RADIOLOGY | Facility: CLINIC | Age: 61
Discharge: HOME | End: 2021-12-13
Attending: PODIATRIST
Payer: COMMERCIAL

## 2021-12-13 ENCOUNTER — OFFICE VISIT (OUTPATIENT)
Dept: PODIATRY | Facility: CLINIC | Age: 61
End: 2021-12-13
Payer: COMMERCIAL

## 2021-12-13 VITALS — WEIGHT: 121 LBS | BODY MASS INDEX: 22.26 KG/M2 | HEIGHT: 62 IN

## 2021-12-13 DIAGNOSIS — M79.674 TOE PAIN, RIGHT: ICD-10-CM

## 2021-12-13 DIAGNOSIS — M79.671 RIGHT FOOT PAIN: ICD-10-CM

## 2021-12-13 DIAGNOSIS — L90.5 SCAR TISSUE: ICD-10-CM

## 2021-12-13 DIAGNOSIS — L85.1 ACQUIRED KERATODERMA: ICD-10-CM

## 2021-12-13 DIAGNOSIS — M77.8 CAPSULITIS OF RIGHT FOOT: Primary | ICD-10-CM

## 2021-12-13 PROCEDURE — 73630 XR FOOT COMPLETE 3 VIEW RIGHT: ICD-10-PCS | Mod: RT,S$GLB,, | Performed by: RADIOLOGY

## 2021-12-13 PROCEDURE — 99214 OFFICE O/P EST MOD 30 MIN: CPT | Mod: S$GLB,,, | Performed by: PODIATRIST

## 2021-12-13 PROCEDURE — 73630 X-RAY EXAM OF FOOT: CPT | Mod: RT,S$GLB,, | Performed by: RADIOLOGY

## 2021-12-13 PROCEDURE — 99214 PR OFFICE/OUTPT VISIT, EST, LEVL IV, 30-39 MIN: ICD-10-PCS | Mod: S$GLB,,, | Performed by: PODIATRIST

## 2022-02-21 ENCOUNTER — OFFICE VISIT (OUTPATIENT)
Dept: FAMILY MEDICINE | Facility: CLINIC | Age: 62
End: 2022-02-21
Payer: COMMERCIAL

## 2022-02-21 VITALS
WEIGHT: 123 LBS | HEIGHT: 62 IN | BODY MASS INDEX: 22.63 KG/M2 | SYSTOLIC BLOOD PRESSURE: 127 MMHG | DIASTOLIC BLOOD PRESSURE: 74 MMHG | HEART RATE: 80 BPM

## 2022-02-21 DIAGNOSIS — E55.9 VITAMIN D DEFICIENCY: ICD-10-CM

## 2022-02-21 DIAGNOSIS — M79.651 PAIN IN RIGHT THIGH: Primary | ICD-10-CM

## 2022-02-21 DIAGNOSIS — R25.2 LEG CRAMPS: ICD-10-CM

## 2022-02-21 DIAGNOSIS — F32.A MOOD DISORDER OF DEPRESSED TYPE: ICD-10-CM

## 2022-02-21 DIAGNOSIS — F51.01 PRIMARY INSOMNIA: ICD-10-CM

## 2022-02-21 DIAGNOSIS — K21.00 GASTROESOPHAGEAL REFLUX DISEASE WITH ESOPHAGITIS WITHOUT HEMORRHAGE: ICD-10-CM

## 2022-02-21 DIAGNOSIS — G25.81 RLS (RESTLESS LEGS SYNDROME): Chronic | ICD-10-CM

## 2022-02-21 DIAGNOSIS — E53.9 B-COMPLEX DEFICIENCY: ICD-10-CM

## 2022-02-21 DIAGNOSIS — G25.81 RESTLESS LEG SYNDROME: ICD-10-CM

## 2022-02-21 DIAGNOSIS — Z13.220 SCREENING FOR LIPID DISORDERS: ICD-10-CM

## 2022-02-21 PROCEDURE — 3008F PR BODY MASS INDEX (BMI) DOCUMENTED: ICD-10-PCS | Mod: S$GLB,,, | Performed by: INTERNAL MEDICINE

## 2022-02-21 PROCEDURE — 1160F PR REVIEW ALL MEDS BY PRESCRIBER/CLIN PHARMACIST DOCUMENTED: ICD-10-PCS | Mod: S$GLB,,, | Performed by: INTERNAL MEDICINE

## 2022-02-21 PROCEDURE — 3008F BODY MASS INDEX DOCD: CPT | Mod: S$GLB,,, | Performed by: INTERNAL MEDICINE

## 2022-02-21 PROCEDURE — 99214 PR OFFICE/OUTPT VISIT, EST, LEVL IV, 30-39 MIN: ICD-10-PCS | Mod: S$GLB,,, | Performed by: INTERNAL MEDICINE

## 2022-02-21 PROCEDURE — 3078F DIAST BP <80 MM HG: CPT | Mod: S$GLB,,, | Performed by: INTERNAL MEDICINE

## 2022-02-21 PROCEDURE — 3074F SYST BP LT 130 MM HG: CPT | Mod: S$GLB,,, | Performed by: INTERNAL MEDICINE

## 2022-02-21 PROCEDURE — 99214 OFFICE O/P EST MOD 30 MIN: CPT | Mod: S$GLB,,, | Performed by: INTERNAL MEDICINE

## 2022-02-21 PROCEDURE — 3078F PR MOST RECENT DIASTOLIC BLOOD PRESSURE < 80 MM HG: ICD-10-PCS | Mod: S$GLB,,, | Performed by: INTERNAL MEDICINE

## 2022-02-21 PROCEDURE — 3074F PR MOST RECENT SYSTOLIC BLOOD PRESSURE < 130 MM HG: ICD-10-PCS | Mod: S$GLB,,, | Performed by: INTERNAL MEDICINE

## 2022-02-21 PROCEDURE — 1160F RVW MEDS BY RX/DR IN RCRD: CPT | Mod: S$GLB,,, | Performed by: INTERNAL MEDICINE

## 2022-02-21 RX ORDER — PRAMIPEXOLE DIHYDROCHLORIDE 1 MG/1
1.5 TABLET ORAL NIGHTLY
Qty: 135 TABLET | Refills: 1 | Status: SHIPPED | OUTPATIENT
Start: 2022-02-21 | End: 2022-08-05

## 2022-02-21 NOTE — PROGRESS NOTES
Subjective:       Patient ID: Mary Mike is a 61 y.o. female.    Chief Complaint: Depression, restless leg , Gastroesophageal Reflux, and Insomnia    Miss Preston comes back for follow-up.    Her abdominal pain and cramps are better after she had the stretching done by Dr.Edward Donnellyive    She continues to have a unusual type of cramps in the right posterior thigh.  She would be taking Mirapex which did give her some relief in past but off late it seems to be getting worse especially the restless leg.    She takes trazodone 50 mg at nighttime to help her sleep and fluoxetine 20 mg to help her with mood disorder.  She is also taking magnesium supplements.    As far as depression has been concerned, she has been on fluoxetine for quite long.  Approximately 10 or more than that years back she had moved to [place] and her daughter was here at [facility].  She was somewhat anxious about that and since then she had been on Prozac.  There has been no attempt to wean this medication off thus far.    She believes that her social situation is more stable at this point and there is not much to cause her feeling of depression, remorseful this or anxiety.    She continues with trazodone as mentioned above for sleep and it seems to be working okay.    Thus far she has not been diagnosed with conditions like sleep apnea.  She and her  sleep together and trazodone helps her sleep.  She believes that trazodone also helps her protect from her  snoring.  If she snores or not is unclear but probably her  might have stated once that she also snores and I have asked patient to check with her  how bad her snoring is.    She is taking the magnesium supplements as prescribed previously.    Bariatric surgery status also has been noted which was done by Dr. Lucius Joseph several years back.  March 2021.     1st bariatricsurgery - lost weight 55 lb.  Currently she takes some vitamin  supplements.    Depression  Visit Type: follow-up  Patient presents with the following symptoms: anhedonia, depressed mood and nervousness/anxiety.  Patient is not experiencing: confusion, palpitations and shortness of breath.  Severity: moderate     Gastroesophageal Reflux  She complains of dysphagia. She reports no abdominal pain, no chest pain, no coughing or no nausea. Associated symptoms include fatigue.   Leg Pain   There was no injury mechanism. The pain is present in the right thigh. The quality of the pain is described as cramping and shooting. The pain is moderate. The pain has been fluctuating since onset. Pertinent negatives include no inability to bear weight, loss of motion, loss of sensation, muscle weakness, numbness or tingling.       Past Medical History:   Diagnosis Date    ADHD (attention deficit hyperactivity disorder)     Allergy     Anemia     Anxiety     Depression     Fatty liver     History of endoscopy 8/26/2021    Dr. Toney Cuevas-Z-line is irregular and was found 37 cm from the incisors.  Scattered ulcerations noted between 36-37 cm. 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  The gastrojejunal anastomosis was characterized by moderate stenosis and ulceration.  This was traversed.  The pouch to jejunum limb was characterized by erythema, friable mucosa, inflammation and ulceration.  The examine    Insomnia     Migraines     Osteoporosis     Restless leg syndrome 2005     Social History     Socioeconomic History    Marital status:    Tobacco Use    Smoking status: Never Smoker    Smokeless tobacco: Never Used   Substance and Sexual Activity    Alcohol use: Yes     Comment: occassional    Drug use: No    Sexual activity: Yes     Social Determinants of Health     Financial Resource Strain: Low Risk     Difficulty of Paying Living Expenses: Not very hard   Food Insecurity: No Food Insecurity    Worried About Running Out of Food in the Last Year: Never  true    Ran Out of Food in the Last Year: Never true   Transportation Needs: No Transportation Needs    Lack of Transportation (Medical): No    Lack of Transportation (Non-Medical): No   Physical Activity: Sufficiently Active    Days of Exercise per Week: 7 days    Minutes of Exercise per Session: 30 min   Stress: Stress Concern Present    Feeling of Stress : To some extent   Social Connections: Moderately Integrated    Frequency of Communication with Friends and Family: More than three times a week    Frequency of Social Gatherings with Friends and Family: More than three times a week    Attends Christianity Services: More than 4 times per year    Active Member of Clubs or Organizations: No    Attends Club or Organization Meetings: Never    Marital Status:    Housing Stability: Low Risk     Unable to Pay for Housing in the Last Year: No    Number of Places Lived in the Last Year: 1    Unstable Housing in the Last Year: No     Past Surgical History:   Procedure Laterality Date    BREAST BIOPSY Left     benign 15 +years ago    BREAST SURGERY  2017    breast reduction    BUNIONECTOMY Right 2018     SECTION  1987    FRACTURE SURGERY  2016    ankle    GASTRIC RESTRICTION SURGERY      GASTRIC SLEEVE 2012    REPAIR OF EXTENSOR TENDON  2021    Procedure: REPAIR, TENDON, EXTENSOR;  Surgeon: Aly Zimmerman DPM;  Location: [facility] OR;  Service: Podiatry;;    SURGICAL REMOVAL OF METATARSAL HEAD Right 2021    Procedure: OSTECTOMY, METATARSAL BONE, HEAD;  Surgeon: Aly Zimmerman DPM;  Location: [facility] OR;  Service: Podiatry;  Laterality: Right;    TOTAL REDUCTION MAMMOPLASTY           Family History   Problem Relation Age of Onset    Arthritis Mother     Colon cancer Mother     Diabetes Mother     Early death Mother     Miscarriages / Stillbirths Mother     Arthritis Father     Heart disease Father     Hypertension Father     Stroke Father     Vision loss Father      Vaginal cancer Paternal Grandmother     Heart disease Paternal Grandfather     Breast cancer Maternal Aunt     Breast cancer Maternal Aunt        Review of Systems   Constitutional: Positive for fatigue. Negative for activity change, appetite change, chills, fever and unexpected weight change (Expected weight loss of 50-60 lb after bariatric surgery.).        Expected weight loss after revision gastric surgery and bypass surgery.   HENT: Negative for congestion, postnasal drip, sinus pressure and trouble swallowing (Swallowing has got better after esophageal dilation.).    Eyes: Negative for pain, discharge, redness and visual disturbance.   Respiratory: Negative for cough, chest tightness and shortness of breath.    Cardiovascular: Negative for chest pain, palpitations and leg swelling.   Gastrointestinal: Positive for dysphagia. Negative for abdominal distention, abdominal pain, anal bleeding, constipation, diarrhea and nausea.        Recent history of revision gastric surgery followed by gastric bypass with nose tangible of significant improvement in her symptoms of feeling nauseated.  Probably some esophageal stenosis and difficulty swallowing also.   Endocrine: Negative for cold intolerance, heat intolerance, polydipsia, polyphagia and polyuria.   Genitourinary: Negative for difficulty urinating, dysuria, frequency and pelvic pain.        History of ovarian cyst removal.  Side of removal is unclear.   Musculoskeletal: Negative for arthralgias, back pain and joint swelling.        Her back pain seem to have got better after the bariatric surgery but it seems to have recurred at this point.    She complains of a cramps in the right thigh posteriorly.   Skin: Negative for color change, pallor and rash.        Hair loss.  Possibly secondary to significant weight loss and the shock that the body has gone through.   Allergic/Immunologic: Negative for environmental allergies, food allergies and immunocompromised  "state.   Neurological: Positive for dizziness. Negative for tingling, tremors, seizures, syncope, light-headedness, numbness and headaches.        Restless leg   Hematological: Negative for adenopathy. Does not bruise/bleed easily.   Psychiatric/Behavioral: Positive for depression. Negative for agitation, confusion and dysphoric mood. The patient is nervous/anxious.          Objective:      Blood pressure 127/74, pulse 80, height 5' 2" (1.575 m), weight 55.8 kg (123 lb). Body mass index is 22.5 kg/m².  Physical Exam  Vitals and nursing note reviewed. Exam conducted with a chaperone present (Ernestine RODRIGUEZ).   Constitutional:       General: She is not in acute distress.     Appearance: Normal appearance. She is well-developed.   HENT:      Head: Normocephalic and atraumatic.   Neck:      Trachea: Trachea normal.   Cardiovascular:      Rate and Rhythm: Normal rate and regular rhythm.      Pulses: Normal pulses.      Heart sounds: Normal heart sounds, S1 normal and S2 normal.   Pulmonary:      Effort: Pulmonary effort is normal.      Breath sounds: Normal breath sounds.   Abdominal:      General: Abdomen is flat. Bowel sounds are normal.      Palpations: Abdomen is soft. Abdomen is not rigid.      Tenderness: There is no abdominal tenderness. There is no guarding.          Comments: Abdominal pulsation is prominent.   Musculoskeletal:         General: Normal range of motion.      Cervical back: Neck supple. No muscular tenderness.        Legs:    Lymphadenopathy:      Cervical: No cervical adenopathy.   Skin:     General: Skin is warm and dry.   Neurological:      General: No focal deficit present.      Mental Status: She is alert and oriented to person, place, and time.   Psychiatric:         Mood and Affect: Mood normal.         Behavior: Behavior normal. Behavior is cooperative.           Assessment:       1. Pain in right thigh    2. Restless leg syndrome    3. Primary insomnia    4. Mood disorder of depressed type  " "  5. Gastroesophageal reflux disease with esophagitis without hemorrhage    6. RLS (restless legs syndrome)    7. Screening for lipid disorders    8. Vitamin D deficiency    9. B-complex deficiency    10. Leg cramps           No visits with results within 3 Month(s) from this visit.   Latest known visit with results is:   Office Visit on 09/20/2021   Component Date Value Ref Range Status    Final Pathologic Diagnosis 09/20/2021    Final                    Value:Skin, left nasal dorsum, shave biopsy:  -MILD ACTINIC KERATOSIS ARISING IN A BACKGROUND OF A THIN AND PIGMENTED  SEBORRHEIC KERATOSIS  This lesion is atypical and further treatment may be required. You will be  contacted by your provider's office.      Gross 09/20/2021    Final                    Value:Container Label: Clinic Number/AP Number:  [id number], and "left nasal dorsum"  Received in formalin is a 6 x 5 x 1 mm shave biopsy fragment tan skin.  Specimen is inked, bisected, and entirely submitted in [id number] Castro      Microscopic Exam 09/20/2021    Final                    Value:Sections show minimal acanthosis at the same level as the basal layer of  keratocytes. Some of these basal keratinocytes show mild cytologic atypia.  Ki-67 proliferation index is somewhat increased but overall confined to the  basal layer of the epidermis.  Some areas contain mildly enlarged, pigmented  keratocytes at the basal layer.  Sparse melanophages are noted in the  superficial dermis. Mart-1 highlights periodically spaced population of  single melanocytes at the dermoepidermal junction.  Immunohistochemical  stains were reviewed in conjunction with adequate positive controls.      Disclaimer 09/20/2021    Final                    Value:Unless the case is a 'gross only' or additional testing only, the final  diagnosis for each specimen is based on a microscopic examination of  appropriate tissue sections.           Plan:           Pain in right " thigh    Restless leg syndrome  -     pramipexole (MIRAPEX) 1 MG tablet; Take 1.5 tablets (1.5 mg total) by mouth every evening.  Dispense: 135 tablet; Refill: 1  -     CBC Auto Differential; Future; Expected date: 02/22/2022  -     Comprehensive Metabolic Panel; Future; Expected date: 02/22/2022  -     Cancel: Magnesium; Future; Expected date: 02/22/2022  -     Magnesium; Future; Expected date: 02/22/2022    Primary insomnia    Mood disorder of depressed type    Gastroesophageal reflux disease with esophagitis without hemorrhage    RLS (restless legs syndrome)    Screening for lipid disorders  -     Lipid Panel; Future; Expected date: 02/22/2022    Vitamin D deficiency  -     Vitamin D; Future; Expected date: 02/22/2022    B-complex deficiency  -     Vitamin B12; Future; Expected date: 02/22/2022  -     Vitamin B6; Future; Expected date: 02/22/2022    Leg cramps  -     Cancel: Magnesium; Future; Expected date: 02/22/2022  -     Magnesium; Future; Expected date: 02/22/2022    Patient's medical issues have been reviewed.      While her issues of stress and anxiety have been reviewed, it seems she has reached stable station of her life as far as stressors are concerned.  She is willing to try to wean off the Prozac or fluoxetine.  I recommend her the following plan    1.-take fluoxetine 20 mg on alternate days or 3 times a week-Monday, Wednesday and Friday for 1 month.    2.-take fluoxetine 20 mg on Monday and Thursday for the 2nd month.    3.-take fluoxetine 20 mg every Sunday for the 3rd month and then quit.    Has this unusual pain in the right posterior thigh.  I could not feel anything at this point.  SLR is negative.  MARILIN test is negative.  She believes that her restless leg is acting up.  I do believe this is a referred pain from her lumbar spine which continues to bother her.  This will be need to be addressed if knots sooner perhaps later.  I will increase her Mirapex to 1.5 mg at nighttime.  New  prescription has been sent to pharmacy.    Labs have been ordered and given her bariatric surgery status will be sure that she is not deficit in vitamin-D and any B complex vitamins periods    I have advised her to take a multi vitamin-B complex and also a vitamin-D 3 2000 IU per day.  Do some stretch exercises at home.    Her  needs to watch if she snores at night and how deeply so.    She will be notified about her labs.    If she has any feelings of emotional breakdown after tapering of the Prozac, she can go back on her original dosing.    If there is anything abnormal on the labs that merits a follow-up earlier, I will call her.    Her leg pain does not get better after increasing the Mirapex to 1.5 mg, she will come back earlier if not 3 months.    She did also ask for a cardiology evaluation.  At this point I will wait and watch and address or other issues.  She has no chest pain or shortness of breath.  Her exercise tolerance is good.  We do not even have a cholesterol level at this point which I will check.    Follow-up in 3 months.  Continue with COVID precautions.    Spent megan 30 minutes with patient which involved review of pts medical conditions, labs, medications and with 50% of time face-to-face discussion about medical problems, management and any applicable changes.          Current Outpatient Medications:     FLUoxetine 20 MG capsule, Take 1 capsule (20 mg total) by mouth every evening., Disp: 90 capsule, Rfl: 2    magnesium oxide (MAG-OX) 400 mg (241.3 mg magnesium) tablet, 400 mg., Disp: , Rfl:     multivitamin (THERAGRAN) tablet, Take 1 tablet by mouth once daily., Disp: , Rfl:     ondansetron (ZOFRAN-ODT) 4 MG TbDL, Take 2 tablets (8 mg total) by mouth every 8 (eight) hours as needed (nausea)., Disp: 30 tablet, Rfl: 0    pantoprazole (PROTONIX) 40 MG tablet, Take 1 tablet (40 mg total) by mouth once daily., Disp: 90 tablet, Rfl: 2    traZODone (DESYREL) 50 MG tablet, Take 1 tablet  (50 mg total) by mouth every evening., Disp: 90 tablet, Rfl: 2    pramipexole (MIRAPEX) 1 MG tablet, Take 1.5 tablets (1.5 mg total) by mouth every evening., Disp: 135 tablet, Rfl: 1

## 2022-02-23 ENCOUNTER — PATIENT MESSAGE (OUTPATIENT)
Dept: FAMILY MEDICINE | Facility: CLINIC | Age: 62
End: 2022-02-23
Payer: COMMERCIAL

## 2022-02-23 DIAGNOSIS — D64.9 ANEMIA, UNSPECIFIED TYPE: Primary | ICD-10-CM

## 2022-02-23 RX ORDER — IRON,CARB/VIT C/VIT B12/FOLIC 100-250-1
1 TABLET ORAL DAILY
Qty: 30 TABLET | Refills: 2 | Status: SHIPPED | OUTPATIENT
Start: 2022-02-23 | End: 2022-10-25 | Stop reason: SDUPTHER

## 2022-02-23 NOTE — TELEPHONE ENCOUNTER
Anemia, unspecified type  -     iron-vit c-b12-folic acid (ICAR-C PLUS) Tab; Take 1 tablet by mouth once daily.  Dispense: 30 tablet; Refill: 2    Discussed with patient.  Mild to moderate anemia with hemoglobin of 9.9.  This may be responsible for her restless leg symptoms.  Will give trial of iron supplement and see how she does.  Likely to not tolerated given her gastric bypass surgery.  In that case will consider iron infusion or injectefar.

## 2022-02-24 LAB
25(OH)D3 SERPL-MCNC: 23 NG/ML (ref 30–100)
ALBUMIN SERPL-MCNC: 3.9 G/DL (ref 3.6–5.1)
ALBUMIN/GLOB SERPL: 1.4 (CALC) (ref 1–2.5)
ALP SERPL-CCNC: 78 U/L (ref 37–153)
ALT SERPL-CCNC: 14 U/L (ref 6–29)
AST SERPL-CCNC: 18 U/L (ref 10–35)
BASOPHILS # BLD AUTO: 32 CELLS/UL (ref 0–200)
BASOPHILS NFR BLD AUTO: 0.8 %
BILIRUB SERPL-MCNC: 0.5 MG/DL (ref 0.2–1.2)
BUN SERPL-MCNC: 6 MG/DL (ref 7–25)
BUN/CREAT SERPL: 9 (CALC) (ref 6–22)
CALCIUM SERPL-MCNC: 9.1 MG/DL (ref 8.6–10.4)
CHLORIDE SERPL-SCNC: 106 MMOL/L (ref 98–110)
CHOLEST SERPL-MCNC: 174 MG/DL
CHOLEST/HDLC SERPL: 2.3 (CALC)
CO2 SERPL-SCNC: 30 MMOL/L (ref 20–32)
CREAT SERPL-MCNC: 0.68 MG/DL (ref 0.5–1.05)
EOSINOPHIL # BLD AUTO: 192 CELLS/UL (ref 15–500)
EOSINOPHIL NFR BLD AUTO: 4.8 %
ERYTHROCYTE [DISTWIDTH] IN BLOOD BY AUTOMATED COUNT: 14.4 % (ref 11–15)
GLOBULIN SER CALC-MCNC: 2.8 G/DL (CALC) (ref 1.9–3.7)
GLUCOSE SERPL-MCNC: 86 MG/DL (ref 65–99)
HCT VFR BLD AUTO: 33.4 % (ref 35–45)
HDLC SERPL-MCNC: 76 MG/DL
HGB BLD-MCNC: 9.9 G/DL (ref 11.7–15.5)
LDLC SERPL CALC-MCNC: 82 MG/DL (CALC)
LYMPHOCYTES # BLD AUTO: 1140 CELLS/UL (ref 850–3900)
LYMPHOCYTES NFR BLD AUTO: 28.5 %
MAGNESIUM SERPL-MCNC: 2.2 MG/DL (ref 1.5–2.5)
MCH RBC QN AUTO: 24.1 PG (ref 27–33)
MCHC RBC AUTO-ENTMCNC: 29.6 G/DL (ref 32–36)
MCV RBC AUTO: 81.5 FL (ref 80–100)
MONOCYTES # BLD AUTO: 404 CELLS/UL (ref 200–950)
MONOCYTES NFR BLD AUTO: 10.1 %
NEUTROPHILS # BLD AUTO: 2232 CELLS/UL (ref 1500–7800)
NEUTROPHILS NFR BLD AUTO: 55.8 %
NONHDLC SERPL-MCNC: 98 MG/DL (CALC)
PLATELET # BLD AUTO: 251 THOUSAND/UL (ref 140–400)
PMV BLD REES-ECKER: 11.7 FL (ref 7.5–12.5)
POTASSIUM SERPL-SCNC: 4.1 MMOL/L (ref 3.5–5.3)
PROT SERPL-MCNC: 6.7 G/DL (ref 6.1–8.1)
RBC # BLD AUTO: 4.1 MILLION/UL (ref 3.8–5.1)
SODIUM SERPL-SCNC: 141 MMOL/L (ref 135–146)
TRIGL SERPL-MCNC: 79 MG/DL
VIT B12 SERPL-MCNC: 223 PG/ML (ref 200–1100)
VIT B6 SERPL-MCNC: 6.5 NG/ML (ref 2.1–21.7)
WBC # BLD AUTO: 4 THOUSAND/UL (ref 3.8–10.8)

## 2022-05-19 ENCOUNTER — OFFICE VISIT (OUTPATIENT)
Dept: FAMILY MEDICINE | Facility: CLINIC | Age: 62
End: 2022-05-19
Payer: COMMERCIAL

## 2022-05-19 ENCOUNTER — PATIENT MESSAGE (OUTPATIENT)
Dept: FAMILY MEDICINE | Facility: CLINIC | Age: 62
End: 2022-05-19

## 2022-05-19 VITALS
BODY MASS INDEX: 23 KG/M2 | SYSTOLIC BLOOD PRESSURE: 132 MMHG | HEART RATE: 63 BPM | WEIGHT: 125 LBS | HEIGHT: 62 IN | DIASTOLIC BLOOD PRESSURE: 71 MMHG

## 2022-05-19 DIAGNOSIS — R39.9 LOWER URINARY TRACT SYMPTOMS: ICD-10-CM

## 2022-05-19 DIAGNOSIS — L27.1 FIXED DRUG ERUPTION: ICD-10-CM

## 2022-05-19 DIAGNOSIS — R21 SKIN RASH: Primary | Chronic | ICD-10-CM

## 2022-05-19 DIAGNOSIS — Z11.4 SCREENING FOR HUMAN IMMUNODEFICIENCY VIRUS: ICD-10-CM

## 2022-05-19 LAB
BILIRUB UR QL STRIP: NEGATIVE
CLARITY UR: CLEAR
COLOR UR: YELLOW
GLUCOSE UR QL STRIP: NEGATIVE
KETONES UR QL STRIP: NEGATIVE
LEUKOCYTE ESTERASE UR QL STRIP: NEGATIVE
PH, POC UA: 5.5 (ref 5–8.5)
POC BLOOD, URINE: NEGATIVE
POC NITRATES, URINE: NEGATIVE
PROT UR QL STRIP: NEGATIVE
SP GR UR STRIP: 1.02 (ref 1–1.03)
UROBILINOGEN UR STRIP-ACNC: NORMAL (ref 0.2–8)

## 2022-05-19 PROCEDURE — 99214 PR OFFICE/OUTPT VISIT, EST, LEVL IV, 30-39 MIN: ICD-10-PCS | Mod: S$GLB,,, | Performed by: INTERNAL MEDICINE

## 2022-05-19 PROCEDURE — 81003 URINALYSIS AUTO W/O SCOPE: CPT | Mod: QW,S$GLB,, | Performed by: INTERNAL MEDICINE

## 2022-05-19 PROCEDURE — 3078F PR MOST RECENT DIASTOLIC BLOOD PRESSURE < 80 MM HG: ICD-10-PCS | Mod: CPTII,S$GLB,, | Performed by: INTERNAL MEDICINE

## 2022-05-19 PROCEDURE — 1159F PR MEDICATION LIST DOCUMENTED IN MEDICAL RECORD: ICD-10-PCS | Mod: CPTII,S$GLB,, | Performed by: INTERNAL MEDICINE

## 2022-05-19 PROCEDURE — 3008F PR BODY MASS INDEX (BMI) DOCUMENTED: ICD-10-PCS | Mod: CPTII,S$GLB,, | Performed by: INTERNAL MEDICINE

## 2022-05-19 PROCEDURE — 1159F MED LIST DOCD IN RCRD: CPT | Mod: CPTII,S$GLB,, | Performed by: INTERNAL MEDICINE

## 2022-05-19 PROCEDURE — 81003 POCT URINALYSIS, DIPSTICK, AUTOMATED, W/O SCOPE: ICD-10-PCS | Mod: QW,S$GLB,, | Performed by: INTERNAL MEDICINE

## 2022-05-19 PROCEDURE — 1160F RVW MEDS BY RX/DR IN RCRD: CPT | Mod: CPTII,S$GLB,, | Performed by: INTERNAL MEDICINE

## 2022-05-19 PROCEDURE — 3075F SYST BP GE 130 - 139MM HG: CPT | Mod: CPTII,S$GLB,, | Performed by: INTERNAL MEDICINE

## 2022-05-19 PROCEDURE — 3078F DIAST BP <80 MM HG: CPT | Mod: CPTII,S$GLB,, | Performed by: INTERNAL MEDICINE

## 2022-05-19 PROCEDURE — 3008F BODY MASS INDEX DOCD: CPT | Mod: CPTII,S$GLB,, | Performed by: INTERNAL MEDICINE

## 2022-05-19 PROCEDURE — 1160F PR REVIEW ALL MEDS BY PRESCRIBER/CLIN PHARMACIST DOCUMENTED: ICD-10-PCS | Mod: CPTII,S$GLB,, | Performed by: INTERNAL MEDICINE

## 2022-05-19 PROCEDURE — 3075F PR MOST RECENT SYSTOLIC BLOOD PRESS GE 130-139MM HG: ICD-10-PCS | Mod: CPTII,S$GLB,, | Performed by: INTERNAL MEDICINE

## 2022-05-19 PROCEDURE — 99214 OFFICE O/P EST MOD 30 MIN: CPT | Mod: S$GLB,,, | Performed by: INTERNAL MEDICINE

## 2022-05-19 NOTE — PROGRESS NOTES
Subjective:       Patient ID: Mary Mike is a 61 y.o. female.    Chief Complaint: Urinary Tract Infection, Rash (On both shoulders  face legs ), Hair Loss, and Fatigue    Kary is a 61-year-old  female who comes for same-day evaluation 1 week before her regular appointment next week.    She has the following issues today:-    1.-frequency and urinary symptoms.  She believes that she might have urinary tract infection.  She has taken some over-the-counter azo.    2.-She has this unusual rash which she has broken out into her shoulders and other parts of body.  She recalls that perhaps several several years back she might have had similar type of rash but to a lesser extent.  This rash started about 6-7 days back last Friday.  It started suddenly.  She does not recall any insect bites or and bites.    She had travel to [place] last weekend but this rash started before her travel to [place].    Urinary Tract Infection   This is a new problem. The current episode started in the past 7 days. The problem occurs every urination. Pertinent negatives include no chills, frequency, nausea, constipation or rash.   Rash  Associated symptoms include fatigue. Pertinent negatives include no congestion, cough, diarrhea, eye pain, fever or shortness of breath.   Hair Loss  Associated symptoms include fatigue. Pertinent negatives include no abdominal pain, arthralgias, chest pain, chills, congestion, coughing, fever, headaches, joint swelling, nausea, numbness or rash.   Fatigue  Associated symptoms include fatigue. Pertinent negatives include no abdominal pain, arthralgias, chest pain, chills, congestion, coughing, fever, headaches, joint swelling, nausea, numbness or rash.       Past Medical History:   Diagnosis Date    ADHD (attention deficit hyperactivity disorder)     Allergy     Anemia     Anxiety     Depression     Fatty liver     History of endoscopy 8/26/2021    Dr. Toney Cuevas-Z-line is  irregular and was found 37 cm from the incisors.  Scattered ulcerations noted between 36-37 cm. 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  The gastrojejunal anastomosis was characterized by moderate stenosis and ulceration.  This was traversed.  The pouch to jejunum limb was characterized by erythema, friable mucosa, inflammation and ulceration.  The examine    Insomnia     Migraines     Osteoporosis     Restless leg syndrome 2005     Social History     Socioeconomic History    Marital status:    Tobacco Use    Smoking status: Never Smoker    Smokeless tobacco: Never Used   Substance and Sexual Activity    Alcohol use: Yes     Comment: occassional    Drug use: No    Sexual activity: Yes     Social Determinants of Health     Financial Resource Strain: Low Risk     Difficulty of Paying Living Expenses: Not very hard   Food Insecurity: No Food Insecurity    Worried About Running Out of Food in the Last Year: Never true    Ran Out of Food in the Last Year: Never true   Transportation Needs: No Transportation Needs    Lack of Transportation (Medical): No    Lack of Transportation (Non-Medical): No   Physical Activity: Sufficiently Active    Days of Exercise per Week: 7 days    Minutes of Exercise per Session: 30 min   Stress: Stress Concern Present    Feeling of Stress : To some extent   Social Connections: Moderately Integrated    Frequency of Communication with Friends and Family: More than three times a week    Frequency of Social Gatherings with Friends and Family: More than three times a week    Attends Restoration Services: More than 4 times per year    Active Member of Clubs or Organizations: No    Attends Club or Organization Meetings: Never    Marital Status:    Housing Stability: Low Risk     Unable to Pay for Housing in the Last Year: No    Number of Places Lived in the Last Year: 1    Unstable Housing in the Last Year: No     Past Surgical History:   Procedure  Laterality Date    BREAST BIOPSY Left     benign 15 +years ago    BREAST SURGERY  2017    breast reduction    BUNIONECTOMY Right 2018     SECTION  1987    FRACTURE SURGERY  2016    ankle    GASTRIC RESTRICTION SURGERY      GASTRIC SLEEVE 2012    REPAIR OF EXTENSOR TENDON  2021    Procedure: REPAIR, TENDON, EXTENSOR;  Surgeon: Aly Zimmerman DPM;  Location: [facility] OR;  Service: Podiatry;;    SURGICAL REMOVAL OF METATARSAL HEAD Right 2021    Procedure: OSTECTOMY, METATARSAL BONE, HEAD;  Surgeon: Aly Zimmerman DPM;  Location: [facility] OR;  Service: Podiatry;  Laterality: Right;    TOTAL REDUCTION MAMMOPLASTY      2016     Family History   Problem Relation Age of Onset    Arthritis Mother     Colon cancer Mother     Diabetes Mother     Early death Mother     Miscarriages / Stillbirths Mother     Arthritis Father     Heart disease Father     Hypertension Father     Stroke Father     Vision loss Father     Vaginal cancer Paternal Grandmother     Heart disease Paternal Grandfather     Breast cancer Maternal Aunt     Breast cancer Maternal Aunt        Review of Systems   Constitutional: Positive for fatigue. Negative for activity change, appetite change, chills, fever and unexpected weight change (Expected weight loss of 50-60 lb after bariatric surgery.).        Expected weight loss after revision gastric surgery and bypass surgery.   HENT: Negative for congestion, postnasal drip, sinus pressure and trouble swallowing (Swallowing has got better after esophageal dilation.).    Eyes: Negative for pain, discharge, redness and visual disturbance.   Respiratory: Negative for cough, chest tightness and shortness of breath.    Cardiovascular: Negative for chest pain, palpitations and leg swelling.   Gastrointestinal: Negative for abdominal distention, abdominal pain, anal bleeding, constipation, diarrhea and nausea.        Recent history of revision gastric surgery followed by gastric  "bypass with nose tangible of significant improvement in her symptoms of feeling nauseated.  Probably some esophageal stenosis and difficulty swallowing also.   Endocrine: Negative for cold intolerance, heat intolerance, polydipsia, polyphagia and polyuria.   Genitourinary: Negative for difficulty urinating, dysuria, frequency and pelvic pain.        History of ovarian cyst removal.  Side of removal is unclear.   Musculoskeletal: Negative for arthralgias, back pain and joint swelling.        Her back pain seem to have got better after the bariatric surgery but it seems to have recurred at this point.    She complains of a cramps in the right thigh posteriorly.   Skin: Negative for color change, pallor and rash.        Hair loss.  Possibly secondary to significant weight loss and the shock that the body has gone through.   Allergic/Immunologic: Negative for environmental allergies, food allergies and immunocompromised state.   Neurological: Positive for dizziness. Negative for tremors, seizures, syncope, light-headedness, numbness and headaches.        Restless leg   Hematological: Negative for adenopathy. Does not bruise/bleed easily.   Psychiatric/Behavioral: Negative for agitation, confusion and dysphoric mood. The patient is nervous/anxious.                              Objective:      Blood pressure 132/71, pulse 63, height 5' 2" (1.575 m), weight 56.7 kg (125 lb). Body mass index is 22.86 kg/m².  Physical Exam  Vitals and nursing note reviewed. Exam conducted with a chaperone present (Ernestine RODRIGUEZ).   Constitutional:       General: She is not in acute distress.     Appearance: Normal appearance. She is well-developed. She is not diaphoretic.   HENT:      Head: Normocephalic and atraumatic.      Mouth/Throat:      Pharynx: No oropharyngeal exudate or posterior oropharyngeal erythema.   Eyes:      General: No scleral icterus.  Neck:      Trachea: Trachea normal.   Cardiovascular:      Rate and Rhythm: Normal rate and " regular rhythm.      Pulses: Normal pulses.      Heart sounds: Normal heart sounds, S1 normal and S2 normal.   Pulmonary:      Effort: Pulmonary effort is normal.      Breath sounds: Normal breath sounds.   Abdominal:      Palpations: Abdomen is soft. Abdomen is not rigid.      Tenderness: There is no abdominal tenderness. There is no guarding.   Musculoskeletal:      Cervical back: Neck supple. No muscular tenderness.      Right lower leg: No edema.      Left lower leg: No edema.   Lymphadenopathy:      Cervical: No cervical adenopathy.   Skin:     General: Skin is warm and dry.      Coloration: Skin is not pale.      Findings: Erythema, lesion and rash present. Rash is vesicular.             Comments: Lesions surrounded seen on both the shoulders, right breast region, right thigh and right forearm.  The shoulder lesions seem to be blistering.   Neurological:      General: No focal deficit present.      Mental Status: She is alert and oriented to person, place, and time.   Psychiatric:         Mood and Affect: Mood normal.         Behavior: Behavior normal. Behavior is cooperative.           Assessment:       1. Skin rash    2. Fixed drug eruption    3. Screening for human immunodeficiency virus    4. Lower urinary tract symptoms           Office Visit on 02/21/2022   Component Date Value Ref Range Status    Cholesterol 02/22/2022 174  <200 mg/dL Final    HDL 02/22/2022 76  > OR = 50 mg/dL Final    Triglycerides 02/22/2022 79  <150 mg/dL Final    LDL Cholesterol 02/22/2022 82  mg/dL (calc) Final    HDL/Cholesterol Ratio 02/22/2022 2.3  <5.0 (calc) Final    Non HDL Chol. (LDL+VLDL) 02/22/2022 98  <130 mg/dL (calc) Final    Vitamin D, 25-OH, Total 02/22/2022 23 (A) 30 - 100 ng/mL Final    Vitamin B-12 02/22/2022 223  200 - 1,100 pg/mL Final    Vitamin B6 02/22/2022 6.5  2.1 - 21.7 ng/mL Final    WBC 02/22/2022 4.0  3.8 - 10.8 Thousand/uL Final    RBC 02/22/2022 4.10  3.80 - 5.10 Million/uL Final     Hemoglobin 02/22/2022 9.9 (A) 11.7 - 15.5 g/dL Final    Hematocrit 02/22/2022 33.4 (A) 35.0 - 45.0 % Final    MCV 02/22/2022 81.5  80.0 - 100.0 fL Final    MCH 02/22/2022 24.1 (A) 27.0 - 33.0 pg Final    MCHC 02/22/2022 29.6 (A) 32.0 - 36.0 g/dL Final    RDW 02/22/2022 14.4  11.0 - 15.0 % Final    Platelets 02/22/2022 251  140 - 400 Thousand/uL Final    MPV 02/22/2022 11.7  7.5 - 12.5 fL Final    Neutrophils, Abs 02/22/2022 2,232  1,500 - 7,800 cells/uL Final    Lymph # 02/22/2022 1,140  850 - 3,900 cells/uL Final    Mono # 02/22/2022 404  200 - 950 cells/uL Final    Eos # 02/22/2022 192  15 - 500 cells/uL Final    Baso # 02/22/2022 32  0 - 200 cells/uL Final    Neutrophils Relative 02/22/2022 55.8  % Final    Lymph % 02/22/2022 28.5  % Final    Mono % 02/22/2022 10.1  % Final    Eosinophil % 02/22/2022 4.8  % Final    Basophil % 02/22/2022 0.8  % Final    Glucose 02/22/2022 86  65 - 99 mg/dL Final    BUN 02/22/2022 6 (A) 7 - 25 mg/dL Final    Creatinine 02/22/2022 0.68  0.50 - 1.05 mg/dL Final    eGFR if non African American 02/22/2022 96  > OR = 60 mL/min/1.73m2 Final    eGFR if  02/22/2022 111  > OR = 60 mL/min/1.73m2 Final    BUN/Creatinine Ratio 02/22/2022 9  6 - 22 (calc) Final    Sodium 02/22/2022 141  135 - 146 mmol/L Final    Potassium 02/22/2022 4.1  3.5 - 5.3 mmol/L Final    Chloride 02/22/2022 106  98 - 110 mmol/L Final    CO2 02/22/2022 30  20 - 32 mmol/L Final    Calcium 02/22/2022 9.1  8.6 - 10.4 mg/dL Final    Total Protein 02/22/2022 6.7  6.1 - 8.1 g/dL Final    Albumin 02/22/2022 3.9  3.6 - 5.1 g/dL Final    Globulin, Total 02/22/2022 2.8  1.9 - 3.7 g/dL (calc) Final    Albumin/Globulin Ratio 02/22/2022 1.4  1.0 - 2.5 (calc) Final    Total Bilirubin 02/22/2022 0.5  0.2 - 1.2 mg/dL Final    Alkaline Phosphatase 02/22/2022 78  37 - 153 U/L Final    AST 02/22/2022 18  10 - 35 U/L Final    ALT 02/22/2022 14  6 - 29 U/L Final    Magnesium 02/22/2022  2.2  1.5 - 2.5 mg/dL Final         Plan:           Skin rash  -     C-Reactive Protein; Future; Expected date: 05/19/2022  -     Sedimentation rate; Future; Expected date: 05/19/2022  -     BRO Screen w/Reflex; Future; Expected date: 05/19/2022  -     Ambulatory referral/consult to Dermatology; Future; Expected date: 05/20/2022  -     CBC Auto Differential; Future; Expected date: 05/19/2022  -     Comprehensive Metabolic Panel; Future; Expected date: 05/19/2022    Fixed drug eruption  -     Ambulatory referral/consult to Dermatology; Future; Expected date: 05/20/2022  -     CBC Auto Differential; Future; Expected date: 05/19/2022  -     Comprehensive Metabolic Panel; Future; Expected date: 05/19/2022    Screening for human immunodeficiency virus  -     HIV 1/2 Ag/Ab (4th Gen); Future; Expected date: 05/19/2022    Lower urinary tract symptoms  -     POCT Urinalysis, Dipstick, Automated, W/O Scope      Patient's rash has been reviewed.  Multiple etiologies could cause this including fix drug eruptions though I could not elicit any new medication taken recently including ibuprofen, Tylenol, antibiotic.  She did take some azo recently but her rash had started before taking azo.    She does have some urinary tract symptoms but her urine test is negative in the office.  Let us see how she does in the next few days.  I will encourage her increase fluid intake.    Her current medications have been noted and I do not want to incomplete anyone of them.    One of the rash apparently had a bullous type feature as per the patient.  I am wondering if the other possibility could be beginning of a Felix Wes syndrome or possibility of a bullous pemphigoid.  Will get dermatology evaluation.    The rash lesions have been photographed as above.      I have sent some basic test including C-reactive protein, sed rate, blood counts, chemistry, lupus screening test to [facility].    Routine HIV screening also.    Spent megan 30  minutes with patient which involved review of pts medical conditions, labs, medications and with 50% of time face-to-face discussion about medical problems, management and any applicable changes.    Current Outpatient Medications:     FLUoxetine 20 MG capsule, Take 1 capsule (20 mg total) by mouth every evening., Disp: 90 capsule, Rfl: 2    iron-vit c-b12-folic acid (ICAR-C PLUS) Tab, Take 1 tablet by mouth once daily., Disp: 30 tablet, Rfl: 2    multivitamin (THERAGRAN) tablet, Take 1 tablet by mouth once daily., Disp: , Rfl:     pantoprazole (PROTONIX) 40 MG tablet, Take 1 tablet (40 mg total) by mouth once daily., Disp: 90 tablet, Rfl: 2    pramipexole (MIRAPEX) 1 MG tablet, Take 1.5 tablets (1.5 mg total) by mouth every evening., Disp: 135 tablet, Rfl: 1    traZODone (DESYREL) 50 MG tablet, Take 1 tablet (50 mg total) by mouth every evening., Disp: 90 tablet, Rfl: 2

## 2022-05-19 NOTE — Clinical Note
Dear Dr. Eller,  Kindly evaluate miss Hernandez for complains of sudden eruption of rashes which are round in color.  No clear-cut drug history.  Similar rash several years back but lesser and degree.  I suspect it may be a fixed drug eruption but cannot pinpoint.  I am wondering if she it could be bullous pemphigoid given some nature of bulla in the lesions noted by patient or earlier.  I have taken some pictures in the chart also.  Kind regards.  Dr. Cristy MONTILLA

## 2022-05-20 LAB
ALBUMIN SERPL-MCNC: 4.1 G/DL (ref 3.6–5.1)
ALBUMIN/GLOB SERPL: 1.6 (CALC) (ref 1–2.5)
ALP SERPL-CCNC: 68 U/L (ref 37–153)
ALT SERPL-CCNC: 14 U/L (ref 6–29)
AST SERPL-CCNC: 20 U/L (ref 10–35)
BASOPHILS # BLD AUTO: 31 CELLS/UL (ref 0–200)
BASOPHILS NFR BLD AUTO: 0.7 %
BILIRUB SERPL-MCNC: 0.3 MG/DL (ref 0.2–1.2)
BUN SERPL-MCNC: 9 MG/DL (ref 7–25)
BUN/CREAT SERPL: ABNORMAL (CALC) (ref 6–22)
CALCIUM SERPL-MCNC: 9 MG/DL (ref 8.6–10.4)
CHLORIDE SERPL-SCNC: 102 MMOL/L (ref 98–110)
CO2 SERPL-SCNC: 27 MMOL/L (ref 20–32)
CREAT SERPL-MCNC: 0.74 MG/DL (ref 0.5–1.05)
EOSINOPHIL # BLD AUTO: 128 CELLS/UL (ref 15–500)
EOSINOPHIL NFR BLD AUTO: 2.9 %
ERYTHROCYTE [DISTWIDTH] IN BLOOD BY AUTOMATED COUNT: 15.5 % (ref 11–15)
GLOBULIN SER CALC-MCNC: 2.6 G/DL (CALC) (ref 1.9–3.7)
GLUCOSE SERPL-MCNC: 140 MG/DL (ref 65–139)
HCT VFR BLD AUTO: 30.6 % (ref 35–45)
HGB BLD-MCNC: 9.4 G/DL (ref 11.7–15.5)
LYMPHOCYTES # BLD AUTO: 1060 CELLS/UL (ref 850–3900)
LYMPHOCYTES NFR BLD AUTO: 24.1 %
MCH RBC QN AUTO: 24.2 PG (ref 27–33)
MCHC RBC AUTO-ENTMCNC: 30.7 G/DL (ref 32–36)
MCV RBC AUTO: 78.9 FL (ref 80–100)
MONOCYTES # BLD AUTO: 277 CELLS/UL (ref 200–950)
MONOCYTES NFR BLD AUTO: 6.3 %
NEUTROPHILS # BLD AUTO: 2904 CELLS/UL (ref 1500–7800)
NEUTROPHILS NFR BLD AUTO: 66 %
PLATELET # BLD AUTO: 257 THOUSAND/UL (ref 140–400)
PMV BLD REES-ECKER: 11.1 FL (ref 7.5–12.5)
POTASSIUM SERPL-SCNC: 4.4 MMOL/L (ref 3.5–5.3)
PROT SERPL-MCNC: 6.7 G/DL (ref 6.1–8.1)
RBC # BLD AUTO: 3.88 MILLION/UL (ref 3.8–5.1)
SODIUM SERPL-SCNC: 137 MMOL/L (ref 135–146)
WBC # BLD AUTO: 4.4 THOUSAND/UL (ref 3.8–10.8)

## 2022-05-21 LAB
ANA SER QL IF: NEGATIVE
CRP SERPL-MCNC: 0.3 MG/L
ERYTHROCYTE [SEDIMENTATION RATE] IN BLOOD BY WESTERGREN METHOD: 6 MM/H
HIV 1+2 AB+HIV1 P24 AG SERPL QL IA: NORMAL

## 2022-05-23 ENCOUNTER — OFFICE VISIT (OUTPATIENT)
Dept: DERMATOLOGY | Facility: CLINIC | Age: 62
End: 2022-05-23
Payer: COMMERCIAL

## 2022-05-23 VITALS — WEIGHT: 125 LBS | HEIGHT: 62 IN | BODY MASS INDEX: 23 KG/M2

## 2022-05-23 DIAGNOSIS — R21 RASH: ICD-10-CM

## 2022-05-23 PROCEDURE — 3008F PR BODY MASS INDEX (BMI) DOCUMENTED: ICD-10-PCS | Mod: CPTII,S$GLB,, | Performed by: STUDENT IN AN ORGANIZED HEALTH CARE EDUCATION/TRAINING PROGRAM

## 2022-05-23 PROCEDURE — 1159F MED LIST DOCD IN RCRD: CPT | Mod: CPTII,S$GLB,, | Performed by: STUDENT IN AN ORGANIZED HEALTH CARE EDUCATION/TRAINING PROGRAM

## 2022-05-23 PROCEDURE — 99213 PR OFFICE/OUTPT VISIT, EST, LEVL III, 20-29 MIN: ICD-10-PCS | Mod: 25,S$GLB,, | Performed by: STUDENT IN AN ORGANIZED HEALTH CARE EDUCATION/TRAINING PROGRAM

## 2022-05-23 PROCEDURE — 99213 OFFICE O/P EST LOW 20 MIN: CPT | Mod: 25,S$GLB,, | Performed by: STUDENT IN AN ORGANIZED HEALTH CARE EDUCATION/TRAINING PROGRAM

## 2022-05-23 PROCEDURE — 11104 PR PUNCH BIOPSY, SKIN, SINGLE LESION: ICD-10-PCS | Mod: S$GLB,,, | Performed by: STUDENT IN AN ORGANIZED HEALTH CARE EDUCATION/TRAINING PROGRAM

## 2022-05-23 PROCEDURE — 1160F RVW MEDS BY RX/DR IN RCRD: CPT | Mod: CPTII,S$GLB,, | Performed by: STUDENT IN AN ORGANIZED HEALTH CARE EDUCATION/TRAINING PROGRAM

## 2022-05-23 PROCEDURE — 99999 PR PBB SHADOW E&M-EST. PATIENT-LVL IV: ICD-10-PCS | Mod: PBBFAC,,, | Performed by: STUDENT IN AN ORGANIZED HEALTH CARE EDUCATION/TRAINING PROGRAM

## 2022-05-23 PROCEDURE — 99999 PR PBB SHADOW E&M-EST. PATIENT-LVL IV: CPT | Mod: PBBFAC,,, | Performed by: STUDENT IN AN ORGANIZED HEALTH CARE EDUCATION/TRAINING PROGRAM

## 2022-05-23 PROCEDURE — 11104 PUNCH BX SKIN SINGLE LESION: CPT | Mod: S$GLB,,, | Performed by: STUDENT IN AN ORGANIZED HEALTH CARE EDUCATION/TRAINING PROGRAM

## 2022-05-23 PROCEDURE — 1159F PR MEDICATION LIST DOCUMENTED IN MEDICAL RECORD: ICD-10-PCS | Mod: CPTII,S$GLB,, | Performed by: STUDENT IN AN ORGANIZED HEALTH CARE EDUCATION/TRAINING PROGRAM

## 2022-05-23 PROCEDURE — 1160F PR REVIEW ALL MEDS BY PRESCRIBER/CLIN PHARMACIST DOCUMENTED: ICD-10-PCS | Mod: CPTII,S$GLB,, | Performed by: STUDENT IN AN ORGANIZED HEALTH CARE EDUCATION/TRAINING PROGRAM

## 2022-05-23 PROCEDURE — 3008F BODY MASS INDEX DOCD: CPT | Mod: CPTII,S$GLB,, | Performed by: STUDENT IN AN ORGANIZED HEALTH CARE EDUCATION/TRAINING PROGRAM

## 2022-05-23 RX ORDER — TRIAMCINOLONE ACETONIDE 1 MG/G
CREAM TOPICAL 2 TIMES DAILY
Qty: 80 G | Refills: 0 | Status: SHIPPED | OUTPATIENT
Start: 2022-05-23 | End: 2022-07-05

## 2022-05-23 NOTE — PROGRESS NOTES
Subjective:       Patient ID:  Mary Mike is a 61 y.o. female who presents for   Chief Complaint   Patient presents with    Rash     LOV: 9/20/21, lentigo, SK    Week of may 9 developed UTI symptoms- took otc azo tablets and then broke out on Friday in a rash. Did have similar rash in the past but not this bad. She stopped the azo tablets when rash started. It has slowly been healing. Denies ocular, oral and genital lesions.   Denies taking any other OTC medications including NSAIDS, tylenol.       Derm Hx:  Denies phx NMSC/MM  Denies fhx MM    Current Outpatient Medications:     FLUoxetine 20 MG capsule, Take 1 capsule (20 mg total) by mouth every evening., Disp: 90 capsule, Rfl: 2    iron-vit c-b12-folic acid (ICAR-C PLUS) Tab, Take 1 tablet by mouth once daily., Disp: 30 tablet, Rfl: 2    multivitamin (THERAGRAN) tablet, Take 1 tablet by mouth once daily., Disp: , Rfl:     pantoprazole (PROTONIX) 40 MG tablet, Take 1 tablet (40 mg total) by mouth once daily., Disp: 90 tablet, Rfl: 2    pramipexole (MIRAPEX) 1 MG tablet, Take 1.5 tablets (1.5 mg total) by mouth every evening., Disp: 135 tablet, Rfl: 1    traZODone (DESYREL) 50 MG tablet, Take 1 tablet (50 mg total) by mouth every evening., Disp: 90 tablet, Rfl: 2        Review of Systems   Constitutional: Negative for fever and chills.   Respiratory: Negative for cough and shortness of breath.    Gastrointestinal: Negative for nausea and vomiting.   Skin: Positive for rash.        Objective:    Physical Exam   Skin:   Areas Examined (abnormalities noted in diagram):   Head / Face Inspection Performed  Neck Inspection Performed  Chest / Axilla Inspection Performed  Genitals / Buttocks / Groin Inspection Performed  Back Inspection Performed              Diagram Legend     Erythematous scaling macule/papule c/w actinic keratosis       Vascular papule c/w angioma      Pigmented verrucoid papule/plaque c/w seborrheic keratosis      Yellow  umbilicated papule c/w sebaceous hyperplasia      Irregularly shaped tan macule c/w lentigo     1-2 mm smooth white papules consistent with Milia      Movable subcutaneous cyst with punctum c/w epidermal inclusion cyst      Subcutaneous movable cyst c/w pilar cyst      Firm pink to brown papule c/w dermatofibroma      Pedunculated fleshy papule(s) c/w skin tag(s)      Evenly pigmented macule c/w junctional nevus     Mildly variegated pigmented, slightly irregular-bordered macule c/w mildly atypical nevus      Flesh colored to evenly pigmented papule c/w intradermal nevus       Pink pearly papule/plaque c/w basal cell carcinoma      Erythematous hyperkeratotic cursted plaque c/w SCC      Surgical scar with no sign of skin cancer recurrence      Open and closed comedones      Inflammatory papules and pustules      Verrucoid papule consistent consistent with wart     Erythematous eczematous patches and plaques     Dystrophic onycholytic nail with subungual debris c/w onychomycosis     Umbilicated papule    Erythematous-base heme-crusted tan verrucoid plaque consistent with inflamed seborrheic keratosis     Erythematous Silvery Scaling Plaque c/w Psoriasis     See annotation              Assessment / Plan:      Pathology Orders:     Normal Orders This Visit    Specimen to Pathology, Dermatology     Comments:    Number of Specimens:->1  ------------------------->-------------------------  Spec 1 Procedure:->Biopsy  Spec 1 Clinical Impression:->erythematous blistering, eroded  plaques dx: generalized FDE vs other  Spec 1 Source:->left posterior shoulder    Questions:    Procedure Type: Dermatology and skin neoplasms    Number of Specimens: 1    ------------------------: -------------------------    Spec 1 Procedure: Biopsy    Spec 1 Clinical Impression: erythematous blistering, eroded plaques dx: generalized FDE vs other    Spec 1 Source: left posterior shoulder    Release to patient:         Rash  -     Ambulatory  referral/consult to Dermatology  -     Specimen to Pathology, Dermatology  -     triamcinolone acetonide 0.1% (KENALOG) 0.1 % cream; Apply topically 2 (two) times daily.  Dispense: 80 g; Refill: 0  - suspect generalized FDE  - recommend avoiding azo tablets in the future  Punch biopsy procedure note:  Punch biopsy performed after verbal consent obtained. Area marked and prepped with alcohol. Approximately 1cc of 2% lidocaine with epinephrine injected. 4 mm disposable punch used to remove lesion. Hemostasis obtained and biopsy site closed with 1 - 2 Prolene sutures. Wound care instructions reviewed with patient and handout given.               No follow-ups on file.

## 2022-05-23 NOTE — PROGRESS NOTES
Subjective:       Patient ID: Mary Mike is a 61 y.o. female.    Chief Complaint: Depression, Gastroesophageal Reflux, Mood Disorder, Restless leg syndrome, History of gastric bypass, and Suspected multiple vitamin deficiencies    This Mary Mike is a 61-year-old  female who comes for follow-up.  Recent visit for rash on her body had been noted for which I had made a referral to dermatology services.    She had a biopsy of skin done at the point of rash and there is an assumption at this point that this is generalized fixed drug eruption.  Possibly use of AZO.    We have reviewed her sister's history also.  It seems she also had a blood disorder with  JAK2 gene mutation.  Probably essential thrombocythemia and myeloproliferative condition.  I have assured the patient that this is not a cancer at this stage but a potential for cancer in future.  Her sister is being worked at [facility].    Patient herself has anemia which is microcytic.  She recalls that in past she was given iron transfusion which made her feel better.    She is taking the iron supplements at this point and the apparently this is not making much of a difference.      Underlying insomnia has been noted for which she takes trazodone at 50 mg. For mild depressive anxiety she takes fluoxetine 20 mg.    For reflux she takes pantoprazole 40 mg    Again history of gastric bypass surgery couple of years ago have been noted by Dr. Duvall.  It has also been reiterated that she never felt better after the bypass surgery even though she has lost significant amount of weight.    She was supposed to take and is out it multi vitamin package and supplements after her gastric bypass but she is at this point not sure about it and she is taking a Theragran multivitamin and iron/folic acid and B12 at my prescription.  She is concerned that her blood counts are not getting better and she may need further evaluation.    I asked her,  if she had her discharge papers or post gastric bypass recommendations by her bariatric surgeon still safe, she does not recall and she may have other got rid of those discharge recommendations or lost them.      DepressionPatient presents with the following symptoms: nervousness/anxiety.  Patient is not experiencing: confusion, palpitations and shortness of breath.    Gastroesophageal Reflux  She complains of heartburn. She reports no abdominal pain, no chest pain, no coughing or no nausea. This is a chronic problem. The current episode started more than 1 year ago. The problem has been waxing and waning. Associated symptoms include fatigue. Risk factors: History of gastric bypass. The treatment provided moderate relief.       Past Medical History:   Diagnosis Date    ADHD (attention deficit hyperactivity disorder)     Allergy     Anemia     Anxiety     Depression     Fatty liver     History of endoscopy 8/26/2021    Dr. Toney Cuevas-Z-line is irregular and was found 37 cm from the incisors.  Scattered ulcerations noted between 36-37 cm. 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  The gastrojejunal anastomosis was characterized by moderate stenosis and ulceration.  This was traversed.  The pouch to jejunum limb was characterized by erythema, friable mucosa, inflammation and ulceration.  The examine    Insomnia     Migraines     Osteoporosis     Restless leg syndrome 2005     Social History     Socioeconomic History    Marital status:    Tobacco Use    Smoking status: Never Smoker    Smokeless tobacco: Never Used   Substance and Sexual Activity    Alcohol use: Yes     Comment: occassional    Drug use: No    Sexual activity: Yes     Social Determinants of Health     Financial Resource Strain: Low Risk     Difficulty of Paying Living Expenses: Not very hard   Food Insecurity: No Food Insecurity    Worried About Running Out of Food in the Last Year: Never true    Ran Out of Food  in the Last Year: Never true   Transportation Needs: No Transportation Needs    Lack of Transportation (Medical): No    Lack of Transportation (Non-Medical): No   Physical Activity: Sufficiently Active    Days of Exercise per Week: 7 days    Minutes of Exercise per Session: 30 min   Stress: Stress Concern Present    Feeling of Stress : To some extent   Social Connections: Moderately Integrated    Frequency of Communication with Friends and Family: More than three times a week    Frequency of Social Gatherings with Friends and Family: More than three times a week    Attends [employer] Services: More than 4 times per year    Active Member of Clubs or Organizations: No    Attends Club or Organization Meetings: Never    Marital Status:    Housing Stability: Low Risk     Unable to Pay for Housing in the Last Year: No    Number of Places Lived in the Last Year: 1    Unstable Housing in the Last Year: No     Past Surgical History:   Procedure Laterality Date    BREAST BIOPSY Left     benign 15 +years ago    BREAST SURGERY  2017    breast reduction    BUNIONECTOMY Right 2018     SECTION  1987    FRACTURE SURGERY  2016    ankle    GASTRIC RESTRICTION SURGERY      GASTRIC SLEEVE 2012    REPAIR OF EXTENSOR TENDON  2021    Procedure: REPAIR, TENDON, EXTENSOR;  Surgeon: Aly Zimmerman DPM;  Location: [facility] OR;  Service: Podiatry;;    SURGICAL REMOVAL OF METATARSAL HEAD Right 2021    Procedure: OSTECTOMY, METATARSAL BONE, HEAD;  Surgeon: Aly Zimmerman DPM;  Location: [facility] OR;  Service: Podiatry;  Laterality: Right;    TOTAL REDUCTION MAMMOPLASTY           Family History   Problem Relation Age of Onset    Arthritis Mother     Colon cancer Mother     Diabetes Mother     Early death Mother     Miscarriages / Stillbirths Mother     Arthritis Father     Heart disease Father     Hypertension Father     Stroke Father     Vision loss Father     Breast cancer Maternal  Aunt     Breast cancer Maternal Aunt     Vaginal cancer Paternal Grandmother     Heart disease Paternal Grandfather        Review of Systems   Constitutional: Positive for fatigue. Negative for activity change, appetite change, chills, fever and unexpected weight change (Expected weight loss of 50-60 lb after bariatric surgery.).        Expected weight loss after revision gastric surgery and bypass surgery.  Overall feels fatigued and run down and was never better after the bypass surgery.   HENT: Negative for congestion, postnasal drip, sinus pressure and trouble swallowing (Swallowing has got better after esophageal dilation.).    Eyes: Negative for pain, discharge, redness and visual disturbance.   Respiratory: Negative for cough, chest tightness and shortness of breath.    Cardiovascular: Negative for chest pain, palpitations and leg swelling.   Gastrointestinal: Positive for heartburn. Negative for abdominal distention, abdominal pain, anal bleeding, constipation, diarrhea and nausea.        Recent history of revision gastric surgery followed by gastric bypass with no tangible of significant improvement in her symptoms of feeling nauseated.  Probably some esophageal stenosis and difficulty swallowing also.   Endocrine: Negative for cold intolerance, heat intolerance, polydipsia, polyphagia and polyuria.   Genitourinary: Negative for difficulty urinating, dysuria, frequency and pelvic pain.        History of ovarian cyst removal.  Side of removal is unclear.   Musculoskeletal: Negative for arthralgias, back pain and joint swelling.        Her back pain seem to have got better after the bariatric surgery but it seems to have recurred at this point.    She complains of a cramps in the right thigh posteriorly.   Skin: Positive for rash. Negative for color change and pallor.        Skin lesions discovered recently consistent with generalized fix drug eruptions.  The causative drug is considered to be azo.  "  Allergic/Immunologic: Negative for environmental allergies, food allergies and immunocompromised state.   Neurological: Positive for dizziness. Negative for tremors, seizures, syncope, light-headedness, numbness and headaches.        Restless leg:-possibly contributed by anemia.   Hematological: Negative for adenopathy. Does not bruise/bleed easily.        Moderate hypochromic microcytic anemia possibly secondary to malabsorption and history of bypass.  Currently on iron supplements.  Patient also has ice PICA   Psychiatric/Behavioral: Positive for depression. Negative for agitation, confusion and dysphoric mood. The patient is nervous/anxious.          Objective:      Blood pressure 123/76, pulse 60, height 5' 2" (1.575 m), weight 57.2 kg (126 lb). Body mass index is 23.05 kg/m².  Physical Exam  Vitals and nursing note reviewed. Exam conducted with a chaperone present (Ernestine RODRIGUEZ).   Constitutional:       General: She is not in acute distress.     Appearance: Normal appearance. She is well-developed. She is ill-appearing. She is not diaphoretic.      Comments: Somewhat chronically unwell appearing.   HENT:      Head: Normocephalic and atraumatic.      Mouth/Throat:      Pharynx: No oropharyngeal exudate or posterior oropharyngeal erythema.   Eyes:      General: No scleral icterus.  Neck:      Trachea: Trachea normal.   Cardiovascular:      Rate and Rhythm: Normal rate and regular rhythm.      Pulses: Normal pulses.      Heart sounds: Normal heart sounds, S1 normal and S2 normal.   Pulmonary:      Effort: Pulmonary effort is normal.      Breath sounds: Normal breath sounds.   Abdominal:      Palpations: Abdomen is soft. Abdomen is not rigid.      Tenderness: There is no abdominal tenderness. There is no guarding.   Musculoskeletal:      Cervical back: Neck supple. No muscular tenderness.      Right lower leg: No edema.      Left lower leg: No edema.   Lymphadenopathy:      Cervical: No cervical adenopathy. "   Skin:     General: Skin is warm and dry.      Coloration: Skin is not pale.      Findings: Erythema, lesion and rash present. Rash is vesicular.             Comments: Lesions surrounded seen on both the shoulders, right breast region, right thigh and right forearm.  The shoulder lesions seem to be blistering.  These lesions seem to be crusting off and closing up at this point.  They have been diagnosed to be fix drug eruptions.   Neurological:      General: No focal deficit present.      Mental Status: She is alert and oriented to person, place, and time.   Psychiatric:         Mood and Affect: Mood normal.         Behavior: Behavior normal. Behavior is cooperative.           Assessment:       1. Hypochromic microcytic anemia    2. Mood disorder of depressed type    3. Gastroesophageal reflux disease with esophagitis without hemorrhage    4. RLS (restless legs syndrome)    5. Vitamin D deficiency    6. B-complex deficiency    7. History of gastric bypass           Office Visit on 05/19/2022   Component Date Value Ref Range Status    POC Blood, Urine 05/19/2022 Negative  Negative Final    POC Bilirubin, Urine 05/19/2022 Negative  Negative Final    POC Urobilinogen, Urine 05/19/2022 normal  0.2 - 8 Final    POC Ketones, Urine 05/19/2022 Negative  Negative Final    POC Protein, Urine 05/19/2022 Negative  Negative Final    POC Nitrates, Urine 05/19/2022 Negative  Negative Final    POC Glucose, Urine 05/19/2022 Negative  Negative Final    pH, UA 05/19/2022 5.5  5.0 - 8.5 Final    POC Specific Gravity, Urine 05/19/2022 1.020  1.000 - 1.030 Final    POC Leukocytes, Urine 05/19/2022 Negative  Negative Final    Color, UA 05/19/2022 Yellow  Light Yellow, Yellow Final    Clarity, UA 05/19/2022 Clear  Clear Final    HIV Ag/Ab 4th Gen 05/19/2022 NON-REACTIVE  NON-REACTIVE Final    CRP 05/19/2022 0.3  <8.0 mg/L Final    BRO 05/19/2022 NEGATIVE  NEGATIVE Final    WBC 05/19/2022 4.4  3.8 - 10.8 Thousand/uL Final     RBC 05/19/2022 3.88  3.80 - 5.10 Million/uL Final    Hemoglobin 05/19/2022 9.4 (A) 11.7 - 15.5 g/dL Final    Hematocrit 05/19/2022 30.6 (A) 35.0 - 45.0 % Final    MCV 05/19/2022 78.9 (A) 80.0 - 100.0 fL Final    MCH 05/19/2022 24.2 (A) 27.0 - 33.0 pg Final    MCHC 05/19/2022 30.7 (A) 32.0 - 36.0 g/dL Final    RDW 05/19/2022 15.5 (A) 11.0 - 15.0 % Final    Platelets 05/19/2022 257  140 - 400 Thousand/uL Final    MPV 05/19/2022 11.1  7.5 - 12.5 fL Final    Neutrophils, Abs 05/19/2022 2,904  1,500 - 7,800 cells/uL Final    Lymph # 05/19/2022 1,060  850 - 3,900 cells/uL Final    Mono # 05/19/2022 277  200 - 950 cells/uL Final    Eos # 05/19/2022 128  15 - 500 cells/uL Final    Baso # 05/19/2022 31  0 - 200 cells/uL Final    Neutrophils Relative 05/19/2022 66  % Final    Lymph % 05/19/2022 24.1  % Final    Mono % 05/19/2022 6.3  % Final    Eosinophil % 05/19/2022 2.9  % Final    Basophil % 05/19/2022 0.7  % Final    Glucose 05/19/2022 140 (A) 65 - 139 mg/dL Final    BUN 05/19/2022 9  7 - 25 mg/dL Final    Creatinine 05/19/2022 0.74  0.50 - 1.05 mg/dL Final    eGFR if non African American 05/19/2022 89  > OR = 60 mL/min/1.73m2 Final    eGFR if  05/19/2022 103  > OR = 60 mL/min/1.73m2 Final    BUN/Creatinine Ratio 05/19/2022 NOT APPLICABLE  6 - 22 (calc) Final    Sodium 05/19/2022 137  135 - 146 mmol/L Final    Potassium 05/19/2022 4.4  3.5 - 5.3 mmol/L Final    Chloride 05/19/2022 102  98 - 110 mmol/L Final    CO2 05/19/2022 27  20 - 32 mmol/L Final    Calcium 05/19/2022 9.0  8.6 - 10.4 mg/dL Final    Total Protein 05/19/2022 6.7  6.1 - 8.1 g/dL Final    Albumin 05/19/2022 4.1  3.6 - 5.1 g/dL Final    Globulin, Total 05/19/2022 2.6  1.9 - 3.7 g/dL (calc) Final    Albumin/Globulin Ratio 05/19/2022 1.6  1.0 - 2.5 (calc) Final    Total Bilirubin 05/19/2022 0.3  0.2 - 1.2 mg/dL Final    Alkaline Phosphatase 05/19/2022 68  37 - 153 U/L Final    AST 05/19/2022 20  10 - 35  U/L Final    ALT 05/19/2022 14  6 - 29 U/L Final    Sed Rate 05/19/2022 6  < OR = 30 mm/h Final         Plan:           Hypochromic microcytic anemia  Comments:  History of gastric bypass has been noted and present anemia with microcytic, hypochromic indices.  Continue with iron supplements and gastric bypass supplements  Orders:  -     Iron and TIBC; Future; Expected date: 05/24/2022  -     Ferritin; Future; Expected date: 05/24/2022    Mood disorder of depressed type  Comments:  Currently on fluoxetine.  I suspect vitamin deficiencies cold lead to some degree of mood disorder and ennui.    Gastroesophageal reflux disease with esophagitis without hemorrhage  Comments:  History of gastric bypass and currently on pantoprazole.  Continue with this medication for the time being.    RLS (restless legs syndrome)  Comments:  Possibly contributed by iron deficiency.  Continue iron supplementation and treatment for restless leg.    Vitamin D deficiency  Comments:  Status post gastric bypass.  Orders:  -     Vitamin D; Future; Expected date: 05/24/2022    B-complex deficiency  Comments:  Status post gastric bypass.  Orders:  -     Vitamin B12; Future; Expected date: 05/24/2022  -     Folate; Future; Expected date: 05/24/2022    History of gastric bypass  Comments:  In past patient had a gastric sleeve surgery which apparently failed and subsequently it was taken to gastric bypass.  Lost weight.  Does not feel better after   Orders:  -     Vitamin D; Future; Expected date: 05/24/2022  -     Vitamin B12; Future; Expected date: 05/24/2022  -     Folate; Future; Expected date: 05/24/2022      Pts medical and Behavioral issues, have been reviewed.    RLS still there and perhaps connection correction of the iron deficiency and anemia will improve it.    Anemia has been reviewed:- she has ice Pica.    She had a gastric bypass in past and I believe this is the result of that.      In past she was noted to be anemic but we could  not figure out as to what was the cause of that anemia and she had received iron transfusions in past.  Remote history of gastric sleeve surgery was also noted.    I will check a baseline vitamin-D, baseline vitamin B12, folic acid and iron studies at this point again just to be sure and after that she needs to take some form of supplementation for the rest of her life.  She cannot stop the supplements after 2 or 3 months assuming that everything will be fine. She has a limited amount of stomach which cannot absorb the nutrients and due to bypass a large part of her colon has been also bypassed which can affect the absorption of micronutrients.  This anemia and probably deficiency of further micronutrients are leading to her fatigue and tiredness.    I am not sure if all of above has any relationship with fixed drug eruption that she has experienced which might be totally different thing secondary to exposure to urinary tract agents.    Her sister history also has been noted for myeloproliferative disorder with thrombocythemia for which she has been worked up.  At this point patient's platelet counts are doing okay.  There is no evidence of my low proliferation at this point.  Will keep on following on these issues.    Last colonoscopy at 11/28/2018    Multiple medical issues reviewed.  Labs ordered.  Once they are back will discuss further course of action.    Follow-up in 2-3 months time or earlier based upon results and improvement or worsening.    Spent megan 30 minutes with patient which involved review of pts medical conditions, labs, medications and with 50% of time face-to-face discussion about medical problems, management and any applicable changes.          Current Outpatient Medications:     FLUoxetine 20 MG capsule, Take 1 capsule (20 mg total) by mouth every evening., Disp: 90 capsule, Rfl: 2    iron-vit c-b12-folic acid (ICAR-C PLUS) Tab, Take 1 tablet by mouth once daily., Disp: 30 tablet, Rfl: 2     multivitamin (THERAGRAN) tablet, Take 1 tablet by mouth once daily., Disp: , Rfl:     pantoprazole (PROTONIX) 40 MG tablet, Take 1 tablet (40 mg total) by mouth once daily., Disp: 90 tablet, Rfl: 2    pramipexole (MIRAPEX) 1 MG tablet, Take 1.5 tablets (1.5 mg total) by mouth every evening., Disp: 135 tablet, Rfl: 1    traZODone (DESYREL) 50 MG tablet, Take 1 tablet (50 mg total) by mouth every evening., Disp: 90 tablet, Rfl: 2    triamcinolone acetonide 0.1% (KENALOG) 0.1 % cream, Apply topically 2 (two) times daily., Disp: 80 g, Rfl: 0

## 2022-05-23 NOTE — PATIENT INSTRUCTIONS
Punch Biopsy Wound Care    Your doctor has performed a punch biopsy today.  A band aid and antibiotic ointment has been placed over the site.  This should remain in place for 24 hours.  It is recommended that you keep the area dry for the first 24 hours.  After 24 hours, you may remove the band aid and wash the area with warm soap and water and apply Vaseline jelly.  Many patients prefer to use Neosporin or Bacitracin ointment.  This is acceptable; however know that you can develop an allergy to this medication even if you have used it safely for years.  It is important to keep the area moist.  Letting it dry out and get air slows healing time, will worsen the scar, and make it more difficult to remove the stitches if they were placed.  Band aid is optional after first 24 hours.      If you notice increasing redness, tenderness, pain, or yellow drainage at the biopsy or surgical site, please notify your doctor.  These are signs of an infection.    If your biopsy/surgical site is bleeding, apply firm pressure for 15 minutes straight.  Repeat for another 15 minutes, if it is still bleeding.   If the surgical site continues to bleed, then please contact your doctor.      [address] [facility] [place] [address]/ [phone] FAX/ [email or web address]

## 2022-05-24 ENCOUNTER — OFFICE VISIT (OUTPATIENT)
Dept: FAMILY MEDICINE | Facility: CLINIC | Age: 62
End: 2022-05-24
Payer: COMMERCIAL

## 2022-05-24 VITALS
SYSTOLIC BLOOD PRESSURE: 123 MMHG | HEART RATE: 60 BPM | BODY MASS INDEX: 23.19 KG/M2 | HEIGHT: 62 IN | WEIGHT: 126 LBS | DIASTOLIC BLOOD PRESSURE: 76 MMHG

## 2022-05-24 DIAGNOSIS — E53.9 B-COMPLEX DEFICIENCY: ICD-10-CM

## 2022-05-24 DIAGNOSIS — E55.9 VITAMIN D DEFICIENCY: Chronic | ICD-10-CM

## 2022-05-24 DIAGNOSIS — F32.A MOOD DISORDER OF DEPRESSED TYPE: Chronic | ICD-10-CM

## 2022-05-24 DIAGNOSIS — G25.81 RLS (RESTLESS LEGS SYNDROME): Chronic | ICD-10-CM

## 2022-05-24 DIAGNOSIS — K21.00 GASTROESOPHAGEAL REFLUX DISEASE WITH ESOPHAGITIS WITHOUT HEMORRHAGE: Chronic | ICD-10-CM

## 2022-05-24 DIAGNOSIS — Z98.84 HISTORY OF GASTRIC BYPASS: Chronic | ICD-10-CM

## 2022-05-24 DIAGNOSIS — D50.9 HYPOCHROMIC MICROCYTIC ANEMIA: Primary | Chronic | ICD-10-CM

## 2022-05-24 PROCEDURE — 99214 OFFICE O/P EST MOD 30 MIN: CPT | Mod: S$GLB,,, | Performed by: INTERNAL MEDICINE

## 2022-05-24 PROCEDURE — 3078F PR MOST RECENT DIASTOLIC BLOOD PRESSURE < 80 MM HG: ICD-10-PCS | Mod: CPTII,S$GLB,, | Performed by: INTERNAL MEDICINE

## 2022-05-24 PROCEDURE — 1160F RVW MEDS BY RX/DR IN RCRD: CPT | Mod: CPTII,S$GLB,, | Performed by: INTERNAL MEDICINE

## 2022-05-24 PROCEDURE — 1159F PR MEDICATION LIST DOCUMENTED IN MEDICAL RECORD: ICD-10-PCS | Mod: CPTII,S$GLB,, | Performed by: INTERNAL MEDICINE

## 2022-05-24 PROCEDURE — 3074F PR MOST RECENT SYSTOLIC BLOOD PRESSURE < 130 MM HG: ICD-10-PCS | Mod: CPTII,S$GLB,, | Performed by: INTERNAL MEDICINE

## 2022-05-24 PROCEDURE — 1159F MED LIST DOCD IN RCRD: CPT | Mod: CPTII,S$GLB,, | Performed by: INTERNAL MEDICINE

## 2022-05-24 PROCEDURE — 3008F PR BODY MASS INDEX (BMI) DOCUMENTED: ICD-10-PCS | Mod: CPTII,S$GLB,, | Performed by: INTERNAL MEDICINE

## 2022-05-24 PROCEDURE — 3074F SYST BP LT 130 MM HG: CPT | Mod: CPTII,S$GLB,, | Performed by: INTERNAL MEDICINE

## 2022-05-24 PROCEDURE — 99214 PR OFFICE/OUTPT VISIT, EST, LEVL IV, 30-39 MIN: ICD-10-PCS | Mod: S$GLB,,, | Performed by: INTERNAL MEDICINE

## 2022-05-24 PROCEDURE — 3078F DIAST BP <80 MM HG: CPT | Mod: CPTII,S$GLB,, | Performed by: INTERNAL MEDICINE

## 2022-05-24 PROCEDURE — 1160F PR REVIEW ALL MEDS BY PRESCRIBER/CLIN PHARMACIST DOCUMENTED: ICD-10-PCS | Mod: CPTII,S$GLB,, | Performed by: INTERNAL MEDICINE

## 2022-05-24 PROCEDURE — 3008F BODY MASS INDEX DOCD: CPT | Mod: CPTII,S$GLB,, | Performed by: INTERNAL MEDICINE

## 2022-05-25 ENCOUNTER — PATIENT MESSAGE (OUTPATIENT)
Dept: FAMILY MEDICINE | Facility: CLINIC | Age: 62
End: 2022-05-25

## 2022-05-25 LAB
25(OH)D3 SERPL-MCNC: 32 NG/ML (ref 30–100)
FERRITIN SERPL-MCNC: 5 NG/ML (ref 16–232)
FOLATE SERPL-MCNC: 9.4 NG/ML
IRON SATN MFR SERPL: 3 % (CALC) (ref 16–45)
IRON SERPL-MCNC: 17 MCG/DL (ref 45–160)
TIBC SERPL-MCNC: 510 MCG/DL (CALC) (ref 250–450)
VIT B12 SERPL-MCNC: 198 PG/ML (ref 200–1100)

## 2022-05-31 ENCOUNTER — TELEPHONE (OUTPATIENT)
Dept: DERMATOLOGY | Facility: CLINIC | Age: 62
End: 2022-05-31
Payer: COMMERCIAL

## 2022-05-31 NOTE — TELEPHONE ENCOUNTER
Returned call to patient and let her know the results are still in process, once back someone from the office will call her. Verbal understanding.     ----- Message from Yasmine Collins sent at 5/31/2022  1:47 PM CDT -----  Contact: [phone]  Type: Needs Medical Advice  Who Called:  Pt     Best Call Back Number: [phone]    Additional Information: Pt calling about biopsy results from May 23rd.

## 2022-06-06 ENCOUNTER — CLINICAL SUPPORT (OUTPATIENT)
Dept: DERMATOLOGY | Facility: CLINIC | Age: 62
End: 2022-06-06
Payer: COMMERCIAL

## 2022-06-06 DIAGNOSIS — Z48.02 VISIT FOR SUTURE REMOVAL: Primary | ICD-10-CM

## 2022-06-06 PROCEDURE — 99024 POSTOP FOLLOW-UP VISIT: CPT | Mod: S$GLB,,, | Performed by: STUDENT IN AN ORGANIZED HEALTH CARE EDUCATION/TRAINING PROGRAM

## 2022-06-06 PROCEDURE — 99999 PR PBB SHADOW E&M-EST. PATIENT-LVL I: CPT | Mod: PBBFAC,,,

## 2022-06-06 PROCEDURE — 99999 PR PBB SHADOW E&M-EST. PATIENT-LVL I: ICD-10-PCS | Mod: PBBFAC,,,

## 2022-06-06 PROCEDURE — 99024 PR POST-OP FOLLOW-UP VISIT: ICD-10-PCS | Mod: S$GLB,,, | Performed by: STUDENT IN AN ORGANIZED HEALTH CARE EDUCATION/TRAINING PROGRAM

## 2022-06-09 LAB
FINAL PATHOLOGIC DIAGNOSIS: NORMAL
GROSS: NORMAL
Lab: NORMAL
MICROSCOPIC EXAM: NORMAL

## 2022-07-04 NOTE — PROGRESS NOTES
Subjective:       Patient ID: Mary Mike is a 61 y.o. female.    Chief Complaint: Fatigue (Still tired ), Iron deficiency anemia, Fixed drug eruption, Abdominal bulge, and Back Pain    Patient is a 61-year-old  female who comes for follow-up.    Previous 2 visits were for skin rash and dermatology consultation was obtained and this was assumed to be fixed drug eruption.  The Culprit drug is considered to be thus far azo.    Underlying medical issues of gastroesophageal reflux, mood disorder with depressive anxiety and insomnia, osteoporosis without pathological fracture, restless leg syndrome have been noted.    Recent blood counts had shown anemia with deficient iron indices.  Indicating iron deficiency.  Prescription for iron supplement was called to the patient.    Underlying bariatric surgery also has been noted.  This was a gastric bypass.  Probably malabsorption.    In November 2018 she had the last colonoscopy.  Fatigue  This is a chronic problem. The current episode started more than 1 year ago. The problem occurs constantly. The problem has been unchanged. Associated symptoms include fatigue and a rash. Pertinent negatives include no abdominal pain, arthralgias, chest pain, chills, congestion, coughing, fever, headaches, joint swelling, nausea or numbness. Associated symptoms comments: Patient does have iron deficiency anemia..   Anemia  Presents for follow-up visit. There has been no abdominal pain, bruising/bleeding easily, confusion, fever, light-headedness, pallor or palpitations. Signs of blood loss that are not present include hematemesis, hematochezia, melena, menorrhagia and vaginal bleeding.   Back Pain  This is a chronic problem. The current episode started more than 1 year ago. The problem occurs constantly. The problem has been waxing and waning since onset. The quality of the pain is described as aching. The pain is the same all the time. The symptoms are aggravated by  coughing and position. Pertinent negatives include no abdominal pain, chest pain, dysuria, fever, headaches, numbness or pelvic pain.       Past Medical History:   Diagnosis Date    ADHD (attention deficit hyperactivity disorder)     Allergy     Anemia     Anxiety     Depression     Fatty liver     History of endoscopy 8/26/2021    Dr. Toney Cuevas-Z-line is irregular and was found 37 cm from the incisors.  Scattered ulcerations noted between 36-37 cm. 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  The gastrojejunal anastomosis was characterized by moderate stenosis and ulceration.  This was traversed.  The pouch to jejunum limb was characterized by erythema, friable mucosa, inflammation and ulceration.  The examine    Insomnia     Migraines     Osteoporosis     Restless leg syndrome 2005     Social History     Socioeconomic History    Marital status:    Tobacco Use    Smoking status: Never Smoker    Smokeless tobacco: Never Used   Substance and Sexual Activity    Alcohol use: Yes     Comment: occassional    Drug use: No    Sexual activity: Yes     Social Determinants of Health     Financial Resource Strain: Low Risk     Difficulty of Paying Living Expenses: Not very hard   Food Insecurity: No Food Insecurity    Worried About Running Out of Food in the Last Year: Never true    Ran Out of Food in the Last Year: Never true   Transportation Needs: No Transportation Needs    Lack of Transportation (Medical): No    Lack of Transportation (Non-Medical): No   Physical Activity: Sufficiently Active    Days of Exercise per Week: 7 days    Minutes of Exercise per Session: 30 min   Stress: Stress Concern Present    Feeling of Stress : To some extent   Social Connections: Moderately Integrated    Frequency of Communication with Friends and Family: More than three times a week    Frequency of Social Gatherings with Friends and Family: More than three times a week    Attends Buddhism  Services: More than 4 times per year    Active Member of Clubs or Organizations: No    Attends Club or Organization Meetings: Never    Marital Status:    Housing Stability: Low Risk     Unable to Pay for Housing in the Last Year: No    Number of Places Lived in the Last Year: 1    Unstable Housing in the Last Year: No     Past Surgical History:   Procedure Laterality Date    BREAST BIOPSY Left     benign 15 +years ago    BREAST SURGERY  2017    breast reduction    BUNIONECTOMY Right 2018     SECTION  1987    FRACTURE SURGERY  2016    ankle    GASTRIC RESTRICTION SURGERY      GASTRIC SLEEVE 2012    REPAIR OF EXTENSOR TENDON  2021    Procedure: REPAIR, TENDON, EXTENSOR;  Surgeon: Aly Zimmerman DPM;  Location: [facility] OR;  Service: Podiatry;;    SURGICAL REMOVAL OF METATARSAL HEAD Right 2021    Procedure: OSTECTOMY, METATARSAL BONE, HEAD;  Surgeon: Aly Zimmerman DPM;  Location: [facility] OR;  Service: Podiatry;  Laterality: Right;    TOTAL REDUCTION MAMMOPLASTY      2016     Family History   Problem Relation Age of Onset    Arthritis Mother     Colon cancer Mother     Diabetes Mother     Early death Mother     Miscarriages / Stillbirths Mother     Arthritis Father     Heart disease Father     Hypertension Father     Stroke Father     Vision loss Father     Breast cancer Maternal Aunt     Breast cancer Maternal Aunt     Vaginal cancer Paternal Grandmother     Heart disease Paternal Grandfather        Review of Systems   Constitutional: Positive for fatigue. Negative for activity change, appetite change, chills, fever and unexpected weight change (Expected weight loss of 50-60 lb after bariatric surgery.).        Expected weight loss after revision gastric surgery and bypass surgery.  Overall feels fatigued and run down and was never better after the bypass surgery.   HENT: Negative for congestion, postnasal drip, sinus pressure and trouble swallowing (Swallowing  has got better after esophageal dilation.).    Eyes: Negative for pain, discharge, redness and visual disturbance.   Respiratory: Negative for cough, chest tightness and shortness of breath.    Cardiovascular: Negative for chest pain, palpitations and leg swelling.   Gastrointestinal: Negative for abdominal distention, abdominal pain, anal bleeding, constipation, diarrhea, hematemesis, hematochezia, melena and nausea.        Recent history of revision gastric surgery followed by gastric bypass with no tangible of significant improvement in her symptoms of feeling nauseated.  Probably some esophageal stenosis and difficulty swallowing also.   Endocrine: Negative for cold intolerance, heat intolerance, polydipsia, polyphagia and polyuria.   Genitourinary: Negative for difficulty urinating, dysuria, frequency, hematuria, menorrhagia, pelvic pain and vaginal bleeding.        History of ovarian cyst removal.  Side of removal is unclear.   Musculoskeletal: Negative for arthralgias, back pain and joint swelling.        Her back pain seem to have got better after the bariatric surgery but it seems to have recurred at this point.    She complains of a cramps in the right thigh posteriorly.   Skin: Positive for rash. Negative for color change and pallor.        Skin lesions discovered recently consistent with generalized fix drug eruptions.  The causative drug is considered to be azo.   Allergic/Immunologic: Negative for environmental allergies, food allergies and immunocompromised state.   Neurological: Positive for dizziness. Negative for tremors, seizures, syncope, light-headedness, numbness and headaches.        Restless leg:-possibly contributed by anemia.   Hematological: Negative for adenopathy. Does not bruise/bleed easily.        Moderate hypochromic microcytic anemia possibly secondary to malabsorption and history of bypass.  Currently on iron supplements.  Patient also has ice PICA   Psychiatric/Behavioral:  "Negative for agitation, confusion and dysphoric mood. The patient is nervous/anxious.          Objective:      Blood pressure 118/73, pulse 60, height 5' 2" (1.575 m), weight 58.1 kg (128 lb). Body mass index is 23.41 kg/m².  Physical Exam  Vitals and nursing note reviewed.   Constitutional:       General: She is not in acute distress.     Appearance: Normal appearance. She is well-developed.   HENT:      Head: Normocephalic and atraumatic.      Nose: Nose normal.      Mouth/Throat:      Mouth: Mucous membranes are moist.   Eyes:      General: Lids are normal. Lids are everted, no foreign bodies appreciated.      Conjunctiva/sclera: Conjunctivae normal.      Pupils:      Right eye: Pupil is round and reactive.      Left eye: Pupil is round and reactive.   Neck:      Trachea: Trachea normal.   Cardiovascular:      Rate and Rhythm: Normal rate and regular rhythm.      Pulses: Normal pulses.      Heart sounds: Normal heart sounds, S1 normal and S2 normal.   Pulmonary:      Effort: Pulmonary effort is normal.      Breath sounds: Normal breath sounds.   Abdominal:      General: Bowel sounds are normal.      Palpations: Abdomen is not rigid.      Tenderness: There is abdominal tenderness in the periumbilical area and suprapubic area. There is no guarding.      Hernia: No hernia is present.          Comments: No hernia was felt or visualized on lying down and also standing up and coughing.  Probably her lower abdominal discomfort is more a reflection of constipation given the fact that she is currently also on iron supplements.   Musculoskeletal:         General: Normal range of motion.      Cervical back: Normal range of motion and neck supple. No muscular tenderness.        Back:    Lymphadenopathy:      Cervical: No cervical adenopathy.   Skin:     General: Skin is warm and dry.   Neurological:      General: No focal deficit present.      Mental Status: She is alert and oriented to person, place, and time. "   Psychiatric:         Mood and Affect: Mood normal.         Behavior: Behavior normal. Behavior is cooperative.           Assessment:       1. Other fatigue    2. Other iron deficiency anemia    3. Fixed drug eruption    4. History of Destini-en-Y gastric bypass    5. Chronic midline low back pain without sciatica           Final Pathologic Diagnosis 1. Skin, left posterior shoulder, punch biopsy:   - FEATURES SUGGESTIVE OF A FIXED DRUG ERUPTION.   - THE LESION IS EXCORIATED/ ULCERATED AND IMPETIGINIZED.            Plan:           Other fatigue  Comments:  Fatigue is most likely secondary to iron deficiency anemia.  Other causes may need to be explored if this is not an issue.  Orders:  -     Ambulatory referral/consult to Hematology / Oncology; Future; Expected date: 07/19/2022    Other iron deficiency anemia  Comments:  Iron deficiency anemia might be secondary to lack of absorption.  She is taking some supplements bariatric supplements.  Also iron supplements.  Orders:  -     CBC Auto Differential; Future; Expected date: 07/05/2022  -     Iron and TIBC; Future; Expected date: 07/05/2022  -     Ambulatory referral/consult to Hematology / Oncology; Future; Expected date: 07/19/2022    Fixed drug eruption  Comments:  Probably contributed by azo drug.  Referred to dermatology Dr. Zonia Eller and confirmed by histopathology of lesion.    History of Destini-en-Y gastric bypass  Comments:  Probably patient is not taking her supplements leading to anemia and fatigue.  Endoscopy done in 2021 by Dr. Toney Cuevas   Orders:  -     Ambulatory referral/consult to Hematology / Oncology; Future; Expected date: 07/19/2022    Chronic midline low back pain without sciatica  Comments:  Chronic in nature.  Also experiences spasms in legs.  Probably from the spine.      Patient's medical conditions have been reviewed.    Fatigue also has been reviewed and today she states that fatigue continues.  She is taking her iron supplements and  bariatric supplements.    It is time to check her iron level and blood counts and see if there is any improvement at all or not.  If there is no improvement at all in spite of taking iron supplements, then she is not absorbing it well.  She may need infusion in that case.  She does recall that when she was in [place] she would routinely get infusions for iron.    She also has lower abdominal discomfort in she is worried that she might have hernia.  Today on examination I did not find any evidence of hernia but some doughy feeling on palpation of lower abdomen.  She might be constipated secondary to taking multiple supplements including iron.    She also has chronic low back pain and the details of this is not worked out be by me as yet.  This will be addressed at follow-up.  Probably she might have some spinal canal stenosis or some lumbar disc problem which gets cause cramps in the legs.    Please note that cramps and charley horses also can occur because of iron deficiency.    Obviously the 1st issue would be to check her blood counts again and then take it from there.  If it does show some improvement then will continue with the iron supplements for another 2 months or so and treat her constipation and abdominal pain also.  On the other hand if there is no improvement in her iron counts and blood count then will have to give her infusion.    Iron deficiency anemia has been noted and cause of iron deficiency is attributed to    Recent diagnosis of fixed drug eruption also had been noted and this is getting better.  She has been advised not to take azo medication anymore.  Need to recall if she had similar type of reaction in past she had taken azo.      Current Outpatient Medications:     FLUoxetine 20 MG capsule, Take 1 capsule (20 mg total) by mouth every evening., Disp: 90 capsule, Rfl: 2    iron-vit c-b12-folic acid (ICAR-C PLUS) Tab, Take 1 tablet by mouth once daily., Disp: 30 tablet, Rfl: 2    multivitamin  (THERAGRAN) tablet, Take 1 tablet by mouth once daily., Disp: , Rfl:     pantoprazole (PROTONIX) 40 MG tablet, Take 1 tablet (40 mg total) by mouth once daily., Disp: 90 tablet, Rfl: 2    pramipexole (MIRAPEX) 1 MG tablet, Take 1.5 tablets (1.5 mg total) by mouth every evening., Disp: 135 tablet, Rfl: 1    traZODone (DESYREL) 50 MG tablet, Take 1 tablet (50 mg total) by mouth every evening., Disp: 90 tablet, Rfl: 2

## 2022-07-05 ENCOUNTER — OFFICE VISIT (OUTPATIENT)
Dept: FAMILY MEDICINE | Facility: CLINIC | Age: 62
End: 2022-07-05
Payer: COMMERCIAL

## 2022-07-05 VITALS
HEIGHT: 62 IN | WEIGHT: 128 LBS | HEART RATE: 60 BPM | DIASTOLIC BLOOD PRESSURE: 73 MMHG | SYSTOLIC BLOOD PRESSURE: 118 MMHG | BODY MASS INDEX: 23.55 KG/M2

## 2022-07-05 DIAGNOSIS — D50.8 OTHER IRON DEFICIENCY ANEMIA: Chronic | ICD-10-CM

## 2022-07-05 DIAGNOSIS — M54.50 CHRONIC MIDLINE LOW BACK PAIN WITHOUT SCIATICA: ICD-10-CM

## 2022-07-05 DIAGNOSIS — R53.83 OTHER FATIGUE: Primary | ICD-10-CM

## 2022-07-05 DIAGNOSIS — L27.1 FIXED DRUG ERUPTION: ICD-10-CM

## 2022-07-05 DIAGNOSIS — G89.29 CHRONIC MIDLINE LOW BACK PAIN WITHOUT SCIATICA: ICD-10-CM

## 2022-07-05 DIAGNOSIS — Z98.84 HISTORY OF ROUX-EN-Y GASTRIC BYPASS: ICD-10-CM

## 2022-07-05 PROCEDURE — 3008F PR BODY MASS INDEX (BMI) DOCUMENTED: ICD-10-PCS | Mod: CPTII,S$GLB,, | Performed by: INTERNAL MEDICINE

## 2022-07-05 PROCEDURE — 3074F SYST BP LT 130 MM HG: CPT | Mod: CPTII,S$GLB,, | Performed by: INTERNAL MEDICINE

## 2022-07-05 PROCEDURE — 1160F RVW MEDS BY RX/DR IN RCRD: CPT | Mod: CPTII,S$GLB,, | Performed by: INTERNAL MEDICINE

## 2022-07-05 PROCEDURE — 99214 PR OFFICE/OUTPT VISIT, EST, LEVL IV, 30-39 MIN: ICD-10-PCS | Mod: S$GLB,,, | Performed by: INTERNAL MEDICINE

## 2022-07-05 PROCEDURE — 3078F DIAST BP <80 MM HG: CPT | Mod: CPTII,S$GLB,, | Performed by: INTERNAL MEDICINE

## 2022-07-05 PROCEDURE — 3078F PR MOST RECENT DIASTOLIC BLOOD PRESSURE < 80 MM HG: ICD-10-PCS | Mod: CPTII,S$GLB,, | Performed by: INTERNAL MEDICINE

## 2022-07-05 PROCEDURE — 3008F BODY MASS INDEX DOCD: CPT | Mod: CPTII,S$GLB,, | Performed by: INTERNAL MEDICINE

## 2022-07-05 PROCEDURE — 99214 OFFICE O/P EST MOD 30 MIN: CPT | Mod: S$GLB,,, | Performed by: INTERNAL MEDICINE

## 2022-07-05 PROCEDURE — 1160F PR REVIEW ALL MEDS BY PRESCRIBER/CLIN PHARMACIST DOCUMENTED: ICD-10-PCS | Mod: CPTII,S$GLB,, | Performed by: INTERNAL MEDICINE

## 2022-07-05 PROCEDURE — 3074F PR MOST RECENT SYSTOLIC BLOOD PRESSURE < 130 MM HG: ICD-10-PCS | Mod: CPTII,S$GLB,, | Performed by: INTERNAL MEDICINE

## 2022-07-05 PROCEDURE — 1159F MED LIST DOCD IN RCRD: CPT | Mod: CPTII,S$GLB,, | Performed by: INTERNAL MEDICINE

## 2022-07-05 PROCEDURE — 1159F PR MEDICATION LIST DOCUMENTED IN MEDICAL RECORD: ICD-10-PCS | Mod: CPTII,S$GLB,, | Performed by: INTERNAL MEDICINE

## 2022-07-05 NOTE — Clinical Note
I would like to give iron infusion to this patient who has a resistant iron deficiency anemia given history of gastric bypass surgery.  What type of iron supplementation infusion would be okay for her.  Dr. Cristy MONTILLA

## 2022-07-06 LAB
BASOPHILS # BLD AUTO: 49 CELLS/UL (ref 0–200)
BASOPHILS NFR BLD AUTO: 1 %
EOSINOPHIL # BLD AUTO: 250 CELLS/UL (ref 15–500)
EOSINOPHIL NFR BLD AUTO: 5.1 %
ERYTHROCYTE [DISTWIDTH] IN BLOOD BY AUTOMATED COUNT: 16 % (ref 11–15)
HCT VFR BLD AUTO: 34.9 % (ref 35–45)
HGB BLD-MCNC: 10.4 G/DL (ref 11.7–15.5)
IRON SATN MFR SERPL: 3 % (CALC) (ref 16–45)
IRON SERPL-MCNC: 16 MCG/DL (ref 45–160)
LYMPHOCYTES # BLD AUTO: 1416 CELLS/UL (ref 850–3900)
LYMPHOCYTES NFR BLD AUTO: 28.9 %
MCH RBC QN AUTO: 23.4 PG (ref 27–33)
MCHC RBC AUTO-ENTMCNC: 29.8 G/DL (ref 32–36)
MCV RBC AUTO: 78.4 FL (ref 80–100)
MONOCYTES # BLD AUTO: 549 CELLS/UL (ref 200–950)
MONOCYTES NFR BLD AUTO: 11.2 %
NEUTROPHILS # BLD AUTO: 2636 CELLS/UL (ref 1500–7800)
NEUTROPHILS NFR BLD AUTO: 53.8 %
PLATELET # BLD AUTO: 319 THOUSAND/UL (ref 140–400)
PMV BLD REES-ECKER: 11.1 FL (ref 7.5–12.5)
RBC # BLD AUTO: 4.45 MILLION/UL (ref 3.8–5.1)
TIBC SERPL-MCNC: 538 MCG/DL (CALC) (ref 250–450)
WBC # BLD AUTO: 4.9 THOUSAND/UL (ref 3.8–10.8)

## 2022-07-07 ENCOUNTER — PATIENT MESSAGE (OUTPATIENT)
Dept: FAMILY MEDICINE | Facility: CLINIC | Age: 62
End: 2022-07-07

## 2022-07-12 ENCOUNTER — PATIENT MESSAGE (OUTPATIENT)
Dept: FAMILY MEDICINE | Facility: CLINIC | Age: 62
End: 2022-07-12

## 2022-07-20 ENCOUNTER — OFFICE VISIT (OUTPATIENT)
Dept: HEMATOLOGY/ONCOLOGY | Facility: CLINIC | Age: 62
End: 2022-07-20
Payer: COMMERCIAL

## 2022-07-20 VITALS
RESPIRATION RATE: 18 BRPM | DIASTOLIC BLOOD PRESSURE: 64 MMHG | HEART RATE: 63 BPM | BODY MASS INDEX: 23.67 KG/M2 | WEIGHT: 128.63 LBS | SYSTOLIC BLOOD PRESSURE: 120 MMHG | TEMPERATURE: 98 F | HEIGHT: 62 IN

## 2022-07-20 DIAGNOSIS — Z98.84 HISTORY OF ROUX-EN-Y GASTRIC BYPASS: ICD-10-CM

## 2022-07-20 DIAGNOSIS — D50.8 IRON DEFICIENCY ANEMIA FOLLOWING BARIATRIC SURGERY: Primary | ICD-10-CM

## 2022-07-20 DIAGNOSIS — R53.83 OTHER FATIGUE: ICD-10-CM

## 2022-07-20 DIAGNOSIS — K95.89 IRON DEFICIENCY ANEMIA FOLLOWING BARIATRIC SURGERY: Primary | ICD-10-CM

## 2022-07-20 DIAGNOSIS — D51.3 OTHER DIETARY VITAMIN B12 DEFICIENCY ANEMIA: ICD-10-CM

## 2022-07-20 DIAGNOSIS — Z80.49 FAMILY HISTORY OF CERVICAL CANCER: ICD-10-CM

## 2022-07-20 PROBLEM — D51.9 B12 DEFICIENCY ANEMIA: Status: ACTIVE | Noted: 2022-07-20

## 2022-07-20 PROCEDURE — 3008F PR BODY MASS INDEX (BMI) DOCUMENTED: ICD-10-PCS | Mod: CPTII,S$GLB,, | Performed by: NURSE PRACTITIONER

## 2022-07-20 PROCEDURE — 1159F MED LIST DOCD IN RCRD: CPT | Mod: CPTII,S$GLB,, | Performed by: NURSE PRACTITIONER

## 2022-07-20 PROCEDURE — 3074F SYST BP LT 130 MM HG: CPT | Mod: CPTII,S$GLB,, | Performed by: NURSE PRACTITIONER

## 2022-07-20 PROCEDURE — 3078F PR MOST RECENT DIASTOLIC BLOOD PRESSURE < 80 MM HG: ICD-10-PCS | Mod: CPTII,S$GLB,, | Performed by: NURSE PRACTITIONER

## 2022-07-20 PROCEDURE — 3074F PR MOST RECENT SYSTOLIC BLOOD PRESSURE < 130 MM HG: ICD-10-PCS | Mod: CPTII,S$GLB,, | Performed by: NURSE PRACTITIONER

## 2022-07-20 PROCEDURE — 99205 OFFICE O/P NEW HI 60 MIN: CPT | Mod: S$GLB,,, | Performed by: NURSE PRACTITIONER

## 2022-07-20 PROCEDURE — 3008F BODY MASS INDEX DOCD: CPT | Mod: CPTII,S$GLB,, | Performed by: NURSE PRACTITIONER

## 2022-07-20 PROCEDURE — 3078F DIAST BP <80 MM HG: CPT | Mod: CPTII,S$GLB,, | Performed by: NURSE PRACTITIONER

## 2022-07-20 PROCEDURE — 1159F PR MEDICATION LIST DOCUMENTED IN MEDICAL RECORD: ICD-10-PCS | Mod: CPTII,S$GLB,, | Performed by: NURSE PRACTITIONER

## 2022-07-20 PROCEDURE — 99205 PR OFFICE/OUTPT VISIT, NEW, LEVL V, 60-74 MIN: ICD-10-PCS | Mod: S$GLB,,, | Performed by: NURSE PRACTITIONER

## 2022-07-20 RX ORDER — SODIUM CHLORIDE 0.9 % (FLUSH) 0.9 %
10 SYRINGE (ML) INJECTION
Status: CANCELLED | OUTPATIENT
Start: 2022-07-20

## 2022-07-20 RX ORDER — CYANOCOBALAMIN 1000 UG/ML
1000 INJECTION, SOLUTION INTRAMUSCULAR; SUBCUTANEOUS
Status: CANCELLED | OUTPATIENT
Start: 2022-07-20

## 2022-07-20 RX ORDER — SODIUM CHLORIDE 9 MG/ML
INJECTION, SOLUTION INTRAVENOUS CONTINUOUS
Status: CANCELLED | OUTPATIENT
Start: 2022-07-20

## 2022-07-20 RX ORDER — HEPARIN 100 UNIT/ML
5 SYRINGE INTRAVENOUS
Status: CANCELLED | OUTPATIENT
Start: 2022-07-20

## 2022-07-20 RX ORDER — DIPHENHYDRAMINE HYDROCHLORIDE 50 MG/ML
25 INJECTION INTRAMUSCULAR; INTRAVENOUS
Status: CANCELLED
Start: 2022-07-20

## 2022-07-20 RX ORDER — ACETAMINOPHEN 325 MG/1
650 TABLET ORAL
Status: CANCELLED
Start: 2022-07-20

## 2022-07-20 RX ORDER — DIPHENHYDRAMINE HYDROCHLORIDE 50 MG/ML
50 INJECTION INTRAMUSCULAR; INTRAVENOUS ONCE AS NEEDED
Status: CANCELLED | OUTPATIENT
Start: 2022-07-20

## 2022-07-20 RX ORDER — EPINEPHRINE 0.3 MG/.3ML
0.3 INJECTION SUBCUTANEOUS ONCE AS NEEDED
Status: CANCELLED | OUTPATIENT
Start: 2022-07-20

## 2022-07-20 RX ORDER — METHYLPREDNISOLONE SOD SUCC 125 MG
125 VIAL (EA) INJECTION ONCE AS NEEDED
Status: CANCELLED | OUTPATIENT
Start: 2022-07-20

## 2022-07-20 NOTE — PROGRESS NOTES
[facility] Hematolgy/Oncology  History & Physical    Subjective:      Patient ID:   NAME: Mary Mike : 1960     61 y.o. female    Referring Doc: Jimbo Muniz MD  Other Physicians: Tia Mckinney         Chief Complaint: Iron Deficiency Anemia - new patient       HPI:  61 y.o. female with diagnosis of Iron deficiency anemia who has been referred by Jimbo Muniz MD for evaluation by medical hematology/oncology.     She has a history of gastric sleeve in  and then had complications and had to receive a gastric bypass a year ago.   She states she remembers needing iron infusions years ago when she lived in [place].      She has lived in [place] for about 4 years now. She has been on oral iron supplements a few months, but has not seen much improvement.     She is on bariatric multivitamins as well.     She reports have progressive fatigue and has PATIÑO.     ROS:   Review of Systems   Constitutional: Positive for fatigue. Negative for appetite change and unexpected weight change.   HENT: Negative for mouth sores.    Eyes: Negative for visual disturbance.   Respiratory: Negative for cough and shortness of breath.    Cardiovascular: Negative for chest pain.   Gastrointestinal: Positive for constipation. Negative for abdominal pain and diarrhea.   Genitourinary: Negative for frequency.   Musculoskeletal: Negative for back pain.        Weakness     Skin: Negative for rash.   Neurological: Positive for weakness and light-headedness. Negative for headaches.   Hematological: Negative for adenopathy.   Psychiatric/Behavioral: The patient is not nervous/anxious.        Past Medical/Surgical History:  Past Medical History:   Diagnosis Date    ADHD (attention deficit hyperactivity disorder)     Allergy     Anemia     Anxiety     Depression     Fatty liver     History of endoscopy 2021    Dr. Toney Cuevas-Z-line is irregular and was found 37 cm from the incisors.  Scattered ulcerations  noted between 36-37 cm. 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  The gastrojejunal anastomosis was characterized by moderate stenosis and ulceration.  This was traversed.  The pouch to jejunum limb was characterized by erythema, friable mucosa, inflammation and ulceration.  The examine    Insomnia     Migraines     Osteoporosis     Restless leg syndrome      Past Surgical History:   Procedure Laterality Date    BREAST BIOPSY Left     benign 15 +years ago    BREAST SURGERY  2017    breast reduction    BUNIONECTOMY Right 2018     SECTION  1987    FRACTURE SURGERY  2016    ankle    GASTRIC RESTRICTION SURGERY      GASTRIC SLEEVE 2012    REPAIR OF EXTENSOR TENDON  2021    Procedure: REPAIR, TENDON, EXTENSOR;  Surgeon: Aly Zimmerman DPM;  Location: [facility] OR;  Service: Podiatry;;    SURGICAL REMOVAL OF METATARSAL HEAD Right 2021    Procedure: OSTECTOMY, METATARSAL BONE, HEAD;  Surgeon: Aly Zimmerman DPM;  Location: [facility] OR;  Service: Podiatry;  Laterality: Right;    TOTAL REDUCTION MAMMOPLASTY               Allergies:  Review of patient's allergies indicates:  No Known Allergies    Social/Family History:  Social History     Socioeconomic History    Marital status:    Tobacco Use    Smoking status: Never Smoker    Smokeless tobacco: Never Used   Substance and Sexual Activity    Alcohol use: Yes     Comment: occassional    Drug use: No    Sexual activity: Yes     Social Determinants of Health     Financial Resource Strain: Low Risk     Difficulty of Paying Living Expenses: Not very hard   Food Insecurity: No Food Insecurity    Worried About Running Out of Food in the Last Year: Never true    Ran Out of Food in the Last Year: Never true   Transportation Needs: No Transportation Needs    Lack of Transportation (Medical): No    Lack of Transportation (Non-Medical): No   Physical Activity: Sufficiently Active    Days of Exercise per Week: 7 days     Minutes of Exercise per Session: 30 min   Stress: Stress Concern Present    Feeling of Stress : To some extent   Social Connections: Moderately Integrated    Frequency of Communication with Friends and Family: More than three times a week    Frequency of Social Gatherings with Friends and Family: More than three times a week    Attends Jain Services: More than 4 times per year    Active Member of Clubs or Organizations: No    Attends Club or Organization Meetings: Never    Marital Status:    Housing Stability: Low Risk     Unable to Pay for Housing in the Last Year: No    Number of Places Lived in the Last Year: 1    Unstable Housing in the Last Year: No     Family History   Problem Relation Age of Onset    Arthritis Mother     Colon cancer Mother     Diabetes Mother     Early death Mother     Miscarriages / Stillbirths Mother     Arthritis Father     Heart disease Father     Hypertension Father     Stroke Father     Vision loss Father     Breast cancer Maternal Aunt     Vaginal cancer Paternal Grandmother     Heart disease Paternal Grandfather          Medications:    Current Outpatient Medications:     FLUoxetine 20 MG capsule, TAKE 1 CAPSULE(20 MG) BY MOUTH EVERY EVENING, Disp: 90 capsule, Rfl: 2    iron-vit c-b12-folic acid (ICAR-C PLUS) Tab, Take 1 tablet by mouth once daily., Disp: 30 tablet, Rfl: 2    multivitamin (THERAGRAN) tablet, Take 1 tablet by mouth once daily., Disp: , Rfl:     pantoprazole (PROTONIX) 40 MG tablet, Take 1 tablet (40 mg total) by mouth once daily., Disp: 90 tablet, Rfl: 2    pramipexole (MIRAPEX) 1 MG tablet, Take 1.5 tablets (1.5 mg total) by mouth every evening., Disp: 135 tablet, Rfl: 1    traZODone (DESYREL) 50 MG tablet, Take 1 tablet (50 mg total) by mouth every evening., Disp: 90 tablet, Rfl: 2      Pathology:  Cancer Staging  No matching staging information was found for the patient.      Objective:   Vitals:  Blood pressure 120/64,  "pulse 63, temperature 98 °F (36.7 °C), resp. rate 18, height 5' 2" (1.575 m), weight 58.3 kg (128 lb 9.6 oz).    Physical Examination:   Physical Exam  Constitutional:       Appearance: Normal appearance.   HENT:      Head: Normocephalic and atraumatic.      Mouth/Throat:      Mouth: Mucous membranes are moist.   Cardiovascular:      Rate and Rhythm: Normal rate and regular rhythm.      Pulses: Normal pulses.      Heart sounds: Normal heart sounds.   Pulmonary:      Effort: Pulmonary effort is normal. No respiratory distress.      Breath sounds: Normal breath sounds. No wheezing.   Abdominal:      General: There is no distension.      Palpations: Abdomen is soft. There is no mass.      Tenderness: There is no abdominal tenderness.   Musculoskeletal:         General: No swelling. Normal range of motion.      Right lower leg: No edema.      Left lower leg: No edema.   Skin:     General: Skin is warm and dry.      Capillary Refill: Capillary refill takes 2 to 3 seconds.      Findings: No bruising or rash.   Neurological:      Mental Status: She is alert and oriented to person, place, and time. Mental status is at baseline.      Motor: No weakness.   Psychiatric:         Mood and Affect: Mood normal.         Behavior: Behavior normal.            Labs:   Lab Results   Component Value Date    WBC 4.9 07/05/2022    HGB 10.4 (L) 07/05/2022    HCT 34.9 (L) 07/05/2022    MCV 78.4 (L) 07/05/2022     07/05/2022    CMP  Sodium   Date Value Ref Range Status   05/19/2022 137 135 - 146 mmol/L Final     Potassium   Date Value Ref Range Status   05/19/2022 4.4 3.5 - 5.3 mmol/L Final     Chloride   Date Value Ref Range Status   05/19/2022 102 98 - 110 mmol/L Final     CO2   Date Value Ref Range Status   05/19/2022 27 20 - 32 mmol/L Final     Glucose   Date Value Ref Range Status   05/19/2022 140 (H) 65 - 139 mg/dL Final     Comment:               Non-fasting reference interval          BUN   Date Value Ref Range Status "   05/19/2022 9 7 - 25 mg/dL Final     Creatinine   Date Value Ref Range Status   05/19/2022 0.74 0.50 - 1.05 mg/dL Final     Comment:     For patients >49 years of age, the reference limit  for Creatinine is approximately 13% higher for people  identified as -American.          Calcium   Date Value Ref Range Status   05/19/2022 9.0 8.6 - 10.4 mg/dL Final     Total Protein   Date Value Ref Range Status   05/19/2022 6.7 6.1 - 8.1 g/dL Final     Albumin   Date Value Ref Range Status   05/19/2022 4.1 3.6 - 5.1 g/dL Final     Total Bilirubin   Date Value Ref Range Status   05/19/2022 0.3 0.2 - 1.2 mg/dL Final     Alkaline Phosphatase   Date Value Ref Range Status   06/23/2021 57 55 - 135 U/L Final     AST   Date Value Ref Range Status   05/19/2022 20 10 - 35 U/L Final     ALT   Date Value Ref Range Status   05/19/2022 14 6 - 29 U/L Final     Anion Gap   Date Value Ref Range Status   06/23/2021 9 8 - 16 mmol/L Final     eGFR if    Date Value Ref Range Status   05/19/2022 103 > OR = 60 mL/min/1.73m2 Final     eGFR if non    Date Value Ref Range Status   05/19/2022 89 > OR = 60 mL/min/1.73m2 Final     Lab Results   Component Value Date    [id number] 198 (L) 05/24/2022     Lab Results   Component Value Date    IRON 16 (L) 07/05/2022    TIBC 538 (H) 07/05/2022    FERRITIN 5 (L) 05/24/2022       Radiology/Diagnostic Studies:    Mammogram 11/2/2022:   Narrative & Impression  EXAMINATION:  MAMMO DIGITAL SCREENING BILAT WITH BRIGHT     CLINICAL HISTORY:  Z12.31,  Encounter for screening mammogram for malignant neoplasm of breast     TECHNIQUE:  CMS MANDATED QUALITY DATA - MAMMOGRAPHY - 225     [facility] utilizes a reminder system to ensure that patients receive reminder letters for appointments. This includes reminders for routine screening mammograms, diagnostic mammograms, or other breast imaging interventions as appropriate. This patient will be placed in the appropriate  reminder system.     FINDINGS:  Compared with the prior mammograms of 07/31/2020, 12/14/2018 and 11/30/2017, the breasts are again composed of scattered fibroglandular densities. There are no suspicious calcifications or masses detected.  The interpretation was assisted by Computer Aided Detection with P[employer] ImageChecker.     Impression:     Negative mammogram. Annual screening mammography is recommended.     BI-RADS CATEGORY 1: NEGATIVE.     Technologist: BALJINDER Kothari lifetime risk calculated at 5.62 %.     Women with a greater than 20% lifetime risk for breast cancer may benefit from supplemental screening including annual breast magnetic resonance imaging (MRI), in addition to annual mammography.        Electronically signed by: Antonio Esqueda MD  Date:                                            11/02/2021  Time:                                           13:40        All lab results and imaging results have been reviewed and discussed with the patient    Assessment:   (1) 61 y.o. female with diagnosis of iron deficiency anemia. She was referred to us by Dr. Muniz and has failed oral iron therapy. She does have a history of gastric by pass and need for IV iron infusions.   - reviewed iron labs   - set up with IV iron    - education given   - consent signed   - orders added   - recheck labs after Iron infusions and meet back with Dr. Ndiaye     (2) B 12 deficiency   - B 12 level at 198 - start B12 injections weekly x 4 and then monthly   - recheck level in 3 months     (3) history of Gastric by pass    -continue the bariatric supplement   - continue to follow up with GI as needed     (4) Family history of cervical cancer   - up to date on mmg   - referral to gyn to establish care       VISIT DIAGNOSES:          Iron deficiency anemia following bariatric surgery  -     Ambulatory referral/consult to Gynecology; Future; Expected date: 07/27/2022    Other dietary vitamin B12 deficiency anemia    Other fatigue  -      Ambulatory referral/consult to Hematology / Oncology    History of Destini-en-Y gastric bypass  -     Ambulatory referral/consult to Hematology / Oncology    Family history of cervical cancer  -     Ambulatory referral/consult to Gynecology; Future; Expected date: 07/27/2022    Other orders  -     ferric carboxymaltose (INJECTAFER) 750 mg in sodium chloride 0.9% 265 mL infusion  -     0.9%  NaCl infusion  -     sodium chloride 0.9% flush 10 mL  -     heparin, porcine (PF) 100 unit/mL injection flush 500 Units  -     sodium chloride 0.9% 100 mL flush bag  -     EPINEPHrine (EPIPEN) 0.3 mg/0.3 mL pen injection 0.3 mg  -     diphenhydrAMINE injection 50 mg  -     methylPREDNISolone sodium succinate injection 125 mg  -     sodium chloride 0.9% bolus 1,000 mL  -     diphenhydrAMINE injection 25 mg  -     acetaminophen tablet 650 mg  -     cyanocobalamin injection 1,000 mcg        Plan:     PLAN:  1. First appt today - labs reviewed by Dr. Muniz   2. Start injectafer x 2 doses   3. Start B12 injections weekly x 4 and then monthly   4. Referral to GYN to establish care   5. Standing lab orders placed for every 12 weeks       RTC in  4 weeks with Dr. Ndiaye and 8 weeks with me     Fax note to Jimbo Muniz MD and Dr. Ndiaye         I have explained and the patient understands all of  the current recommendation(s). I have answered all of their questions to the best of my ability and to their complete satisfaction.         Education  Given to patient on injectafer   erric Carboxymaltose (HERMES-ik rah-box-ee-MAWL-tose)  Treats anemia (not enough iron in the blood).  Brand Name(s):Injectafer  There may be other brand names for this medicine.  When This Medicine Should Not Be Used:  This medicine is not right for everyone. You should not receive it if you had an allergic reaction to ferric carboxymaltose.  How to Use This Medicine:  Injectable  · Your doctor will prescribe your dose and schedule. This medicine is given  through a needle placed in a vein.  · A nurse or other health provider will give you this medicine.  · Read and follow the patient instructions that come with this medicine. Talk to your doctor or pharmacist if you have any questions.  Drugs and Foods to Avoid:  Ask your doctor or pharmacist before using any other medicine, including over-the-counter medicines, vitamins, and herbal products.  · Some foods and medicines can affect how this medicine works. Tell your doctor if you are also using an iron supplement that you take by mouth.  Warnings While Using This Medicine:  · Tell your doctor if you are pregnant or breastfeeding, or if you have liver disease or high blood pressure. Tell your doctor if you have had an allergic reaction to injectable iron products.  · This medicine may cause high blood pressure.  · Tell any doctor or dentist who treats you that you are using this medicine. This medicine may affect certain medical test results.  · Your doctor will do lab tests at regular visits to check on the effects of this medicine. Keep all appointments.  Possible Side Effects While Using This Medicine:  Call your doctor right away if you notice any of these side effects:  · Allergic reaction: Itching or hives, swelling in your face or hands, swelling or tingling in your mouth or throat, chest tightness, trouble breathing  · Bone pain, loss of appetite, seizures, unusual tiredness or weakness  · Chest pain, trouble breathing  · Fast, slow, or pounding heartbeat  · Lightheadedness, dizziness, fainting  · Warmth or redness in your face, neck, arms, or upper chest  If you notice these less serious side effects, talk with your doctor:  · Headache  · Nausea, vomiting  · Pain, discoloration, or a bruise under your skin where the needle is placed  · Skin rash or redness    Thank you for allowing me to participate in this patient's care. Please call with any questions or concerns.    Electronically signed Lynsey Parr  MSN,APRN,AGNP-C

## 2022-07-22 ENCOUNTER — PATIENT MESSAGE (OUTPATIENT)
Dept: FAMILY MEDICINE | Facility: CLINIC | Age: 62
End: 2022-07-22

## 2022-08-02 ENCOUNTER — PATIENT MESSAGE (OUTPATIENT)
Dept: FAMILY MEDICINE | Facility: CLINIC | Age: 62
End: 2022-08-02

## 2022-08-04 ENCOUNTER — INFUSION (OUTPATIENT)
Dept: INFUSION THERAPY | Facility: HOSPITAL | Age: 62
End: 2022-08-04
Attending: INTERNAL MEDICINE
Payer: COMMERCIAL

## 2022-08-04 VITALS
BODY MASS INDEX: 24.06 KG/M2 | SYSTOLIC BLOOD PRESSURE: 123 MMHG | WEIGHT: 130.75 LBS | HEIGHT: 62 IN | TEMPERATURE: 98 F | RESPIRATION RATE: 17 BRPM | HEART RATE: 56 BPM | DIASTOLIC BLOOD PRESSURE: 68 MMHG

## 2022-08-04 DIAGNOSIS — D50.8 IRON DEFICIENCY ANEMIA FOLLOWING BARIATRIC SURGERY: Primary | ICD-10-CM

## 2022-08-04 DIAGNOSIS — K95.89 IRON DEFICIENCY ANEMIA FOLLOWING BARIATRIC SURGERY: Primary | ICD-10-CM

## 2022-08-04 DIAGNOSIS — D51.3 OTHER DIETARY VITAMIN B12 DEFICIENCY ANEMIA: ICD-10-CM

## 2022-08-04 PROCEDURE — 96365 THER/PROPH/DIAG IV INF INIT: CPT

## 2022-08-04 PROCEDURE — 25000003 PHARM REV CODE 250: Performed by: NURSE PRACTITIONER

## 2022-08-04 PROCEDURE — 63600175 PHARM REV CODE 636 W HCPCS: Performed by: NURSE PRACTITIONER

## 2022-08-04 PROCEDURE — 96372 THER/PROPH/DIAG INJ SC/IM: CPT | Mod: 59

## 2022-08-04 PROCEDURE — 96367 TX/PROPH/DG ADDL SEQ IV INF: CPT

## 2022-08-04 RX ORDER — DIPHENHYDRAMINE HYDROCHLORIDE 50 MG/ML
50 INJECTION INTRAMUSCULAR; INTRAVENOUS ONCE AS NEEDED
Status: CANCELLED | OUTPATIENT
Start: 2022-08-11

## 2022-08-04 RX ORDER — CYANOCOBALAMIN 1000 UG/ML
1000 INJECTION, SOLUTION INTRAMUSCULAR; SUBCUTANEOUS
Status: COMPLETED | OUTPATIENT
Start: 2022-08-04 | End: 2022-08-04

## 2022-08-04 RX ORDER — ACETAMINOPHEN 325 MG/1
650 TABLET ORAL
Status: CANCELLED
Start: 2022-08-11

## 2022-08-04 RX ORDER — CYANOCOBALAMIN 1000 UG/ML
1000 INJECTION, SOLUTION INTRAMUSCULAR; SUBCUTANEOUS
Status: CANCELLED | OUTPATIENT
Start: 2022-08-04

## 2022-08-04 RX ORDER — SODIUM CHLORIDE 9 MG/ML
INJECTION, SOLUTION INTRAVENOUS CONTINUOUS
Status: CANCELLED | OUTPATIENT
Start: 2022-08-11

## 2022-08-04 RX ORDER — SODIUM CHLORIDE 0.9 % (FLUSH) 0.9 %
10 SYRINGE (ML) INJECTION
Status: DISCONTINUED | OUTPATIENT
Start: 2022-08-04 | End: 2022-08-04 | Stop reason: HOSPADM

## 2022-08-04 RX ORDER — EPINEPHRINE 0.3 MG/.3ML
0.3 INJECTION SUBCUTANEOUS ONCE AS NEEDED
Status: CANCELLED | OUTPATIENT
Start: 2022-08-11

## 2022-08-04 RX ORDER — DIPHENHYDRAMINE HYDROCHLORIDE 50 MG/ML
25 INJECTION INTRAMUSCULAR; INTRAVENOUS
Status: CANCELLED
Start: 2022-08-11

## 2022-08-04 RX ORDER — SODIUM CHLORIDE 9 MG/ML
INJECTION, SOLUTION INTRAVENOUS CONTINUOUS
Status: DISCONTINUED | OUTPATIENT
Start: 2022-08-04 | End: 2022-08-04 | Stop reason: HOSPADM

## 2022-08-04 RX ORDER — ACETAMINOPHEN 325 MG/1
650 TABLET ORAL
Status: COMPLETED | OUTPATIENT
Start: 2022-08-04 | End: 2022-08-04

## 2022-08-04 RX ORDER — METHYLPREDNISOLONE SOD SUCC 125 MG
125 VIAL (EA) INJECTION ONCE AS NEEDED
Status: CANCELLED | OUTPATIENT
Start: 2022-08-11

## 2022-08-04 RX ORDER — SODIUM CHLORIDE 0.9 % (FLUSH) 0.9 %
10 SYRINGE (ML) INJECTION
Status: CANCELLED | OUTPATIENT
Start: 2022-08-11

## 2022-08-04 RX ORDER — HEPARIN 100 UNIT/ML
5 SYRINGE INTRAVENOUS
Status: CANCELLED | OUTPATIENT
Start: 2022-08-11

## 2022-08-04 RX ADMIN — SODIUM CHLORIDE: 9 INJECTION, SOLUTION INTRAVENOUS at 11:08

## 2022-08-04 RX ADMIN — DIPHENHYDRAMINE HYDROCHLORIDE 25 MG: 50 INJECTION INTRAMUSCULAR; INTRAVENOUS at 11:08

## 2022-08-04 RX ADMIN — ACETAMINOPHEN 650 MG: 325 TABLET ORAL at 11:08

## 2022-08-04 RX ADMIN — FERRIC CARBOXYMALTOSE INJECTION 750 MG: 50 INJECTION, SOLUTION INTRAVENOUS at 11:08

## 2022-08-04 RX ADMIN — CYANOCOBALAMIN 1000 MCG: 1000 INJECTION, SOLUTION INTRAMUSCULAR at 11:08

## 2022-08-04 NOTE — PLAN OF CARE
Problem: Fatigue  Goal: Improved Activity Tolerance  8/4/2022 1125 by Erin Espinoza RN  Outcome: Met  8/4/2022 1125 by Erin Espinoza RN  Outcome: Ongoing, Progressing

## 2022-08-11 ENCOUNTER — INFUSION (OUTPATIENT)
Dept: INFUSION THERAPY | Facility: HOSPITAL | Age: 62
End: 2022-08-11
Attending: INTERNAL MEDICINE
Payer: COMMERCIAL

## 2022-08-11 VITALS
TEMPERATURE: 98 F | WEIGHT: 130.5 LBS | HEIGHT: 62 IN | RESPIRATION RATE: 18 BRPM | BODY MASS INDEX: 24.01 KG/M2 | OXYGEN SATURATION: 99 % | DIASTOLIC BLOOD PRESSURE: 74 MMHG | SYSTOLIC BLOOD PRESSURE: 125 MMHG | HEART RATE: 66 BPM

## 2022-08-11 DIAGNOSIS — D50.8 IRON DEFICIENCY ANEMIA FOLLOWING BARIATRIC SURGERY: Primary | ICD-10-CM

## 2022-08-11 DIAGNOSIS — D51.3 OTHER DIETARY VITAMIN B12 DEFICIENCY ANEMIA: ICD-10-CM

## 2022-08-11 DIAGNOSIS — K95.89 IRON DEFICIENCY ANEMIA FOLLOWING BARIATRIC SURGERY: Primary | ICD-10-CM

## 2022-08-11 PROCEDURE — 25000003 PHARM REV CODE 250: Performed by: NURSE PRACTITIONER

## 2022-08-11 PROCEDURE — 96365 THER/PROPH/DIAG IV INF INIT: CPT

## 2022-08-11 PROCEDURE — 63600175 PHARM REV CODE 636 W HCPCS: Performed by: NURSE PRACTITIONER

## 2022-08-11 PROCEDURE — 96367 TX/PROPH/DG ADDL SEQ IV INF: CPT

## 2022-08-11 RX ORDER — HEPARIN 100 UNIT/ML
5 SYRINGE INTRAVENOUS
OUTPATIENT
Start: 2022-08-18

## 2022-08-11 RX ORDER — CYANOCOBALAMIN 1000 UG/ML
1000 INJECTION, SOLUTION INTRAMUSCULAR; SUBCUTANEOUS
Status: CANCELLED | OUTPATIENT
Start: 2022-08-11

## 2022-08-11 RX ORDER — SODIUM CHLORIDE 9 MG/ML
INJECTION, SOLUTION INTRAVENOUS CONTINUOUS
Status: CANCELLED | OUTPATIENT
Start: 2022-08-18

## 2022-08-11 RX ORDER — HEPARIN 100 UNIT/ML
5 SYRINGE INTRAVENOUS
Status: DISCONTINUED | OUTPATIENT
Start: 2022-08-11 | End: 2022-08-11 | Stop reason: HOSPADM

## 2022-08-11 RX ORDER — METHYLPREDNISOLONE SOD SUCC 125 MG
125 VIAL (EA) INJECTION ONCE AS NEEDED
OUTPATIENT
Start: 2022-08-18

## 2022-08-11 RX ORDER — ACETAMINOPHEN 325 MG/1
650 TABLET ORAL
Start: 2022-08-18

## 2022-08-11 RX ORDER — SODIUM CHLORIDE 0.9 % (FLUSH) 0.9 %
10 SYRINGE (ML) INJECTION
Status: DISCONTINUED | OUTPATIENT
Start: 2022-08-11 | End: 2022-08-11 | Stop reason: HOSPADM

## 2022-08-11 RX ORDER — DIPHENHYDRAMINE HYDROCHLORIDE 50 MG/ML
50 INJECTION INTRAMUSCULAR; INTRAVENOUS ONCE AS NEEDED
OUTPATIENT
Start: 2022-08-18

## 2022-08-11 RX ORDER — EPINEPHRINE 0.3 MG/.3ML
0.3 INJECTION SUBCUTANEOUS ONCE AS NEEDED
OUTPATIENT
Start: 2022-08-18

## 2022-08-11 RX ORDER — DIPHENHYDRAMINE HYDROCHLORIDE 50 MG/ML
25 INJECTION INTRAMUSCULAR; INTRAVENOUS
Start: 2022-08-18

## 2022-08-11 RX ORDER — SODIUM CHLORIDE 9 MG/ML
INJECTION, SOLUTION INTRAVENOUS CONTINUOUS
Status: DISCONTINUED | OUTPATIENT
Start: 2022-08-11 | End: 2022-08-11 | Stop reason: HOSPADM

## 2022-08-11 RX ORDER — CYANOCOBALAMIN 1000 UG/ML
1000 INJECTION, SOLUTION INTRAMUSCULAR; SUBCUTANEOUS
Status: COMPLETED | OUTPATIENT
Start: 2022-08-11 | End: 2022-08-11

## 2022-08-11 RX ORDER — SODIUM CHLORIDE 0.9 % (FLUSH) 0.9 %
10 SYRINGE (ML) INJECTION
Status: CANCELLED | OUTPATIENT
Start: 2022-08-18

## 2022-08-11 RX ORDER — ACETAMINOPHEN 325 MG/1
650 TABLET ORAL
Status: COMPLETED | OUTPATIENT
Start: 2022-08-11 | End: 2022-08-11

## 2022-08-11 RX ADMIN — CYANOCOBALAMIN 1000 MCG: 1000 INJECTION, SOLUTION INTRAMUSCULAR at 01:08

## 2022-08-11 RX ADMIN — ACETAMINOPHEN 650 MG: 325 TABLET ORAL at 11:08

## 2022-08-11 RX ADMIN — DIPHENHYDRAMINE HYDROCHLORIDE 25 MG: 50 INJECTION INTRAMUSCULAR; INTRAVENOUS at 11:08

## 2022-08-11 RX ADMIN — FERRIC CARBOXYMALTOSE INJECTION 750 MG: 50 INJECTION, SOLUTION INTRAVENOUS at 11:08

## 2022-08-11 NOTE — PLAN OF CARE
Problem: Fatigue  Goal: Improved Activity Tolerance  Intervention: Promote Improved Energy  Flowsheets (Taken 8/11/2022 1305)  Fatigue Management:   paced activity encouraged   frequent rest breaks encouraged  Activity Management: Ambulated -L4

## 2022-08-16 NOTE — PROGRESS NOTES
[facility] Hematology/Oncology  PROGRESS NOTE - 2nd Follow-up Visit      Subjective:       Patient ID:   NAME: Mary Mike : 1960     61 y.o. female    Referring Doc: Jimbo Muniz MD  Other Physicians: Tia Cuevas            Chief Complaint: Iron Deficiency Anemia f/u       History of Present Illness:     Patient returns today for a 2nd regularly scheduled follow-up visit.  The patient is here today to go over the results of the recently ordered labs, tests and studies. She saw Lynsey Denis NP for the initial visit. She is here by herself. She has had two IV infusions and started on B12. She is feeling a little better. Energy levels are low.    She is breathing ok , no CP, SOB, HA's or N/V    She is retired  owner      Discussed covid19 precautions ans she has been vaccinated          ROS:   GEN: normal without any fever, night sweats or weight loss;  fatigue, weakness and light-headedness  HEENT: normal with no HA's, sore throat, stiff neck, changes in vision  CV: normal with no CP, SOB, PND, PATIÑO or orthopnea  PULM: normal with no SOB, cough, hemoptysis, sputum or pleuritic pain  GI: normal with no abdominal pain, nausea, vomiting, constipation, diarrhea, melanotic stools, BRBPR, or hematemesis  : normal with no hematuria, dysuria  BREAST: normal with no mass, discharge, pain  SKIN: normal with no rash, erythema, bruising, or swelling    Pain Scale:  0    Allergies:  Review of patient's allergies indicates:  No Known Allergies    Medications:    Current Outpatient Medications:     FLUoxetine 20 MG capsule, TAKE 1 CAPSULE(20 MG) BY MOUTH EVERY EVENING, Disp: 90 capsule, Rfl: 2    iron-vit c-b12-folic acid (ICAR-C PLUS) Tab, Take 1 tablet by mouth once daily., Disp: 30 tablet, Rfl: 2    multivitamin (THERAGRAN) tablet, Take 1 tablet by mouth once daily., Disp: , Rfl:     pantoprazole (PROTONIX) 40 MG tablet, Take 1 tablet (40 mg total) by mouth once daily., Disp: 90 tablet, Rfl: 2    " pramipexole (MIRAPEX) 1 MG tablet, TAKE 1 AND 1/2 TABLETS BY MOUTH EVERY EVENING, Disp: 135 tablet, Rfl: 1    traZODone (DESYREL) 50 MG tablet, TAKE 1 TABLET(50 MG) BY MOUTH EVERY EVENING, Disp: 90 tablet, Rfl: 2  No current facility-administered medications for this visit.    PMHx/PSHx Updates:  See patient's last visit with me on Visit date not found.  See H&P on         Pathology:  Cancer Staging  No matching staging information was found for the patient.          Objective:     Vitals:  Blood pressure 113/71, pulse 66, temperature 98.5 °F (36.9 °C), resp. rate 18, height 5' 2" (1.575 m), weight 59.4 kg (131 lb).    Physical Examination:   GEN: no apparent distress, comfortable; AAOx3  HEAD: atraumatic and normocephalic  EYES: no pallor, no icterus, PERRLA  ENT: OMM, no pharyngeal erythema, external ears WNL; no nasal discharge; no thrush  NECK: no masses, thyroid normal, trachea midline, no LAD/LN's, supple  CV: RRR with no murmur; normal pulse; normal S1 and S2; no pedal edema  CHEST: Normal respiratory effort; CTAB; normal breath sounds; no wheeze or crackles  ABDOM: nontender and nondistended; soft; normal bowel sounds; no rebound/guarding  MUSC/Skeletal: ROM normal; no crepitus; joints normal; no deformities or arthropathy  EXTREM: no clubbing, cyanosis, inflammation or swelling  SKIN: no rashes, lesions, ulcers, petechiae or subcutaneous nodules  : no aguiar  NEURO: grossly intact; motor/sensory WNL; AAOx3; no tremors  PSYCH: normal mood, affect and behavior  LYMPH: normal cervical, supraclavicular, axillary and groin LN's            Labs:     Lab Results   Component Value Date    WBC 4.9 07/05/2022    HGB 10.4 (L) 07/05/2022    HCT 34.9 (L) 07/05/2022    MCV 78.4 (L) 07/05/2022     07/05/2022       CMP  Sodium   Date Value Ref Range Status   05/19/2022 137 135 - 146 mmol/L Final     Potassium   Date Value Ref Range Status   05/19/2022 4.4 3.5 - 5.3 mmol/L Final     Chloride   Date Value Ref Range " Status   05/19/2022 102 98 - 110 mmol/L Final     CO2   Date Value Ref Range Status   05/19/2022 27 20 - 32 mmol/L Final     Glucose   Date Value Ref Range Status   05/19/2022 140 (H) 65 - 139 mg/dL Final     Comment:               Non-fasting reference interval          BUN   Date Value Ref Range Status   05/19/2022 9 7 - 25 mg/dL Final     Creatinine   Date Value Ref Range Status   05/19/2022 0.74 0.50 - 1.05 mg/dL Final     Comment:     For patients >49 years of age, the reference limit  for Creatinine is approximately 13% higher for people  identified as -American.          Calcium   Date Value Ref Range Status   05/19/2022 9.0 8.6 - 10.4 mg/dL Final     Total Protein   Date Value Ref Range Status   05/19/2022 6.7 6.1 - 8.1 g/dL Final     Albumin   Date Value Ref Range Status   05/19/2022 4.1 3.6 - 5.1 g/dL Final     Total Bilirubin   Date Value Ref Range Status   05/19/2022 0.3 0.2 - 1.2 mg/dL Final     Alkaline Phosphatase   Date Value Ref Range Status   06/23/2021 57 55 - 135 U/L Final     AST   Date Value Ref Range Status   05/19/2022 20 10 - 35 U/L Final     ALT   Date Value Ref Range Status   05/19/2022 14 6 - 29 U/L Final     Anion Gap   Date Value Ref Range Status   06/23/2021 9 8 - 16 mmol/L Final     eGFR if    Date Value Ref Range Status   05/19/2022 103 > OR = 60 mL/min/1.73m2 Final     eGFR if non    Date Value Ref Range Status   05/19/2022 89 > OR = 60 mL/min/1.73m2 Final       Lab Results   Component Value Date    IRON 16 (L) 07/05/2022    TIBC 538 (H) 07/05/2022    FERRITIN 5 (L) 05/24/2022       Lab Results   Component Value Date    [id number] 198 (L) 05/24/2022     Lab Results   Component Value Date    FOLATE 9.4 05/24/2022         Radiology/Diagnostic Studies:    No results found.    I have reviewed all available lab results and radiology reports.    Assessment/Plan:   (1) 61 y.o. female with diagnosis of iron deficiency anemia. She was referred to us by  Dr. Muniz and has failed oral iron therapy. She does have a history of gastric by pass and need for IV iron infusions.   - reviewed iron labs   - set up with IV iron    - education given   - consent signed   - orders added   - recheck labs after Iron infusions and meet back with Dr. Ndiaye     8/17/2022:  - check up to date labs since getting the IV iron x2  - continued on b12 monthly     (2) B 12 deficiency   - B 12 level at 198 - start B12 injections weekly x 4 and then monthly   - recheck level in 3 months      (3) history of Gastric by pass    -continue the bariatric supplement   - continue to follow up with GI as needed      (4) Family history of cervical cancer   - up to date on mmg   - referral to gyn to establish care     VISIT DIAGNOSES:      Other dietary vitamin B12 deficiency anemia    Family history of cervical cancer    Iron deficiency anemia following bariatric surgery          PLAN:  1. check repeat labs since getting the IV iron  2. resume IV iron as needed  3. continue with the b12 as directed  4.check labs monthly  5. F/u with PCP and GI        RTC in 3 months  Fax note to [facility]    Discussion:     COVID-19 Discussion:    I had long discussion with patient and any applicable family about the COVID-19 coronavirus epidemic and the recommended precautions with regard to cancer and/or hematology patients. I have re-iterated the CDC recommendations for adequate hand washing, use of hand -like products, and coughing into elbow, etc. In addition, especially for our patients who are on chemotherapy and/or our otherwise immunocompromised patients, I have recommended avoidance of crowds, including movie theaters, restaurants, churches, etc. I have recommended avoidance of any sick or symptomatic family members and/or friends. I have also recommended avoidance of any raw and unwashed food products, and general avoidance of food items that have not been prepared by themselves. The  patient has been asked to call us immediately with any symptom developments, issues, questions or other general concerns.       Iron Infusion Therapy Discussion:     I provided literature/learning materials on the particular IV iron regimen and discussed the potential side-effect profiles of the drug(s). I discussed the importance of compliance with obtaining and monitoring requested lab work, and went over the potential risk for the development of anaphylactic shock, bronchospasm, dysrhythmia, liver and/or kidney damage, and respiratory/cardiovascular arrest and/or failure. I discussed the potential risks for development of alopecia, fevers, itching, chills and/or rigors, cold sensory issues, ringing in ears, vertigo and neuropathy, all of which are usually acute but sometimes could end up being chronic and life-long. I discussed the risks of hand-foot syndrome and rashes, and development of other autoimmune mediated processes such as pneumonitis and colitis which could be life threatening.     The patient's consent has been obtained to proceed with the IV iron therapy.The patient will be referred to [facility] for training and education on IV iron therapy, use of antiemetics and/or anti-diarrheals, use of NSAID's, potential IV iron therapy side-effects, and any specific recommendations and precautions with the particular IV iron agents.      I answered all of the patient's (and family's, if applicable) questions to the best of my ability and to their complete satisfaction. The patient acknowledged full understanding of the risks, recommendations and plan(s).       I spent over 25 mins of time with the patient. Reviewed results of the recently ordered labs, tests and studies; made directives with regards to the results. Over half of this time was spent couseling and coordinating care.    I have explained all of the above in detail and the patient understands all of the current  recommendation(s). I have answered all of their questions to the best of my ability and to their complete satisfaction.   The patient is to continue with the current management plan.            Electronically signed by Jamie Ndiaye MD

## 2022-08-17 ENCOUNTER — INFUSION (OUTPATIENT)
Dept: INFUSION THERAPY | Facility: HOSPITAL | Age: 62
End: 2022-08-17
Attending: INTERNAL MEDICINE
Payer: COMMERCIAL

## 2022-08-17 ENCOUNTER — OFFICE VISIT (OUTPATIENT)
Dept: HEMATOLOGY/ONCOLOGY | Facility: CLINIC | Age: 62
End: 2022-08-17
Payer: COMMERCIAL

## 2022-08-17 VITALS
HEIGHT: 62 IN | DIASTOLIC BLOOD PRESSURE: 82 MMHG | HEART RATE: 64 BPM | BODY MASS INDEX: 23.92 KG/M2 | SYSTOLIC BLOOD PRESSURE: 146 MMHG | WEIGHT: 130 LBS | OXYGEN SATURATION: 99 % | TEMPERATURE: 98 F | RESPIRATION RATE: 17 BRPM

## 2022-08-17 VITALS
SYSTOLIC BLOOD PRESSURE: 113 MMHG | HEART RATE: 66 BPM | BODY MASS INDEX: 24.11 KG/M2 | RESPIRATION RATE: 18 BRPM | TEMPERATURE: 99 F | DIASTOLIC BLOOD PRESSURE: 71 MMHG | HEIGHT: 62 IN | WEIGHT: 131 LBS

## 2022-08-17 DIAGNOSIS — D51.3 OTHER DIETARY VITAMIN B12 DEFICIENCY ANEMIA: Primary | ICD-10-CM

## 2022-08-17 DIAGNOSIS — K95.89 IRON DEFICIENCY ANEMIA FOLLOWING BARIATRIC SURGERY: ICD-10-CM

## 2022-08-17 DIAGNOSIS — D50.8 IRON DEFICIENCY ANEMIA FOLLOWING BARIATRIC SURGERY: ICD-10-CM

## 2022-08-17 DIAGNOSIS — Z80.49 FAMILY HISTORY OF CERVICAL CANCER: ICD-10-CM

## 2022-08-17 PROCEDURE — 99215 PR OFFICE/OUTPT VISIT, EST, LEVL V, 40-54 MIN: ICD-10-PCS | Mod: S$GLB,,, | Performed by: INTERNAL MEDICINE

## 2022-08-17 PROCEDURE — 96372 THER/PROPH/DIAG INJ SC/IM: CPT

## 2022-08-17 PROCEDURE — 3008F BODY MASS INDEX DOCD: CPT | Mod: CPTII,S$GLB,, | Performed by: INTERNAL MEDICINE

## 2022-08-17 PROCEDURE — 3008F PR BODY MASS INDEX (BMI) DOCUMENTED: ICD-10-PCS | Mod: CPTII,S$GLB,, | Performed by: INTERNAL MEDICINE

## 2022-08-17 PROCEDURE — 1160F RVW MEDS BY RX/DR IN RCRD: CPT | Mod: CPTII,S$GLB,, | Performed by: INTERNAL MEDICINE

## 2022-08-17 PROCEDURE — 1159F MED LIST DOCD IN RCRD: CPT | Mod: CPTII,S$GLB,, | Performed by: INTERNAL MEDICINE

## 2022-08-17 PROCEDURE — 99215 OFFICE O/P EST HI 40 MIN: CPT | Mod: S$GLB,,, | Performed by: INTERNAL MEDICINE

## 2022-08-17 PROCEDURE — 1159F PR MEDICATION LIST DOCUMENTED IN MEDICAL RECORD: ICD-10-PCS | Mod: CPTII,S$GLB,, | Performed by: INTERNAL MEDICINE

## 2022-08-17 PROCEDURE — 3078F PR MOST RECENT DIASTOLIC BLOOD PRESSURE < 80 MM HG: ICD-10-PCS | Mod: CPTII,S$GLB,, | Performed by: INTERNAL MEDICINE

## 2022-08-17 PROCEDURE — 1160F PR REVIEW ALL MEDS BY PRESCRIBER/CLIN PHARMACIST DOCUMENTED: ICD-10-PCS | Mod: CPTII,S$GLB,, | Performed by: INTERNAL MEDICINE

## 2022-08-17 PROCEDURE — 63600175 PHARM REV CODE 636 W HCPCS: Performed by: NURSE PRACTITIONER

## 2022-08-17 PROCEDURE — 3074F SYST BP LT 130 MM HG: CPT | Mod: CPTII,S$GLB,, | Performed by: INTERNAL MEDICINE

## 2022-08-17 PROCEDURE — 3078F DIAST BP <80 MM HG: CPT | Mod: CPTII,S$GLB,, | Performed by: INTERNAL MEDICINE

## 2022-08-17 PROCEDURE — 3074F PR MOST RECENT SYSTOLIC BLOOD PRESSURE < 130 MM HG: ICD-10-PCS | Mod: CPTII,S$GLB,, | Performed by: INTERNAL MEDICINE

## 2022-08-17 RX ORDER — CYANOCOBALAMIN 1000 UG/ML
1000 INJECTION, SOLUTION INTRAMUSCULAR; SUBCUTANEOUS
Status: CANCELLED | OUTPATIENT
Start: 2022-08-17

## 2022-08-17 RX ORDER — CYANOCOBALAMIN 1000 UG/ML
1000 INJECTION, SOLUTION INTRAMUSCULAR; SUBCUTANEOUS
Status: COMPLETED | OUTPATIENT
Start: 2022-08-17 | End: 2022-08-17

## 2022-08-17 RX ADMIN — CYANOCOBALAMIN 1000 MCG: 1000 INJECTION, SOLUTION INTRAMUSCULAR at 10:08

## 2022-08-17 NOTE — PLAN OF CARE
Problem: Fatigue  Goal: Improved Activity Tolerance  Intervention: Promote Improved Energy  Flowsheets (Taken 8/17/2022 1033)  Fatigue Management:   frequent rest breaks encouraged   fatigue-related activity identified  Activity Management: Ambulated -L4

## 2022-08-18 LAB
ALBUMIN SERPL-MCNC: 4 G/DL (ref 3.6–5.1)
ALBUMIN/GLOB SERPL: 1.7 (CALC) (ref 1–2.5)
ALP SERPL-CCNC: 74 U/L (ref 37–153)
ALT SERPL-CCNC: 33 U/L (ref 6–29)
AST SERPL-CCNC: 30 U/L (ref 10–35)
BASOPHILS # BLD AUTO: 42 CELLS/UL (ref 0–200)
BASOPHILS NFR BLD AUTO: 0.9 %
BILIRUB SERPL-MCNC: 0.4 MG/DL (ref 0.2–1.2)
BUN SERPL-MCNC: 10 MG/DL (ref 7–25)
BUN/CREAT SERPL: ABNORMAL (CALC) (ref 6–22)
CALCIUM SERPL-MCNC: 8.8 MG/DL (ref 8.6–10.4)
CHLORIDE SERPL-SCNC: 104 MMOL/L (ref 98–110)
CO2 SERPL-SCNC: 29 MMOL/L (ref 20–32)
CREAT SERPL-MCNC: 0.92 MG/DL (ref 0.5–1.05)
EGFR: 71 ML/MIN/1.73M2
EOSINOPHIL # BLD AUTO: 179 CELLS/UL (ref 15–500)
EOSINOPHIL NFR BLD AUTO: 3.8 %
ERYTHROCYTE [DISTWIDTH] IN BLOOD BY AUTOMATED COUNT: 19.1 % (ref 11–15)
FERRITIN SERPL-MCNC: 736 NG/ML (ref 16–232)
FOLATE SERPL-MCNC: 10 NG/ML
GLOBULIN SER CALC-MCNC: 2.3 G/DL (CALC) (ref 1.9–3.7)
GLUCOSE SERPL-MCNC: 119 MG/DL (ref 65–139)
HCT VFR BLD AUTO: 34.1 % (ref 35–45)
HGB BLD-MCNC: 10.2 G/DL (ref 11.7–15.5)
IRON SATN MFR SERPL: 38 % (CALC) (ref 16–45)
IRON SERPL-MCNC: 144 MCG/DL (ref 45–160)
LYMPHOCYTES # BLD AUTO: 1147 CELLS/UL (ref 850–3900)
LYMPHOCYTES NFR BLD AUTO: 24.4 %
MCH RBC QN AUTO: 23.7 PG (ref 27–33)
MCHC RBC AUTO-ENTMCNC: 29.9 G/DL (ref 32–36)
MCV RBC AUTO: 79.3 FL (ref 80–100)
MONOCYTES # BLD AUTO: 362 CELLS/UL (ref 200–950)
MONOCYTES NFR BLD AUTO: 7.7 %
NEUTROPHILS # BLD AUTO: 2970 CELLS/UL (ref 1500–7800)
NEUTROPHILS NFR BLD AUTO: 63.2 %
PLATELET # BLD AUTO: 244 THOUSAND/UL (ref 140–400)
PMV BLD REES-ECKER: 11.4 FL (ref 7.5–12.5)
POTASSIUM SERPL-SCNC: 4.6 MMOL/L (ref 3.5–5.3)
PROT SERPL-MCNC: 6.3 G/DL (ref 6.1–8.1)
RBC # BLD AUTO: 4.3 MILLION/UL (ref 3.8–5.1)
SODIUM SERPL-SCNC: 139 MMOL/L (ref 135–146)
TIBC SERPL-MCNC: 380 MCG/DL (CALC) (ref 250–450)
VIT B12 SERPL-MCNC: >2000 PG/ML (ref 200–1100)
WBC # BLD AUTO: 4.7 THOUSAND/UL (ref 3.8–10.8)

## 2022-08-22 ENCOUNTER — TELEPHONE (OUTPATIENT)
Dept: HEMATOLOGY/ONCOLOGY | Facility: CLINIC | Age: 62
End: 2022-08-22

## 2022-08-22 NOTE — TELEPHONE ENCOUNTER
Dr. Ndiaye reviewed recent labs, per his verbal orders, I instructed patient that at this time nothing of concern, cont with monthly labs and scheduled f/u with Dr. Ndiaye on 9/14. Verbalized understanding.

## 2022-08-22 NOTE — TELEPHONE ENCOUNTER
----- Message from Irene Trent sent at 8/22/2022 11:34 AM CDT -----  The patient wants a call back with the results of her lab work. # [phone]

## 2022-08-24 ENCOUNTER — INFUSION (OUTPATIENT)
Dept: INFUSION THERAPY | Facility: HOSPITAL | Age: 62
End: 2022-08-24
Attending: INTERNAL MEDICINE
Payer: COMMERCIAL

## 2022-08-24 VITALS
DIASTOLIC BLOOD PRESSURE: 77 MMHG | SYSTOLIC BLOOD PRESSURE: 127 MMHG | RESPIRATION RATE: 18 BRPM | TEMPERATURE: 98 F | OXYGEN SATURATION: 97 % | WEIGHT: 131.88 LBS | HEART RATE: 63 BPM | BODY MASS INDEX: 24.12 KG/M2

## 2022-08-24 DIAGNOSIS — D51.3 OTHER DIETARY VITAMIN B12 DEFICIENCY ANEMIA: Primary | ICD-10-CM

## 2022-08-24 PROCEDURE — 96372 THER/PROPH/DIAG INJ SC/IM: CPT

## 2022-08-24 PROCEDURE — 63600175 PHARM REV CODE 636 W HCPCS: Performed by: NURSE PRACTITIONER

## 2022-08-24 RX ORDER — CYANOCOBALAMIN 1000 UG/ML
1000 INJECTION, SOLUTION INTRAMUSCULAR; SUBCUTANEOUS
Status: CANCELLED | OUTPATIENT
Start: 2022-08-24

## 2022-08-24 RX ORDER — CYANOCOBALAMIN 1000 UG/ML
1000 INJECTION, SOLUTION INTRAMUSCULAR; SUBCUTANEOUS
Status: COMPLETED | OUTPATIENT
Start: 2022-08-24 | End: 2022-08-24

## 2022-08-24 RX ADMIN — CYANOCOBALAMIN 1000 MCG: 1000 INJECTION, SOLUTION INTRAMUSCULAR at 10:08

## 2022-08-24 NOTE — PLAN OF CARE
Problem: Anemia  Goal: Anemia Symptom Improvement  Outcome: Ongoing, Progressing  Intervention: Monitor and Manage Anemia  Flowsheets (Taken 8/24/2022 1033)  Oral Nutrition Promotion: rest periods promoted  Fatigue Management:   activity schedule adjusted   frequent rest breaks encouraged

## 2022-09-21 ENCOUNTER — OFFICE VISIT (OUTPATIENT)
Dept: HEMATOLOGY/ONCOLOGY | Facility: CLINIC | Age: 62
End: 2022-09-21
Payer: COMMERCIAL

## 2022-09-21 ENCOUNTER — LAB VISIT (OUTPATIENT)
Dept: LAB | Facility: HOSPITAL | Age: 62
End: 2022-09-21
Attending: NURSE PRACTITIONER
Payer: COMMERCIAL

## 2022-09-21 VITALS
SYSTOLIC BLOOD PRESSURE: 132 MMHG | RESPIRATION RATE: 18 BRPM | WEIGHT: 132.31 LBS | TEMPERATURE: 98 F | DIASTOLIC BLOOD PRESSURE: 58 MMHG | HEART RATE: 55 BPM | BODY MASS INDEX: 24.35 KG/M2 | HEIGHT: 62 IN

## 2022-09-21 DIAGNOSIS — D50.8 IRON DEFICIENCY ANEMIA FOLLOWING BARIATRIC SURGERY: ICD-10-CM

## 2022-09-21 DIAGNOSIS — D50.8 IRON DEFICIENCY ANEMIA FOLLOWING BARIATRIC SURGERY: Primary | ICD-10-CM

## 2022-09-21 DIAGNOSIS — K95.89 IRON DEFICIENCY ANEMIA FOLLOWING BARIATRIC SURGERY: ICD-10-CM

## 2022-09-21 DIAGNOSIS — M51.06 DISC DISEASE WITH MYELOPATHY, LUMBAR: ICD-10-CM

## 2022-09-21 DIAGNOSIS — R00.1 BRADYCARDIA: ICD-10-CM

## 2022-09-21 DIAGNOSIS — K95.89 IRON DEFICIENCY ANEMIA FOLLOWING BARIATRIC SURGERY: Primary | ICD-10-CM

## 2022-09-21 LAB
ALBUMIN SERPL BCP-MCNC: 4.3 G/DL (ref 3.5–5.2)
ALP SERPL-CCNC: 86 U/L (ref 55–135)
ALT SERPL W/O P-5'-P-CCNC: 24 U/L (ref 10–44)
ANION GAP SERPL CALC-SCNC: 5 MMOL/L (ref 8–16)
AST SERPL-CCNC: 22 U/L (ref 10–40)
BASOPHILS # BLD AUTO: 0.05 K/UL (ref 0–0.2)
BASOPHILS NFR BLD: 1 % (ref 0–1.9)
BILIRUB SERPL-MCNC: 0.6 MG/DL (ref 0.1–1)
BUN SERPL-MCNC: 10 MG/DL (ref 6–20)
CALCIUM SERPL-MCNC: 9.4 MG/DL (ref 8.7–10.5)
CHLORIDE SERPL-SCNC: 105 MMOL/L (ref 95–110)
CO2 SERPL-SCNC: 30 MMOL/L (ref 23–29)
CREAT SERPL-MCNC: 0.7 MG/DL (ref 0.5–1.4)
DIFFERENTIAL METHOD: ABNORMAL
EOSINOPHIL # BLD AUTO: 0.2 K/UL (ref 0–0.5)
EOSINOPHIL NFR BLD: 3.3 % (ref 0–8)
ERYTHROCYTE [DISTWIDTH] IN BLOOD BY AUTOMATED COUNT: 21.5 % (ref 11.5–14.5)
EST. GFR  (NO RACE VARIABLE): >60 ML/MIN/1.73 M^2
FERRITIN SERPL-MCNC: 175 NG/ML (ref 20–300)
GLUCOSE SERPL-MCNC: 94 MG/DL (ref 70–110)
HCT VFR BLD AUTO: 37.9 % (ref 37–48.5)
HGB BLD-MCNC: 12.2 G/DL (ref 12–16)
IMM GRANULOCYTES # BLD AUTO: 0.03 K/UL (ref 0–0.04)
IMM GRANULOCYTES NFR BLD AUTO: 0.6 % (ref 0–0.5)
IRON SERPL-MCNC: 82 UG/DL (ref 30–160)
LYMPHOCYTES # BLD AUTO: 1 K/UL (ref 1–4.8)
LYMPHOCYTES NFR BLD: 21.3 % (ref 18–48)
MCH RBC QN AUTO: 27.1 PG (ref 27–31)
MCHC RBC AUTO-ENTMCNC: 32.2 G/DL (ref 32–36)
MCV RBC AUTO: 84 FL (ref 82–98)
MONOCYTES # BLD AUTO: 0.4 K/UL (ref 0.3–1)
MONOCYTES NFR BLD: 7.5 % (ref 4–15)
NEUTROPHILS # BLD AUTO: 3.2 K/UL (ref 1.8–7.7)
NEUTROPHILS NFR BLD: 66.3 % (ref 38–73)
NRBC BLD-RTO: 0 /100 WBC
PLATELET # BLD AUTO: 228 K/UL (ref 150–450)
PMV BLD AUTO: 9.8 FL (ref 9.2–12.9)
POTASSIUM SERPL-SCNC: 4.4 MMOL/L (ref 3.5–5.1)
PROT SERPL-MCNC: 7.3 G/DL (ref 6–8.4)
RBC # BLD AUTO: 4.5 M/UL (ref 4–5.4)
SATURATED IRON: 23 % (ref 20–50)
SODIUM SERPL-SCNC: 140 MMOL/L (ref 136–145)
TOTAL IRON BINDING CAPACITY: 350 UG/DL (ref 250–450)
TRANSFERRIN SERPL-MCNC: 250 MG/DL (ref 200–375)
WBC # BLD AUTO: 4.83 K/UL (ref 3.9–12.7)

## 2022-09-21 PROCEDURE — 85025 COMPLETE CBC W/AUTO DIFF WBC: CPT | Performed by: NURSE PRACTITIONER

## 2022-09-21 PROCEDURE — 3008F BODY MASS INDEX DOCD: CPT | Mod: CPTII,S$GLB,, | Performed by: NURSE PRACTITIONER

## 2022-09-21 PROCEDURE — 82728 ASSAY OF FERRITIN: CPT | Performed by: NURSE PRACTITIONER

## 2022-09-21 PROCEDURE — 3008F PR BODY MASS INDEX (BMI) DOCUMENTED: ICD-10-PCS | Mod: CPTII,S$GLB,, | Performed by: NURSE PRACTITIONER

## 2022-09-21 PROCEDURE — 3075F SYST BP GE 130 - 139MM HG: CPT | Mod: CPTII,S$GLB,, | Performed by: NURSE PRACTITIONER

## 2022-09-21 PROCEDURE — 99214 OFFICE O/P EST MOD 30 MIN: CPT | Mod: S$GLB,,, | Performed by: NURSE PRACTITIONER

## 2022-09-21 PROCEDURE — 3078F PR MOST RECENT DIASTOLIC BLOOD PRESSURE < 80 MM HG: ICD-10-PCS | Mod: CPTII,S$GLB,, | Performed by: NURSE PRACTITIONER

## 2022-09-21 PROCEDURE — 1159F MED LIST DOCD IN RCRD: CPT | Mod: CPTII,S$GLB,, | Performed by: NURSE PRACTITIONER

## 2022-09-21 PROCEDURE — 84466 ASSAY OF TRANSFERRIN: CPT | Performed by: NURSE PRACTITIONER

## 2022-09-21 PROCEDURE — 36415 COLL VENOUS BLD VENIPUNCTURE: CPT | Performed by: NURSE PRACTITIONER

## 2022-09-21 PROCEDURE — 80053 COMPREHEN METABOLIC PANEL: CPT | Performed by: NURSE PRACTITIONER

## 2022-09-21 PROCEDURE — 3078F DIAST BP <80 MM HG: CPT | Mod: CPTII,S$GLB,, | Performed by: NURSE PRACTITIONER

## 2022-09-21 PROCEDURE — 3075F PR MOST RECENT SYSTOLIC BLOOD PRESS GE 130-139MM HG: ICD-10-PCS | Mod: CPTII,S$GLB,, | Performed by: NURSE PRACTITIONER

## 2022-09-21 PROCEDURE — 1159F PR MEDICATION LIST DOCUMENTED IN MEDICAL RECORD: ICD-10-PCS | Mod: CPTII,S$GLB,, | Performed by: NURSE PRACTITIONER

## 2022-09-21 PROCEDURE — 99214 PR OFFICE/OUTPT VISIT, EST, LEVL IV, 30-39 MIN: ICD-10-PCS | Mod: S$GLB,,, | Performed by: NURSE PRACTITIONER

## 2022-09-21 NOTE — PROGRESS NOTES
[facility] Hematology/Oncology  PROGRESS NOTE - 2nd Follow-up Visit      Subjective:       Patient ID:   NAME: Mary Mike : 1960     62 y.o. female    Referring Doc: Jimbo Muniz MD  Other Physicians: Tia Cuevas            Chief Complaint: Iron Deficiency Anemia f/u       History of Present Illness:     Patient returns today for a regularly scheduled follow-up visit.  The patient is here today to go over the results of the recently ordered labs, tests and studies. She had two injectafer infusions and she is on B12 injections monthly.     She is breathing ok ,no CP, SOB, HA's or N/V. She does state that she has increased fatigue. She does not notice a difference with her fatigue.     She states she has trouble sleeping and only receives about 4 hours a night.     She had labs redrawn last month and her iron was improved, but the CBC did not change drastically. She will need new lab work today.     Discussed covid19 precautions ans she has been vaccinated          ROS:   GEN: normal without any fever, night sweats or weight loss; fatigue, weakness and light-headedness, insomnia, + back pain  HEENT: normal with no HA's, sore throat, stiff neck, changes in vision  CV: normal with no CP, SOB, PND, PATIÑO or orthopnea  PULM: normal with no SOB, cough, hemoptysis, sputum or pleuritic pain  GI: normal with no abdominal pain, nausea, vomiting, + constipation, diarrhea, melanotic stools, BRBPR, or hematemesis  : normal with no hematuria, dysuria  BREAST: normal with no mass, discharge, pain  SKIN: normal with no rash, erythema, bruising, or swelling    Pain Scale:  7 in back - chronic back pain    Allergies:  Review of patient's allergies indicates:  No Known Allergies    Medications:    Current Outpatient Medications:     FLUoxetine 20 MG capsule, TAKE 1 CAPSULE(20 MG) BY MOUTH EVERY EVENING, Disp: 90 capsule, Rfl: 2    iron-vit c-b12-folic acid (ICAR-C PLUS) Tab, Take 1 tablet by mouth once daily., Disp:  "30 tablet, Rfl: 2    multivitamin (THERAGRAN) tablet, Take 1 tablet by mouth once daily., Disp: , Rfl:     pantoprazole (PROTONIX) 40 MG tablet, Take 1 tablet (40 mg total) by mouth once daily., Disp: 90 tablet, Rfl: 2    pramipexole (MIRAPEX) 1 MG tablet, TAKE 1 AND 1/2 TABLETS BY MOUTH EVERY EVENING, Disp: 135 tablet, Rfl: 1    traZODone (DESYREL) 50 MG tablet, TAKE 1 TABLET(50 MG) BY MOUTH EVERY EVENING, Disp: 90 tablet, Rfl: 2    PMHx/PSHx Updates:  See patient's last visit with Dr. Ndiaye on 8/17/22  See H&P on         Pathology:   Cancer Staging   No matching staging information was found for the patient.          Objective:     Vitals:  Blood pressure (!) 132/58, pulse (!) 55, temperature 97.9 °F (36.6 °C), resp. rate 18, height 5' 2" (1.575 m), weight 60 kg (132 lb 4.8 oz).    Physical Examination:   GEN: no apparent distress, comfortable; AAOx3  HEAD: atraumatic and normocephalic  EYES: no pallor, no icterus, PERRLA  ENT: OMM, no pharyngeal erythema, external ears WNL; no nasal discharge; no thrush  NECK: no masses, thyroid normal, trachea midline, no LAD/LN's, supple  CV: RRR with no murmur; +bradycardia  normal S1 and S2; no pedal edema  CHEST: Normal respiratory effort; CTAB; normal breath sounds; no wheeze or crackles  ABDOM: nontender and nondistended; soft; normal bowel sounds; no rebound/guarding  MUSC/Skeletal: ROM normal; no crepitus; joints normal; no deformities or arthropathy  EXTREM: no clubbing, cyanosis, inflammation or swelling  SKIN: no rashes, lesions, ulcers, petechiae or subcutaneous nodules  : no aguiar  NEURO: grossly intact; motor/sensory WNL; AAOx3; no tremors  PSYCH: normal mood, affect and behavior  LYMPH: normal cervical, supraclavicular, axillary and groin LN's  Labs:     Lab Results   Component Value Date    WBC 4.7 08/17/2022    HGB 10.2 (L) 08/17/2022    HCT 34.1 (L) 08/17/2022    MCV 79.3 (L) 08/17/2022     08/17/2022       CMP  Sodium   Date Value Ref Range Status "   08/17/2022 139 135 - 146 mmol/L Final     Potassium   Date Value Ref Range Status   08/17/2022 4.6 3.5 - 5.3 mmol/L Final     Chloride   Date Value Ref Range Status   08/17/2022 104 98 - 110 mmol/L Final     CO2   Date Value Ref Range Status   08/17/2022 29 20 - 32 mmol/L Final     Glucose   Date Value Ref Range Status   08/17/2022 119 65 - 139 mg/dL Final     Comment:               Non-fasting reference interval          BUN   Date Value Ref Range Status   08/17/2022 10 7 - 25 mg/dL Final     Creatinine   Date Value Ref Range Status   08/17/2022 0.92 0.50 - 1.05 mg/dL Final     Calcium   Date Value Ref Range Status   08/17/2022 8.8 8.6 - 10.4 mg/dL Final     Total Protein   Date Value Ref Range Status   08/17/2022 6.3 6.1 - 8.1 g/dL Final     Albumin   Date Value Ref Range Status   08/17/2022 4.0 3.6 - 5.1 g/dL Final     Total Bilirubin   Date Value Ref Range Status   08/17/2022 0.4 0.2 - 1.2 mg/dL Final     Alkaline Phosphatase   Date Value Ref Range Status   06/23/2021 57 55 - 135 U/L Final     AST   Date Value Ref Range Status   08/17/2022 30 10 - 35 U/L Final     ALT   Date Value Ref Range Status   08/17/2022 33 (H) 6 - 29 U/L Final     Anion Gap   Date Value Ref Range Status   06/23/2021 9 8 - 16 mmol/L Final     eGFR if    Date Value Ref Range Status   05/19/2022 103 > OR = 60 mL/min/1.73m2 Final     eGFR if non    Date Value Ref Range Status   05/19/2022 89 > OR = 60 mL/min/1.73m2 Final       Lab Results   Component Value Date    IRON 144 08/17/2022    TIBC 380 08/17/2022    FERRITIN 736 (H) 08/17/2022       Lab Results   Component Value Date    [id number] >2000 (H) 08/17/2022     Lab Results   Component Value Date    FOLATE 10.0 08/17/2022         Radiology/Diagnostic Studies:    No results found.    I have reviewed all available lab results and radiology reports.    Assessment/Plan:   (1) 62 y.o. female with diagnosis of iron deficiency anemia. She was referred to us by  "Dr. Muniz and has failed oral iron therapy. She does have a history of gastric by pass and need for IV iron infusions.   - reviewed iron labs   - set up with IV iron    - education given   - consent signed   - orders added   - recheck labs after Iron infusions and meet back with Dr. Ndiaye     8/17/2022:  - check up to date labs since getting the IV iron x2  - continued on b12 monthly    9/21/22:   - check up to date labs today   - EKG due to bradycardia - may be the cause of fatigue   - referral to Dr. Wolfe for "disc disease" and back pain   - will talk to PCP about insomnia      (2) B 12 deficiency   - B 12 level at 198 - start B12 injections weekly x 4 and then monthly   - recheck level in 3 months      (3) history of Gastric by pass    -continue the bariatric supplement   - continue to follow up with GI as needed      (4) Family history of cervical cancer   - up to date on mmg   - referral to gyn to establish care     VISIT DIAGNOSES:      Iron deficiency anemia following bariatric surgery  -     CBC Auto Differential; Future; Expected date: 09/21/2022  -     CMP; Future; Expected date: 09/21/2022  -     Iron and TIBC; Future; Expected date: 09/21/2022  -     Ferritin; Future; Expected date: 09/21/2022    Bradycardia  -     EKG 12-lead; Future    Disc disease with myelopathy, lumbar  -     Ambulatory referral/consult to Orthopedics; Future; Expected date: 09/28/2022          PLAN:  1. check repeat labs since getting the IV iron - repeat today   2. resume IV iron as needed  3. continue with the b12 as directed  4 .check labs monthly  5. F/u with PCP and GI  6. Referral to Dr. Wolfe for disc disease   7. EKG for Bradycardia         RTC in 3 months        Discussion:     COVID-19 Discussion:    I had long discussion with patient and any applicable family about the COVID-19 coronavirus epidemic and the recommended precautions with regard to cancer and/or hematology patients. I have re-iterated the CDC " recommendations for adequate hand washing, use of hand -like products, and coughing into elbow, etc. In addition, especially for our patients who are on chemotherapy and/or our otherwise immunocompromised patients, I have recommended avoidance of crowds, including movie theaters, restaurants, churches, etc. I have recommended avoidance of any sick or symptomatic family members and/or friends. I have also recommended avoidance of any raw and unwashed food products, and general avoidance of food items that have not been prepared by themselves. The patient has been asked to call us immediately with any symptom developments, issues, questions or other general concerns.       Iron Infusion Therapy Discussion:     I provided literature/learning materials on the particular IV iron regimen and discussed the potential side-effect profiles of the drug(s). I discussed the importance of compliance with obtaining and monitoring requested lab work, and went over the potential risk for the development of anaphylactic shock, bronchospasm, dysrhythmia, liver and/or kidney damage, and respiratory/cardiovascular arrest and/or failure. I discussed the potential risks for development of alopecia, fevers, itching, chills and/or rigors, cold sensory issues, ringing in ears, vertigo and neuropathy, all of which are usually acute but sometimes could end up being chronic and life-long. I discussed the risks of hand-foot syndrome and rashes, and development of other autoimmune mediated processes such as pneumonitis and colitis which could be life threatening.     The patient's consent has been obtained to proceed with the IV iron therapy.The patient will be referred to [employer] [employer] [facility] for training and education on IV iron therapy, use of antiemetics and/or anti-diarrheals, use of NSAID's, potential IV iron therapy side-effects, and any specific recommendations and precautions with the particular IV iron  agents.      I answered all of the patient's (and family's, if applicable) questions to the best of my ability and to their complete satisfaction. The patient acknowledged full understanding of the risks, recommendations and plan(s).           I have explained all of the above in detail and the patient understands all of the current recommendation(s). I have answered all of their questions to the best of my ability and to their complete satisfaction.   The patient is to continue with the current management plan.            Electronically signed by Lynsey Parr, MSN,APRN,AGNP-C            Answers submitted by the patient for this visit:  Review of Systems Questionnaire (Submitted on 9/16/2022)  appetite change : No  unexpected weight change: Yes  mouth sores: No  visual disturbance: No  cough: No  shortness of breath: Yes  chest pain: No  abdominal pain: Yes  diarrhea: No  frequency: No  back pain: Yes  rash: No  headaches: No  adenopathy: No  nervous/ anxious: No

## 2022-09-27 DIAGNOSIS — K21.00 GASTROESOPHAGEAL REFLUX DISEASE WITH ESOPHAGITIS WITHOUT HEMORRHAGE: ICD-10-CM

## 2022-09-28 RX ORDER — PANTOPRAZOLE SODIUM 40 MG/1
40 TABLET, DELAYED RELEASE ORAL DAILY
Qty: 90 TABLET | Refills: 2 | Status: SHIPPED | OUTPATIENT
Start: 2022-09-28 | End: 2023-08-06

## 2022-09-29 ENCOUNTER — INFUSION (OUTPATIENT)
Dept: INFUSION THERAPY | Facility: HOSPITAL | Age: 62
End: 2022-09-29
Attending: INTERNAL MEDICINE
Payer: COMMERCIAL

## 2022-09-29 VITALS
HEIGHT: 62 IN | DIASTOLIC BLOOD PRESSURE: 88 MMHG | BODY MASS INDEX: 24.13 KG/M2 | SYSTOLIC BLOOD PRESSURE: 154 MMHG | WEIGHT: 131.13 LBS | OXYGEN SATURATION: 97 % | TEMPERATURE: 97 F | HEART RATE: 60 BPM | RESPIRATION RATE: 18 BRPM

## 2022-09-29 DIAGNOSIS — D51.3 OTHER DIETARY VITAMIN B12 DEFICIENCY ANEMIA: Primary | ICD-10-CM

## 2022-09-29 PROCEDURE — 63600175 PHARM REV CODE 636 W HCPCS: Performed by: NURSE PRACTITIONER

## 2022-09-29 PROCEDURE — 96372 THER/PROPH/DIAG INJ SC/IM: CPT

## 2022-09-29 RX ORDER — CYANOCOBALAMIN 1000 UG/ML
1000 INJECTION, SOLUTION INTRAMUSCULAR; SUBCUTANEOUS
Status: CANCELLED | OUTPATIENT
Start: 2022-09-29

## 2022-09-29 RX ORDER — CYANOCOBALAMIN 1000 UG/ML
1000 INJECTION, SOLUTION INTRAMUSCULAR; SUBCUTANEOUS
Status: COMPLETED | OUTPATIENT
Start: 2022-09-29 | End: 2022-09-29

## 2022-09-29 RX ADMIN — CYANOCOBALAMIN 1000 MCG: 1000 INJECTION, SOLUTION INTRAMUSCULAR at 10:09

## 2022-10-06 ENCOUNTER — TELEPHONE (OUTPATIENT)
Dept: FAMILY MEDICINE | Facility: CLINIC | Age: 62
End: 2022-10-06
Payer: COMMERCIAL

## 2022-10-06 NOTE — TELEPHONE ENCOUNTER
Spoke to patient's  . He is requesting an appointment with Dr Knapp to address his wife's back issues. Maricruz goode is a patient of DR Jimbo Muniz and has already seen a specialist for her back. An MRI has been scheduled. He states that he wants her to see Dr Knapp because when he came in to see Dr Knapp he was able to address his personal back issues quickly and with only an x-ray.  States she does not want to establish care, just wants to see him regarding her back issues.   Advised that DR Knapp does not have any new patient appointments until Feb 2022. Offered appointment with a mid level provider but this was declined.  states she will follow with the specialist she has already seen.

## 2022-10-06 NOTE — TELEPHONE ENCOUNTER
----- Message from Candelaria Coreas sent at 10/6/2022 10:42 AM CDT -----  Contact:   Type:  Sooner Apoointment Request      Name of Caller:      When is the first available appointment? N/a     Symptoms: est care- back pain     Would the patient rather a call back or a response via [facility]? Call     Best Call Back Number: [phone]    Additional Information:

## 2022-10-10 ENCOUNTER — ANESTHESIA (OUTPATIENT)
Dept: SURGERY | Facility: HOSPITAL | Age: 62
End: 2022-10-10
Payer: COMMERCIAL

## 2022-10-10 ENCOUNTER — HOSPITAL ENCOUNTER (OUTPATIENT)
Facility: HOSPITAL | Age: 62
Discharge: HOME | End: 2022-10-11
Attending: EMERGENCY MEDICINE | Admitting: STUDENT IN AN ORGANIZED HEALTH CARE EDUCATION/TRAINING PROGRAM
Payer: COMMERCIAL

## 2022-10-10 ENCOUNTER — ANESTHESIA EVENT (OUTPATIENT)
Dept: SURGERY | Facility: HOSPITAL | Age: 62
End: 2022-10-10
Payer: COMMERCIAL

## 2022-10-10 DIAGNOSIS — R07.9 CHEST PAIN: ICD-10-CM

## 2022-10-10 DIAGNOSIS — K80.01 CALCULUS OF GALLBLADDER WITH ACUTE CHOLECYSTITIS AND OBSTRUCTION: ICD-10-CM

## 2022-10-10 DIAGNOSIS — M79.672 LEFT FOOT PAIN: ICD-10-CM

## 2022-10-10 DIAGNOSIS — K81.0 ACUTE CHOLECYSTITIS: Primary | ICD-10-CM

## 2022-10-10 PROBLEM — F41.9 ANXIETY AND DEPRESSION: Status: ACTIVE | Noted: 2022-10-10

## 2022-10-10 PROBLEM — K25.9 MULTIPLE GASTRIC ULCERS: Status: ACTIVE | Noted: 2022-10-10

## 2022-10-10 PROBLEM — F32.A ANXIETY AND DEPRESSION: Status: ACTIVE | Noted: 2022-10-10

## 2022-10-10 PROBLEM — F33.0 MILD EPISODE OF RECURRENT MAJOR DEPRESSIVE DISORDER: Status: RESOLVED | Noted: 2018-07-26 | Resolved: 2022-10-10

## 2022-10-10 PROBLEM — K80.00 CHOLELITHIASIS WITH ACUTE CHOLECYSTITIS: Status: ACTIVE | Noted: 2022-10-10

## 2022-10-10 PROBLEM — E83.42 HYPOMAGNESEMIA: Status: RESOLVED | Noted: 2018-07-27 | Resolved: 2022-10-10

## 2022-10-10 LAB
ALBUMIN SERPL BCP-MCNC: 4.1 G/DL (ref 3.5–5.2)
ALP SERPL-CCNC: 83 U/L (ref 55–135)
ALT SERPL W/O P-5'-P-CCNC: 20 U/L (ref 10–44)
ANION GAP SERPL CALC-SCNC: 10 MMOL/L (ref 8–16)
AST SERPL-CCNC: 21 U/L (ref 10–40)
BACTERIA #/AREA URNS HPF: NORMAL /HPF
BASOPHILS # BLD AUTO: 0.03 K/UL (ref 0–0.2)
BASOPHILS NFR BLD: 0.7 % (ref 0–1.9)
BILIRUB SERPL-MCNC: 0.3 MG/DL (ref 0.1–1)
BILIRUB UR QL STRIP: NEGATIVE
BUN SERPL-MCNC: 6 MG/DL (ref 6–20)
CALCIUM SERPL-MCNC: 9.3 MG/DL (ref 8.7–10.5)
CHLORIDE SERPL-SCNC: 104 MMOL/L (ref 95–110)
CLARITY UR: CLEAR
CO2 SERPL-SCNC: 27 MMOL/L (ref 23–29)
COLOR UR: YELLOW
CREAT SERPL-MCNC: 0.7 MG/DL (ref 0.5–1.4)
DIFFERENTIAL METHOD: ABNORMAL
EOSINOPHIL # BLD AUTO: 0.3 K/UL (ref 0–0.5)
EOSINOPHIL NFR BLD: 6.6 % (ref 0–8)
ERYTHROCYTE [DISTWIDTH] IN BLOOD BY AUTOMATED COUNT: 19.7 % (ref 11.5–14.5)
EST. GFR  (NO RACE VARIABLE): >60 ML/MIN/1.73 M^2
GLUCOSE SERPL-MCNC: 90 MG/DL (ref 70–110)
GLUCOSE UR QL STRIP: NEGATIVE
HCT VFR BLD AUTO: 38.9 % (ref 37–48.5)
HGB BLD-MCNC: 12.8 G/DL (ref 12–16)
HGB UR QL STRIP: NEGATIVE
IMM GRANULOCYTES # BLD AUTO: 0.02 K/UL (ref 0–0.04)
IMM GRANULOCYTES NFR BLD AUTO: 0.5 % (ref 0–0.5)
KETONES UR QL STRIP: NEGATIVE
LEUKOCYTE ESTERASE UR QL STRIP: ABNORMAL
LIPASE SERPL-CCNC: 14 U/L (ref 4–60)
LYMPHOCYTES # BLD AUTO: 1.5 K/UL (ref 1–4.8)
LYMPHOCYTES NFR BLD: 36.3 % (ref 18–48)
MCH RBC QN AUTO: 28.2 PG (ref 27–31)
MCHC RBC AUTO-ENTMCNC: 32.9 G/DL (ref 32–36)
MCV RBC AUTO: 86 FL (ref 82–98)
MICROSCOPIC COMMENT: NORMAL
MONOCYTES # BLD AUTO: 0.4 K/UL (ref 0.3–1)
MONOCYTES NFR BLD: 8.8 % (ref 4–15)
NEUTROPHILS # BLD AUTO: 1.9 K/UL (ref 1.8–7.7)
NEUTROPHILS NFR BLD: 47.1 % (ref 38–73)
NITRITE UR QL STRIP: NEGATIVE
NRBC BLD-RTO: 0 /100 WBC
PH UR STRIP: 7 [PH] (ref 5–8)
PLATELET # BLD AUTO: 193 K/UL (ref 150–450)
PMV BLD AUTO: 10.4 FL (ref 9.2–12.9)
POTASSIUM SERPL-SCNC: 3.8 MMOL/L (ref 3.5–5.1)
PROT SERPL-MCNC: 7 G/DL (ref 6–8.4)
PROT UR QL STRIP: NEGATIVE
RBC # BLD AUTO: 4.54 M/UL (ref 4–5.4)
SODIUM SERPL-SCNC: 141 MMOL/L (ref 136–145)
SP GR UR STRIP: 1.01 (ref 1–1.03)
URN SPEC COLLECT METH UR: ABNORMAL
UROBILINOGEN UR STRIP-ACNC: NEGATIVE EU/DL
WBC # BLD AUTO: 4.1 K/UL (ref 3.9–12.7)
WBC #/AREA URNS HPF: 1 /HPF (ref 0–5)

## 2022-10-10 PROCEDURE — G0378 HOSPITAL OBSERVATION PER HR: HCPCS

## 2022-10-10 PROCEDURE — 36000709 HC OR TIME LEV III EA ADD 15 MIN: Performed by: SURGERY

## 2022-10-10 PROCEDURE — 63600175 PHARM REV CODE 636 W HCPCS: Performed by: STUDENT IN AN ORGANIZED HEALTH CARE EDUCATION/TRAINING PROGRAM

## 2022-10-10 PROCEDURE — 96374 THER/PROPH/DIAG INJ IV PUSH: CPT

## 2022-10-10 PROCEDURE — 99285 EMERGENCY DEPT VISIT HI MDM: CPT | Mod: 25

## 2022-10-10 PROCEDURE — 63600175 PHARM REV CODE 636 W HCPCS: Performed by: EMERGENCY MEDICINE

## 2022-10-10 PROCEDURE — 71000039 HC RECOVERY, EACH ADD'L HOUR: Performed by: SURGERY

## 2022-10-10 PROCEDURE — 81000 URINALYSIS NONAUTO W/SCOPE: CPT | Performed by: PHYSICIAN ASSISTANT

## 2022-10-10 PROCEDURE — 25000003 PHARM REV CODE 250: Performed by: SURGERY

## 2022-10-10 PROCEDURE — 47562 PR LAP,CHOLECYSTECTOMY: ICD-10-PCS | Mod: ,,, | Performed by: SURGERY

## 2022-10-10 PROCEDURE — D9220A PRA ANESTHESIA: Mod: CRNA,,, | Performed by: NURSE ANESTHETIST, CERTIFIED REGISTERED

## 2022-10-10 PROCEDURE — 37000009 HC ANESTHESIA EA ADD 15 MINS: Performed by: SURGERY

## 2022-10-10 PROCEDURE — 96361 HYDRATE IV INFUSION ADD-ON: CPT

## 2022-10-10 PROCEDURE — 83690 ASSAY OF LIPASE: CPT | Performed by: PHYSICIAN ASSISTANT

## 2022-10-10 PROCEDURE — 27201423 OPTIME MED/SURG SUP & DEVICES STERILE SUPPLY: Performed by: SURGERY

## 2022-10-10 PROCEDURE — 36000708 HC OR TIME LEV III 1ST 15 MIN: Performed by: SURGERY

## 2022-10-10 PROCEDURE — 88304 TISSUE EXAM BY PATHOLOGIST: CPT | Performed by: PATHOLOGY

## 2022-10-10 PROCEDURE — 36415 COLL VENOUS BLD VENIPUNCTURE: CPT | Performed by: PHYSICIAN ASSISTANT

## 2022-10-10 PROCEDURE — 25000003 PHARM REV CODE 250: Performed by: NURSE PRACTITIONER

## 2022-10-10 PROCEDURE — 88304 PR  SURG PATH,LEVEL III: ICD-10-PCS | Mod: 26,,, | Performed by: PATHOLOGY

## 2022-10-10 PROCEDURE — 63600175 PHARM REV CODE 636 W HCPCS: Performed by: ANESTHESIOLOGY

## 2022-10-10 PROCEDURE — 80053 COMPREHEN METABOLIC PANEL: CPT | Performed by: PHYSICIAN ASSISTANT

## 2022-10-10 PROCEDURE — D9220A PRA ANESTHESIA: ICD-10-PCS | Mod: CRNA,,, | Performed by: NURSE ANESTHETIST, CERTIFIED REGISTERED

## 2022-10-10 PROCEDURE — 47562 LAPAROSCOPIC CHOLECYSTECTOMY: CPT | Mod: ,,, | Performed by: SURGERY

## 2022-10-10 PROCEDURE — D9220A PRA ANESTHESIA: ICD-10-PCS | Mod: ANES,,, | Performed by: ANESTHESIOLOGY

## 2022-10-10 PROCEDURE — 88304 TISSUE EXAM BY PATHOLOGIST: CPT | Mod: 26,,, | Performed by: PATHOLOGY

## 2022-10-10 PROCEDURE — 63600175 PHARM REV CODE 636 W HCPCS: Performed by: NURSE PRACTITIONER

## 2022-10-10 PROCEDURE — 96376 TX/PRO/DX INJ SAME DRUG ADON: CPT

## 2022-10-10 PROCEDURE — 85025 COMPLETE CBC W/AUTO DIFF WBC: CPT | Performed by: PHYSICIAN ASSISTANT

## 2022-10-10 PROCEDURE — 37000008 HC ANESTHESIA 1ST 15 MINUTES: Performed by: SURGERY

## 2022-10-10 PROCEDURE — 25000003 PHARM REV CODE 250: Performed by: NURSE ANESTHETIST, CERTIFIED REGISTERED

## 2022-10-10 PROCEDURE — 96375 TX/PRO/DX INJ NEW DRUG ADDON: CPT

## 2022-10-10 PROCEDURE — 71000033 HC RECOVERY, INTIAL HOUR: Performed by: SURGERY

## 2022-10-10 PROCEDURE — 25000003 PHARM REV CODE 250: Performed by: ANESTHESIOLOGY

## 2022-10-10 PROCEDURE — 25000003 PHARM REV CODE 250: Performed by: EMERGENCY MEDICINE

## 2022-10-10 PROCEDURE — 63600175 PHARM REV CODE 636 W HCPCS: Performed by: NURSE ANESTHETIST, CERTIFIED REGISTERED

## 2022-10-10 PROCEDURE — D9220A PRA ANESTHESIA: Mod: ANES,,, | Performed by: ANESTHESIOLOGY

## 2022-10-10 RX ORDER — ONDANSETRON HYDROCHLORIDE 2 MG/ML
INJECTION, SOLUTION INTRAMUSCULAR; INTRAVENOUS
Status: DISCONTINUED | OUTPATIENT
Start: 2022-10-10 | End: 2022-10-10

## 2022-10-10 RX ORDER — ONDANSETRON 2 MG/ML
8 INJECTION INTRAMUSCULAR; INTRAVENOUS EVERY 6 HOURS PRN
Status: DISCONTINUED | OUTPATIENT
Start: 2022-10-10 | End: 2022-10-10 | Stop reason: SDUPTHER

## 2022-10-10 RX ORDER — ACETAMINOPHEN 10 MG/ML
INJECTION, SOLUTION INTRAVENOUS
Status: DISCONTINUED | OUTPATIENT
Start: 2022-10-10 | End: 2022-10-10

## 2022-10-10 RX ORDER — HYDROMORPHONE HYDROCHLORIDE 1 MG/ML
1 INJECTION, SOLUTION INTRAMUSCULAR; INTRAVENOUS; SUBCUTANEOUS EVERY 6 HOURS PRN
Status: DISCONTINUED | OUTPATIENT
Start: 2022-10-10 | End: 2022-10-10

## 2022-10-10 RX ORDER — FLUOXETINE HYDROCHLORIDE 20 MG/1
20 CAPSULE ORAL NIGHTLY
Status: DISCONTINUED | OUTPATIENT
Start: 2022-10-10 | End: 2022-10-11 | Stop reason: HOSPADM

## 2022-10-10 RX ORDER — TRAZODONE HYDROCHLORIDE 50 MG/1
50 TABLET ORAL NIGHTLY PRN
Status: DISCONTINUED | OUTPATIENT
Start: 2022-10-10 | End: 2022-10-11 | Stop reason: HOSPADM

## 2022-10-10 RX ORDER — ROCURONIUM BROMIDE 10 MG/ML
INJECTION, SOLUTION INTRAVENOUS
Status: DISCONTINUED | OUTPATIENT
Start: 2022-10-10 | End: 2022-10-10

## 2022-10-10 RX ORDER — LANOLIN ALCOHOL/MO/W.PET/CERES
1000 CREAM (GRAM) TOPICAL DAILY
Status: DISCONTINUED | OUTPATIENT
Start: 2022-10-11 | End: 2022-10-11 | Stop reason: HOSPADM

## 2022-10-10 RX ORDER — DEXAMETHASONE SODIUM PHOSPHATE 4 MG/ML
INJECTION, SOLUTION INTRA-ARTICULAR; INTRALESIONAL; INTRAMUSCULAR; INTRAVENOUS; SOFT TISSUE
Status: DISCONTINUED | OUTPATIENT
Start: 2022-10-10 | End: 2022-10-10

## 2022-10-10 RX ORDER — HYDROMORPHONE HYDROCHLORIDE 2 MG/ML
0.2 INJECTION, SOLUTION INTRAMUSCULAR; INTRAVENOUS; SUBCUTANEOUS EVERY 5 MIN PRN
Status: DISCONTINUED | OUTPATIENT
Start: 2022-10-10 | End: 2022-10-10 | Stop reason: HOSPADM

## 2022-10-10 RX ORDER — MIDAZOLAM HYDROCHLORIDE 1 MG/ML
INJECTION INTRAMUSCULAR; INTRAVENOUS
Status: DISCONTINUED | OUTPATIENT
Start: 2022-10-10 | End: 2022-10-10

## 2022-10-10 RX ORDER — PANTOPRAZOLE SODIUM 40 MG/1
40 TABLET, DELAYED RELEASE ORAL DAILY
Status: DISCONTINUED | OUTPATIENT
Start: 2022-10-11 | End: 2022-10-11 | Stop reason: HOSPADM

## 2022-10-10 RX ORDER — OXYCODONE HYDROCHLORIDE 5 MG/1
5 TABLET ORAL
Status: DISCONTINUED | OUTPATIENT
Start: 2022-10-10 | End: 2022-10-10 | Stop reason: HOSPADM

## 2022-10-10 RX ORDER — NALOXONE HCL 0.4 MG/ML
0.02 VIAL (ML) INJECTION
Status: DISCONTINUED | OUTPATIENT
Start: 2022-10-10 | End: 2022-10-11 | Stop reason: HOSPADM

## 2022-10-10 RX ORDER — FENTANYL CITRATE 50 UG/ML
25 INJECTION, SOLUTION INTRAMUSCULAR; INTRAVENOUS EVERY 5 MIN PRN
Status: DISCONTINUED | OUTPATIENT
Start: 2022-10-10 | End: 2022-10-10 | Stop reason: HOSPADM

## 2022-10-10 RX ORDER — LIDOCAINE HCL/PF 100 MG/5ML
SYRINGE (ML) INTRAVENOUS
Status: DISCONTINUED | OUTPATIENT
Start: 2022-10-10 | End: 2022-10-10

## 2022-10-10 RX ORDER — FLUOXETINE HYDROCHLORIDE 20 MG/1
20 CAPSULE ORAL NIGHTLY
Status: DISCONTINUED | OUTPATIENT
Start: 2022-10-11 | End: 2022-10-10

## 2022-10-10 RX ORDER — MORPHINE SULFATE 4 MG/ML
4 INJECTION, SOLUTION INTRAMUSCULAR; INTRAVENOUS
Status: COMPLETED | OUTPATIENT
Start: 2022-10-10 | End: 2022-10-10

## 2022-10-10 RX ORDER — ENOXAPARIN SODIUM 100 MG/ML
40 INJECTION SUBCUTANEOUS EVERY 24 HOURS
Status: DISCONTINUED | OUTPATIENT
Start: 2022-10-11 | End: 2022-10-11 | Stop reason: HOSPADM

## 2022-10-10 RX ORDER — MORPHINE SULFATE 4 MG/ML
4 INJECTION, SOLUTION INTRAMUSCULAR; INTRAVENOUS EVERY 4 HOURS PRN
Status: CANCELLED | OUTPATIENT
Start: 2022-10-10

## 2022-10-10 RX ORDER — OXYCODONE HYDROCHLORIDE 10 MG/1
10 TABLET ORAL EVERY 4 HOURS PRN
Status: DISCONTINUED | OUTPATIENT
Start: 2022-10-10 | End: 2022-10-11 | Stop reason: HOSPADM

## 2022-10-10 RX ORDER — ACETAMINOPHEN 325 MG/1
650 TABLET ORAL EVERY 8 HOURS PRN
Status: DISCONTINUED | OUTPATIENT
Start: 2022-10-10 | End: 2022-10-11 | Stop reason: HOSPADM

## 2022-10-10 RX ORDER — MORPHINE SULFATE 2 MG/ML
2 INJECTION, SOLUTION INTRAMUSCULAR; INTRAVENOUS
Status: COMPLETED | OUTPATIENT
Start: 2022-10-10 | End: 2022-10-10

## 2022-10-10 RX ORDER — FENTANYL CITRATE 50 UG/ML
INJECTION, SOLUTION INTRAMUSCULAR; INTRAVENOUS
Status: DISCONTINUED | OUTPATIENT
Start: 2022-10-10 | End: 2022-10-10

## 2022-10-10 RX ORDER — SUCCINYLCHOLINE CHLORIDE 20 MG/ML
INJECTION INTRAMUSCULAR; INTRAVENOUS
Status: DISCONTINUED | OUTPATIENT
Start: 2022-10-10 | End: 2022-10-10

## 2022-10-10 RX ORDER — SODIUM CHLORIDE 0.9 % (FLUSH) 0.9 %
10 SYRINGE (ML) INJECTION
Status: DISCONTINUED | OUTPATIENT
Start: 2022-10-10 | End: 2022-10-11 | Stop reason: HOSPADM

## 2022-10-10 RX ORDER — PROPOFOL 10 MG/ML
VIAL (ML) INTRAVENOUS
Status: DISCONTINUED | OUTPATIENT
Start: 2022-10-10 | End: 2022-10-10

## 2022-10-10 RX ORDER — BUPIVACAINE HYDROCHLORIDE AND EPINEPHRINE 5; 5 MG/ML; UG/ML
INJECTION, SOLUTION EPIDURAL; INTRACAUDAL; PERINEURAL
Status: DISCONTINUED | OUTPATIENT
Start: 2022-10-10 | End: 2022-10-10 | Stop reason: HOSPADM

## 2022-10-10 RX ORDER — SODIUM CHLORIDE, SODIUM LACTATE, POTASSIUM CHLORIDE, CALCIUM CHLORIDE 600; 310; 30; 20 MG/100ML; MG/100ML; MG/100ML; MG/100ML
INJECTION, SOLUTION INTRAVENOUS CONTINUOUS
Status: DISCONTINUED | OUTPATIENT
Start: 2022-10-10 | End: 2022-10-11 | Stop reason: HOSPADM

## 2022-10-10 RX ORDER — ONDANSETRON 2 MG/ML
4 INJECTION INTRAMUSCULAR; INTRAVENOUS EVERY 8 HOURS PRN
Status: DISCONTINUED | OUTPATIENT
Start: 2022-10-10 | End: 2022-10-11 | Stop reason: HOSPADM

## 2022-10-10 RX ORDER — FERROUS GLUCONATE 324(37.5)
324 TABLET ORAL
Status: DISCONTINUED | OUTPATIENT
Start: 2022-10-11 | End: 2022-10-11 | Stop reason: HOSPADM

## 2022-10-10 RX ORDER — HYDROMORPHONE HYDROCHLORIDE 1 MG/ML
1 INJECTION, SOLUTION INTRAMUSCULAR; INTRAVENOUS; SUBCUTANEOUS EVERY 4 HOURS PRN
Status: DISCONTINUED | OUTPATIENT
Start: 2022-10-10 | End: 2022-10-11 | Stop reason: HOSPADM

## 2022-10-10 RX ADMIN — SUGAMMADEX 200 MG: 100 INJECTION, SOLUTION INTRAVENOUS at 06:10

## 2022-10-10 RX ADMIN — FENTANYL CITRATE 25 MCG: 50 INJECTION, SOLUTION INTRAMUSCULAR; INTRAVENOUS at 07:10

## 2022-10-10 RX ADMIN — ROCURONIUM BROMIDE 5 MG: 10 INJECTION, SOLUTION INTRAVENOUS at 05:10

## 2022-10-10 RX ADMIN — CEFAZOLIN 2 G: 1 INJECTION, POWDER, FOR SOLUTION INTRAVENOUS at 05:10

## 2022-10-10 RX ADMIN — HYDROMORPHONE HYDROCHLORIDE 1 MG: 1 INJECTION, SOLUTION INTRAMUSCULAR; INTRAVENOUS; SUBCUTANEOUS at 09:10

## 2022-10-10 RX ADMIN — PROPOFOL 150 MG: 10 INJECTION, EMULSION INTRAVENOUS at 05:10

## 2022-10-10 RX ADMIN — OXYCODONE HYDROCHLORIDE 10 MG: 10 TABLET ORAL at 10:10

## 2022-10-10 RX ADMIN — ROCURONIUM BROMIDE 25 MG: 10 INJECTION, SOLUTION INTRAVENOUS at 05:10

## 2022-10-10 RX ADMIN — SODIUM CHLORIDE, SODIUM GLUCONATE, SODIUM ACETATE, POTASSIUM CHLORIDE, MAGNESIUM CHLORIDE, SODIUM PHOSPHATE, DIBASIC, AND POTASSIUM PHOSPHATE: .53; .5; .37; .037; .03; .012; .00082 INJECTION, SOLUTION INTRAVENOUS at 06:10

## 2022-10-10 RX ADMIN — SODIUM CHLORIDE 1000 ML: 0.9 INJECTION, SOLUTION INTRAVENOUS at 01:10

## 2022-10-10 RX ADMIN — DEXAMETHASONE SODIUM PHOSPHATE 4 MG: 4 INJECTION, SOLUTION INTRA-ARTICULAR; INTRALESIONAL; INTRAMUSCULAR; INTRAVENOUS; SOFT TISSUE at 05:10

## 2022-10-10 RX ADMIN — SODIUM CHLORIDE, SODIUM LACTATE, POTASSIUM CHLORIDE, AND CALCIUM CHLORIDE: .6; .31; .03; .02 INJECTION, SOLUTION INTRAVENOUS at 07:10

## 2022-10-10 RX ADMIN — ACETAMINOPHEN 1000 MG: 10 INJECTION, SOLUTION INTRAVENOUS at 05:10

## 2022-10-10 RX ADMIN — MORPHINE SULFATE 2 MG: 2 INJECTION, SOLUTION INTRAMUSCULAR; INTRAVENOUS at 01:10

## 2022-10-10 RX ADMIN — SUCCINYLCHOLINE CHLORIDE 120 MG: 20 INJECTION, SOLUTION INTRAMUSCULAR; INTRAVENOUS; PARENTERAL at 05:10

## 2022-10-10 RX ADMIN — ONDANSETRON 4 MG: 2 INJECTION, SOLUTION INTRAMUSCULAR; INTRAVENOUS at 05:10

## 2022-10-10 RX ADMIN — PRAMIPEXOLE DIHYDROCHLORIDE 1.5 MG: 1 TABLET ORAL at 10:10

## 2022-10-10 RX ADMIN — GLYCOPYRROLATE 0.2 MG: 0.2 INJECTION, SOLUTION INTRAMUSCULAR; INTRAVITREAL at 05:10

## 2022-10-10 RX ADMIN — OXYCODONE 5 MG: 5 TABLET ORAL at 06:10

## 2022-10-10 RX ADMIN — LIDOCAINE HYDROCHLORIDE 75 MG: 20 INJECTION INTRAVENOUS at 05:10

## 2022-10-10 RX ADMIN — MORPHINE SULFATE 4 MG: 4 INJECTION INTRAVENOUS at 03:10

## 2022-10-10 RX ADMIN — SODIUM CHLORIDE, SODIUM GLUCONATE, SODIUM ACETATE, POTASSIUM CHLORIDE, MAGNESIUM CHLORIDE, SODIUM PHOSPHATE, DIBASIC, AND POTASSIUM PHOSPHATE: .53; .5; .37; .037; .03; .012; .00082 INJECTION, SOLUTION INTRAVENOUS at 05:10

## 2022-10-10 RX ADMIN — FLUOXETINE 20 MG: 20 CAPSULE ORAL at 09:10

## 2022-10-10 RX ADMIN — MIDAZOLAM HYDROCHLORIDE 2 MG: 1 INJECTION, SOLUTION INTRAMUSCULAR; INTRAVENOUS at 05:10

## 2022-10-10 RX ADMIN — FENTANYL CITRATE 25 MCG: 50 INJECTION, SOLUTION INTRAMUSCULAR; INTRAVENOUS at 06:10

## 2022-10-10 RX ADMIN — FENTANYL CITRATE 100 MCG: 50 INJECTION, SOLUTION INTRAMUSCULAR; INTRAVENOUS at 05:10

## 2022-10-10 NOTE — ED PROVIDER NOTES
Encounter Date: 10/10/2022    SCRIBE #1 NOTE: I, Judie Leung, am scribing for, and in the presence of,  Jareth Patrick MD.     History     Chief Complaint   Patient presents with    Abdominal Pain     Rt. Upper abd. Pain through to back x 3 days      Time seen by provider: 12:42 PM on 10/10/2022    Mary Mike is a 62 y.o. female who presents to the ED with an onset of right upper abdominal pain for 3 days that radiates to her back. Patient endorses pain keeps her up at night and is worse with deep breathing and palpation. Patient reports an episode of generalized weakness and clamminess a few days ago. She denies known allergies or hx of smoking. Patient is an occasional drinker. She does not currently work. The patient denies fever, chills, vomiting, diarrhea, cough, SOB, rash or any other symptoms at this time. Abdominal PSHx includes unilateral oophorectomy, , and gastric sleeve that was converted to bypass.    The history is provided by the patient.   Review of patient's allergies indicates:  No Known Allergies  Past Medical History:   Diagnosis Date    ADHD (attention deficit hyperactivity disorder)     Allergy     Anemia     Anxiety     Depression     Fatty liver     History of endoscopy 2021    Dr. Toney Cuevas-Z-line is irregular and was found 37 cm from the incisors.  Scattered ulcerations noted between 36-37 cm. 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  The gastrojejunal anastomosis was characterized by moderate stenosis and ulceration.  This was traversed.  The pouch to jejunum limb was characterized by erythema, friable mucosa, inflammation and ulceration.  The examine    Insomnia     Migraines     Osteoporosis     Restless leg syndrome      Past Surgical History:   Procedure Laterality Date    BREAST BIOPSY Left     benign 15 +years ago    BREAST SURGERY  2017    breast reduction    BUNIONECTOMY Right 2018     SECTION  1987    FRACTURE SURGERY   2016    ankle    GASTRIC RESTRICTION SURGERY      GASTRIC SLEEVE 2012 2012    REPAIR OF EXTENSOR TENDON  6/24/2021    Procedure: REPAIR, TENDON, EXTENSOR;  Surgeon: Aly Zimmerman DPM;  Location: [facility] OR;  Service: Podiatry;;    SURGICAL REMOVAL OF METATARSAL HEAD Right 6/24/2021    Procedure: OSTECTOMY, METATARSAL BONE, HEAD;  Surgeon: Aly Zimmerman DPM;  Location: [facility] OR;  Service: Podiatry;  Laterality: Right;    TOTAL REDUCTION MAMMOPLASTY      2016     Family History   Problem Relation Age of Onset    Arthritis Mother     Colon cancer Mother     Diabetes Mother     Early death Mother     Miscarriages / Stillbirths Mother     Arthritis Father     Heart disease Father     Hypertension Father     Stroke Father     Vision loss Father     Breast cancer Maternal Aunt     Vaginal cancer Paternal Grandmother     Heart disease Paternal Grandfather      Social History     Tobacco Use    Smoking status: Never    Smokeless tobacco: Never   Substance Use Topics    Alcohol use: Yes     Comment: occassional    Drug use: No     Review of Systems   Constitutional:  Negative for chills and fever.   Respiratory:  Negative for cough and shortness of breath.    Gastrointestinal:  Positive for abdominal pain. Negative for diarrhea.   Skin:  Negative for rash.   Neurological:  Positive for weakness (generalized).   All other systems reviewed and are negative.    Physical Exam     Initial Vitals   BP Pulse Resp Temp SpO2   10/10/22 1345 10/10/22 1215 10/10/22 1215 10/10/22 1215 10/10/22 1215   (!) 165/86 (!) 51 18 98.2 °F (36.8 °C) 99 %      MAP       --                Physical Exam    Nursing note and vitals reviewed.  Constitutional: She appears well-developed and well-nourished. She is not diaphoretic. No distress.   HENT:   Head: Normocephalic and atraumatic.   Eyes: EOM are normal.   Neck: Neck supple.   Normal range of motion.  Cardiovascular:  Normal rate, regular rhythm and normal heart sounds.     Exam reveals no gallop and  no friction rub.       No murmur heard.  Pulmonary/Chest: Breath sounds normal. No respiratory distress. She has no wheezes. She has no rhonchi. She has no rales.   Abdominal: Abdomen is soft. She exhibits no distension. There is abdominal tenderness in the right upper quadrant. There is no rebound and no guarding.   Musculoskeletal:         General: Normal range of motion.      Cervical back: Normal range of motion and neck supple.     Neurological: She is alert and oriented to person, place, and time.   Skin: Skin is warm and dry.   Psychiatric: She has a normal mood and affect. Her behavior is normal. Judgment and thought content normal.       ED Course   Procedures  Labs Reviewed   CBC W/ AUTO DIFFERENTIAL - Abnormal; Notable for the following components:       Result Value    RDW 19.7 (*)     All other components within normal limits   URINALYSIS, REFLEX TO URINE CULTURE - Abnormal; Notable for the following components:    Leukocytes, UA Trace (*)     All other components within normal limits    Narrative:     Specimen Source->Urine   COMPREHENSIVE METABOLIC PANEL   LIPASE   URINALYSIS MICROSCOPIC    Narrative:     Specimen Source->Urine          Imaging Results              US Abdomen Limited (Final result)  Result time 10/10/22 15:39:33      Final result by Prakash Jara MD (10/10/22 15:39:33)                   Impression:      Small cholelithiasis.  Positive sonographic Uribe sign, nonspecific, however clinical correlation for acute cholecystitis is recommended.  No accompanying sonographic findings of acute cholecystitis.      Electronically signed by: Prakash Jara MD  Date:    10/10/2022  Time:    15:39               Narrative:    EXAMINATION:  US ABDOMEN LIMITED    CLINICAL HISTORY:  ruq pain;    TECHNIQUE:  Limited ultrasound of the right upper quadrant of the abdomen (including pancreas, liver, gallbladder, common bile duct, and right kidney) was  performed.    COMPARISON:  None.    FINDINGS:  The pancreas is largely obscured.    Several small stones layer dependently within the gallbladder.  No gallbladder wall thickening or pericholecystic fluid.  Sonographic Uribe sign is reportedly positive.  The common bile duct is normal in caliber at 4 mm.  No intrahepatic biliary dilatation.    The IVC is normal caliber.  The liver is normal in size without focal lesion.  The spleen is normal in size at 8.6 cm.                                       Medications   FLUoxetine capsule 20 mg (has no administration in time range)   multivitamin tablet (has no administration in time range)   ferrous gluconate tablet 324 mg (has no administration in time range)   pantoprazole EC tablet 40 mg (has no administration in time range)   pramipexole tablet 1.5 mg (has no administration in time range)   traZODone tablet 50 mg (has no administration in time range)   cyanocobalamin tablet 1,000 mcg (has no administration in time range)   sodium chloride 0.9% flush 10 mL (has no administration in time range)   acetaminophen tablet 650 mg (has no administration in time range)   naloxone 0.4 mg/mL injection 0.02 mg (has no administration in time range)   enoxaparin injection 40 mg (has no administration in time range)   ondansetron injection 4 mg (has no administration in time range)   lactated ringers infusion ( Intravenous New Bag 10/10/22 1910)   electrolyte-S (ISOLYTE) (0 mLs Intravenous Stopped 10/10/22 1902)   promethazine (PHENERGAN) 6.25 mg in dextrose 5 % 50 mL IVPB (has no administration in time range)   oxyCODONE immediate release tablet Tab 10 mg (has no administration in time range)   HYDROmorphone injection 1 mg (has no administration in time range)   sodium chloride 0.9% bolus 1,000 mL (0 mLs Intravenous Stopped 10/10/22 1421)   morphine injection 2 mg (2 mg Intravenous Given 10/10/22 1321)   morphine injection 4 mg (4 mg Intravenous Given 10/10/22 1545)     Medical  Decision Making:   History:   Old Medical Records: I decided to obtain old medical records.  Clinical Tests:   Lab Tests: Ordered and Reviewed  Radiological Study: Ordered and Reviewed        Scribe Attestation:   Scribe #1: I performed the above scribed service and the documentation accurately describes the services I performed. I attest to the accuracy of the note.      ED Course as of 10/10/22 2053   Mon Oct 10, 2022   1240 Temp: 98.2 °F (36.8 °C) [EF]   1240 Temp src: Oral [EF]   1240 Pulse(!): 51 [EF]   1240 Resp: 18 [EF]   1240 SpO2: 99 % [EF]   1543 US Abdomen Limited [EF]   1606 Dr. Krause in a case will call when done [EF]   1647 Dr. Tamayo to admit, dr krause will take to OR later today for GB [EF]   1648 62-year-old female relatively healthy presents with 3 days of constant right upper quadrant pain radiating to the back.  Tender on exam the right upper quadrant.  Ultrasound demonstrates gallstones with positive Uribe sign but no gallbladder wall thickening or pericholecystic fluid.  Patient has continued to have discomfort in the right upper quadrant despite IV morphine x2.  Likely early acute cholecystitis.  Case discussed with General surgery who will take the patient to the operating room later today. [EF]      ED Course User Index  [EF] Jareth Patrick MD               I, Dr. Patrick, personally performed the services described in this documentation. All medical record entries made by the scribe were at my direction and in my presence.  I have reviewed the chart and agree that the record reflects my personal performance and is accurate and complete.8:53 PM 10/10/2022    Clinical Impression:   Final diagnoses:  [K81.0] Acute cholecystitis      ED Disposition Condition    Observation Stable                Jareth Patrick MD  10/10/22 2053

## 2022-10-10 NOTE — ANESTHESIA PROCEDURE NOTES
Intubation    Date/Time: 10/10/2022 5:28 PM  Performed by: Bernard Goldsmith CRNA  Authorized by: Fermín Nolen MD     Intubation:     Induction:  Intravenous    Intubated:  Postinduction    Mask Ventilation:  Easy mask    Attempts:  1    Attempted By:  CRNA    Method of Intubation:  Direct    Blade:  Vik 3    Laryngeal View Grade: Grade I - full view of cords      Difficult Airway Encountered?: No      Complications:  None    Airway Device:  Oral endotracheal tube    Airway Device Size:  7.0    Style/Cuff Inflation:  Cuffed (inflated to minimal occlusive pressure)    Tube secured:  20    Secured at:  The lips    Placement Verified By:  Capnometry    Complicating Factors:  None    Findings Post-Intubation:  BS equal bilateral

## 2022-10-10 NOTE — OP NOTE
Cholecystectomy- laparoscopic Procedure Note    Date of procedure:   10/10/2022    Indications:  62-year-old presents with signs of acute cholecystitis here for laparoscopic cholecystectomy    Pre-operative Diagnosis:  Acute cholecystitis without biliary obstruction    Post-operative Diagnosis: Same    Surgeon: Henrietta Joseph MD    Assistants:  Greer Gillette CFA    Anesthesia: General endotracheal anesthesia    ASA Class: 3    Procedure Details   The patient was seen in the Holding Room. The risks, benefits, complications, treatment options, and expected outcomes were discussed with the patient. The possibilities of reaction to medication, pulmonary aspiration, perforation of viscus, bleeding, recurrent infection, the need for additional procedures, failure to diagnose a condition, and creating a complication requiring transfusion or operation were discussed with the patient. The patient concurred with the proposed plan, giving informed consent. The site of surgery properly noted/marked. The patient was taken to Operating Room 6 identified as Mary Mike and the procedure verified as laparoscopic cholecystectomy. A Time Out was held and the above information confirmed.    Full general anesthesia was induced with orotracheal intubation. The patient was prepped and draped in a supine position. Appropriate antibiotics were given intravenously. Arms were out.    Through a periumbilical incision a Veress needle was inserted into the peritoneal cavity.  CO2 insufflation was started maintain without change in vital signs.  Other trocars were inserted under direct visualization.  The patient was positioned appropriately and immediately we noted adhesions directly to the gallbladder.  The gallbladder also appeared to be distended and dilated.  I was able to grasp it elevated above the liver.  The adhesions were taken down using the cautery and blunt we were able.  The neck of the gallbladder was grasped and the  peritoneum around the cystic duct and artery were taken down using a Maryland dissector.  Once this was done the cystic duct was easily seen.  The cystic artery was very small and cauterized easily with the cautery.  The critical view was seen and a picture was taken to document this.  Using a clip applier the cystic duct was clipped and then transected.  The gallbladder was then removed from the gallbladder fossa using the cautery.  It was placed in an Endo-Catch bag and removed via the umbilicus.  The right upper quadrant was then suctioned and irrigated copiously.  There was some oozing from the gallbladder fossa which was controlled with cautery and then Sandip.  Trocars removed.  Fascia was closed with Vicryl.  Skin was closed with Monocryl.      Instrument, sponge, and needle counts were correct prior to wound closure and at the conclusion of the case.     Findings:  acute cholecystitis    Estimated Blood Loss: 20.0 cc    Drains: none    Total IV Fluids: see anesthesia    Specimens: gallbladder    Implants: sandip    Complications:  None; patient tolerated the procedure well.    Disposition: PACU - hemodynamically stable.    Condition: stable    Attending Attestation: I was present and scrubbed for the entire procedure.

## 2022-10-10 NOTE — ANESTHESIA PREPROCEDURE EVALUATION
10/10/2022  Mary Mike is a 62 y.o., female.      Pre-op Assessment    I have reviewed the Patient Summary Reports.     I have reviewed the Nursing Notes. I have reviewed the NPO Status.   I have reviewed the Medications.     Review of Systems  Anesthesia Hx:  Denies Family Hx of Anesthesia complications.   Denies Personal Hx of Anesthesia complications.   Hematology/Oncology:         -- Anemia:   Hepatic/GI:   PUD, Liver Disease, GI bypass status, malabsorption, cholelithiasis/cholecystitis   Neurological:   Headaches    Psych:   Psychiatric History          Physical Exam  General: Cooperative, Alert and Oriented    Airway:  Mallampati: II   Mouth Opening: Normal  TM Distance: Normal  Tongue: Normal  Neck ROM: Normal ROM    Dental:  Intact    Chest/Lungs:  Normal Respiratory Rate    Heart:  Rate: Normal  Rhythm: Regular Rhythm        Anesthesia Plan  Type of Anesthesia, risks & benefits discussed:    Anesthesia Type: Gen ETT  Intra-op Monitoring Plan: Standard ASA Monitors  Post Op Pain Control Plan: multimodal analgesia  Induction:  IV  Airway Plan: Direct and Video, Post-Induction  Informed Consent: Informed consent signed with the Patient and all parties understand the risks and agree with anesthesia plan.  All questions answered.   ASA Score: 2    Ready For Surgery From Anesthesia Perspective.     .

## 2022-10-10 NOTE — TRANSFER OF CARE
"Anesthesia Transfer of Care Note    Patient: Mary Mike    Procedure(s) Performed: Procedure(s) (LRB):  CHOLECYSTECTOMY, LAPAROSCOPIC (N/A)    Patient location: PACU    Anesthesia Type: general    Transport from OR: Transported from OR on 2-3 L/min O2 by NC with adequate spontaneous ventilation    Post pain: adequate analgesia    Post assessment: no apparent anesthetic complications and tolerated procedure well    Post vital signs: stable    Level of consciousness: responds to stimulation and sedated    Nausea/Vomiting: no nausea/vomiting    Complications: none    Transfer of care protocol was followed      Last vitals:   Visit Vitals  BP (!) 146/77   Pulse (!) 51   Temp 36.8 °C (98.2 °F) (Oral)   Resp 20   Ht 5' 2" (1.575 m)   Wt 59.4 kg (131 lb)   SpO2 99%   BMI 23.96 kg/m²     "

## 2022-10-10 NOTE — H&P
[facility] Emergency Dept  History & Physical    Subjective:      Chief Complaint/Reason for Admission: cholecystitis    Mary Mike is a 62 y.o. female with right upper quadrant sharp stabbing pain, constant radiating to back.    Past Medical History:   Diagnosis Date    ADHD (attention deficit hyperactivity disorder)     Allergy     Anemia     Anxiety     Depression     Fatty liver     History of endoscopy 2021    Dr. Toney Cuevas-Z-line is irregular and was found 37 cm from the incisors.  Scattered ulcerations noted between 36-37 cm. 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  The gastrojejunal anastomosis was characterized by moderate stenosis and ulceration.  This was traversed.  The pouch to jejunum limb was characterized by erythema, friable mucosa, inflammation and ulceration.  The examine    Insomnia     Migraines     Osteoporosis     Restless leg syndrome      Past Surgical History:   Procedure Laterality Date    BREAST BIOPSY Left     benign 15 +years ago    BREAST SURGERY  2017    breast reduction    BUNIONECTOMY Right 2018     SECTION  1987    FRACTURE SURGERY  2016    ankle    GASTRIC RESTRICTION SURGERY      GASTRIC SLEEVE 2012    REPAIR OF EXTENSOR TENDON  2021    Procedure: REPAIR, TENDON, EXTENSOR;  Surgeon: Aly Zimmerman DPM;  Location: [facility] OR;  Service: Podiatry;;    SURGICAL REMOVAL OF METATARSAL HEAD Right 2021    Procedure: OSTECTOMY, METATARSAL BONE, HEAD;  Surgeon: Aly Zimmerman DPM;  Location: [facility] OR;  Service: Podiatry;  Laterality: Right;    TOTAL REDUCTION MAMMOPLASTY           Family History   Problem Relation Age of Onset    Arthritis Mother     Colon cancer Mother     Diabetes Mother     Early death Mother     Miscarriages / Stillbirths Mother     Arthritis Father     Heart disease Father     Hypertension Father     Stroke Father     Vision loss Father     Breast cancer Maternal Aunt     Vaginal cancer Paternal  Grandmother     Heart disease Paternal Grandfather      Social History     Tobacco Use    Smoking status: Never    Smokeless tobacco: Never   Substance Use Topics    Alcohol use: Yes     Comment: occassional    Drug use: No       (Not in a hospital admission)    Review of patient's allergies indicates:  No Known Allergies     Review of Systems   Gastrointestinal:  Positive for abdominal pain and nausea.   All other systems reviewed and are negative.    Objective:      Vital Signs (Most Recent)  Temp: 98.2 °F (36.8 °C) (10/10/22 1215)  Pulse: (!) 51 (10/10/22 1634)  Resp: 20 (10/10/22 1545)  BP: (!) 146/77 (10/10/22 1634)  SpO2: 99 % (10/10/22 1634)    Vital Signs Range (Last 24H):  Temp:  [98.2 °F (36.8 °C)]   Pulse:  [51]   Resp:  [18-20]   BP: (146)/(77)   SpO2:  [98 %-99 %]     Physical Exam  Vitals and nursing note reviewed.   Constitutional:       General: She is not in acute distress.     Appearance: She is well-developed. She is not diaphoretic.   HENT:      Head: Normocephalic and atraumatic.      Mouth/Throat:      Pharynx: No oropharyngeal exudate.   Eyes:      General: No scleral icterus.     Conjunctiva/sclera: Conjunctivae normal.      Pupils: Pupils are equal, round, and reactive to light.   Neck:      Thyroid: No thyromegaly.      Vascular: No JVD.      Trachea: No tracheal deviation.   Cardiovascular:      Rate and Rhythm: Normal rate and regular rhythm.      Heart sounds: Normal heart sounds. No murmur heard.    No friction rub. No gallop.   Pulmonary:      Effort: Pulmonary effort is normal. No respiratory distress.      Breath sounds: Normal breath sounds. No stridor. No wheezing or rales.   Chest:      Chest wall: No tenderness.   Abdominal:      General: Bowel sounds are normal. There is no distension.      Palpations: Abdomen is soft. There is no mass.      Tenderness: There is abdominal tenderness. There is no guarding or rebound.   Musculoskeletal:         General: No tenderness. Normal range  of motion.      Cervical back: Normal range of motion and neck supple.   Lymphadenopathy:      Cervical: No cervical adenopathy.   Skin:     General: Skin is warm and dry.      Findings: No erythema or rash.   Neurological:      Mental Status: She is alert and oriented to person, place, and time.      Cranial Nerves: No cranial nerve deficit.   Psychiatric:         Behavior: Behavior normal.       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       Plan:    Acute cholecystitis  Lap konstantin  Gi rest  antibiotics

## 2022-10-10 NOTE — FIRST PROVIDER EVALUATION
Emergency Department TeleTriage Encounter Note      CHIEF COMPLAINT    Chief Complaint   Patient presents with    Abdominal Pain     Rt. Upper abd. Pain through to back x 3 days        VITAL SIGNS   Initial Vitals [10/10/22 1215]   BP Pulse Resp Temp SpO2   -- (!) 51 18 98.2 °F (36.8 °C) 99 %      MAP       --            ALLERGIES    Review of patient's allergies indicates:  No Known Allergies    PROVIDER TRIAGE NOTE  HPI: Mary Mike, a 62 y.o. female presents to the ED for RUQ abdominal pain x 3 days.       Constitutional: well nourished, well developed, appearing stated age, NAD   HEENT: NCAT, symmetrical lids, No obvious facial deformity.  Normal phonation. Normal Conjunctiva, Gross EOMs intact   Neck: NAROM   Respiratory: Normal effort.  No obvious use of accessory muscles   Musculoskeletal: Moved upper extremities well   Neuro: Alert, answers questions appropriately    Psych: appropriate mood and affect          ORDERS  Labs Reviewed - No data to display    ED Orders (720h ago, onward)      None              Virtual Visit Note: The provider triage portion of this emergency department evaluation and documentation was performed via [employer], a HIPAA-compliant telemedicine application, in concert with a tele-presenter in the room. A face to face patient evaluation with one of my colleagues will occur once the patient is placed in an emergency department room.      DISCLAIMER: This note was prepared with [employer] voice recognition transcription software. Garbled syntax, mangled pronouns, and other bizarre constructions may be attributed to that software system.

## 2022-10-10 NOTE — PLAN OF CARE
Released from Pacu when criteria met pain controlled skin w+d No nausea No emesis dsg dry intact aaox4 encouraged deep breaths Pt has all belongings   at bedside  lr at 125 20 g in L ac

## 2022-10-11 VITALS
HEART RATE: 54 BPM | RESPIRATION RATE: 18 BRPM | OXYGEN SATURATION: 96 % | WEIGHT: 149.94 LBS | HEIGHT: 62 IN | TEMPERATURE: 98 F | SYSTOLIC BLOOD PRESSURE: 159 MMHG | BODY MASS INDEX: 27.59 KG/M2 | DIASTOLIC BLOOD PRESSURE: 72 MMHG

## 2022-10-11 PROBLEM — R79.89 ELEVATED LFTS: Status: ACTIVE | Noted: 2022-10-11

## 2022-10-11 PROBLEM — R00.1 BRADYCARDIA: Status: ACTIVE | Noted: 2022-10-11

## 2022-10-11 PROBLEM — Z90.49 S/P LAPAROSCOPIC CHOLECYSTECTOMY: Status: ACTIVE | Noted: 2022-10-10

## 2022-10-11 PROBLEM — K80.01 CALCULUS OF GALLBLADDER WITH ACUTE CHOLECYSTITIS AND OBSTRUCTION: Status: RESOLVED | Noted: 2022-10-10 | Resolved: 2022-10-11

## 2022-10-11 LAB
ALBUMIN SERPL BCP-MCNC: 3.6 G/DL (ref 3.5–5.2)
ALP SERPL-CCNC: 73 U/L (ref 55–135)
ALT SERPL W/O P-5'-P-CCNC: 64 U/L (ref 10–44)
ANION GAP SERPL CALC-SCNC: 9 MMOL/L (ref 8–16)
AST SERPL-CCNC: 76 U/L (ref 10–40)
BASOPHILS # BLD AUTO: 0.02 K/UL (ref 0–0.2)
BASOPHILS NFR BLD: 0.3 % (ref 0–1.9)
BILIRUB SERPL-MCNC: 0.3 MG/DL (ref 0.1–1)
BUN SERPL-MCNC: 3 MG/DL (ref 6–20)
CALCIUM SERPL-MCNC: 8.8 MG/DL (ref 8.7–10.5)
CHLORIDE SERPL-SCNC: 102 MMOL/L (ref 95–110)
CO2 SERPL-SCNC: 26 MMOL/L (ref 23–29)
CREAT SERPL-MCNC: 0.7 MG/DL (ref 0.5–1.4)
DIFFERENTIAL METHOD: ABNORMAL
EOSINOPHIL # BLD AUTO: 0 K/UL (ref 0–0.5)
EOSINOPHIL NFR BLD: 0 % (ref 0–8)
ERYTHROCYTE [DISTWIDTH] IN BLOOD BY AUTOMATED COUNT: 19.6 % (ref 11.5–14.5)
EST. GFR  (NO RACE VARIABLE): >60 ML/MIN/1.73 M^2
GLUCOSE SERPL-MCNC: 141 MG/DL (ref 70–110)
HCT VFR BLD AUTO: 36.4 % (ref 37–48.5)
HGB BLD-MCNC: 11.7 G/DL (ref 12–16)
IMM GRANULOCYTES # BLD AUTO: 0.04 K/UL (ref 0–0.04)
IMM GRANULOCYTES NFR BLD AUTO: 0.5 % (ref 0–0.5)
LYMPHOCYTES # BLD AUTO: 0.5 K/UL (ref 1–4.8)
LYMPHOCYTES NFR BLD: 7 % (ref 18–48)
MAGNESIUM SERPL-MCNC: 1.9 MG/DL (ref 1.6–2.6)
MCH RBC QN AUTO: 27.9 PG (ref 27–31)
MCHC RBC AUTO-ENTMCNC: 32.1 G/DL (ref 32–36)
MCV RBC AUTO: 87 FL (ref 82–98)
MONOCYTES # BLD AUTO: 0.3 K/UL (ref 0.3–1)
MONOCYTES NFR BLD: 3.8 % (ref 4–15)
NEUTROPHILS # BLD AUTO: 6.5 K/UL (ref 1.8–7.7)
NEUTROPHILS NFR BLD: 88.4 % (ref 38–73)
NRBC BLD-RTO: 0 /100 WBC
PHOSPHATE SERPL-MCNC: 3.5 MG/DL (ref 2.7–4.5)
PLATELET # BLD AUTO: 176 K/UL (ref 150–450)
PMV BLD AUTO: 11.2 FL (ref 9.2–12.9)
POTASSIUM SERPL-SCNC: 4.9 MMOL/L (ref 3.5–5.1)
PROT SERPL-MCNC: 6.1 G/DL (ref 6–8.4)
RBC # BLD AUTO: 4.2 M/UL (ref 4–5.4)
SODIUM SERPL-SCNC: 137 MMOL/L (ref 136–145)
WBC # BLD AUTO: 7.39 K/UL (ref 3.9–12.7)

## 2022-10-11 PROCEDURE — 25000003 PHARM REV CODE 250: Performed by: SURGERY

## 2022-10-11 PROCEDURE — 25000003 PHARM REV CODE 250: Performed by: STUDENT IN AN ORGANIZED HEALTH CARE EDUCATION/TRAINING PROGRAM

## 2022-10-11 PROCEDURE — G0378 HOSPITAL OBSERVATION PER HR: HCPCS

## 2022-10-11 PROCEDURE — 36415 COLL VENOUS BLD VENIPUNCTURE: CPT | Performed by: STUDENT IN AN ORGANIZED HEALTH CARE EDUCATION/TRAINING PROGRAM

## 2022-10-11 PROCEDURE — 94761 N-INVAS EAR/PLS OXIMETRY MLT: CPT

## 2022-10-11 PROCEDURE — 83735 ASSAY OF MAGNESIUM: CPT | Performed by: STUDENT IN AN ORGANIZED HEALTH CARE EDUCATION/TRAINING PROGRAM

## 2022-10-11 PROCEDURE — 85025 COMPLETE CBC W/AUTO DIFF WBC: CPT | Performed by: STUDENT IN AN ORGANIZED HEALTH CARE EDUCATION/TRAINING PROGRAM

## 2022-10-11 PROCEDURE — 84100 ASSAY OF PHOSPHORUS: CPT | Performed by: STUDENT IN AN ORGANIZED HEALTH CARE EDUCATION/TRAINING PROGRAM

## 2022-10-11 PROCEDURE — 27000221 HC OXYGEN, UP TO 24 HOURS

## 2022-10-11 PROCEDURE — 80053 COMPREHEN METABOLIC PANEL: CPT | Performed by: STUDENT IN AN ORGANIZED HEALTH CARE EDUCATION/TRAINING PROGRAM

## 2022-10-11 RX ORDER — DOCUSATE SODIUM, 50 MG SENNOSIDES, 8.6 MG 8.6; 5 1/1; 1/1
1 CAPSULE, GELATIN COATED ORAL 2 TIMES DAILY PRN
Qty: 30 CAPSULE | Refills: 0 | Status: SHIPPED | OUTPATIENT
Start: 2022-10-11 | End: 2023-03-15

## 2022-10-11 RX ORDER — OXYCODONE AND ACETAMINOPHEN 5; 325 MG/1; MG/1
1 TABLET ORAL EVERY 6 HOURS PRN
Qty: 28 TABLET | Refills: 0 | Status: SHIPPED | OUTPATIENT
Start: 2022-10-11 | End: 2022-11-15

## 2022-10-11 RX ADMIN — Medication 324 MG: at 12:10

## 2022-10-11 RX ADMIN — THERA TABS 1 TABLET: TAB at 08:10

## 2022-10-11 RX ADMIN — PANTOPRAZOLE SODIUM 40 MG: 40 TABLET, DELAYED RELEASE ORAL at 08:10

## 2022-10-11 RX ADMIN — CYANOCOBALAMIN TAB 1000 MCG 1000 MCG: 1000 TAB at 08:10

## 2022-10-11 RX ADMIN — ACETAMINOPHEN 650 MG: 325 TABLET ORAL at 04:10

## 2022-10-11 RX ADMIN — OXYCODONE HYDROCHLORIDE 10 MG: 10 TABLET ORAL at 08:10

## 2022-10-11 NOTE — HOSPITAL COURSE
Mary Mike is a 62 year old female with a past medical history of Destini en Y gastric bypass, EDUARDA/MDD, RLS and gastric ulceration who presented with acute cholecystitis. She underwent laparoscopic cholecystectomy per Dr. Joseph on 10/10/2022 without complication. Her pain was well controlled post-operatively and she was able to tolerate a PO diet without difficulty. She was able to be discharged 10/1/2022. She will follow up with her PCP and with General Surgery in the outpatient setting.

## 2022-10-11 NOTE — PLAN OF CARE
[facility]  Initial Discharge Assessment       Primary Care Provider: Jimbo Muniz MD    Admission Diagnosis: Calculus of gallbladder with acute cholecystitis and obstruction [K80.01]  Acute cholecystitis [K81.0]    Admission Date: 10/10/2022  Expected Discharge Date: 10/11/2022         Payor: [facility] BLUE SHIELD / Plan: BCBS ALL OUT OF STATE / Product Type: PPO /     Extended Emergency Contact Information  Primary Emergency Contact: Lucius Mike  Address: [address]           [place] [address] [facility]  Home Phone: [phone]  Mobile Phone: [phone]  Relation: Spouse   needed? No    Discharge Plan A: Home with family  Discharge Plan B: Home      [id number] - [place] - [address] [facility]  [address] [address]  Phone: [phone] Fax: [phone]      Initial Assessment (most recent)       Adult Discharge Assessment - 10/11/22 1400          Discharge Assessment    Assessment Type Discharge Planning Assessment     Confirmed/corrected address, phone number and insurance Yes     Confirmed Demographics Correct on Facesheet     Source of Information patient     Does patient/caregiver understand observation status Yes     Communicated SHANA with patient/caregiver Yes     Reason For Admission jo-ann pryor     Lives With spouse     Facility Arrived From: home     Do you expect to return to your current living situation? No     Do you have help at home or someone to help you manage your care at home? No     Prior to hospitilization cognitive status: Alert/Oriented     Current cognitive status: Alert/Oriented     Walking or Climbing Stairs Difficulty none     Dressing/Bathing Difficulty none     Equipment Currently Used at Home none     Readmission within 30 days? No     Patient currently being followed by outpatient case management? No     Do you currently have service(s) that help you  manage your care at home? No     Do you take prescription medications? Yes     Do you have prescription coverage? Yes     Coverage BCBS     Do you have any problems affording any of your prescribed medications? No     Is the patient taking medications as prescribed? yes     How do you get to doctors appointments? car, drives self;family or friend will provide     Are you on dialysis? No     Do you take coumadin? No     Discharge Plan A Home with family     Discharge Plan B Home     DME Needed Upon Discharge  none     Discharge Plan discussed with: Patient

## 2022-10-11 NOTE — PLAN OF CARE
Released from [facility] when criteria met pain controlled skin w+d No nausea No emesis dsg dry intact aaox4   encouraged deep breaths reviewed Is encouraged use    Pt has all belongings   at bedside with   bandaids and steri strips intact x 4  site on belly to room bed low locked and alarm on

## 2022-10-11 NOTE — PROGRESS NOTES
Pharmacist discharge counseling completed.     The patient/caregiver was educated on the purpose and major side effects of the following medications: Endocet and Senna Plus. Patient was given handout. All questions were answered and the patient demonstrated understanding.    Julianna Varela, CorneliusD

## 2022-10-11 NOTE — H&P
[facility] Hospital Medicine  History & Physical    Patient Name: Mary Mike  MRN: [id number]  Patient Class: OP- Observation  Admission Date: 10/10/2022  Attending Physician: Dr. Tamayo  Primary Care Provider: Jimbo Muniz MD         Patient information was obtained from patient, spouse/SO, past medical records and ER records.     Subjective:     Principal Problem:Acute cholecystitis    Chief Complaint:   Chief Complaint   Patient presents with    Abdominal Pain     Rt. Upper abd. Pain through to back x 3 days         HPI: Ms. Mike is a 62 year old female with a history of gastric sleeve s/p revision and Destini-en-Y, anxiety, depression, and RLS who presents today with complaints of RUQ abd pain for the last 3 days. Pain radiates to her back. It is severe. It is associated with weakness, clamminess, and nausea. She denies fever, chills, V/D, chest pain, or SOB. In the ED, abd US revealed: cholelithiasis and a positive cota's sign. Dr. Joseph was consulted and pt was taken to the OR for a lap cholecystectomy. Pt is seen post op and is sitting up eating pudding and in good spirits.       Past Medical History:   Diagnosis Date    ADHD (attention deficit hyperactivity disorder)     Allergy     Anemia     Anxiety     Depression     Fatty liver     History of endoscopy 8/26/2021    Dr. Toney Cuevas-Z-line is irregular and was found 37 cm from the incisors.  Scattered ulcerations noted between 36-37 cm. 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  The gastrojejunal anastomosis was characterized by moderate stenosis and ulceration.  This was traversed.  The pouch to jejunum limb was characterized by erythema, friable mucosa, inflammation and ulceration.  The examine    Insomnia     Migraines     Osteoporosis     Restless leg syndrome 2005       Past Surgical History:   Procedure Laterality Date    BREAST BIOPSY Left     benign 15 +years ago    BREAST SURGERY  2017     breast reduction    BUNIONECTOMY Right 2018     SECTION  1987    FRACTURE SURGERY  2016    ankle    GASTRIC RESTRICTION SURGERY      GASTRIC SLEEVE 2012    REPAIR OF EXTENSOR TENDON  2021    Procedure: REPAIR, TENDON, EXTENSOR;  Surgeon: Aly Zimmerman DPM;  Location: [facility] OR;  Service: Podiatry;;    SURGICAL REMOVAL OF METATARSAL HEAD Right 2021    Procedure: OSTECTOMY, METATARSAL BONE, HEAD;  Surgeon: Aly Zimmerman DPM;  Location: [facility] OR;  Service: Podiatry;  Laterality: Right;    TOTAL REDUCTION MAMMOPLASTY             Review of patient's allergies indicates:  No Known Allergies    No current facility-administered medications on file prior to encounter.     Current Outpatient Medications on File Prior to Encounter   Medication Sig    FLUoxetine 20 MG capsule TAKE 1 CAPSULE(20 MG) BY MOUTH EVERY EVENING    iron-vit c-b12-folic acid (ICAR-C PLUS) Tab Take 1 tablet by mouth once daily.    multivitamin (THERAGRAN) tablet Take 1 tablet by mouth once daily.    pantoprazole (PROTONIX) 40 MG tablet Take 1 tablet (40 mg total) by mouth once daily.    pramipexole (MIRAPEX) 1 MG tablet TAKE 1 AND 1/2 TABLETS BY MOUTH EVERY EVENING    traZODone (DESYREL) 50 MG tablet TAKE 1 TABLET(50 MG) BY MOUTH EVERY EVENING     Family History       Problem Relation (Age of Onset)    Arthritis Mother, Father    Breast cancer Maternal Aunt    Colon cancer Mother    Diabetes Mother    Early death Mother    Heart disease Father, Paternal Grandfather    Hypertension Father    Miscarriages / Stillbirths Mother    Stroke Father    Vaginal cancer Paternal Grandmother    Vision loss Father          Tobacco Use    Smoking status: Never    Smokeless tobacco: Never   Substance and Sexual Activity    Alcohol use: Yes     Comment: occassional    Drug use: No    Sexual activity: Yes     Review of Systems   Constitutional:  Negative for activity change, appetite change, chills, diaphoresis, fatigue,  fever and unexpected weight change.   HENT:  Negative for congestion, ear pain, facial swelling, hearing loss, sore throat and trouble swallowing.    Eyes:  Negative for pain and discharge.   Respiratory:  Negative for cough, chest tightness, shortness of breath and wheezing.    Cardiovascular:  Negative for chest pain, palpitations and leg swelling.   Gastrointestinal:  Positive for abdominal pain and nausea. Negative for blood in stool, diarrhea and vomiting.   Endocrine: Negative for polydipsia, polyphagia and polyuria.   Genitourinary:  Negative for difficulty urinating, dysuria, flank pain, frequency and urgency.   Musculoskeletal:  Positive for back pain. Negative for arthralgias, joint swelling, neck pain and neck stiffness.   Skin:  Negative for rash and wound.   Allergic/Immunologic: Negative for environmental allergies and immunocompromised state.   Neurological:  Negative for dizziness, seizures, syncope, speech difficulty, weakness, light-headedness, numbness and headaches.   Hematological:  Negative for adenopathy.   Psychiatric/Behavioral:  Negative for sleep disturbance and suicidal ideas. The patient is not nervous/anxious.    All other systems reviewed and are negative.  Objective:     Vital Signs (Most Recent):  Temp: 97.5 °F (36.4 °C) (10/10/22 1936)  Pulse: 63 (10/10/22 1936)  Resp: 16 (10/10/22 1936)  BP: (!) 160/67 (10/10/22 1936)  SpO2: (!) 94 % (10/10/22 1936)   Vital Signs (24h Range):  Temp:  [97.5 °F (36.4 °C)-98.2 °F (36.8 °C)] 97.5 °F (36.4 °C)  Pulse:  [44-63] 63  Resp:  [14-20] 16  SpO2:  [94 %-99 %] 94 %  BP: (135-191)/(56-94) 160/67     Weight: 59.4 kg (131 lb)  Body mass index is 23.96 kg/m².    Physical Exam  Vitals and nursing note reviewed.   Constitutional:       Appearance: Normal appearance.   HENT:      Head: Normocephalic and atraumatic.      Nose: Nose normal.      Mouth/Throat:      Mouth: Mucous membranes are moist.      Pharynx: Oropharynx is clear.   Eyes:       Extraocular Movements: Extraocular movements intact.      Pupils: Pupils are equal, round, and reactive to light.   Cardiovascular:      Rate and Rhythm: Normal rate and regular rhythm.      Pulses: Normal pulses.      Heart sounds: Murmur heard.   Pulmonary:      Effort: Pulmonary effort is normal.      Breath sounds: Normal breath sounds.   Abdominal:      Palpations: Abdomen is soft.      Comments: Faint hypoactive bowel sounds   Musculoskeletal:         General: Normal range of motion.      Cervical back: Normal range of motion and neck supple.   Skin:     General: Skin is warm and dry.      Capillary Refill: Capillary refill takes less than 2 seconds.      Comments: Laproscopic incisions to abd, dressings clean dry and intact   Neurological:      General: No focal deficit present.      Mental Status: She is alert and oriented to person, place, and time.   Psychiatric:         Mood and Affect: Mood normal.         Behavior: Behavior normal.         CRANIAL NERVES     CN III, IV, VI   Pupils are equal, round, and reactive to light.     Significant Labs: All pertinent labs within the past 24 hours have been reviewed.  CBC:   Recent Labs   Lab 10/10/22  1310   WBC 4.10   HGB 12.8   HCT 38.9        CMP:   Recent Labs   Lab 10/10/22  1309      K 3.8      CO2 27   GLU 90   BUN 6   CREATININE 0.7   CALCIUM 9.3   PROT 7.0   ALBUMIN 4.1   BILITOT 0.3   ALKPHOS 83   AST 21   ALT 20   ANIONGAP 10       Significant Imaging: I have reviewed all pertinent imaging results/findings within the past 24 hours.    US Abdomen Limited    Result Date: 10/10/2022  EXAMINATION: US ABDOMEN LIMITED CLINICAL HISTORY: ruq pain; TECHNIQUE: Limited ultrasound of the right upper quadrant of the abdomen (including pancreas, liver, gallbladder, common bile duct, and right kidney) was performed. COMPARISON: None. FINDINGS: The pancreas is largely obscured. Several small stones layer dependently within the gallbladder.  No  gallbladder wall thickening or pericholecystic fluid.  Sonographic Uribe sign is reportedly positive.  The common bile duct is normal in caliber at 4 mm.  No intrahepatic biliary dilatation. The IVC is normal caliber.  The liver is normal in size without focal lesion.  The spleen is normal in size at 8.6 cm.     Small cholelithiasis.  Positive sonographic Uribe sign, nonspecific, however clinical correlation for acute cholecystitis is recommended.  No accompanying sonographic findings of acute cholecystitis. Electronically signed by: Prakash Jara MD Date:    10/10/2022 Time:    15:39       Assessment/Plan:     * Acute cholecystitis  Admit to med/surg   Dr. Jospeh consulted in ED and pt is s/p lap cholecystectomy   Pain control  Diet as tolerated    Calculus of gallbladder with acute cholecystitis and obstruction  As above      Multiple gastric ulcers  Continue pantoprazole        Anxiety and depression  Continue appropriate home meds      History of Destini-en-Y gastric bypass  aware      RLS (restless legs syndrome)  Continue appropriate home meds        VTE Risk Mitigation (From admission, onward)         Ordered     enoxaparin injection 40 mg  Daily         10/10/22 2002     IP VTE HIGH RISK PATIENT  Once         10/10/22 2002     Place sequential compression device  Until discontinued         10/10/22 2002                   Rolanda Krishna NP  Department of Hospital Medicine   [facility]

## 2022-10-11 NOTE — PROGRESS NOTES
Progress Note  Gen Surg    Admit Date: 10/10/2022  Attending: Opal  S/P: Procedure(s) (LRB):  CHOLECYSTECTOMY, LAPAROSCOPIC (N/A)    Post-operative Day: 1 Day Post-Op    Hospital Day: 2    SUBJECTIVE:     Doing well  Pain meds     OBJECTIVE:     Vital Signs (Most Recent)  Temp: 97.1 °F (36.2 °C) (10/11/22 0814)  Pulse: (!) 46 (10/11/22 0814)  Resp: 15 (10/11/22 0814)  BP: 133/62 (10/11/22 0814)  SpO2: 97 % (10/11/22 0814)    Vital Signs Range (Last 24H):  Temp:  [96.5 °F (35.8 °C)-98.2 °F (36.8 °C)]   Pulse:  [44-63]   Resp:  [14-20]   BP: (114-191)/(55-94)   SpO2:  [92 %-99 %]     I & O (Last 24H):  Intake/Output Summary (Last 24 hours) at 10/11/2022 0830  Last data filed at 10/11/2022 0546  Gross per 24 hour   Intake 3295 ml   Output 1601 ml   Net 1694 ml       Scheduled medications:   cyanocobalamin  1,000 mcg Oral Daily    enoxaparin  40 mg Subcutaneous Daily    ferrous gluconate  324 mg Oral Q48H    FLUoxetine  20 mg Oral QHS    multivitamin  1 tablet Oral Daily    pantoprazole  40 mg Oral Daily    pramipexole  1.5 mg Oral QHS       Physical Exam:  General: no distress  Lungs:  clear to auscultation bilaterally and normal respiratory effort  Heart: regular rate and rhythm, S1, S2 normal, no murmur, rub or gallop  Abdomen: soft, non-tender non-distented; bowel sounds normal; no masses,  no organomegaly    Wound/Incision:  clean, dry, intact    Laboratory:  Labs within the past 24 hours have been reviewed.    ASSESSMENT/PLAN:     1 Day Post-Op  Divina Weems to zora Joseph MD

## 2022-10-11 NOTE — HPI
Ms. Mike is a 62 year old female with a history of gastric sleeve s/p revision and Destini-en-Y, anxiety, depression, and RLS who presents today with complaints of RUQ abd pain for the last 3 days. Pain radiates to her back. It is severe. It is associated with weakness, clamminess, and nausea. She denies fever, chills, V/D, chest pain, or SOB. In the ED, abd US revealed: cholelithiasis and a positive cota's sign. Dr. Joseph was consulted and pt was taken to the OR for a lap cholecystectomy. Pt is seen post op and is sitting up eating pudding and in good spirits.

## 2022-10-11 NOTE — DISCHARGE SUMMARY
[facility] Hospital Medicine  Discharge Summary      Patient Name: Mary Mike  MRN: [id number]  Patient Class: OP- Observation  Admission Date: 10/10/2022  Hospital Length of Stay: 0 days  Discharge Date and Time: No discharge date for patient encounter.  Attending Physician: Travis Tamayo MD   Discharging Provider: Travis Tamayo MD  Primary Care Provider: Jimbo Muniz MD      HPI:   Ms. Mike is a 62 year old female with a history of gastric sleeve s/p revision and Destini-en-Y, anxiety, depression, and RLS who presents today with complaints of RUQ abd pain for the last 3 days. Pain radiates to her back. It is severe. It is associated with weakness, clamminess, and nausea. She denies fever, chills, V/D, chest pain, or SOB. In the ED, abd US revealed: cholelithiasis and a positive cota's sign. Dr. Joseph was consulted and pt was taken to the OR for a lap cholecystectomy. Pt is seen post op and is sitting up eating pudding and in good spirits.       Procedure(s) (LRB):  CHOLECYSTECTOMY, LAPAROSCOPIC (N/A)      Hospital Course:   Mary Mike is a 62 year old female with a past medical history of Destini en Y gastric bypass, EDUARDA/MDD, RLS and gastric ulceration who presented with acute cholecystitis. She underwent laparoscopic cholecystectomy per Dr. Joseph on 10/10/2022 without complication. Her pain was well controlled post-operatively and she was able to tolerate a PO diet without difficulty. She was able to be discharged 10/1/2022. She will follow up with her PCP and with General Surgery in the outpatient setting.       Goals of Care Treatment Preferences:  Code Status: Full Code      Consults:   Consults (From admission, onward)        Status Ordering Provider     Inpatient consult to General Surgery  Once        Provider:  Henrietta Joseph MD    Completed TRAVIS TAMAYO.          * S/P laparoscopic cholecystectomy  -Follow up with Surgery in clinic  -PRN Endocet Q6H for one week  with stool softener  -Regular diet    Elevated LFTs  Secondary to surgery.      Sinus bradycardia  Stable. Asymptomatic.      Multiple gastric ulcers  -PPI continued      Anxiety and depression  -Home medications continued      History of Destini-en-Y gastric bypass  Stable.      RLS (restless legs syndrome)  -Continue home medications      Calculus of gallbladder with acute cholecystitis and obstruction-resolved as of 10/11/2022  As above        Final Active Diagnoses:    Diagnosis Date Noted POA    PRINCIPAL PROBLEM:  S/P laparoscopic cholecystectomy [Z90.49] 10/10/2022 Not Applicable    Sinus bradycardia [R00.1] 10/11/2022 Yes    Elevated LFTs [R79.89] 10/11/2022 No    Anxiety and depression [F41.9, F32.A] 10/10/2022 Yes    Multiple gastric ulcers [K25.9] 10/10/2022 Yes    History of Destini-en-Y gastric bypass [Z98.84] 07/20/2022 Not Applicable    RLS (restless legs syndrome) [G25.81] 07/26/2018 Yes      Problems Resolved During this Admission:    Diagnosis Date Noted Date Resolved POA    Calculus of gallbladder with acute cholecystitis and obstruction [K80.01] 10/10/2022 10/11/2022 Yes       Discharged Condition: stable    Disposition: Home or Self Care    Follow Up:   Follow-up Information     Jimbo Muniz MD Follow up in 2 week(s).    Specialty: Internal Medicine  Contact information:  [address] [facility]  [address]  [place] [address]  [phone]             Henrietta Joseph MD Follow up in 2 week(s).    Specialties: General Surgery, Bariatrics, Surgery  Contact information:  [address] [facility]  [address]  [place] [address] [phone]                       Patient Instructions:      Diet Adult Regular     Notify your health care provider if you experience any of the following:  increased confusion or weakness     Notify your health care provider if you experience any of the following:  severe persistent headache     Notify your health care provider if you experience any of the following:  redness, tenderness, or  signs of infection (pain, swelling, redness, odor or green/yellow discharge around incision site)     Notify your health care provider if you experience any of the following:  severe uncontrolled pain     Notify your health care provider if you experience any of the following:  temperature >100.4     Notify your health care provider if you experience any of the following:  persistent nausea and vomiting or diarrhea     Notify your health care provider if you experience any of the following:  difficulty breathing or increased cough     Notify your health care provider if you experience any of the following:  persistent dizziness, light-headedness, or visual disturbances     Activity as tolerated       Significant Diagnostic Studies: Labs: All labs within the past 24 hours have been reviewed    Pending Diagnostic Studies:     Procedure Component Value Units Date/Time    Specimen to Pathology, Surgery General Surgery [[id number]] Collected: 10/10/22 1808    Order Status: Sent Lab Status: In process Updated: 10/11/22 0730    Specimen: Tissue          Medications:  Reconciled Home Medications:      Medication List      START taking these medications    ENDOCET 5-325 mg per tablet  Generic drug: oxyCODONE-acetaminophen  Take 1 tablet by mouth every 6 (six) hours as needed for Pain.     SENNA PLUS 8.6-50 mg Cap  Generic drug: sennosides-docusate sodium  Take 1 tablet by mouth 2 (two) times daily as needed.        CONTINUE taking these medications    FLUoxetine 20 MG capsule  TAKE 1 CAPSULE(20 MG) BY MOUTH EVERY EVENING     ICAR-C PLUS Tab  Generic drug: iron-vit c-b12-folic acid  Take 1 tablet by mouth once daily.     multivitamin tablet  Commonly known as: THERAGRAN  Take 1 tablet by mouth once daily.     pantoprazole 40 MG tablet  Commonly known as: PROTONIX  Take 1 tablet (40 mg total) by mouth once daily.     pramipexole 1 MG tablet  Commonly known as: MIRAPEX  TAKE 1 AND 1/2 TABLETS BY MOUTH EVERY EVENING     traZODone  50 MG tablet  Commonly known as: DESYREL  TAKE 1 TABLET(50 MG) BY MOUTH EVERY EVENING            Indwelling Lines/Drains at time of discharge:   Lines/Drains/Airways     None                 Time spent on the discharge of patient: 31 minutes         Lane Tamayo MD  Department of Hospital Medicine  [facility]

## 2022-10-11 NOTE — PLAN OF CARE
Plan of care discussed with pt. Patient verbalizes understanding. IV fluids infusing. Pain controlled with PRN meds. Dressings CDI. 2L O2 via NC. Ambulating to bathroom. Voiding without difficulty. Tolerating diet well. Tele monitor on. Bed in lowest position, wheels locked. Call light within reach.

## 2022-10-11 NOTE — ASSESSMENT & PLAN NOTE
-Follow up with Surgery in clinic  -PRN Endocet Q6H for one week with stool softener  -Regular diet

## 2022-10-11 NOTE — NURSING
Nurses Note -- 4 Eyes      10/11/2022   2:12 AM      Skin assessed during: Admit      [x] No Pressure Injuries Present    []Prevention Measures Documented      [] Yes- Altered Skin Integrity Present or Discovered   [] LDA Added if Not in Epic (Describe Wound)   [] New Altered Skin Integrity was Present on Admit and Documented in LDA   [] Wound Image Taken    Wound Care Consulted? No    Attending Nurse:  Eduardo Cullen RN     Second RN/Staff Member:  Debora Swann RN

## 2022-10-11 NOTE — PLAN OF CARE
CM requested follow up apt with PCP from [facility] group (Aparna Ahuja, Sridevi Harvey, Tess Katz, Sanjana Hicks, Glo Allen, and Roxann Sprague). Someone from [facility] group will contact pt for scheduling.       Pt already has post op apt scheduled

## 2022-10-11 NOTE — SUBJECTIVE & OBJECTIVE
Past Medical History:   Diagnosis Date    ADHD (attention deficit hyperactivity disorder)     Allergy     Anemia     Anxiety     Depression     Fatty liver     History of endoscopy 2021    Dr. Toney Cuevas-Z-line is irregular and was found 37 cm from the incisors.  Scattered ulcerations noted between 36-37 cm. 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  The gastrojejunal anastomosis was characterized by moderate stenosis and ulceration.  This was traversed.  The pouch to jejunum limb was characterized by erythema, friable mucosa, inflammation and ulceration.  The examine    Insomnia     Migraines     Osteoporosis     Restless leg syndrome        Past Surgical History:   Procedure Laterality Date    BREAST BIOPSY Left     benign 15 +years ago    BREAST SURGERY  2017    breast reduction    BUNIONECTOMY Right 2018     SECTION  1987    FRACTURE SURGERY  2016    ankle    GASTRIC RESTRICTION SURGERY      GASTRIC SLEEVE 2012    REPAIR OF EXTENSOR TENDON  2021    Procedure: REPAIR, TENDON, EXTENSOR;  Surgeon: Aly Zimmerman DPM;  Location: [facility] OR;  Service: Podiatry;;    SURGICAL REMOVAL OF METATARSAL HEAD Right 2021    Procedure: OSTECTOMY, METATARSAL BONE, HEAD;  Surgeon: Aly Zimmerman DPM;  Location: [facility] OR;  Service: Podiatry;  Laterality: Right;    TOTAL REDUCTION MAMMOPLASTY             Review of patient's allergies indicates:  No Known Allergies    No current facility-administered medications on file prior to encounter.     Current Outpatient Medications on File Prior to Encounter   Medication Sig    FLUoxetine 20 MG capsule TAKE 1 CAPSULE(20 MG) BY MOUTH EVERY EVENING    iron-vit c-b12-folic acid (ICAR-C PLUS) Tab Take 1 tablet by mouth once daily.    multivitamin (THERAGRAN) tablet Take 1 tablet by mouth once daily.    pantoprazole (PROTONIX) 40 MG tablet Take 1 tablet (40 mg total) by mouth once daily.    pramipexole (MIRAPEX) 1 MG tablet TAKE 1 AND 1/2 TABLETS  BY MOUTH EVERY EVENING    traZODone (DESYREL) 50 MG tablet TAKE 1 TABLET(50 MG) BY MOUTH EVERY EVENING     Family History       Problem Relation (Age of Onset)    Arthritis Mother, Father    Breast cancer Maternal Aunt    Colon cancer Mother    Diabetes Mother    Early death Mother    Heart disease Father, Paternal Grandfather    Hypertension Father    Miscarriages / Stillbirths Mother    Stroke Father    Vaginal cancer Paternal Grandmother    Vision loss Father          Tobacco Use    Smoking status: Never    Smokeless tobacco: Never   Substance and Sexual Activity    Alcohol use: Yes     Comment: occassional    Drug use: No    Sexual activity: Yes     Review of Systems   Constitutional:  Negative for activity change, appetite change, chills, diaphoresis, fatigue, fever and unexpected weight change.   HENT:  Negative for congestion, ear pain, facial swelling, hearing loss, sore throat and trouble swallowing.    Eyes:  Negative for pain and discharge.   Respiratory:  Negative for cough, chest tightness, shortness of breath and wheezing.    Cardiovascular:  Negative for chest pain, palpitations and leg swelling.   Gastrointestinal:  Positive for abdominal pain and nausea. Negative for blood in stool, diarrhea and vomiting.   Endocrine: Negative for polydipsia, polyphagia and polyuria.   Genitourinary:  Negative for difficulty urinating, dysuria, flank pain, frequency and urgency.   Musculoskeletal:  Positive for back pain. Negative for arthralgias, joint swelling, neck pain and neck stiffness.   Skin:  Negative for rash and wound.   Allergic/Immunologic: Negative for environmental allergies and immunocompromised state.   Neurological:  Negative for dizziness, seizures, syncope, speech difficulty, weakness, light-headedness, numbness and headaches.   Hematological:  Negative for adenopathy.   Psychiatric/Behavioral:  Negative for sleep disturbance and suicidal ideas. The patient is not nervous/anxious.    All other  systems reviewed and are negative.  Objective:     Vital Signs (Most Recent):  Temp: 97.5 °F (36.4 °C) (10/10/22 1936)  Pulse: 63 (10/10/22 1936)  Resp: 16 (10/10/22 1936)  BP: (!) 160/67 (10/10/22 1936)  SpO2: (!) 94 % (10/10/22 1936)   Vital Signs (24h Range):  Temp:  [97.5 °F (36.4 °C)-98.2 °F (36.8 °C)] 97.5 °F (36.4 °C)  Pulse:  [44-63] 63  Resp:  [14-20] 16  SpO2:  [94 %-99 %] 94 %  BP: (135-191)/(56-94) 160/67     Weight: 59.4 kg (131 lb)  Body mass index is 23.96 kg/m².    Physical Exam  Vitals and nursing note reviewed.   Constitutional:       Appearance: Normal appearance.   HENT:      Head: Normocephalic and atraumatic.      Nose: Nose normal.      Mouth/Throat:      Mouth: Mucous membranes are moist.      Pharynx: Oropharynx is clear.   Eyes:      Extraocular Movements: Extraocular movements intact.      Pupils: Pupils are equal, round, and reactive to light.   Cardiovascular:      Rate and Rhythm: Normal rate and regular rhythm.      Pulses: Normal pulses.      Heart sounds: Murmur heard.   Pulmonary:      Effort: Pulmonary effort is normal.      Breath sounds: Normal breath sounds.   Abdominal:      Palpations: Abdomen is soft.      Comments: Faint hypoactive bowel sounds   Musculoskeletal:         General: Normal range of motion.      Cervical back: Normal range of motion and neck supple.   Skin:     General: Skin is warm and dry.      Capillary Refill: Capillary refill takes less than 2 seconds.      Comments: Laproscopic incisions to abd, dressings clean dry and intact   Neurological:      General: No focal deficit present.      Mental Status: She is alert and oriented to person, place, and time.   Psychiatric:         Mood and Affect: Mood normal.         Behavior: Behavior normal.         CRANIAL NERVES     CN III, IV, VI   Pupils are equal, round, and reactive to light.     Significant Labs: All pertinent labs within the past 24 hours have been reviewed.  CBC:   Recent Labs   Lab 10/10/22  1310    WBC 4.10   HGB 12.8   HCT 38.9        CMP:   Recent Labs   Lab 10/10/22  1309      K 3.8      CO2 27   GLU 90   BUN 6   CREATININE 0.7   CALCIUM 9.3   PROT 7.0   ALBUMIN 4.1   BILITOT 0.3   ALKPHOS 83   AST 21   ALT 20   ANIONGAP 10       Significant Imaging: I have reviewed all pertinent imaging results/findings within the past 24 hours.    US Abdomen Limited    Result Date: 10/10/2022  EXAMINATION: US ABDOMEN LIMITED CLINICAL HISTORY: ruq pain; TECHNIQUE: Limited ultrasound of the right upper quadrant of the abdomen (including pancreas, liver, gallbladder, common bile duct, and right kidney) was performed. COMPARISON: None. FINDINGS: The pancreas is largely obscured. Several small stones layer dependently within the gallbladder.  No gallbladder wall thickening or pericholecystic fluid.  Sonographic Uribe sign is reportedly positive.  The common bile duct is normal in caliber at 4 mm.  No intrahepatic biliary dilatation. The IVC is normal caliber.  The liver is normal in size without focal lesion.  The spleen is normal in size at 8.6 cm.     Small cholelithiasis.  Positive sonographic Uribe sign, nonspecific, however clinical correlation for acute cholecystitis is recommended.  No accompanying sonographic findings of acute cholecystitis. Electronically signed by: Prakash Jara MD Date:    10/10/2022 Time:    15:39

## 2022-10-11 NOTE — PLAN OF CARE
Pt clear for DC from case management standpoint. Discharging to home       10/11/22 1134   Final Note   Assessment Type Final Discharge Note   Anticipated Discharge Disposition Home   Hospital Resources/Appts/Education Provided Appointments scheduled and added to AVS

## 2022-10-11 NOTE — ASSESSMENT & PLAN NOTE
Admit to med/surg   Dr. Joseph consulted in ED and pt is s/p lap cholecystectomy   Pain control  Diet as tolerated

## 2022-10-12 ENCOUNTER — TELEPHONE (OUTPATIENT)
Dept: MEDSURG UNIT | Facility: HOSPITAL | Age: 62
End: 2022-10-12
Payer: COMMERCIAL

## 2022-10-12 NOTE — ANESTHESIA POSTPROCEDURE EVALUATION
Anesthesia Post Evaluation    Patient: Mary Mike    Procedure(s) Performed: Procedure(s) (LRB):  CHOLECYSTECTOMY, LAPAROSCOPIC (N/A)    Final Anesthesia Type: general      Patient location during evaluation: PACU  Patient participation: Yes- Able to Participate  Level of consciousness: awake and alert  Post-procedure vital signs: reviewed and stable  Pain management: adequate  Airway patency: patent    PONV status at discharge: No PONV  Anesthetic complications: no      Cardiovascular status: blood pressure returned to baseline  Respiratory status: unassisted  Hydration status: euvolemic  Follow-up not needed.          Vitals Value Taken Time   /72 10/11/22 1137   Temp 36.8 °C (98.2 °F) 10/11/22 1137   Pulse 54 10/11/22 1137   Resp 18 10/11/22 1137   SpO2 96 % 10/11/22 1137         Event Time   Out of Recovery 19:30:00         Pain/Marcela Score: Pain Rating Prior to Med Admin: 8 (10/11/2022  8:11 AM)  Pain Rating Post Med Admin: 0 (10/11/2022  9:11 AM)  Marcela Score: 10 (10/11/2022  7:41 AM)

## 2022-10-12 NOTE — PLAN OF CARE
POC reviewed with patient verbalizes understanding VSS afebrile tolerating diet well with no complications noted up ad purvi in room Lap sites clean dry and intact no redness swelling or drainage noted dc to home with spouse

## 2022-10-14 ENCOUNTER — HOSPITAL ENCOUNTER (EMERGENCY)
Facility: HOSPITAL | Age: 62
Discharge: HOME | End: 2022-10-14
Attending: EMERGENCY MEDICINE
Payer: COMMERCIAL

## 2022-10-14 ENCOUNTER — TELEPHONE (OUTPATIENT)
Dept: BARIATRICS | Facility: CLINIC | Age: 62
End: 2022-10-14
Payer: COMMERCIAL

## 2022-10-14 VITALS
OXYGEN SATURATION: 98 % | HEART RATE: 53 BPM | HEIGHT: 62 IN | TEMPERATURE: 98 F | BODY MASS INDEX: 24.48 KG/M2 | RESPIRATION RATE: 16 BRPM | SYSTOLIC BLOOD PRESSURE: 149 MMHG | WEIGHT: 133 LBS | DIASTOLIC BLOOD PRESSURE: 68 MMHG

## 2022-10-14 DIAGNOSIS — S29.011A CHEST WALL MUSCLE STRAIN, INITIAL ENCOUNTER: Primary | ICD-10-CM

## 2022-10-14 DIAGNOSIS — R07.9 CHEST PAIN: ICD-10-CM

## 2022-10-14 LAB
ALBUMIN SERPL BCP-MCNC: 3.7 G/DL (ref 3.5–5.2)
ALP SERPL-CCNC: 85 U/L (ref 55–135)
ALT SERPL W/O P-5'-P-CCNC: 67 U/L (ref 10–44)
ANION GAP SERPL CALC-SCNC: 9 MMOL/L (ref 8–16)
AST SERPL-CCNC: 55 U/L (ref 10–40)
BASOPHILS # BLD AUTO: 0.04 K/UL (ref 0–0.2)
BASOPHILS NFR BLD: 0.7 % (ref 0–1.9)
BILIRUB SERPL-MCNC: 0.3 MG/DL (ref 0.1–1)
BUN SERPL-MCNC: 11 MG/DL (ref 6–20)
CALCIUM SERPL-MCNC: 9.2 MG/DL (ref 8.7–10.5)
CHLORIDE SERPL-SCNC: 104 MMOL/L (ref 95–110)
CO2 SERPL-SCNC: 28 MMOL/L (ref 23–29)
CREAT SERPL-MCNC: 0.7 MG/DL (ref 0.5–1.4)
DIFFERENTIAL METHOD: ABNORMAL
EOSINOPHIL # BLD AUTO: 0.4 K/UL (ref 0–0.5)
EOSINOPHIL NFR BLD: 7.6 % (ref 0–8)
ERYTHROCYTE [DISTWIDTH] IN BLOOD BY AUTOMATED COUNT: 18.9 % (ref 11.5–14.5)
EST. GFR  (NO RACE VARIABLE): >60 ML/MIN/1.73 M^2
FINAL PATHOLOGIC DIAGNOSIS: NORMAL
GLUCOSE SERPL-MCNC: 92 MG/DL (ref 70–110)
GROSS: NORMAL
HCT VFR BLD AUTO: 34.5 % (ref 37–48.5)
HGB BLD-MCNC: 11.2 G/DL (ref 12–16)
IMM GRANULOCYTES # BLD AUTO: 0.03 K/UL (ref 0–0.04)
IMM GRANULOCYTES NFR BLD AUTO: 0.5 % (ref 0–0.5)
INFLUENZA A, MOLECULAR: NEGATIVE
INFLUENZA B, MOLECULAR: NEGATIVE
LYMPHOCYTES # BLD AUTO: 1.4 K/UL (ref 1–4.8)
LYMPHOCYTES NFR BLD: 24.6 % (ref 18–48)
Lab: NORMAL
MCH RBC QN AUTO: 27.7 PG (ref 27–31)
MCHC RBC AUTO-ENTMCNC: 32.5 G/DL (ref 32–36)
MCV RBC AUTO: 85 FL (ref 82–98)
MONOCYTES # BLD AUTO: 0.5 K/UL (ref 0.3–1)
MONOCYTES NFR BLD: 8.7 % (ref 4–15)
NEUTROPHILS # BLD AUTO: 3.3 K/UL (ref 1.8–7.7)
NEUTROPHILS NFR BLD: 57.9 % (ref 38–73)
NRBC BLD-RTO: 0 /100 WBC
PLATELET # BLD AUTO: 170 K/UL (ref 150–450)
PMV BLD AUTO: 9.4 FL (ref 9.2–12.9)
POTASSIUM SERPL-SCNC: 4.6 MMOL/L (ref 3.5–5.1)
PROT SERPL-MCNC: 6.4 G/DL (ref 6–8.4)
RBC # BLD AUTO: 4.04 M/UL (ref 4–5.4)
SODIUM SERPL-SCNC: 141 MMOL/L (ref 136–145)
SPECIMEN SOURCE: NORMAL
WBC # BLD AUTO: 5.77 K/UL (ref 3.9–12.7)

## 2022-10-14 PROCEDURE — 99284 EMERGENCY DEPT VISIT MOD MDM: CPT | Mod: 25

## 2022-10-14 PROCEDURE — 85025 COMPLETE CBC W/AUTO DIFF WBC: CPT | Performed by: EMERGENCY MEDICINE

## 2022-10-14 PROCEDURE — 80053 COMPREHEN METABOLIC PANEL: CPT | Performed by: EMERGENCY MEDICINE

## 2022-10-14 PROCEDURE — 36415 COLL VENOUS BLD VENIPUNCTURE: CPT | Performed by: EMERGENCY MEDICINE

## 2022-10-14 PROCEDURE — 87502 INFLUENZA DNA AMP PROBE: CPT | Performed by: EMERGENCY MEDICINE

## 2022-10-14 RX ORDER — DICLOFENAC SODIUM 50 MG/1
50 TABLET, DELAYED RELEASE ORAL 3 TIMES DAILY
Qty: 15 TABLET | Refills: 0 | Status: SHIPPED | OUTPATIENT
Start: 2022-10-14 | End: 2023-03-15

## 2022-10-14 NOTE — ED PROVIDER NOTES
Encounter Date: 10/14/2022    SCRIBE #1 NOTE: I, Inez Vázquez, am scribing for, and in the presence of,  Josh Irving III, MD.     History     Chief Complaint   Patient presents with    Abdominal Pain     RUQ abd pain.  Non radiating. Gallbladder removed 10/10/22.  Pain is no better since surgery.  Pt is also c/o constipation since surgery, is on a stool softener     Time seen by provider: 4:57 PM on 10/14/2022    Mary Mike is a 62 y.o. female with a PMHx of ADHD and fatty liver who presents to the ED for evaluation of RUQ abdominal pain and R subcostal pain.  Patient reports the pain onset prior to having a laparoscopic cholecystectomy which was performed on 10/10/2022 by Dr. Joseph.  Since the procedure the patient states the pain has not improved.  She endorses SOB secondary to pain with accompanying fatigue.  Patient denies a fever, cough or any other symptoms at this time.  Additional PSHx includes , gastric restriction surgery and gastric sleeve.      The history is provided by the patient.   Review of patient's allergies indicates:  No Known Allergies  Past Medical History:   Diagnosis Date    ADHD (attention deficit hyperactivity disorder)     Allergy     Anemia     Anxiety     Depression     Fatty liver     History of endoscopy 2021    Dr. Toney Cuevas-Z-line is irregular and was found 37 cm from the incisors.  Scattered ulcerations noted between 36-37 cm. 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  The gastrojejunal anastomosis was characterized by moderate stenosis and ulceration.  This was traversed.  The pouch to jejunum limb was characterized by erythema, friable mucosa, inflammation and ulceration.  The examine    Insomnia     Migraines     Osteoporosis     Restless leg syndrome      Past Surgical History:   Procedure Laterality Date    BREAST BIOPSY Left     benign 15 +years ago    BREAST SURGERY  2017    breast reduction    BUNIONECTOMY Right 2018      SECTION  1987    FRACTURE SURGERY  2016    ankle    GASTRIC RESTRICTION SURGERY      GASTRIC SLEEVE 2012    LAPAROSCOPIC CHOLECYSTECTOMY N/A 10/10/2022    Procedure: CHOLECYSTECTOMY, LAPAROSCOPIC;  Surgeon: Henrietta Joseph MD;  Location: [facility] OR;  Service: General;  Laterality: N/A;    REPAIR OF EXTENSOR TENDON  2021    Procedure: REPAIR, TENDON, EXTENSOR;  Surgeon: Aly Zimmerman DPM;  Location: [facility] OR;  Service: Podiatry;;    SURGICAL REMOVAL OF METATARSAL HEAD Right 2021    Procedure: OSTECTOMY, METATARSAL BONE, HEAD;  Surgeon: lAy Zimmerman DPM;  Location: [facility] OR;  Service: Podiatry;  Laterality: Right;    TOTAL REDUCTION MAMMOPLASTY           Family History   Problem Relation Age of Onset    Arthritis Mother     Colon cancer Mother     Diabetes Mother     Early death Mother     Miscarriages / Stillbirths Mother     Arthritis Father     Heart disease Father     Hypertension Father     Stroke Father     Vision loss Father     Breast cancer Maternal Aunt     Vaginal cancer Paternal Grandmother     Heart disease Paternal Grandfather      Social History     Tobacco Use    Smoking status: Never    Smokeless tobacco: Never   Substance Use Topics    Alcohol use: Yes     Comment: occassional    Drug use: No     Review of Systems   Constitutional:  Positive for fatigue. Negative for activity change, appetite change, chills and fever.   Eyes:  Negative for visual disturbance.   Respiratory:  Positive for shortness of breath. Negative for apnea and cough.         Positive for subcostal pain.   Cardiovascular:  Negative for chest pain and palpitations.   Gastrointestinal:  Positive for abdominal pain. Negative for abdominal distention.   Genitourinary:  Negative for difficulty urinating.   Musculoskeletal:  Negative for neck pain.   Skin:  Negative for pallor and rash.   Neurological:  Negative for headaches.   Hematological:  Does not bruise/bleed easily.   Psychiatric/Behavioral:   Negative for agitation.      Physical Exam     Initial Vitals [10/14/22 1641]   BP Pulse Resp Temp SpO2   138/67 65 16 98.2 °F (36.8 °C) 98 %      MAP       --         Physical Exam    Nursing note and vitals reviewed.  Constitutional: She appears well-developed and well-nourished.   HENT:   Head: Normocephalic and atraumatic.   Eyes: Conjunctivae are normal.   Neck: Neck supple.   Normal range of motion.  Cardiovascular:  Normal rate, regular rhythm and normal heart sounds.     Exam reveals no gallop and no friction rub.       No murmur heard.  Pulmonary/Chest: Effort normal and breath sounds normal. No respiratory distress. She has no wheezes. She has no rhonchi. She has no rales.   Tenderness to palpation over the R lower chest wall.     Abdominal: Abdomen is soft. She exhibits no distension. There is no abdominal tenderness.   No RUQ tenderness noted.     Musculoskeletal:         General: Normal range of motion.      Cervical back: Normal range of motion and neck supple.     Neurological: She is alert and oriented to person, place, and time.   Skin: Skin is warm and dry. No erythema.   Psychiatric: She has a normal mood and affect.       ED Course   Procedures  Labs Reviewed   CBC W/ AUTO DIFFERENTIAL - Abnormal; Notable for the following components:       Result Value    Hemoglobin 11.2 (*)     Hematocrit 34.5 (*)     RDW 18.9 (*)     All other components within normal limits   INFLUENZA A & B BY MOLECULAR   COMPREHENSIVE METABOLIC PANEL          Imaging Results              X-Ray Chest PA And Lateral (In process)                      Medications - No data to display  Medical Decision Making:   History:   Old Medical Records: I decided to obtain old medical records.  Clinical Tests:   Lab Tests: Ordered and Reviewed  Radiological Study: Ordered and Reviewed  ED Management:  62-year-old female presents with right chest pain with associated chest tenderness.  Chest x-ray independently interpreted by me  demonstrates no evidence of pneumothorax or pneumonia.  Pulmonary embolism is unlikely in the absence of hypoxia, tachycardia or lower extremity swelling.  The symptoms most likely reflect a myofascial strain sustained during the surgical procedure.  She is encouraged to return for worsening symptoms particularly if she develops shortness of breath.     APC / Resident Notes:   I, Dr. Josh Irving III, personally performed the services described in this documentation. All medical record entries made by the scribe were at my direction and in my presence.  I have reviewed the chart and agree that the record reflects my personal performance and is accurate and complete   Scribe Attestation:   Scribe #1: I performed the above scribed service and the documentation accurately describes the services I performed. I attest to the accuracy of the note.                   Clinical Impression:   Final diagnoses:  [R07.9] Chest pain             Josh Irving III, MD  10/15/22 0001

## 2022-10-14 NOTE — TELEPHONE ENCOUNTER
----- Message from Bertrand Jurado sent at 10/14/2022  3:11 PM CDT -----  Contact: pt at [phone]  Type: Needs Medical Advice  Who Called:  pt  Best Call Back Number: [phone]    Additional Information: pt called in to say she is still feeling the exact pain before she had the surgery please call back to advise

## 2022-10-15 ENCOUNTER — PATIENT MESSAGE (OUTPATIENT)
Dept: BARIATRICS | Facility: CLINIC | Age: 62
End: 2022-10-15
Payer: COMMERCIAL

## 2022-10-16 ENCOUNTER — PATIENT MESSAGE (OUTPATIENT)
Dept: HEMATOLOGY/ONCOLOGY | Facility: CLINIC | Age: 62
End: 2022-10-16

## 2022-10-18 ENCOUNTER — HOSPITAL ENCOUNTER (OUTPATIENT)
Dept: RADIOLOGY | Facility: HOSPITAL | Age: 62
Discharge: HOME | End: 2022-10-18
Attending: SURGERY
Payer: COMMERCIAL

## 2022-10-18 ENCOUNTER — OFFICE VISIT (OUTPATIENT)
Dept: BARIATRICS | Facility: CLINIC | Age: 62
End: 2022-10-18
Payer: COMMERCIAL

## 2022-10-18 ENCOUNTER — TELEPHONE (OUTPATIENT)
Dept: BARIATRICS | Facility: CLINIC | Age: 62
End: 2022-10-18

## 2022-10-18 ENCOUNTER — TELEPHONE (OUTPATIENT)
Dept: HEMATOLOGY/ONCOLOGY | Facility: CLINIC | Age: 62
End: 2022-10-18

## 2022-10-18 VITALS
HEART RATE: 55 BPM | RESPIRATION RATE: 16 BRPM | DIASTOLIC BLOOD PRESSURE: 83 MMHG | WEIGHT: 131.75 LBS | SYSTOLIC BLOOD PRESSURE: 162 MMHG | TEMPERATURE: 98 F | BODY MASS INDEX: 24.24 KG/M2 | HEIGHT: 62 IN

## 2022-10-18 DIAGNOSIS — K95.89 IRON DEFICIENCY ANEMIA FOLLOWING BARIATRIC SURGERY: Primary | ICD-10-CM

## 2022-10-18 DIAGNOSIS — R10.9 ABDOMINAL PAIN, UNSPECIFIED ABDOMINAL LOCATION: Primary | ICD-10-CM

## 2022-10-18 DIAGNOSIS — R10.31 RIGHT LOWER QUADRANT ABDOMINAL PAIN: ICD-10-CM

## 2022-10-18 DIAGNOSIS — R10.31 RIGHT LOWER QUADRANT ABDOMINAL PAIN: Primary | ICD-10-CM

## 2022-10-18 DIAGNOSIS — D50.8 IRON DEFICIENCY ANEMIA FOLLOWING BARIATRIC SURGERY: Primary | ICD-10-CM

## 2022-10-18 PROCEDURE — 74176 CT ABDOMEN PELVIS WITHOUT CONTRAST: ICD-10-PCS | Mod: 26,,, | Performed by: RADIOLOGY

## 2022-10-18 PROCEDURE — 3077F PR MOST RECENT SYSTOLIC BLOOD PRESSURE >= 140 MM HG: ICD-10-PCS | Mod: CPTII,S$GLB,, | Performed by: SURGERY

## 2022-10-18 PROCEDURE — 99999 PR PBB SHADOW E&M-EST. PATIENT-LVL IV: CPT | Mod: PBBFAC,,, | Performed by: SURGERY

## 2022-10-18 PROCEDURE — 3079F DIAST BP 80-89 MM HG: CPT | Mod: CPTII,S$GLB,, | Performed by: SURGERY

## 2022-10-18 PROCEDURE — 3079F PR MOST RECENT DIASTOLIC BLOOD PRESSURE 80-89 MM HG: ICD-10-PCS | Mod: CPTII,S$GLB,, | Performed by: SURGERY

## 2022-10-18 PROCEDURE — 99024 PR POST-OP FOLLOW-UP VISIT: ICD-10-PCS | Mod: S$GLB,,, | Performed by: SURGERY

## 2022-10-18 PROCEDURE — 1159F MED LIST DOCD IN RCRD: CPT | Mod: CPTII,S$GLB,, | Performed by: SURGERY

## 2022-10-18 PROCEDURE — 1159F PR MEDICATION LIST DOCUMENTED IN MEDICAL RECORD: ICD-10-PCS | Mod: CPTII,S$GLB,, | Performed by: SURGERY

## 2022-10-18 PROCEDURE — 74176 CT ABD & PELVIS W/O CONTRAST: CPT | Mod: 26,,, | Performed by: RADIOLOGY

## 2022-10-18 PROCEDURE — 99999 PR PBB SHADOW E&M-EST. PATIENT-LVL IV: ICD-10-PCS | Mod: PBBFAC,,, | Performed by: SURGERY

## 2022-10-18 PROCEDURE — 99024 POSTOP FOLLOW-UP VISIT: CPT | Mod: S$GLB,,, | Performed by: SURGERY

## 2022-10-18 PROCEDURE — 3077F SYST BP >= 140 MM HG: CPT | Mod: CPTII,S$GLB,, | Performed by: SURGERY

## 2022-10-18 PROCEDURE — 74176 CT ABD & PELVIS W/O CONTRAST: CPT | Mod: TC

## 2022-10-18 NOTE — TELEPHONE ENCOUNTER
----- Message from Lynsey Parr NP sent at 10/17/2022  9:42 AM CDT -----  Yes add an iron panel to next lab work.

## 2022-10-18 NOTE — PROGRESS NOTES
Doing well  But having some pain over ruq incision    Vitals:    10/18/22 1127   BP: (!) 162/83   Pulse: (!) 55   Resp: 16   Temp: 98.1 °F (36.7 °C)     Abdomen benign    CMP  Sodium   Date Value Ref Range Status   10/20/2022 140 136 - 145 mmol/L Final     Potassium   Date Value Ref Range Status   10/20/2022 4.1 3.5 - 5.1 mmol/L Final     Chloride   Date Value Ref Range Status   10/20/2022 106 95 - 110 mmol/L Final     CO2   Date Value Ref Range Status   10/20/2022 25 23 - 29 mmol/L Final     Glucose   Date Value Ref Range Status   10/20/2022 87 70 - 110 mg/dL Final     BUN   Date Value Ref Range Status   10/20/2022 12 6 - 20 mg/dL Final     Creatinine   Date Value Ref Range Status   10/20/2022 0.8 0.5 - 1.4 mg/dL Final     Calcium   Date Value Ref Range Status   10/20/2022 9.0 8.7 - 10.5 mg/dL Final     Total Protein   Date Value Ref Range Status   10/20/2022 7.2 6.0 - 8.4 g/dL Final     Albumin   Date Value Ref Range Status   10/20/2022 4.0 3.5 - 5.2 g/dL Final     Total Bilirubin   Date Value Ref Range Status   10/20/2022 0.5 0.1 - 1.0 mg/dL Final     Comment:     For infants and newborns, interpretation of results should be based  on gestational age, weight and in agreement with clinical  observations.    Premature Infant recommended reference ranges:  Up to 24 hours.............<8.0 mg/dL  Up to 48 hours............<12.0 mg/dL  3-5 days..................<15.0 mg/dL  6-29 days.................<15.0 mg/dL       Alkaline Phosphatase   Date Value Ref Range Status   10/20/2022 109 55 - 135 U/L Final     AST   Date Value Ref Range Status   10/20/2022 39 10 - 40 U/L Final     ALT   Date Value Ref Range Status   10/20/2022 63 (H) 10 - 44 U/L Final     Anion Gap   Date Value Ref Range Status   10/20/2022 9 8 - 16 mmol/L Final     eGFR if    Date Value Ref Range Status   05/19/2022 103 > OR = 60 mL/min/1.73m2 Final     eGFR if non    Date Value Ref Range Status   05/19/2022 89 > OR = 60  mL/min/1.73m2 Final       A/P  Had elevated lfts on last er visit but will recheck  CT today  Labs today  Follow up on labs end of week

## 2022-10-20 ENCOUNTER — TELEPHONE (OUTPATIENT)
Dept: SURGERY | Facility: HOSPITAL | Age: 62
End: 2022-10-20
Payer: COMMERCIAL

## 2022-10-20 ENCOUNTER — LAB VISIT (OUTPATIENT)
Dept: LAB | Facility: HOSPITAL | Age: 62
End: 2022-10-20
Attending: SURGERY
Payer: COMMERCIAL

## 2022-10-20 DIAGNOSIS — R10.9 ABDOMINAL PAIN, UNSPECIFIED ABDOMINAL LOCATION: ICD-10-CM

## 2022-10-20 LAB
ALBUMIN SERPL BCP-MCNC: 4 G/DL (ref 3.5–5.2)
ALP SERPL-CCNC: 109 U/L (ref 55–135)
ALT SERPL W/O P-5'-P-CCNC: 63 U/L (ref 10–44)
ANION GAP SERPL CALC-SCNC: 9 MMOL/L (ref 8–16)
AST SERPL-CCNC: 39 U/L (ref 10–40)
BILIRUB SERPL-MCNC: 0.5 MG/DL (ref 0.1–1)
BUN SERPL-MCNC: 12 MG/DL (ref 6–20)
CALCIUM SERPL-MCNC: 9 MG/DL (ref 8.7–10.5)
CHLORIDE SERPL-SCNC: 106 MMOL/L (ref 95–110)
CO2 SERPL-SCNC: 25 MMOL/L (ref 23–29)
CREAT SERPL-MCNC: 0.8 MG/DL (ref 0.5–1.4)
EST. GFR  (NO RACE VARIABLE): >60 ML/MIN/1.73 M^2
GLUCOSE SERPL-MCNC: 87 MG/DL (ref 70–110)
POTASSIUM SERPL-SCNC: 4.1 MMOL/L (ref 3.5–5.1)
PROT SERPL-MCNC: 7.2 G/DL (ref 6–8.4)
SODIUM SERPL-SCNC: 140 MMOL/L (ref 136–145)

## 2022-10-20 PROCEDURE — 36415 COLL VENOUS BLD VENIPUNCTURE: CPT | Performed by: SURGERY

## 2022-10-20 PROCEDURE — 80053 COMPREHEN METABOLIC PANEL: CPT | Performed by: SURGERY

## 2022-10-25 ENCOUNTER — TELEPHONE (OUTPATIENT)
Dept: HEMATOLOGY/ONCOLOGY | Facility: CLINIC | Age: 62
End: 2022-10-25

## 2022-10-25 DIAGNOSIS — D50.8 IRON DEFICIENCY ANEMIA FOLLOWING BARIATRIC SURGERY: Primary | ICD-10-CM

## 2022-10-25 DIAGNOSIS — D51.3 OTHER DIETARY VITAMIN B12 DEFICIENCY ANEMIA: ICD-10-CM

## 2022-10-25 DIAGNOSIS — K95.89 IRON DEFICIENCY ANEMIA FOLLOWING BARIATRIC SURGERY: Primary | ICD-10-CM

## 2022-10-27 ENCOUNTER — INFUSION (OUTPATIENT)
Dept: INFUSION THERAPY | Facility: HOSPITAL | Age: 62
End: 2022-10-27
Attending: INTERNAL MEDICINE
Payer: COMMERCIAL

## 2022-10-27 VITALS
HEIGHT: 62 IN | BODY MASS INDEX: 24.11 KG/M2 | WEIGHT: 131 LBS | OXYGEN SATURATION: 98 % | TEMPERATURE: 98 F | HEART RATE: 57 BPM | DIASTOLIC BLOOD PRESSURE: 84 MMHG | RESPIRATION RATE: 16 BRPM | SYSTOLIC BLOOD PRESSURE: 148 MMHG

## 2022-10-27 DIAGNOSIS — D51.3 OTHER DIETARY VITAMIN B12 DEFICIENCY ANEMIA: Primary | ICD-10-CM

## 2022-10-27 PROCEDURE — 96372 THER/PROPH/DIAG INJ SC/IM: CPT

## 2022-10-27 PROCEDURE — 63600175 PHARM REV CODE 636 W HCPCS: Performed by: NURSE PRACTITIONER

## 2022-10-27 RX ORDER — CYANOCOBALAMIN 1000 UG/ML
1000 INJECTION, SOLUTION INTRAMUSCULAR; SUBCUTANEOUS
Status: CANCELLED | OUTPATIENT
Start: 2022-10-27

## 2022-10-27 RX ORDER — CYANOCOBALAMIN 1000 UG/ML
1000 INJECTION, SOLUTION INTRAMUSCULAR; SUBCUTANEOUS
Status: COMPLETED | OUTPATIENT
Start: 2022-10-27 | End: 2022-10-27

## 2022-10-27 RX ADMIN — CYANOCOBALAMIN 1000 MCG: 1000 INJECTION, SOLUTION INTRAMUSCULAR at 11:10

## 2022-10-27 NOTE — PLAN OF CARE
Problem: Fatigue  Goal: Improved Activity Tolerance  Outcome: Ongoing, Progressing  Intervention: Promote Improved Energy  Flowsheets (Taken 10/27/2022 1106)  Fatigue Management: frequent rest breaks encouraged  Sleep/Rest Enhancement:   regular sleep/rest pattern promoted   relaxation techniques promoted  Activity Management: Ambulated -L4

## 2022-10-28 LAB
ALBUMIN SERPL-MCNC: 4.1 G/DL (ref 3.6–5.1)
ALBUMIN/GLOB SERPL: 1.8 (CALC) (ref 1–2.5)
ALP SERPL-CCNC: 85 U/L (ref 37–153)
ALT SERPL-CCNC: 29 U/L (ref 6–29)
AST SERPL-CCNC: 26 U/L (ref 10–35)
BASOPHILS # BLD AUTO: 41 CELLS/UL (ref 0–200)
BASOPHILS NFR BLD AUTO: 1.1 %
BILIRUB SERPL-MCNC: 0.4 MG/DL (ref 0.2–1.2)
BUN SERPL-MCNC: 8 MG/DL (ref 7–25)
BUN/CREAT SERPL: NORMAL (CALC) (ref 6–22)
CALCIUM SERPL-MCNC: 9.1 MG/DL (ref 8.6–10.4)
CHLORIDE SERPL-SCNC: 104 MMOL/L (ref 98–110)
CO2 SERPL-SCNC: 27 MMOL/L (ref 20–32)
CREAT SERPL-MCNC: 0.69 MG/DL (ref 0.5–1.05)
EGFR: 99 ML/MIN/1.73M2
EOSINOPHIL # BLD AUTO: 266 CELLS/UL (ref 15–500)
EOSINOPHIL NFR BLD AUTO: 7.2 %
ERYTHROCYTE [DISTWIDTH] IN BLOOD BY AUTOMATED COUNT: 16.9 % (ref 11–15)
FERRITIN SERPL-MCNC: 167 NG/ML (ref 16–232)
GLOBULIN SER CALC-MCNC: 2.3 G/DL (CALC) (ref 1.9–3.7)
GLUCOSE SERPL-MCNC: 94 MG/DL (ref 65–99)
HCT VFR BLD AUTO: 37.2 % (ref 35–45)
HGB BLD-MCNC: 12.2 G/DL (ref 11.7–15.5)
IRON SATN MFR SERPL: 27 % (CALC) (ref 16–45)
IRON SERPL-MCNC: 80 MCG/DL (ref 45–160)
LYMPHOCYTES # BLD AUTO: 1040 CELLS/UL (ref 850–3900)
LYMPHOCYTES NFR BLD AUTO: 28.1 %
MCH RBC QN AUTO: 27.9 PG (ref 27–33)
MCHC RBC AUTO-ENTMCNC: 32.8 G/DL (ref 32–36)
MCV RBC AUTO: 85.1 FL (ref 80–100)
MONOCYTES # BLD AUTO: 337 CELLS/UL (ref 200–950)
MONOCYTES NFR BLD AUTO: 9.1 %
NEUTROPHILS # BLD AUTO: 2017 CELLS/UL (ref 1500–7800)
NEUTROPHILS NFR BLD AUTO: 54.5 %
PLATELET # BLD AUTO: 280 THOUSAND/UL (ref 140–400)
PMV BLD REES-ECKER: 10.2 FL (ref 7.5–12.5)
POTASSIUM SERPL-SCNC: 4.1 MMOL/L (ref 3.5–5.3)
PROT SERPL-MCNC: 6.4 G/DL (ref 6.1–8.1)
RBC # BLD AUTO: 4.37 MILLION/UL (ref 3.8–5.1)
SODIUM SERPL-SCNC: 140 MMOL/L (ref 135–146)
TIBC SERPL-MCNC: 298 MCG/DL (CALC) (ref 250–450)
WBC # BLD AUTO: 3.7 THOUSAND/UL (ref 3.8–10.8)

## 2022-11-15 ENCOUNTER — OFFICE VISIT (OUTPATIENT)
Dept: FAMILY MEDICINE | Facility: CLINIC | Age: 62
End: 2022-11-15
Payer: COMMERCIAL

## 2022-11-15 VITALS
HEIGHT: 62 IN | OXYGEN SATURATION: 96 % | WEIGHT: 132 LBS | RESPIRATION RATE: 18 BRPM | SYSTOLIC BLOOD PRESSURE: 108 MMHG | BODY MASS INDEX: 24.29 KG/M2 | TEMPERATURE: 99 F | DIASTOLIC BLOOD PRESSURE: 66 MMHG | HEART RATE: 64 BPM

## 2022-11-15 DIAGNOSIS — Z12.31 SCREENING MAMMOGRAM FOR BREAST CANCER: ICD-10-CM

## 2022-11-15 DIAGNOSIS — Z23 NEED FOR INFLUENZA VACCINATION: ICD-10-CM

## 2022-11-15 DIAGNOSIS — F51.01 PRIMARY INSOMNIA: Primary | ICD-10-CM

## 2022-11-15 PROCEDURE — 1159F PR MEDICATION LIST DOCUMENTED IN MEDICAL RECORD: ICD-10-PCS | Mod: CPTII,S$GLB,, | Performed by: NURSE PRACTITIONER

## 2022-11-15 PROCEDURE — 3074F SYST BP LT 130 MM HG: CPT | Mod: CPTII,S$GLB,, | Performed by: NURSE PRACTITIONER

## 2022-11-15 PROCEDURE — 1159F MED LIST DOCD IN RCRD: CPT | Mod: CPTII,S$GLB,, | Performed by: NURSE PRACTITIONER

## 2022-11-15 PROCEDURE — 3008F BODY MASS INDEX DOCD: CPT | Mod: CPTII,S$GLB,, | Performed by: NURSE PRACTITIONER

## 2022-11-15 PROCEDURE — 99213 PR OFFICE/OUTPT VISIT, EST, LEVL III, 20-29 MIN: ICD-10-PCS | Mod: 25,S$GLB,, | Performed by: NURSE PRACTITIONER

## 2022-11-15 PROCEDURE — 3074F PR MOST RECENT SYSTOLIC BLOOD PRESSURE < 130 MM HG: ICD-10-PCS | Mod: CPTII,S$GLB,, | Performed by: NURSE PRACTITIONER

## 2022-11-15 PROCEDURE — 3008F PR BODY MASS INDEX (BMI) DOCUMENTED: ICD-10-PCS | Mod: CPTII,S$GLB,, | Performed by: NURSE PRACTITIONER

## 2022-11-15 PROCEDURE — 90471 FLU VACCINE (QUAD) GREATER THAN OR EQUAL TO 3YO PRESERVATIVE FREE IM: ICD-10-PCS | Mod: S$GLB,,, | Performed by: NURSE PRACTITIONER

## 2022-11-15 PROCEDURE — 90686 IIV4 VACC NO PRSV 0.5 ML IM: CPT | Mod: S$GLB,,, | Performed by: NURSE PRACTITIONER

## 2022-11-15 PROCEDURE — 99213 OFFICE O/P EST LOW 20 MIN: CPT | Mod: 25,S$GLB,, | Performed by: NURSE PRACTITIONER

## 2022-11-15 PROCEDURE — 3078F DIAST BP <80 MM HG: CPT | Mod: CPTII,S$GLB,, | Performed by: NURSE PRACTITIONER

## 2022-11-15 PROCEDURE — 90471 IMMUNIZATION ADMIN: CPT | Mod: S$GLB,,, | Performed by: NURSE PRACTITIONER

## 2022-11-15 PROCEDURE — 90686 FLU VACCINE (QUAD) GREATER THAN OR EQUAL TO 3YO PRESERVATIVE FREE IM: ICD-10-PCS | Mod: S$GLB,,, | Performed by: NURSE PRACTITIONER

## 2022-11-15 PROCEDURE — 3078F PR MOST RECENT DIASTOLIC BLOOD PRESSURE < 80 MM HG: ICD-10-PCS | Mod: CPTII,S$GLB,, | Performed by: NURSE PRACTITIONER

## 2022-11-15 RX ORDER — TRAZODONE HYDROCHLORIDE 100 MG/1
100 TABLET ORAL NIGHTLY
Qty: 30 TABLET | Refills: 11 | Status: SHIPPED | OUTPATIENT
Start: 2022-11-15 | End: 2023-07-03 | Stop reason: SDUPTHER

## 2022-11-15 NOTE — PROGRESS NOTES
Patient ID: Mary Mike is a 62 y.o. female.    Chief Complaint: Fatigue (F/u)    Ms Mike presents today for follow up for her chronic conditions. She is a patient of Dr. Muniz. She states she is doing well in this interval but she states she has a difficult time staying asleep. She currently takes Trazodone and she is asking what her other options are at this time.      He denies any other issues or complaints.     We reviewed overdue health maintenance.    Fatigue  This is a new problem. The current episode started more than 1 year ago. The problem occurs intermittently. The problem has been waxing and waning. Associated symptoms include fatigue. Pertinent negatives include no abdominal pain, arthralgias, chest pain, congestion, fever, headaches, joint swelling, myalgias, nausea, neck pain, rash, sore throat, vomiting or weakness. Nothing aggravates the symptoms. She has tried sleep and rest for the symptoms. The treatment provided mild relief.     Past Medical History:   Diagnosis Date    ADHD (attention deficit hyperactivity disorder)     Allergy     Anemia     Anxiety     Depression     Fatty liver     History of endoscopy 2021    Dr. Toney Cuevas-Z-line is irregular and was found 37 cm from the incisors.  Scattered ulcerations noted between 36-37 cm. 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  The gastrojejunal anastomosis was characterized by moderate stenosis and ulceration.  This was traversed.  The pouch to jejunum limb was characterized by erythema, friable mucosa, inflammation and ulceration.  The examine    Insomnia     Migraines     Osteoporosis     Restless leg syndrome      Past Surgical History:   Procedure Laterality Date    BREAST BIOPSY Left     benign 15 +years ago    BREAST SURGERY  2017    breast reduction    BUNIONECTOMY Right 2018     SECTION  1987    FRACTURE SURGERY  2016    ankle    GASTRIC RESTRICTION SURGERY      GASTRIC SLEEVE 2012     LAPAROSCOPIC CHOLECYSTECTOMY N/A 10/10/2022    Procedure: CHOLECYSTECTOMY, LAPAROSCOPIC;  Surgeon: Henrietta Joseph MD;  Location: [facility] OR;  Service: General;  Laterality: N/A;    REPAIR OF EXTENSOR TENDON  6/24/2021    Procedure: REPAIR, TENDON, EXTENSOR;  Surgeon: Aly Zimmerman DPM;  Location: [facility] OR;  Service: Podiatry;;    SURGICAL REMOVAL OF METATARSAL HEAD Right 6/24/2021    Procedure: OSTECTOMY, METATARSAL BONE, HEAD;  Surgeon: Aly Zimmerman DPM;  Location: [facility] OR;  Service: Podiatry;  Laterality: Right;    TOTAL REDUCTION MAMMOPLASTY      2016         Tobacco History:  reports that she has never smoked. She has never used smokeless tobacco.      Review of patient's allergies indicates:  No Known Allergies    Current Outpatient Medications:     diclofenac (VOLTAREN) 50 MG EC tablet, Take 1 tablet (50 mg total) by mouth 3 (three) times daily., Disp: 15 tablet, Rfl: 0    FLUoxetine 20 MG capsule, TAKE 1 CAPSULE(20 MG) BY MOUTH EVERY EVENING, Disp: 90 capsule, Rfl: 2    iron-vit c-b12-folic acid (ICAR-C PLUS) Tab, Take 1 tablet by mouth once daily., Disp: 30 tablet, Rfl: 2    multivitamin (THERAGRAN) tablet, Take 1 tablet by mouth once daily., Disp: , Rfl:     pantoprazole (PROTONIX) 40 MG tablet, Take 1 tablet (40 mg total) by mouth once daily., Disp: 90 tablet, Rfl: 2    pramipexole (MIRAPEX) 1 MG tablet, TAKE 1 AND 1/2 TABLETS BY MOUTH EVERY EVENING, Disp: 135 tablet, Rfl: 1    sennosides-docusate sodium (SENNA PLUS) 8.6-50 mg Cap, Take 1 tablet by mouth 2 (two) times daily as needed., Disp: 30 capsule, Rfl: 0    traZODone (DESYREL) 100 MG tablet, Take 1 tablet (100 mg total) by mouth every evening., Disp: 30 tablet, Rfl: 11    Review of Systems   Constitutional:  Positive for fatigue. Negative for activity change, appetite change, fever and unexpected weight change.   HENT:  Positive for rhinorrhea. Negative for congestion, hearing loss, mouth sores, nosebleeds, postnasal drip, sinus pressure, sinus  "pain, sneezing, sore throat and trouble swallowing.    Eyes:  Negative for pain, discharge, redness, itching and visual disturbance.   Respiratory:  Negative for chest tightness, shortness of breath and wheezing.    Cardiovascular:  Negative for chest pain and palpitations.   Gastrointestinal:  Negative for abdominal pain, blood in stool, constipation, diarrhea, nausea and vomiting.   Endocrine: Negative for cold intolerance, heat intolerance, polydipsia, polyphagia and polyuria.   Genitourinary:  Negative for difficulty urinating, dysuria, flank pain, frequency, genital sores, hematuria, menstrual problem, urgency, vaginal bleeding and vaginal discharge.   Musculoskeletal:  Negative for arthralgias, joint swelling, myalgias and neck pain.   Skin:  Negative for rash and wound.   Allergic/Immunologic: Negative for environmental allergies and food allergies.   Neurological:  Negative for dizziness, weakness, light-headedness and headaches.   Hematological:  Negative for adenopathy. Does not bruise/bleed easily.   Psychiatric/Behavioral:  Negative for agitation, confusion, dysphoric mood, hallucinations, self-injury and sleep disturbance. The patient is not nervous/anxious.         Objective:      Vitals:    11/15/22 1300   BP: 108/66   Pulse: 64   Resp: 18   Temp: 98.5 °F (36.9 °C)   SpO2: 96%   Weight: 59.9 kg (132 lb)   Height: 5' 2" (1.575 m)     Physical Exam  Vitals reviewed.   Constitutional:       General: She is not in acute distress.     Appearance: Normal appearance. She is normal weight. She is not ill-appearing, toxic-appearing or diaphoretic.   HENT:      Head: Normocephalic and atraumatic.      Right Ear: Tympanic membrane and external ear normal. There is no impacted cerumen.      Left Ear: Tympanic membrane and external ear normal. There is no impacted cerumen.      Nose: Nose normal. No congestion or rhinorrhea.      Mouth/Throat:      Mouth: Mucous membranes are moist.      Pharynx: Oropharynx is " clear. No oropharyngeal exudate or posterior oropharyngeal erythema.   Eyes:      General: No scleral icterus.        Right eye: No discharge.         Left eye: No discharge.      Extraocular Movements: Extraocular movements intact.      Conjunctiva/sclera: Conjunctivae normal.      Pupils: Pupils are equal, round, and reactive to light.   Neck:      Vascular: No carotid bruit.   Cardiovascular:      Rate and Rhythm: Normal rate and regular rhythm.      Pulses: Normal pulses.      Heart sounds: Normal heart sounds. No murmur heard.    No friction rub. No gallop.   Pulmonary:      Effort: Pulmonary effort is normal. No respiratory distress.      Breath sounds: Normal breath sounds. No stridor. No rales.   Chest:      Chest wall: No tenderness.   Abdominal:      General: Abdomen is flat. Bowel sounds are normal.      Palpations: Abdomen is soft. There is no mass.      Tenderness: There is no abdominal tenderness. There is no guarding.   Musculoskeletal:         General: No swelling or signs of injury. Normal range of motion.      Cervical back: Normal range of motion and neck supple. No rigidity or tenderness.      Right lower leg: No edema.      Left lower leg: No edema.   Skin:     General: Skin is warm and dry.      Capillary Refill: Capillary refill takes less than 2 seconds.      Findings: No bruising, lesion or rash.   Neurological:      General: No focal deficit present.      Mental Status: She is alert and oriented to person, place, and time. Mental status is at baseline.      Motor: No weakness.      Coordination: Coordination normal.   Psychiatric:         Mood and Affect: Mood normal.         Behavior: Behavior normal.         Thought Content: Thought content normal.         Judgment: Judgment normal.         Assessment:       1. Primary insomnia    2. Screening mammogram for breast cancer    3. Need for influenza vaccination           Plan:       Primary insomnia  -     traZODone (DESYREL) 100 MG tablet;  Take 1 tablet (100 mg total) by mouth every evening.  Dispense: 30 tablet; Refill: 11    Screening mammogram for breast cancer  -     Mammo Digital Screening Bil[facility] w/ [facility]; Future; Expected date: 11/15/2022    Need for influenza vaccination  -     Influenza - Quadrivalent *Preferred* (6 months+) (PF)      Follow up in about 4 months (around 3/15/2023), or if symptoms worsen or fail to improve, for Insomia.        11/15/2022 Louise Davidson NP      Answers submitted by the patient for this visit:  Review of Systems Questionnaire (Submitted on 11/14/2022)  activity change: No  unexpected weight change: No  neck pain: No  hearing loss: No  rhinorrhea: Yes  trouble swallowing: No  eye discharge: No  visual disturbance: No  chest tightness: No  wheezing: No  chest pain: No  palpitations: No  blood in stool: No  constipation: No  vomiting: No  diarrhea: No  polydipsia: No  polyuria: No  difficulty urinating: No  hematuria: No  menstrual problem: No  dysuria: No  joint swelling: No  arthralgias: No  headaches: No  weakness: No  confusion: No  dysphoric mood: No

## 2022-12-07 ENCOUNTER — HOSPITAL ENCOUNTER (OUTPATIENT)
Dept: RADIOLOGY | Facility: HOSPITAL | Age: 62
Discharge: HOME | End: 2022-12-07
Attending: NURSE PRACTITIONER
Payer: COMMERCIAL

## 2022-12-07 VITALS — BODY MASS INDEX: 24.3 KG/M2 | HEIGHT: 62 IN | WEIGHT: 132.06 LBS

## 2022-12-07 DIAGNOSIS — Z12.31 SCREENING MAMMOGRAM FOR BREAST CANCER: ICD-10-CM

## 2022-12-07 PROCEDURE — 77063 BREAST TOMOSYNTHESIS BI: CPT | Mod: TC,PO

## 2022-12-12 ENCOUNTER — TELEPHONE (OUTPATIENT)
Dept: HEMATOLOGY/ONCOLOGY | Facility: CLINIC | Age: 62
End: 2022-12-12

## 2022-12-29 ENCOUNTER — INFUSION (OUTPATIENT)
Dept: INFUSION THERAPY | Facility: HOSPITAL | Age: 62
End: 2022-12-29
Attending: INTERNAL MEDICINE
Payer: COMMERCIAL

## 2022-12-29 VITALS
DIASTOLIC BLOOD PRESSURE: 69 MMHG | TEMPERATURE: 98 F | HEIGHT: 62 IN | OXYGEN SATURATION: 97 % | BODY MASS INDEX: 23.92 KG/M2 | HEART RATE: 65 BPM | WEIGHT: 130 LBS | SYSTOLIC BLOOD PRESSURE: 114 MMHG | RESPIRATION RATE: 17 BRPM

## 2022-12-29 DIAGNOSIS — D51.3 OTHER DIETARY VITAMIN B12 DEFICIENCY ANEMIA: Primary | ICD-10-CM

## 2022-12-29 PROCEDURE — 96372 THER/PROPH/DIAG INJ SC/IM: CPT

## 2022-12-29 PROCEDURE — 63600175 PHARM REV CODE 636 W HCPCS: Performed by: NURSE PRACTITIONER

## 2022-12-29 RX ORDER — CYANOCOBALAMIN 1000 UG/ML
1000 INJECTION, SOLUTION INTRAMUSCULAR; SUBCUTANEOUS
Status: CANCELLED | OUTPATIENT
Start: 2022-12-29

## 2022-12-29 RX ORDER — CYANOCOBALAMIN 1000 UG/ML
1000 INJECTION, SOLUTION INTRAMUSCULAR; SUBCUTANEOUS
Status: COMPLETED | OUTPATIENT
Start: 2022-12-29 | End: 2022-12-29

## 2022-12-29 RX ADMIN — CYANOCOBALAMIN 1000 MCG: 1000 INJECTION, SOLUTION INTRAMUSCULAR at 10:12

## 2022-12-29 NOTE — PLAN OF CARE
Problem: Fatigue  Goal: Improved Activity Tolerance  Outcome: Ongoing, Progressing  Intervention: Promote Improved Energy  Flowsheets (Taken 12/29/2022 1020)  Fatigue Management:   activity schedule adjusted   fatigue-related activity identified   frequent rest breaks encouraged   paced activity encouraged  Activity Management: Ambulated -L4

## 2023-01-09 ENCOUNTER — OFFICE VISIT (OUTPATIENT)
Dept: ORTHOPEDICS | Facility: CLINIC | Age: 63
End: 2023-01-09
Payer: COMMERCIAL

## 2023-01-09 VITALS
DIASTOLIC BLOOD PRESSURE: 74 MMHG | BODY MASS INDEX: 23.92 KG/M2 | WEIGHT: 130 LBS | HEIGHT: 62 IN | SYSTOLIC BLOOD PRESSURE: 132 MMHG

## 2023-01-09 DIAGNOSIS — M47.816 FACET ARTHRITIS, DEGENERATIVE, LUMBAR SPINE: ICD-10-CM

## 2023-01-09 DIAGNOSIS — M41.20 OTHER IDIOPATHIC SCOLIOSIS, UNSPECIFIED SPINAL REGION: ICD-10-CM

## 2023-01-09 DIAGNOSIS — M51.36 DISC DEGENERATION, LUMBAR: ICD-10-CM

## 2023-01-09 DIAGNOSIS — R52 PAIN: ICD-10-CM

## 2023-01-09 DIAGNOSIS — M43.16 SPONDYLOLISTHESIS AT L4-L5 LEVEL: Primary | ICD-10-CM

## 2023-01-09 PROCEDURE — 99203 OFFICE O/P NEW LOW 30 MIN: CPT | Mod: S$GLB,,, | Performed by: ORTHOPAEDIC SURGERY

## 2023-01-09 PROCEDURE — 1160F PR REVIEW ALL MEDS BY PRESCRIBER/CLIN PHARMACIST DOCUMENTED: ICD-10-PCS | Mod: CPTII,S$GLB,, | Performed by: ORTHOPAEDIC SURGERY

## 2023-01-09 PROCEDURE — 3075F PR MOST RECENT SYSTOLIC BLOOD PRESS GE 130-139MM HG: ICD-10-PCS | Mod: CPTII,S$GLB,, | Performed by: ORTHOPAEDIC SURGERY

## 2023-01-09 PROCEDURE — 1159F PR MEDICATION LIST DOCUMENTED IN MEDICAL RECORD: ICD-10-PCS | Mod: CPTII,S$GLB,, | Performed by: ORTHOPAEDIC SURGERY

## 2023-01-09 PROCEDURE — 3078F PR MOST RECENT DIASTOLIC BLOOD PRESSURE < 80 MM HG: ICD-10-PCS | Mod: CPTII,S$GLB,, | Performed by: ORTHOPAEDIC SURGERY

## 2023-01-09 PROCEDURE — 3078F DIAST BP <80 MM HG: CPT | Mod: CPTII,S$GLB,, | Performed by: ORTHOPAEDIC SURGERY

## 2023-01-09 PROCEDURE — 3008F BODY MASS INDEX DOCD: CPT | Mod: CPTII,S$GLB,, | Performed by: ORTHOPAEDIC SURGERY

## 2023-01-09 PROCEDURE — 1160F RVW MEDS BY RX/DR IN RCRD: CPT | Mod: CPTII,S$GLB,, | Performed by: ORTHOPAEDIC SURGERY

## 2023-01-09 PROCEDURE — 1159F MED LIST DOCD IN RCRD: CPT | Mod: CPTII,S$GLB,, | Performed by: ORTHOPAEDIC SURGERY

## 2023-01-09 PROCEDURE — 3008F PR BODY MASS INDEX (BMI) DOCUMENTED: ICD-10-PCS | Mod: CPTII,S$GLB,, | Performed by: ORTHOPAEDIC SURGERY

## 2023-01-09 PROCEDURE — 3075F SYST BP GE 130 - 139MM HG: CPT | Mod: CPTII,S$GLB,, | Performed by: ORTHOPAEDIC SURGERY

## 2023-01-09 PROCEDURE — 99203 PR OFFICE/OUTPT VISIT, NEW, LEVL III, 30-44 MIN: ICD-10-PCS | Mod: S$GLB,,, | Performed by: ORTHOPAEDIC SURGERY

## 2023-01-09 RX ORDER — MELOXICAM 7.5 MG/1
7.5 TABLET ORAL DAILY
Qty: 30 TABLET | Refills: 2 | Status: SHIPPED | OUTPATIENT
Start: 2023-01-09 | End: 2023-02-08

## 2023-01-09 NOTE — PROGRESS NOTES
Subjective:       Patient ID: Mary Mike is a 62 y.o. female.    Chief Complaint: Pain of the Lumbar Spine (Patient is having lumbar pain, this pain has been going on for a while but has gotten worse the last few months. She has limited walking standing bending, weakness in the left leg, bowel/bladdes problems, sexual dysfunction, radiates down to knee.)      History of Present Illness    Prior to meeting with the patient I reviewed the medical chart in [facility]. This included reviewing the previous progress notes from our office, review of the patient's last appointment with their primary care provider, review of any visits to the emergency room, and review of any pain management appointments or procedures.   62-year-old female, presents to clinic today for new patient evaluation chronic low back pain without radiculitis.  Symptom onset greater than 1 year appears to be 1st annotated in primary care notes in July of 2022.  No formal workup at that time..  She has multiple other medical problems going on right now.  She has history of gastric bypass surgery, has some sort of malabsorption as well as chronic iron deficiency anemia followed by Hematology-Oncology as well as bariatric surgery.    Current Medications  Current Outpatient Medications   Medication Sig Dispense Refill    diclofenac (VOLTAREN) 50 MG EC tablet Take 1 tablet (50 mg total) by mouth 3 (three) times daily. 15 tablet 0    FLUoxetine 20 MG capsule TAKE 1 CAPSULE(20 MG) BY MOUTH EVERY EVENING 90 capsule 2    iron-vit c-b12-folic acid (ICAR-C PLUS) Tab Take 1 tablet by mouth once daily. 30 tablet 2    multivitamin (THERAGRAN) tablet Take 1 tablet by mouth once daily.      pantoprazole (PROTONIX) 40 MG tablet Take 1 tablet (40 mg total) by mouth once daily. 90 tablet 2    pramipexole (MIRAPEX) 1 MG tablet TAKE 1 AND 1/2 TABLETS BY MOUTH EVERY EVENING 135 tablet 1    traZODone (DESYREL) 100 MG tablet Take 1 tablet (100 mg total) by mouth every  evening. 30 tablet 11    meloxicam (MOBIC) 7.5 MG tablet Take 1 tablet (7.5 mg total) by mouth once daily. 30 tablet 2    sennosides-docusate sodium (SENNA PLUS) 8.6-50 mg Cap Take 1 tablet by mouth 2 (two) times daily as needed. (Patient not taking: Reported on 2023) 30 capsule 0     No current facility-administered medications for this visit.       Allergies  Review of patient's allergies indicates:  No Known Allergies    Past Medical History  Past Medical History:   Diagnosis Date    ADHD (attention deficit hyperactivity disorder)     Allergy     Anemia     Anxiety     Depression     Fatty liver     History of endoscopy 2021    Dr. Toney Cuevas-Z-line is irregular and was found 37 cm from the incisors.  Scattered ulcerations noted between 36-37 cm. 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  The gastrojejunal anastomosis was characterized by moderate stenosis and ulceration.  This was traversed.  The pouch to jejunum limb was characterized by erythema, friable mucosa, inflammation and ulceration.  The examine    Insomnia     Migraines     Osteoporosis     Restless leg syndrome        Surgical History  Past Surgical History:   Procedure Laterality Date    BREAST BIOPSY Left     benign 15 +years ago    BREAST SURGERY  2017    breast reduction    BUNIONECTOMY Right 2018     SECTION  1987    FRACTURE SURGERY  2016    ankle    GASTRIC RESTRICTION SURGERY      GASTRIC SLEEVE 2012    LAPAROSCOPIC CHOLECYSTECTOMY N/A 10/10/2022    Procedure: CHOLECYSTECTOMY, LAPAROSCOPIC;  Surgeon: Henrietta Joseph MD;  Location: [facility] OR;  Service: General;  Laterality: N/A;    REPAIR OF EXTENSOR TENDON  2021    Procedure: REPAIR, TENDON, EXTENSOR;  Surgeon: Aly Zimmerman DPM;  Location: [facility] OR;  Service: Podiatry;;    SURGICAL REMOVAL OF METATARSAL HEAD Right 2021    Procedure: OSTECTOMY, METATARSAL BONE, HEAD;  Surgeon: Aly Zimmerman DPM;  Location: [facility] OR;  Service: Podiatry;   Laterality: Right;    TOTAL REDUCTION MAMMOPLASTY      2016       Family History:   Family History   Problem Relation Age of Onset    Arthritis Mother     Colon cancer Mother     Diabetes Mother     Early death Mother     Miscarriages / Stillbirths Mother     Arthritis Father     Heart disease Father     Hypertension Father     Stroke Father     Vision loss Father     Breast cancer Maternal Aunt 52    Vaginal cancer Paternal Grandmother     Heart disease Paternal Grandfather        Social History:   Social History     Socioeconomic History    Marital status:    Tobacco Use    Smoking status: Never    Smokeless tobacco: Never   Substance and Sexual Activity    Alcohol use: Yes     Comment: occassional    Drug use: No    Sexual activity: Yes       Hospitalization/Major Diagnostic Procedure:     Review of Systems     General/Constitutional:  Chills denies. Fatigue denies. Fever denies. Weight gain denies. Weight loss denies.    Respiratory:  Shortness of breath denies.    Cardiovascular:  Chest pain denies.    Gastrointestinal:  Constipation denies. Diarrhea denies. Nausea denies. Vomiting denies.     Hematology:  Easy bruising denies. Prolonged bleeding denies.     Genitourinary:  Frequent urination denies. Pain in lower back denies. Painful urination denies.     Musculoskeletal:  See HPI for details    Skin:  Rash denies.    Neurologic:  Dizziness denies. Gait abnormalities denies. Seizures denies. Tingling/Numbess denies.    Psychiatric:  Anxiety denies. Depressed mood denies.     Objective:   Vital Signs:   Vitals:    01/09/23 1236   BP: 132/74        Physical Exam      General Examination:     Constitutional: The patient is alert and oriented to lace person and time. Mood is pleasant.     Head/Face: Normal facial features normal eyebrows    Eyes: Normal extraocular motion bilaterally    Lungs: Respirations are equal and unlabored    Gait is coordinated.    Cardiovascular: There are no swelling or  varicosities present.    Lymphatic: Negative for adenopathy    Skin: Normal    Neurological: Level of consciousness normal. Oriented to place person and time and situation    Psychiatric: Oriented to time place person and situation    Examination of lumbar spine, no midline vertebral tenderness, patient tenderness to the left of the midline lumbar sacral area.  Who nonantalgic gait, not antalgic sit to stand.  No significant limitations in range of motion with flexion extension rotation lateral bending.  Bilateral straight leg raise is negative.  Muscle strength symmetrical in bilateral lower extremities.  Distally neurovascularly intact.    Examination of the bilateral hips, not significantly limited with range of motion.  No groin pain.    XRAY Report/ Interpretation:  AP pelvis today in the office without significant osteoarthritis in the bilateral hips.    AP and lateral views of the lumbar spine taken today in the office demonstrate some scoliosis, patient with advanced L4-5 disc degeneration and collapse with an a lateral listhesis of L4 on L5.  Facet arthritis L3-S1.      Assessment:       1. Spondylolisthesis at L4-L5 level    2. Pain    3. Disc degeneration, lumbar    4. Facet arthritis, degenerative, lumbar spine    5. Other idiopathic scoliosis, unspecified spinal region          Plan:       Mary was seen today for pain.    Diagnoses and all orders for this visit:    Spondylolisthesis at L4-L5 level  -     Ambulatory referral/consult to Physical/Occupational Therapy; Future  -     meloxicam (MOBIC) 7.5 MG tablet; Take 1 tablet (7.5 mg total) by mouth once daily.    Pain  -     X-Ray Lumbar Spine Ap And Lateral  -     X-Ray Pelvis Routine AP    Disc degeneration, lumbar  -     Ambulatory referral/consult to Physical/Occupational Therapy; Future  -     meloxicam (MOBIC) 7.5 MG tablet; Take 1 tablet (7.5 mg total) by mouth once daily.    Facet arthritis, degenerative, lumbar spine  -     Ambulatory  referral/consult to Physical/Occupational Therapy; Future  -     meloxicam (MOBIC) 7.5 MG tablet; Take 1 tablet (7.5 mg total) by mouth once daily.    Other idiopathic scoliosis, unspecified spinal region         Follow up in about 6 weeks (around 2/20/2023) for Lumbar PT f/u.    62-year-old female with primarily axial back pain.  Absence of radicular symptoms in her history and physical exam today.  She has L4-5 disc degeneration and advanced collapse at that level with anterolisthesis of L4 on L5.  She also has significant facet arthritis L3-S1.  She would like to avoid pain medication.  In terms of treatment, conservatively I have offered her physical therapy for core strengthening and range of motion.  I think this will help some with her symptoms but I am not sure at alleviate an enough of her discomfort to palliate her.  We see her back in 6 weeks, if she still not having significant improvement in her symptoms will get an MRI further evaluate the for 5 disc space, facets and foramen but I do not think she is truly having radiculopathy.  Possibly consider pain managed at some point the future.    For her acute symptoms, will start her on a daily anti-inflammatory, meloxicam 7.5 mg once a day.  She already takes a PPI, with her history of gastric bypass she states she does not have chronic gastritis but will try to minimize that with low-dose once daily NSAID and continuing her PPI.  Instructions to discontinue if she develops any dyspepsia or gastritis symptoms.    Treatment options were discussed with regards to the nature of the medical condition. Conservative pain intervention and surgical options were discussed in detail. The probability of success of each separate treatment option was discussed. The patient expressed a clear understanding of the treatment options. With regards to surgery, the procedure risk, benefits, complications, and outcomes were discussed. No guarantees were given with regards to  surgical outcome.   The risk of complications, morbidity, and mortality of patient management decisions have been made at the time of this visit. These are associated with the patient's problems, diagnostic procedures and treatment options. This includes the possible management options selected and those considered but not selected by the patient after shared medical decision making we discussed with the patient.     This note was created using Dragon voice recognition software that occasionally misinterpreted phrases or words.

## 2023-01-10 ENCOUNTER — TELEPHONE (OUTPATIENT)
Dept: ORTHOPEDICS | Facility: CLINIC | Age: 63
End: 2023-01-10

## 2023-01-10 NOTE — TELEPHONE ENCOUNTER
----- Message from Penny Wilson sent at 1/10/2023  9:55 AM CST -----  Pt would like to change the  facility for  P/T . PT would  like a  call  back    [facility] [address]

## 2023-01-17 NOTE — PROGRESS NOTES
[facility] Hematology/Oncology  PROGRESS NOTE -  Follow-up Visit      Subjective:       Patient ID:   NAME: Mary Mike : 1960     62 y.o. female    Referring Doc: Jimbo Muniz MD  Other Physicians: Tia Cuevas            Chief Complaint: Iron Deficiency Anemia f/u       History of Present Illness:     Patient returns today for a regularly scheduled follow-up visit.  The patient is here today to go over the results of the recently ordered labs, tests and studies.   She is here by herself.       She previously had had two IV infusions and started on B12. She is feeling better. Energy levels are good    She is breathing ok , no CP, SOB, HA's or N/V. Chronic back issues and starting PT next week    She is retired  owner      Discussed covid19 precautions ans she has been vaccinated          ROS:   GEN: normal without any fever, night sweats or weight loss; fair energy levels; chronic back issues  HEENT: normal with no HA's, sore throat, stiff neck, changes in vision  CV: normal with no CP, SOB, PND, PATIÑO or orthopnea  PULM: normal with no SOB, cough, hemoptysis, sputum or pleuritic pain  GI: normal with no abdominal pain, nausea, vomiting, constipation, diarrhea, melanotic stools, BRBPR, or hematemesis  : normal with no hematuria, dysuria  BREAST: normal with no mass, discharge, pain  SKIN: normal with no rash, erythema, bruising, or swelling    Pain Scale:  0    Allergies:  Review of patient's allergies indicates:  No Known Allergies    Medications:    Current Outpatient Medications:     diclofenac (VOLTAREN) 50 MG EC tablet, Take 1 tablet (50 mg total) by mouth 3 (three) times daily., Disp: 15 tablet, Rfl: 0    FLUoxetine 20 MG capsule, TAKE 1 CAPSULE(20 MG) BY MOUTH EVERY EVENING, Disp: 90 capsule, Rfl: 2    iron-vit c-b12-folic acid (ICAR-C PLUS) Tab, Take 1 tablet by mouth once daily., Disp: 30 tablet, Rfl: 2    meloxicam (MOBIC) 7.5 MG tablet, Take 1 tablet (7.5 mg total) by mouth once  "daily., Disp: 30 tablet, Rfl: 2    multivitamin (THERAGRAN) tablet, Take 1 tablet by mouth once daily., Disp: , Rfl:     pantoprazole (PROTONIX) 40 MG tablet, Take 1 tablet (40 mg total) by mouth once daily., Disp: 90 tablet, Rfl: 2    pramipexole (MIRAPEX) 1 MG tablet, TAKE 1 AND 1/2 TABLETS BY MOUTH EVERY EVENING, Disp: 135 tablet, Rfl: 1    traZODone (DESYREL) 100 MG tablet, Take 1 tablet (100 mg total) by mouth every evening., Disp: 30 tablet, Rfl: 11    sennosides-docusate sodium (SENNA PLUS) 8.6-50 mg Cap, Take 1 tablet by mouth 2 (two) times daily as needed. (Patient not taking: Reported on 1/9/2023), Disp: 30 capsule, Rfl: 0    PMHx/PSHx Updates:  See patient's last visit with me on 8/17/2022  See H&P on 7/20/2022        Pathology:   Cancer Staging   No matching staging information was found for the patient.          Objective:     Vitals:  Blood pressure 133/73, pulse 63, temperature 98.1 °F (36.7 °C), resp. rate 16, height 5' 2" (1.575 m), weight 60.9 kg (134 lb 4.8 oz).    Physical Examination:   GEN: no apparent distress, comfortable; AAOx3  HEAD: atraumatic and normocephalic  EYES: no pallor, no icterus, PERRLA  ENT: OMM, no pharyngeal erythema, external ears WNL; no nasal discharge; no thrush  NECK: no masses, thyroid normal, trachea midline, no LAD/LN's, supple  CV: RRR with no murmur; normal pulse; normal S1 and S2; no pedal edema  CHEST: Normal respiratory effort; CTAB; normal breath sounds; no wheeze or crackles  ABDOM: nontender and nondistended; soft; normal bowel sounds; no rebound/guarding  MUSC/Skeletal: ROM normal; no crepitus; joints normal; no deformities or arthropathy  EXTREM: no clubbing, cyanosis, inflammation or swelling  SKIN: no rashes, lesions, ulcers, petechiae or subcutaneous nodules  : no aguiar  NEURO: grossly intact; motor/sensory WNL; AAOx3; no tremors  PSYCH: normal mood, affect and behavior  LYMPH: normal cervical, supraclavicular, axillary and groin LN's            Labs: "     Lab Results   Component Value Date    WBC 3.6 (L) 01/09/2023    HGB 12.8 01/09/2023    HCT 38.8 01/09/2023    MCV 90.7 01/09/2023     01/09/2023       CMP  Sodium   Date Value Ref Range Status   01/09/2023 142 135 - 146 mmol/L Final     Potassium   Date Value Ref Range Status   01/09/2023 4.8 3.5 - 5.3 mmol/L Final     Chloride   Date Value Ref Range Status   01/09/2023 105 98 - 110 mmol/L Final     CO2   Date Value Ref Range Status   01/09/2023 33 (H) 20 - 32 mmol/L Final     Glucose   Date Value Ref Range Status   01/09/2023 85 65 - 99 mg/dL Final     Comment:                   Fasting reference interval          BUN   Date Value Ref Range Status   01/09/2023 8 7 - 25 mg/dL Final     Creatinine   Date Value Ref Range Status   01/09/2023 0.79 0.50 - 1.05 mg/dL Final     Calcium   Date Value Ref Range Status   01/09/2023 9.4 8.6 - 10.4 mg/dL Final     Total Protein   Date Value Ref Range Status   01/09/2023 6.5 6.1 - 8.1 g/dL Final     Albumin   Date Value Ref Range Status   01/09/2023 4.3 3.6 - 5.1 g/dL Final     Total Bilirubin   Date Value Ref Range Status   01/09/2023 0.5 0.2 - 1.2 mg/dL Final     Alkaline Phosphatase   Date Value Ref Range Status   10/20/2022 109 55 - 135 U/L Final     AST   Date Value Ref Range Status   01/09/2023 22 10 - 35 U/L Final     ALT   Date Value Ref Range Status   01/09/2023 46 (H) 6 - 29 U/L Final     Anion Gap   Date Value Ref Range Status   10/20/2022 9 8 - 16 mmol/L Final     eGFR if    Date Value Ref Range Status   05/19/2022 103 > OR = 60 mL/min/1.73m2 Final     eGFR if non    Date Value Ref Range Status   05/19/2022 89 > OR = 60 mL/min/1.73m2 Final       Lab Results   Component Value Date    IRON 72 01/09/2023    TIBC 346 01/09/2023    FERRITIN 126 01/09/2023       Lab Results   Component Value Date    EKTTUWBU72 >2000 (H) 08/17/2022     Lab Results   Component Value Date    FOLATE 10.0 08/17/2022         Radiology/Diagnostic  Studies:    No results found.    I have reviewed all available lab results and radiology reports.    Assessment/Plan:   (1) 62 y.o. female with diagnosis of iron deficiency anemia. She was referred to us by Dr. Muniz and has failed oral iron therapy. She does have a history of gastric by pass and need for IV iron infusions.   - reviewed iron labs   - set up with IV iron    - education given   - consent signed   - orders added   - recheck labs after Iron infusions and meet back with Dr. Ndiaye     8/17/2022:  - check up to date labs since getting the IV iron x2  - continued on b12 monthly    1/18/2023:  - latest hgb WNL  - iron panel adequate  - continue B12 monthly     (2) B 12 deficiency   - B 12 level at 198 - start B12 injections weekly x 4 and then monthly   - recheck level in 3 months      (3) history of Gastric by pass    -continue the bariatric supplement   - continue to follow up with GI as needed      (4) Family history of cervical cancer   - up to date on mmg   - referral to gyn to establish care     VISIT DIAGNOSES:      Other dietary vitamin B12 deficiency anemia    Iron deficiency anemia following bariatric surgery    History of Destini-en-Y gastric bypass          PLAN:  1. check repeat labs every 3 months  2. resume IV iron as needed  3. continue with the b12 as directed  4.F/u with PCP and GI        RTC in 6 months  Fax note to [facility]    Discussion:     COVID-19 Discussion:    I had long discussion with patient and any applicable family about the COVID-19 coronavirus epidemic and the recommended precautions with regard to cancer and/or hematology patients. I have re-iterated the CDC recommendations for adequate hand washing, use of hand -like products, and coughing into elbow, etc. In addition, especially for our patients who are on chemotherapy and/or our otherwise immunocompromised patients, I have recommended avoidance of crowds, including movie theaters, restaurants,  churches, etc. I have recommended avoidance of any sick or symptomatic family members and/or friends. I have also recommended avoidance of any raw and unwashed food products, and general avoidance of food items that have not been prepared by themselves. The patient has been asked to call us immediately with any symptom developments, issues, questions or other general concerns.       Iron Infusion Therapy Discussion:     I provided literature/learning materials on the particular IV iron regimen and discussed the potential side-effect profiles of the drug(s). I discussed the importance of compliance with obtaining and monitoring requested lab work, and went over the potential risk for the development of anaphylactic shock, bronchospasm, dysrhythmia, liver and/or kidney damage, and respiratory/cardiovascular arrest and/or failure. I discussed the potential risks for development of alopecia, fevers, itching, chills and/or rigors, cold sensory issues, ringing in ears, vertigo and neuropathy, all of which are usually acute but sometimes could end up being chronic and life-long. I discussed the risks of hand-foot syndrome and rashes, and development of other autoimmune mediated processes such as pneumonitis and colitis which could be life threatening.     The patient's consent has been obtained to proceed with the IV iron therapy.The patient will be referred to [facility] for training and education on IV iron therapy, use of antiemetics and/or anti-diarrheals, use of NSAID's, potential IV iron therapy side-effects, and any specific recommendations and precautions with the particular IV iron agents.      I answered all of the patient's (and family's, if applicable) questions to the best of my ability and to their complete satisfaction. The patient acknowledged full understanding of the risks, recommendations and plan(s).       I spent over 25 mins of time with the patient. Reviewed results of  the recently ordered labs, tests and studies; made directives with regards to the results. Over half of this time was spent couseling and coordinating care.    I have explained all of the above in detail and the patient understands all of the current recommendation(s). I have answered all of their questions to the best of my ability and to their complete satisfaction.   The patient is to continue with the current management plan.            Electronically signed by Jamie Ndiaye MD          Answers submitted by the patient for this visit:  Review of Systems Questionnaire (Submitted on 1/15/2023)  appetite change : No  unexpected weight change: No  mouth sores: No  visual disturbance: No  cough: No  shortness of breath: No  chest pain: No  abdominal pain: No  diarrhea: No  frequency: No  back pain: Yes  rash: No  headaches: No  adenopathy: No  nervous/ anxious: No

## 2023-01-18 ENCOUNTER — OFFICE VISIT (OUTPATIENT)
Dept: HEMATOLOGY/ONCOLOGY | Facility: CLINIC | Age: 63
End: 2023-01-18
Payer: COMMERCIAL

## 2023-01-18 VITALS
BODY MASS INDEX: 24.71 KG/M2 | HEIGHT: 62 IN | DIASTOLIC BLOOD PRESSURE: 73 MMHG | HEART RATE: 63 BPM | RESPIRATION RATE: 16 BRPM | WEIGHT: 134.31 LBS | TEMPERATURE: 98 F | SYSTOLIC BLOOD PRESSURE: 133 MMHG

## 2023-01-18 DIAGNOSIS — D50.8 IRON DEFICIENCY ANEMIA FOLLOWING BARIATRIC SURGERY: ICD-10-CM

## 2023-01-18 DIAGNOSIS — D51.3 OTHER DIETARY VITAMIN B12 DEFICIENCY ANEMIA: Primary | ICD-10-CM

## 2023-01-18 DIAGNOSIS — K95.89 IRON DEFICIENCY ANEMIA FOLLOWING BARIATRIC SURGERY: ICD-10-CM

## 2023-01-18 DIAGNOSIS — Z98.84 HISTORY OF ROUX-EN-Y GASTRIC BYPASS: ICD-10-CM

## 2023-01-18 PROCEDURE — 1159F MED LIST DOCD IN RCRD: CPT | Mod: CPTII,S$GLB,, | Performed by: INTERNAL MEDICINE

## 2023-01-18 PROCEDURE — 3078F DIAST BP <80 MM HG: CPT | Mod: CPTII,S$GLB,, | Performed by: INTERNAL MEDICINE

## 2023-01-18 PROCEDURE — 1159F PR MEDICATION LIST DOCUMENTED IN MEDICAL RECORD: ICD-10-PCS | Mod: CPTII,S$GLB,, | Performed by: INTERNAL MEDICINE

## 2023-01-18 PROCEDURE — 3008F PR BODY MASS INDEX (BMI) DOCUMENTED: ICD-10-PCS | Mod: CPTII,S$GLB,, | Performed by: INTERNAL MEDICINE

## 2023-01-18 PROCEDURE — 1160F PR REVIEW ALL MEDS BY PRESCRIBER/CLIN PHARMACIST DOCUMENTED: ICD-10-PCS | Mod: CPTII,S$GLB,, | Performed by: INTERNAL MEDICINE

## 2023-01-18 PROCEDURE — 3078F PR MOST RECENT DIASTOLIC BLOOD PRESSURE < 80 MM HG: ICD-10-PCS | Mod: CPTII,S$GLB,, | Performed by: INTERNAL MEDICINE

## 2023-01-18 PROCEDURE — 3075F SYST BP GE 130 - 139MM HG: CPT | Mod: CPTII,S$GLB,, | Performed by: INTERNAL MEDICINE

## 2023-01-18 PROCEDURE — 99213 PR OFFICE/OUTPT VISIT, EST, LEVL III, 20-29 MIN: ICD-10-PCS | Mod: S$GLB,,, | Performed by: INTERNAL MEDICINE

## 2023-01-18 PROCEDURE — 3008F BODY MASS INDEX DOCD: CPT | Mod: CPTII,S$GLB,, | Performed by: INTERNAL MEDICINE

## 2023-01-18 PROCEDURE — 99213 OFFICE O/P EST LOW 20 MIN: CPT | Mod: S$GLB,,, | Performed by: INTERNAL MEDICINE

## 2023-01-18 PROCEDURE — 3075F PR MOST RECENT SYSTOLIC BLOOD PRESS GE 130-139MM HG: ICD-10-PCS | Mod: CPTII,S$GLB,, | Performed by: INTERNAL MEDICINE

## 2023-01-18 PROCEDURE — 1160F RVW MEDS BY RX/DR IN RCRD: CPT | Mod: CPTII,S$GLB,, | Performed by: INTERNAL MEDICINE

## 2023-01-26 ENCOUNTER — INFUSION (OUTPATIENT)
Dept: INFUSION THERAPY | Facility: HOSPITAL | Age: 63
End: 2023-01-26
Attending: INTERNAL MEDICINE
Payer: COMMERCIAL

## 2023-01-26 VITALS
HEIGHT: 62 IN | RESPIRATION RATE: 17 BRPM | HEART RATE: 62 BPM | SYSTOLIC BLOOD PRESSURE: 138 MMHG | BODY MASS INDEX: 24.78 KG/M2 | WEIGHT: 134.69 LBS | TEMPERATURE: 98 F | DIASTOLIC BLOOD PRESSURE: 81 MMHG | OXYGEN SATURATION: 98 %

## 2023-01-26 DIAGNOSIS — D51.3 OTHER DIETARY VITAMIN B12 DEFICIENCY ANEMIA: Primary | ICD-10-CM

## 2023-01-26 PROCEDURE — 63600175 PHARM REV CODE 636 W HCPCS: Performed by: NURSE PRACTITIONER

## 2023-01-26 PROCEDURE — 96372 THER/PROPH/DIAG INJ SC/IM: CPT

## 2023-01-26 RX ORDER — CYANOCOBALAMIN 1000 UG/ML
1000 INJECTION, SOLUTION INTRAMUSCULAR; SUBCUTANEOUS
Status: COMPLETED | OUTPATIENT
Start: 2023-01-26 | End: 2023-01-26

## 2023-01-26 RX ORDER — CYANOCOBALAMIN 1000 UG/ML
1000 INJECTION, SOLUTION INTRAMUSCULAR; SUBCUTANEOUS
Status: CANCELLED | OUTPATIENT
Start: 2023-01-26

## 2023-01-26 RX ADMIN — CYANOCOBALAMIN 1000 MCG: 1000 INJECTION, SOLUTION INTRAMUSCULAR at 10:01

## 2023-01-26 NOTE — PLAN OF CARE
Problem: Fatigue  Goal: Improved Activity Tolerance  Outcome: Ongoing, Progressing  Intervention: Promote Improved Energy  Flowsheets (Taken 1/26/2023 1058)  Fatigue Management:   frequent rest breaks encouraged   activity schedule adjusted  Activity Management: Ambulated -L4

## 2023-02-16 DIAGNOSIS — M85.88 OTHER SPECIFIED DISORDERS OF BONE DENSITY AND STRUCTURE, OTHER SITE: Primary | ICD-10-CM

## 2023-02-16 LAB
HUMAN PAPILLOMAVIRUS (HPV): NORMAL
PAP RECOMMENDATION EXT: NORMAL
PAP SMEAR: NORMAL

## 2023-02-20 ENCOUNTER — HOSPITAL ENCOUNTER (OUTPATIENT)
Dept: RADIOLOGY | Facility: HOSPITAL | Age: 63
Discharge: HOME | End: 2023-02-20
Attending: OBSTETRICS & GYNECOLOGY
Payer: COMMERCIAL

## 2023-02-20 DIAGNOSIS — M85.88 OTHER SPECIFIED DISORDERS OF BONE DENSITY AND STRUCTURE, OTHER SITE: ICD-10-CM

## 2023-02-20 PROCEDURE — 77080 DXA BONE DENSITY AXIAL: CPT | Mod: TC,PO

## 2023-02-22 ENCOUNTER — OFFICE VISIT (OUTPATIENT)
Dept: ORTHOPEDICS | Facility: CLINIC | Age: 63
End: 2023-02-22
Payer: COMMERCIAL

## 2023-02-22 VITALS — HEIGHT: 63 IN | BODY MASS INDEX: 23.74 KG/M2 | WEIGHT: 134 LBS

## 2023-02-22 DIAGNOSIS — M81.0 OSTEOPOROSIS, UNSPECIFIED OSTEOPOROSIS TYPE, UNSPECIFIED PATHOLOGICAL FRACTURE PRESENCE: Primary | ICD-10-CM

## 2023-02-22 DIAGNOSIS — M43.16 SPONDYLOLISTHESIS AT L4-L5 LEVEL: ICD-10-CM

## 2023-02-22 DIAGNOSIS — M51.36 DISC DEGENERATION, LUMBAR: ICD-10-CM

## 2023-02-22 DIAGNOSIS — M47.817 SPONDYLOSIS WITHOUT MYELOPATHY OR RADICULOPATHY, LUMBOSACRAL REGION: ICD-10-CM

## 2023-02-22 DIAGNOSIS — M48.07 SPINAL STENOSIS, LUMBOSACRAL REGION: ICD-10-CM

## 2023-02-22 DIAGNOSIS — M47.816 FACET ARTHRITIS, DEGENERATIVE, LUMBAR SPINE: ICD-10-CM

## 2023-02-22 PROCEDURE — 3008F BODY MASS INDEX DOCD: CPT | Mod: CPTII,S$GLB,, | Performed by: ORTHOPAEDIC SURGERY

## 2023-02-22 PROCEDURE — 1159F PR MEDICATION LIST DOCUMENTED IN MEDICAL RECORD: ICD-10-PCS | Mod: CPTII,S$GLB,, | Performed by: ORTHOPAEDIC SURGERY

## 2023-02-22 PROCEDURE — 3008F PR BODY MASS INDEX (BMI) DOCUMENTED: ICD-10-PCS | Mod: CPTII,S$GLB,, | Performed by: ORTHOPAEDIC SURGERY

## 2023-02-22 PROCEDURE — 99213 PR OFFICE/OUTPT VISIT, EST, LEVL III, 20-29 MIN: ICD-10-PCS | Mod: S$GLB,,, | Performed by: ORTHOPAEDIC SURGERY

## 2023-02-22 PROCEDURE — 1160F PR REVIEW ALL MEDS BY PRESCRIBER/CLIN PHARMACIST DOCUMENTED: ICD-10-PCS | Mod: CPTII,S$GLB,, | Performed by: ORTHOPAEDIC SURGERY

## 2023-02-22 PROCEDURE — 99213 OFFICE O/P EST LOW 20 MIN: CPT | Mod: S$GLB,,, | Performed by: ORTHOPAEDIC SURGERY

## 2023-02-22 PROCEDURE — 1159F MED LIST DOCD IN RCRD: CPT | Mod: CPTII,S$GLB,, | Performed by: ORTHOPAEDIC SURGERY

## 2023-02-22 PROCEDURE — 1160F RVW MEDS BY RX/DR IN RCRD: CPT | Mod: CPTII,S$GLB,, | Performed by: ORTHOPAEDIC SURGERY

## 2023-02-22 NOTE — PROGRESS NOTES
Subjective:       Patient ID: Mary Mike is a 62 y.o. female.    Chief Complaint: Pain of the Lumbar Spine (Patient went to physical therapy, she reports that it did not help much.)      History of Present Illness    Prior to meeting with the patient I reviewed the medical chart in [facility]. This included reviewing the previous progress notes from our office, review of the patient's last appointment with their primary care provider, review of any visits to the emergency room, and review of any pain management appointments or procedures.    62-year-old female, presents to clinic today for follow up of   chronic low back pain without radiculitis.  Symptom onset greater than 1 year appears to be 1st annotated in primary care notes in July of 2022.  No formal workup at that time..  She has multiple other medical problems going on right now.  She has history of gastric bypass surgery, has some sort of malabsorption as well as chronic iron deficiency anemia followed by Hematology-Oncology as well as bariatric surgery.   Recently had a DEXA scan.    Current Medications  Current Outpatient Medications   Medication Sig Dispense Refill    FLUoxetine 20 MG capsule TAKE 1 CAPSULE(20 MG) BY MOUTH EVERY EVENING 90 capsule 2    multivitamin (THERAGRAN) tablet Take 1 tablet by mouth once daily.      pantoprazole (PROTONIX) 40 MG tablet Take 1 tablet (40 mg total) by mouth once daily. 90 tablet 2    pramipexole (MIRAPEX) 1 MG tablet TAKE 1 AND 1/2 TABLETS BY MOUTH EVERY EVENING 135 tablet 1    traZODone (DESYREL) 100 MG tablet Take 1 tablet (100 mg total) by mouth every evening. 30 tablet 11    diclofenac (VOLTAREN) 50 MG EC tablet Take 1 tablet (50 mg total) by mouth 3 (three) times daily. (Patient not taking: Reported on 2/22/2023) 15 tablet 0    iron-vit c-b12-folic acid (ICAR-C PLUS) Tab Take 1 tablet by mouth once daily. (Patient not taking: Reported on 2/22/2023) 30 tablet 2    sennosides-docusate sodium (SENNA PLUS)  8.6-50 mg Cap Take 1 tablet by mouth 2 (two) times daily as needed. (Patient not taking: Reported on 2023) 30 capsule 0     No current facility-administered medications for this visit.       Allergies  Review of patient's allergies indicates:  No Known Allergies    Past Medical History  Past Medical History:   Diagnosis Date    ADHD (attention deficit hyperactivity disorder)     Allergy     Anemia     Anxiety     Depression     Fatty liver     History of endoscopy 2021    Dr. Toney Cuevas-Z-line is irregular and was found 37 cm from the incisors.  Scattered ulcerations noted between 36-37 cm. 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  The gastrojejunal anastomosis was characterized by moderate stenosis and ulceration.  This was traversed.  The pouch to jejunum limb was characterized by erythema, friable mucosa, inflammation and ulceration.  The examine    Insomnia     Migraines     Osteoporosis     Restless leg syndrome        Surgical History  Past Surgical History:   Procedure Laterality Date    BREAST BIOPSY Left     benign 15 +years ago    BREAST SURGERY  2017    breast reduction    BUNIONECTOMY Right 2018     SECTION  1987    FRACTURE SURGERY  2016    ankle    GASTRIC RESTRICTION SURGERY      GASTRIC SLEEVE 2012    LAPAROSCOPIC CHOLECYSTECTOMY N/A 10/10/2022    Procedure: CHOLECYSTECTOMY, LAPAROSCOPIC;  Surgeon: Henrietta Joseph MD;  Location: [facility] OR;  Service: General;  Laterality: N/A;    REPAIR OF EXTENSOR TENDON  2021    Procedure: REPAIR, TENDON, EXTENSOR;  Surgeon: Aly Zimmerman DPM;  Location: [facility] OR;  Service: Podiatry;;    SURGICAL REMOVAL OF METATARSAL HEAD Right 2021    Procedure: OSTECTOMY, METATARSAL BONE, HEAD;  Surgeon: Aly Zimmerman DPM;  Location: [facility] OR;  Service: Podiatry;  Laterality: Right;    TOTAL REDUCTION MAMMOPLASTY             Family History:   Family History   Problem Relation Age of Onset    Arthritis Mother     Colon  cancer Mother     Diabetes Mother     Early death Mother     Miscarriages / Stillbirths Mother     Arthritis Father     Heart disease Father     Hypertension Father     Stroke Father     Vision loss Father     Breast cancer Maternal Aunt 52    Vaginal cancer Paternal Grandmother     Heart disease Paternal Grandfather        Social History:   Social History     Socioeconomic History    Marital status:    Tobacco Use    Smoking status: Never    Smokeless tobacco: Never   Substance and Sexual Activity    Alcohol use: Yes     Comment: occassional    Drug use: No    Sexual activity: Yes       Hospitalization/Major Diagnostic Procedure:     Review of Systems     General/Constitutional:  Chills denies. Fatigue denies. Fever denies. Weight gain denies. Weight loss denies.    Respiratory:  Shortness of breath denies.    Cardiovascular:  Chest pain denies.    Gastrointestinal:  Constipation denies. Diarrhea denies. Nausea denies. Vomiting denies.     Hematology:  Easy bruising denies. Prolonged bleeding denies.     Genitourinary:  Frequent urination denies. Pain in lower back denies. Painful urination denies.     Musculoskeletal:  See HPI for details    Skin:  Rash denies.    Neurologic:  Dizziness denies. Gait abnormalities denies. Seizures denies. Tingling/Numbess denies.    Psychiatric:  Anxiety denies. Depressed mood denies.     Objective:   Vital Signs: There were no vitals filed for this visit.     Physical Exam      General Examination:     Constitutional: The patient is alert and oriented to lace person and time. Mood is pleasant.     Head/Face: Normal facial features normal eyebrows    Eyes: Normal extraocular motion bilaterally    Lungs: Respirations are equal and unlabored    Gait is coordinated.    Cardiovascular: There are no swelling or varicosities present.    Lymphatic: Negative for adenopathy    Skin: Normal    Neurological: Level of consciousness normal. Oriented to place person and time and  situation    Psychiatric: Oriented to time place person and situation    Patient stand erect has pain when doing so tenderness paraspinous muscle over facet joints without spasm range of motion limited due to pain straight-leg-raising negative  XRAY Report/ Interpretation:  DEXA scan was reviewed patient is moderately severe osteoporosis.      Assessment:       1. Osteoporosis, unspecified osteoporosis type, unspecified pathological fracture presence    2. Spondylolisthesis at L4-L5 level    3. Disc degeneration, lumbar    4. Facet arthritis, degenerative, lumbar spine        Plan:       Mary was seen today for pain.    Diagnoses and all orders for this visit:    Osteoporosis, unspecified osteoporosis type, unspecified pathological fracture presence    Spondylolisthesis at L4-L5 level    Disc degeneration, lumbar    Facet arthritis, degenerative, lumbar spine         No follow-ups on file.    Since attempted physical therapy was unsuccessful and recommended an MRI with thoughts being poor possibly refer her to pain management for injections.  With regards to her osteoporosis an malabsorption syndrome we have referred her to an endocrinologist to directed treatment of her osteoporosis  Treatment options were discussed with regards to the nature of the medical condition. Conservative pain intervention and surgical options were discussed in detail. The probability of success of each separate treatment option was discussed. The patient expressed a clear understanding of the treatment options. With regards to surgery, the procedure risk, benefits, complications, and outcomes were discussed. No guarantees were given with regards to surgical outcome.   The risk of complications, morbidity, and mortality of patient management decisions have been made at the time of this visit. These are associated with the patient's problems, diagnostic procedures and treatment options. This includes the possible management options  selected and those considered but not selected by the patient after shared medical decision making we discussed with the patient.     This note was created using Dragon voice recognition software that occasionally misinterpreted phrases or words.

## 2023-02-23 ENCOUNTER — INFUSION (OUTPATIENT)
Dept: INFUSION THERAPY | Facility: HOSPITAL | Age: 63
End: 2023-02-23
Attending: INTERNAL MEDICINE
Payer: COMMERCIAL

## 2023-02-23 VITALS
BODY MASS INDEX: 24.75 KG/M2 | DIASTOLIC BLOOD PRESSURE: 79 MMHG | SYSTOLIC BLOOD PRESSURE: 137 MMHG | HEIGHT: 62 IN | HEART RATE: 64 BPM | OXYGEN SATURATION: 97 % | RESPIRATION RATE: 16 BRPM | TEMPERATURE: 96 F | WEIGHT: 134.5 LBS

## 2023-02-23 DIAGNOSIS — D51.3 OTHER DIETARY VITAMIN B12 DEFICIENCY ANEMIA: Primary | ICD-10-CM

## 2023-02-23 PROCEDURE — 63600175 PHARM REV CODE 636 W HCPCS: Performed by: NURSE PRACTITIONER

## 2023-02-23 PROCEDURE — 96372 THER/PROPH/DIAG INJ SC/IM: CPT

## 2023-02-23 RX ORDER — CYANOCOBALAMIN 1000 UG/ML
1000 INJECTION, SOLUTION INTRAMUSCULAR; SUBCUTANEOUS
Status: COMPLETED | OUTPATIENT
Start: 2023-02-23 | End: 2023-02-23

## 2023-02-23 RX ORDER — CYANOCOBALAMIN 1000 UG/ML
1000 INJECTION, SOLUTION INTRAMUSCULAR; SUBCUTANEOUS
Status: CANCELLED | OUTPATIENT
Start: 2023-02-23

## 2023-02-23 RX ADMIN — CYANOCOBALAMIN 1000 MCG: 1000 INJECTION, SOLUTION INTRAMUSCULAR at 08:02

## 2023-02-23 NOTE — PLAN OF CARE
Problem: Fatigue  Goal: Improved Activity Tolerance  Outcome: Ongoing, Progressing  Intervention: Promote Improved Energy  Flowsheets (Taken 2/23/2023 4240)  Fatigue Management: activity schedule adjusted  Activity Management: Ambulated -L4

## 2023-02-28 ENCOUNTER — OFFICE VISIT (OUTPATIENT)
Dept: ENDOCRINOLOGY | Facility: CLINIC | Age: 63
End: 2023-02-28
Payer: COMMERCIAL

## 2023-02-28 ENCOUNTER — LAB VISIT (OUTPATIENT)
Dept: LAB | Facility: HOSPITAL | Age: 63
End: 2023-02-28
Attending: INTERNAL MEDICINE
Payer: COMMERCIAL

## 2023-02-28 VITALS
HEIGHT: 62 IN | SYSTOLIC BLOOD PRESSURE: 100 MMHG | TEMPERATURE: 98 F | WEIGHT: 136.25 LBS | BODY MASS INDEX: 25.07 KG/M2 | OXYGEN SATURATION: 96 % | DIASTOLIC BLOOD PRESSURE: 60 MMHG | HEART RATE: 59 BPM

## 2023-02-28 DIAGNOSIS — R94.6 THYROID FUNCTION TEST ABNORMAL: ICD-10-CM

## 2023-02-28 DIAGNOSIS — F41.9 ANXIETY AND DEPRESSION: ICD-10-CM

## 2023-02-28 DIAGNOSIS — Z98.84 HISTORY OF ROUX-EN-Y GASTRIC BYPASS: ICD-10-CM

## 2023-02-28 DIAGNOSIS — E88.810 DYSMETABOLIC SYNDROME: ICD-10-CM

## 2023-02-28 DIAGNOSIS — F32.A ANXIETY AND DEPRESSION: ICD-10-CM

## 2023-02-28 DIAGNOSIS — D53.9 NUTRITIONAL ANEMIA: ICD-10-CM

## 2023-02-28 DIAGNOSIS — K95.89 IRON DEFICIENCY ANEMIA FOLLOWING BARIATRIC SURGERY: ICD-10-CM

## 2023-02-28 DIAGNOSIS — Z78.0 POSTMENOPAUSAL: ICD-10-CM

## 2023-02-28 DIAGNOSIS — F33.0 MILD EPISODE OF RECURRENT MAJOR DEPRESSIVE DISORDER: ICD-10-CM

## 2023-02-28 DIAGNOSIS — G25.81 RLS (RESTLESS LEGS SYNDROME): ICD-10-CM

## 2023-02-28 DIAGNOSIS — E56.9 MULTIPLE VITAMIN DEFICIENCY: ICD-10-CM

## 2023-02-28 DIAGNOSIS — F98.8 ATTENTION DEFICIT DISORDER, UNSPECIFIED HYPERACTIVITY PRESENCE: ICD-10-CM

## 2023-02-28 DIAGNOSIS — Z90.49 S/P LAPAROSCOPIC CHOLECYSTECTOMY: ICD-10-CM

## 2023-02-28 DIAGNOSIS — D51.3 OTHER DIETARY VITAMIN B12 DEFICIENCY ANEMIA: ICD-10-CM

## 2023-02-28 DIAGNOSIS — M81.0 OSTEOPOROSIS WITHOUT CURRENT PATHOLOGICAL FRACTURE, UNSPECIFIED OSTEOPOROSIS TYPE: ICD-10-CM

## 2023-02-28 DIAGNOSIS — E55.9 HYPOVITAMINOSIS D: ICD-10-CM

## 2023-02-28 DIAGNOSIS — E78.5 DYSLIPIDEMIA: ICD-10-CM

## 2023-02-28 DIAGNOSIS — Z98.84 BARIATRIC SURGERY STATUS: Primary | ICD-10-CM

## 2023-02-28 DIAGNOSIS — Z98.84 BARIATRIC SURGERY STATUS: ICD-10-CM

## 2023-02-28 DIAGNOSIS — R73.09 DYSGLYCEMIA: ICD-10-CM

## 2023-02-28 DIAGNOSIS — E46 MALNUTRITION, UNSPECIFIED TYPE: ICD-10-CM

## 2023-02-28 DIAGNOSIS — E63.9 NUTRITIONAL DEFICIENCY: ICD-10-CM

## 2023-02-28 DIAGNOSIS — D50.8 IRON DEFICIENCY ANEMIA FOLLOWING BARIATRIC SURGERY: ICD-10-CM

## 2023-02-28 DIAGNOSIS — M81.0 OSTEOPOROSIS WITHOUT CURRENT PATHOLOGICAL FRACTURE, UNSPECIFIED OSTEOPOROSIS TYPE: Primary | ICD-10-CM

## 2023-02-28 PROCEDURE — 3074F PR MOST RECENT SYSTOLIC BLOOD PRESSURE < 130 MM HG: ICD-10-PCS | Mod: CPTII,S$GLB,, | Performed by: INTERNAL MEDICINE

## 2023-02-28 PROCEDURE — 3078F PR MOST RECENT DIASTOLIC BLOOD PRESSURE < 80 MM HG: ICD-10-PCS | Mod: CPTII,S$GLB,, | Performed by: INTERNAL MEDICINE

## 2023-02-28 PROCEDURE — 1160F PR REVIEW ALL MEDS BY PRESCRIBER/CLIN PHARMACIST DOCUMENTED: ICD-10-PCS | Mod: CPTII,S$GLB,, | Performed by: INTERNAL MEDICINE

## 2023-02-28 PROCEDURE — 99204 PR OFFICE/OUTPT VISIT, NEW, LEVL IV, 45-59 MIN: ICD-10-PCS | Mod: S$GLB,,, | Performed by: INTERNAL MEDICINE

## 2023-02-28 PROCEDURE — 1160F RVW MEDS BY RX/DR IN RCRD: CPT | Mod: CPTII,S$GLB,, | Performed by: INTERNAL MEDICINE

## 2023-02-28 PROCEDURE — 1159F MED LIST DOCD IN RCRD: CPT | Mod: CPTII,S$GLB,, | Performed by: INTERNAL MEDICINE

## 2023-02-28 PROCEDURE — 99204 OFFICE O/P NEW MOD 45 MIN: CPT | Mod: S$GLB,,, | Performed by: INTERNAL MEDICINE

## 2023-02-28 PROCEDURE — 1159F PR MEDICATION LIST DOCUMENTED IN MEDICAL RECORD: ICD-10-PCS | Mod: CPTII,S$GLB,, | Performed by: INTERNAL MEDICINE

## 2023-02-28 PROCEDURE — 99999 PR PBB SHADOW E&M-EST. PATIENT-LVL IV: ICD-10-PCS | Mod: PBBFAC,,, | Performed by: INTERNAL MEDICINE

## 2023-02-28 PROCEDURE — 99999 PR PBB SHADOW E&M-EST. PATIENT-LVL IV: CPT | Mod: PBBFAC,,, | Performed by: INTERNAL MEDICINE

## 2023-02-28 PROCEDURE — 3074F SYST BP LT 130 MM HG: CPT | Mod: CPTII,S$GLB,, | Performed by: INTERNAL MEDICINE

## 2023-02-28 PROCEDURE — 3008F BODY MASS INDEX DOCD: CPT | Mod: CPTII,S$GLB,, | Performed by: INTERNAL MEDICINE

## 2023-02-28 PROCEDURE — 3008F PR BODY MASS INDEX (BMI) DOCUMENTED: ICD-10-PCS | Mod: CPTII,S$GLB,, | Performed by: INTERNAL MEDICINE

## 2023-02-28 PROCEDURE — 3078F DIAST BP <80 MM HG: CPT | Mod: CPTII,S$GLB,, | Performed by: INTERNAL MEDICINE

## 2023-02-28 RX ORDER — IBANDRONATE SODIUM 150 MG/1
150 TABLET, FILM COATED ORAL
COMMUNITY
Start: 2023-02-27 | End: 2023-02-28 | Stop reason: ALTCHOICE

## 2023-02-28 NOTE — PROGRESS NOTES
Subjective:      Patient ID: Mary Mike is a 62 y.o. female.    Chief Complaint:  Osteoporosis    The patient is a 62 yr old lady seen for initial care visit today on account of osteoporosis.    History of Present Illness    The patient, Ms Mike is a 62 yr old postmenopausal lady seen today for initial care visit on account of osteoporosis.  She was referred from Dr William Garcia (ortho)  Her background comorbidities are detailed below;    1. Osteoporosis without current pathological fracture, unspecified osteoporosis type        2. S/P laparoscopic cholecystectomy        3. Attention deficit disorder, unspecified hyperactivity presence        4. Mild episode of recurrent major depressive disorder        5. Postmenopausal        6. History of Destini-en-Y gastric bypass        7. Other dietary vitamin B12 deficiency anemia        8. Iron deficiency anemia following bariatric surgery        9. Anxiety and depression        10. RLS (restless legs syndrome)        11. Bariatric surgery status          Her baseline Agate score is 6.  Her most recent DEXA from 02/23 showed osteoporosis and her next ffup DEXA should be for ~ 02/25.  She is on boniva 150mg monthly and has been on that since ??   She has known to have osteoporosis since ~ 15 yrs ago and this ffed treatment  (??? Lupron or other GNRH agonist injectable therapy) she received for endometriosis. She had been on active treatment but had been on fosamax years ago.  She has not had any fractures that she is aware of.  She has apparently however not shrinkage of her height in the last few years.    She had elective RYGB in 2021. This was done by Dr Joss Joseph @ [facility].  She was ~ 180lbs prior to surgery. She initially had a sleeve gastrectomy and then within a month had to be converted to a RYGB. He bayron weight post surgery was 118lbs.  She also just recently had a emergency laparoscopic cholecystectomy.     Patient has two  sisters (older) and a brother. Her oldest sister does have osteoporosis.  Patients mother passed on colon cancer but is unknown if she had osteoporosis. One of her maternal aunts had major hunching over suggestive of possible osteoporosis as well.  Patient has no major dental problems other than bruxism when she sleeps.    Patient does not smoke.  Patient drink ETOH very infrequently.  Grav 1 Para 1+0 (1 alive).  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          Review of Systems   Constitutional:  Negative for activity change, appetite change, diaphoresis, fatigue and unexpected weight change.   HENT:  Negative for facial swelling, hearing loss, sore throat, trouble swallowing and voice change.    Eyes:  Negative for photophobia and visual disturbance.   Respiratory:  Negative for cough and shortness of breath.    Cardiovascular:  Negative for chest pain, palpitations and leg swelling.   Gastrointestinal:  Negative for abdominal distention, abdominal pain, constipation, diarrhea and nausea.   Endocrine: Negative for cold intolerance, heat intolerance, polydipsia, polyphagia and polyuria.   Genitourinary:  Negative for difficulty urinating, dysuria, flank pain, frequency, hematuria and urgency.   Musculoskeletal:  Positive for back pain (lower back pain; chronic and intermittent with associated progressive height loss). Negative for arthralgias, gait problem, joint swelling and neck stiffness.   Skin:  Negative for color change, pallor and rash.   Neurological:  Negative for dizziness, tremors, seizures, syncope, weakness, light-headedness, numbness and headaches.   Hematological:  Does not bruise/bleed easily.   Psychiatric/Behavioral:  Negative for agitation, confusion, dysphoric mood, hallucinations and sleep disturbance. The patient is not nervous/anxious.      Objective: /60 (BP Location: Left arm, Patient Position:  "Sitting, BP Method: Medium (Manual))   Pulse (!) 59   Temp 98.2 °F (36.8 °C) (Oral)   Ht 5' 2" (1.575 m)   Wt 61.8 kg (136 lb 3.9 oz)   SpO2 96%   BMI 24.92 kg/m² Body surface area is 1.64 meters squared.'    Her latest measured height is 5' 1.5" compared to her usual height of 5' 3".       Physical Exam  Vitals and nursing note reviewed.   Constitutional:       General: She is not in acute distress.     Appearance: She is well-developed. She is not ill-appearing or diaphoretic.      Comments: Pleasant middle aged lady. Not pale, anicteric and afebrile. Well hydrated. Clinically comfortable. Not in any apparent acute nor chronic distress. Clinically comfortable.  She is acyanotic.   HENT:      Head: Normocephalic and atraumatic. Not macrocephalic. No right periorbital erythema or left periorbital erythema. Hair is normal.      Comments: Nil nuchal AN. Mallampati grade 2 fauces.     Nose: Nose normal.      Mouth/Throat:      Mouth: Mucous membranes are moist. Mucous membranes are not pale and not dry.      Pharynx: No oropharyngeal exudate.   Eyes:      General: No scleral icterus.        Right eye: No discharge.         Left eye: No discharge.      Conjunctiva/sclera: Conjunctivae normal.      Pupils: Pupils are equal, round, and reactive to light.   Neck:      Thyroid: No thyroid mass or thyromegaly.      Vascular: Normal carotid pulses. No carotid bruit or JVD.      Trachea: Trachea and phonation normal. No tracheal deviation.   Cardiovascular:      Rate and Rhythm: Normal rate and regular rhythm.      Chest Wall: PMI is not displaced.      Pulses: Normal pulses.      Heart sounds: Normal heart sounds, S1 normal and S2 normal. No murmur heard.    No gallop.   Pulmonary:      Effort: Pulmonary effort is normal. No respiratory distress.      Breath sounds: Normal breath sounds. No stridor. No wheezing, rhonchi or rales.      Comments: Nil apparent spine kyphosis nor scoliosis. No point tenderness of the spine " to deep palpation/percussion.  Abdominal:      General: Bowel sounds are normal. There is no distension.      Palpations: Abdomen is soft. There is no hepatomegaly or splenomegaly.      Tenderness: There is no abdominal tenderness. There is no guarding.      Hernia: There is no hernia in the ventral area.   Musculoskeletal:         General: No swelling or tenderness.      Right shoulder: No deformity, bony tenderness or crepitus. Normal range of motion. Normal strength. Normal pulse.      Cervical back: Normal range of motion and neck supple.      Comments: Nil pedal edema and no calf tenderness nor swelling.  No digital clubbing of digits noted..   Lymphadenopathy:      Cervical: No cervical adenopathy.   Skin:     General: Skin is warm and dry.      Coloration: Skin is not jaundiced or pale.      Findings: No bruising, ecchymosis, erythema, petechiae or rash.      Nails: There is no clubbing.   Neurological:      General: No focal deficit present.      Mental Status: She is alert and oriented to person, place, and time.      Cranial Nerves: Cranial nerves 2-12 are intact. No cranial nerve deficit.      Sensory: Sensation is intact.      Motor: Motor function is intact. No tremor, atrophy or abnormal muscle tone.      Coordination: Coordination is intact. Coordination normal.      Gait: Gait is intact. Gait normal.      Deep Tendon Reflexes: Reflexes are normal and symmetric. Reflexes normal.   Psychiatric:         Attention and Perception: Attention normal.         Mood and Affect: Mood and affect normal.         Speech: Speech normal.         Behavior: Behavior normal. Behavior is cooperative.         Thought Content: Thought content normal.         Cognition and Memory: Cognition normal.         Judgment: Judgment normal.       Lab Review:      Latest Reference Range & Units 10/11/22 05:00 10/14/22 17:02 10/14/22 17:15 10/18/22 12:00 10/20/22 09:08 10/27/22 00:00 12/14/22 09:31 01/09/23 11:40   WBC 3.8 - 10.8  Thousand/uL 7.39  5.77 5.47  3.7 (L) 3.5 (L) 3.6 (L)   RBC 3.80 - 5.10 Million/uL 4.20  4.04 4.71  4.37 4.14 4.28   Hemoglobin 11.7 - 15.5 g/dL 11.7 (L)  11.2 (L) 13.2  12.2 12.3 12.8   Hematocrit 35.0 - 45.0 % 36.4 (L)  34.5 (L) 41.0  37.2 37.3 38.8   MCV 80.0 - 100.0 fL 87  85 87  85.1 90.1 90.7   MCH 27.0 - 33.0 pg 27.9  27.7 28.0  27.9 29.7 29.9   MCHC 32.0 - 36.0 g/dL 32.1  32.5 32.2  32.8 33.0 33.0   RDW 11.0 - 15.0 % 19.6 (H)  18.9 (H) 18.7 (H)  16.9 (H) 12.5 13.1   Platelets 140 - 400 Thousand/uL 176  170 243  280 193 229   MPV 7.5 - 12.5 fL 11.2  9.4 9.7  10.2 10.8 10.8   Platelet Estimate     Appears normal       Gran % 38.0 - 73.0 % 88.4 (H)  57.9 62.4       Neutrophils Relative %      54.5 52.7 48.5   Lymph % % 7.0 (L)  24.6 21.9  28.1 29.6 29.2   Mono % % 3.8 (L)  8.7 7.9  9.1 10.4 11.0   Eosinophil % % 0.0  7.6 6.2  7.2 6.4 9.9   Basophil % % 0.3  0.7 0.7  1.1 0.9 1.4   Immature Granulocytes 0.0 - 0.5 % 0.5  0.5 0.9 (H)       Gran # (ANC) 1.8 - 7.7 K/uL 6.5  3.3 3.4       Neutrophils, Abs 1,500 - 7,800 cells/uL      2,017 1,845 1,746   Lymph # 850 - 3,900 cells/uL 0.5 (L)  1.4 1.2  1,040 1,036 1,051   Mono # 200 - 950 cells/uL 0.3  0.5 0.4  337 364 396   Eos # 15 - 500 cells/uL 0.0  0.4 0.3  266 224 356   Baso # 0 - 200 cells/uL 0.02  0.04 0.04  41 32 50   Immature Grans (Abs) 0.00 - 0.04 K/uL 0.04  0.03 0.05 (H)       nRBC 0 /100 WBC 0  0 0       Differential Method  Automated  Automated Automated       Iron 45 - 160 mcg/dL      80 112 72   TIBC 250 - 450 mcg/dL (calc)      298 313 346   Ferritin 16 - 232 ng/mL      167 134 126   Iron Saturation 16 - 45 % (calc)      27 36 21   Sodium 135 - 146 mmol/L 137  141 141 140 140 142 142   Potassium 3.5 - 5.3 mmol/L 4.9  4.6 4.3 4.1 4.1 4.2 4.8   Chloride 98 - 110 mmol/L 102  104 105 106 104 106 105   CO2 20 - 32 mmol/L 26  28 25 25 27 28 33 (H)   Anion Gap 8 - 16 mmol/L 9  9 11 9      BUN 7 - 25 mg/dL 3 (L)  11 8 12 8 8 8   Creatinine 0.50 - 1.05 mg/dL 0.7   0.7 0.7 0.8 0.69 0.71 0.79   BUN/CREAT RATIO 6 - 22 (calc)      NOT APPLICABLE NOT APPLICABLE NOT APPLICABLE   eGFR >60 mL/min/1.73 m^2 >60  >60 >60 >60      eGFR > OR = 60 mL/min/1.73m2      99 97 86   Glucose 65 - 99 mg/dL 141 (H)  92 91 87 94 84 85   Calcium 8.6 - 10.4 mg/dL 8.8  9.2 9.5 9.0 9.1 8.9 9.4   Phosphorus 2.7 - 4.5 mg/dL 3.5          Magnesium 1.6 - 2.6 mg/dL 1.9          Alkaline Phosphatase 37 - 153 U/L 73  85 122 109 85 65 96   PROTEIN TOTAL 6.1 - 8.1 g/dL 6.1  6.4 7.8 7.2 6.4 6.1 6.5   Albumin 3.6 - 5.1 g/dL 3.6  3.7 4.4 4.0 4.1 4.0 4.3   Albumin/Globulin Ratio 1.0 - 2.5 (calc)      1.8 1.9 2.0   BILIRUBIN TOTAL 0.2 - 1.2 mg/dL 0.3  0.3 0.4 0.5 0.4 0.5 0.5   AST 10 - 35 U/L 76 (H)  55 (H) 107 (H) 39 26 19 22   ALT 6 - 29 U/L 64 (H)  67 (H) 114 (H) 63 (H) 29 20 46 (H)   Globulin, Total 1.9 - 3.7 g/dL (calc)      2.3 2.1 2.2   Flu A & B Source   Nasal swab         Influenza A, Molecular Negative   Negative         Influenza B, Molecular Negative   Negative         (L): Data is abnormally low  (H): Data is abnormally high    Assessment:     1. Osteoporosis without current pathological fracture, unspecified osteoporosis type  Urinalysis    Microalbumin/Creatinine Ratio, Urine    Magnesium    Phosphorus    Phosphorus, Random Urine    Calcium, Random Urine    Creatinine, Random Urine      2. S/P laparoscopic cholecystectomy        3. Attention deficit disorder, unspecified hyperactivity presence  Caffeine level      4. Mild episode of recurrent major depressive disorder        5. Postmenopausal        6. History of Destini-en-Y gastric bypass        7. Other dietary vitamin B12 deficiency anemia        8. Iron deficiency anemia following bariatric surgery  Transferrin    Ferritin    Iron and TIBC      9. Anxiety and depression  Caffeine level      10. RLS (restless legs syndrome)  Histamine    Serotonin serum    Catecholamines, Fractionated, Plasma    Caffeine level      11. Bariatric surgery status         12. Dyslipidemia  Lipid Panel      13. Dysmetabolic syndrome  Uric Acid    Urinalysis    Microalbumin/Creatinine Ratio, Urine    Hemoglobin A1C      14. Hypovitaminosis D  PTH, Intact    Calcium, Ionized    Vitamin D    Calcitriol    Misc Sendout Test, Blood 25 OH D: 24, 25 Di OH Vit D ratio    Misc Sendout Test, Blood Calcifediol (25 OH D2 and D3)      15. Dysglycemia  Hemoglobin A1C      16. Thyroid function test abnormal  TSH             Regarding osteoporosis; for ffup DEXA 02/25. To obtain full screening labs as detailed above to exclude any underlying contributory factors. Presently the identifiable pptants and contributors include her postmenopausal status, her post RYGB status and familial history suggesting possible genetic contributors.  To also obtain baseline bone turnover markers. Patient prefers to get yearly Reclast infusions as her preferred strategy and will commence this after getting dental clearance.  Regarding postmenopausal status; presently not on HRT.  Regarding GERD; symptomatically stable. To continue PPI therapy.  Regarding possible hypovitaminosis D; to check current 25 OH vit D levels and replete as needed based on the results.  Regarding ADHD; to continue stimulant therapy as before.  Regarding restless leg syndrome; to continue pramipexole as before.  Regarding post bariatric surgery status; strongly encouraged to ensure consistent daily multivitamin use as well as consistent daily use of relevant mineral supplements. Will obtain full nutritional labs in ~ 1 month and then continue serial tracking of same annually thereafter.  Regarding anxiety and depression; mood presently stable. To continue management and therapy in this regard including fluoexetine and  trazodone. Being managed in this regard by her PCP and ?/ mental health specialist.        Plan:     FFup in ~ 6months.

## 2023-03-02 ENCOUNTER — LAB VISIT (OUTPATIENT)
Dept: LAB | Facility: HOSPITAL | Age: 63
End: 2023-03-02
Attending: INTERNAL MEDICINE
Payer: COMMERCIAL

## 2023-03-02 DIAGNOSIS — D53.9 NUTRITIONAL ANEMIA: ICD-10-CM

## 2023-03-02 DIAGNOSIS — E63.9 NUTRITIONAL DEFICIENCY: ICD-10-CM

## 2023-03-02 DIAGNOSIS — Z98.84 BARIATRIC SURGERY STATUS: ICD-10-CM

## 2023-03-02 DIAGNOSIS — M81.0 OSTEOPOROSIS WITHOUT CURRENT PATHOLOGICAL FRACTURE, UNSPECIFIED OSTEOPOROSIS TYPE: ICD-10-CM

## 2023-03-02 DIAGNOSIS — E56.9 MULTIPLE VITAMIN DEFICIENCY: ICD-10-CM

## 2023-03-02 DIAGNOSIS — E46 MALNUTRITION, UNSPECIFIED TYPE: ICD-10-CM

## 2023-03-02 DIAGNOSIS — Z98.84 HISTORY OF ROUX-EN-Y GASTRIC BYPASS: ICD-10-CM

## 2023-03-02 PROCEDURE — 36415 COLL VENOUS BLD VENIPUNCTURE: CPT | Mod: PO | Performed by: INTERNAL MEDICINE

## 2023-03-02 PROCEDURE — 83018 HEAVY METAL QUAN EACH NES: CPT | Performed by: INTERNAL MEDICINE

## 2023-03-02 PROCEDURE — 82180 ASSAY OF ASCORBIC ACID: CPT | Performed by: INTERNAL MEDICINE

## 2023-03-02 PROCEDURE — 84591 ASSAY OF NOS VITAMIN: CPT | Mod: 91 | Performed by: INTERNAL MEDICINE

## 2023-03-02 PROCEDURE — 84134 ASSAY OF PREALBUMIN: CPT | Performed by: INTERNAL MEDICINE

## 2023-03-02 PROCEDURE — 84590 ASSAY OF VITAMIN A: CPT | Performed by: INTERNAL MEDICINE

## 2023-03-02 PROCEDURE — 84255 ASSAY OF SELENIUM: CPT | Performed by: INTERNAL MEDICINE

## 2023-03-02 PROCEDURE — 84630 ASSAY OF ZINC: CPT | Performed by: INTERNAL MEDICINE

## 2023-03-02 PROCEDURE — 82390 ASSAY OF CERULOPLASMIN: CPT | Performed by: INTERNAL MEDICINE

## 2023-03-02 PROCEDURE — 84597 ASSAY OF VITAMIN K: CPT | Performed by: INTERNAL MEDICINE

## 2023-03-02 PROCEDURE — 83785 ASSAY OF MANGANESE: CPT | Performed by: INTERNAL MEDICINE

## 2023-03-02 PROCEDURE — 84425 ASSAY OF VITAMIN B-1: CPT | Performed by: INTERNAL MEDICINE

## 2023-03-02 PROCEDURE — 84252 ASSAY OF VITAMIN B-2: CPT | Performed by: INTERNAL MEDICINE

## 2023-03-02 PROCEDURE — 82525 ASSAY OF COPPER: CPT | Performed by: INTERNAL MEDICINE

## 2023-03-02 PROCEDURE — 85730 THROMBOPLASTIN TIME PARTIAL: CPT | Performed by: INTERNAL MEDICINE

## 2023-03-02 PROCEDURE — 84446 ASSAY OF VITAMIN E: CPT | Performed by: INTERNAL MEDICINE

## 2023-03-02 PROCEDURE — 84591 ASSAY OF NOS VITAMIN: CPT | Performed by: INTERNAL MEDICINE

## 2023-03-02 PROCEDURE — 84207 ASSAY OF VITAMIN B-6: CPT | Performed by: INTERNAL MEDICINE

## 2023-03-02 PROCEDURE — 82495 ASSAY OF CHROMIUM: CPT | Performed by: INTERNAL MEDICINE

## 2023-03-02 PROCEDURE — 85610 PROTHROMBIN TIME: CPT | Performed by: INTERNAL MEDICINE

## 2023-03-02 PROCEDURE — 82380 ASSAY OF CAROTENE: CPT | Performed by: INTERNAL MEDICINE

## 2023-03-03 LAB
APTT BLDCRRT: 30.7 SEC (ref 21–32)
CERULOPLASMIN SERPL-MCNC: 25 MG/DL (ref 15–45)
INR PPP: 1 (ref 0.8–1.2)
PREALB SERPL-MCNC: 20 MG/DL (ref 20–43)
PROTHROMBIN TIME: 10.7 SEC (ref 9–12.5)

## 2023-03-04 LAB — SELENIUM SERPL-MCNC: 121 MCG/L (ref 110–165)

## 2023-03-06 LAB — VIT C SERPL-MCNC: 10 MG/L (ref 2–19)

## 2023-03-07 LAB
A-TOCOPHEROL VIT E SERPL-MCNC: 1009 UG/DL (ref 500–1800)
COPPER SERPL-MCNC: 999 UG/L (ref 810–1990)
CR SERPL-MCNC: 0.2 NG/ML
IODINE SERPL-MCNC: 61 NG/ML (ref 40–92)
NIACIN SERPL-MCNC: <5 NG/ML
NICOTINAMIDE SERPL-MCNC: 25.8 NG/ML (ref 5–48)
NICOTINURATE SERPL-MCNC: <5 NG/ML
PYRIDOXAL SERPL-MCNC: 22 UG/L (ref 5–50)
VIT A SERPL-MCNC: 40 UG/DL (ref 38–106)
VIT B2 SERPL-MCNC: 6 MCG/L (ref 1–19)
ZINC SERPL-MCNC: 62 UG/DL (ref 60–130)

## 2023-03-08 DIAGNOSIS — G25.81 RESTLESS LEG SYNDROME: ICD-10-CM

## 2023-03-08 LAB
CAROTENE SERPL-MCNC: 8 UG/DL (ref 3–91)
MANGANESE, SERUM: 0.6 NG/ML (ref 0.5–1.2)

## 2023-03-08 RX ORDER — PRAMIPEXOLE DIHYDROCHLORIDE 1 MG/1
1.5 TABLET ORAL NIGHTLY
Qty: 135 TABLET | Refills: 1 | Status: SHIPPED | OUTPATIENT
Start: 2023-03-08 | End: 2023-11-30

## 2023-03-09 ENCOUNTER — HOSPITAL ENCOUNTER (OUTPATIENT)
Dept: RADIOLOGY | Facility: HOSPITAL | Age: 63
Discharge: HOME | End: 2023-03-09
Attending: ORTHOPAEDIC SURGERY
Payer: COMMERCIAL

## 2023-03-09 DIAGNOSIS — M51.36 DISC DEGENERATION, LUMBAR: ICD-10-CM

## 2023-03-09 DIAGNOSIS — M47.817 SPONDYLOSIS WITHOUT MYELOPATHY OR RADICULOPATHY, LUMBOSACRAL REGION: ICD-10-CM

## 2023-03-09 DIAGNOSIS — M48.07 SPINAL STENOSIS, LUMBOSACRAL REGION: ICD-10-CM

## 2023-03-09 DIAGNOSIS — M43.16 SPONDYLOLISTHESIS AT L4-L5 LEVEL: ICD-10-CM

## 2023-03-09 DIAGNOSIS — M47.816 FACET ARTHRITIS, DEGENERATIVE, LUMBAR SPINE: ICD-10-CM

## 2023-03-09 LAB — VIT B1 BLD-MCNC: 71 UG/L (ref 38–122)

## 2023-03-09 PROCEDURE — 72148 MRI LUMBAR SPINE W/O DYE: CPT | Mod: TC,PO

## 2023-03-10 LAB — BIOTIN SERPL-SCNC: 1752.6 PG/ML (ref 221–3004)

## 2023-03-14 LAB — PHYTONADIONE SERPL-MCNC: 0.6 NMOL/L (ref 0.22–4.88)

## 2023-03-15 ENCOUNTER — OFFICE VISIT (OUTPATIENT)
Dept: ORTHOPEDICS | Facility: CLINIC | Age: 63
End: 2023-03-15
Payer: COMMERCIAL

## 2023-03-15 VITALS — WEIGHT: 136 LBS | BODY MASS INDEX: 25.03 KG/M2 | HEIGHT: 62 IN

## 2023-03-15 DIAGNOSIS — M51.36 DISC DEGENERATION, LUMBAR: ICD-10-CM

## 2023-03-15 DIAGNOSIS — M47.816 FACET ARTHRITIS, DEGENERATIVE, LUMBAR SPINE: ICD-10-CM

## 2023-03-15 DIAGNOSIS — M48.07 SPINAL STENOSIS, LUMBOSACRAL REGION: ICD-10-CM

## 2023-03-15 DIAGNOSIS — M47.817 SPONDYLOSIS WITHOUT MYELOPATHY OR RADICULOPATHY, LUMBOSACRAL REGION: ICD-10-CM

## 2023-03-15 DIAGNOSIS — M43.16 SPONDYLOLISTHESIS AT L4-L5 LEVEL: Primary | ICD-10-CM

## 2023-03-15 PROCEDURE — 3008F PR BODY MASS INDEX (BMI) DOCUMENTED: ICD-10-PCS | Mod: CPTII,S$GLB,, | Performed by: ORTHOPAEDIC SURGERY

## 2023-03-15 PROCEDURE — 3008F BODY MASS INDEX DOCD: CPT | Mod: CPTII,S$GLB,, | Performed by: ORTHOPAEDIC SURGERY

## 2023-03-15 PROCEDURE — 99213 PR OFFICE/OUTPT VISIT, EST, LEVL III, 20-29 MIN: ICD-10-PCS | Mod: S$GLB,,, | Performed by: ORTHOPAEDIC SURGERY

## 2023-03-15 PROCEDURE — 1160F PR REVIEW ALL MEDS BY PRESCRIBER/CLIN PHARMACIST DOCUMENTED: ICD-10-PCS | Mod: CPTII,S$GLB,, | Performed by: ORTHOPAEDIC SURGERY

## 2023-03-15 PROCEDURE — 1160F RVW MEDS BY RX/DR IN RCRD: CPT | Mod: CPTII,S$GLB,, | Performed by: ORTHOPAEDIC SURGERY

## 2023-03-15 PROCEDURE — 1159F PR MEDICATION LIST DOCUMENTED IN MEDICAL RECORD: ICD-10-PCS | Mod: CPTII,S$GLB,, | Performed by: ORTHOPAEDIC SURGERY

## 2023-03-15 PROCEDURE — 99213 OFFICE O/P EST LOW 20 MIN: CPT | Mod: S$GLB,,, | Performed by: ORTHOPAEDIC SURGERY

## 2023-03-15 PROCEDURE — 1159F MED LIST DOCD IN RCRD: CPT | Mod: CPTII,S$GLB,, | Performed by: ORTHOPAEDIC SURGERY

## 2023-03-15 RX ORDER — MELOXICAM 7.5 MG/1
7.5 TABLET ORAL
COMMUNITY
Start: 2023-03-14 | End: 2023-04-13 | Stop reason: SDUPTHER

## 2023-03-15 NOTE — PROGRESS NOTES
Subjective:       Patient ID: Mary Mike is a 62 y.o. female.    Chief Complaint: Pain of the Lumbar Spine (Lumbar pain follow up. Here for MRI results review. Pain has remained the constant same.)      History of Present Illness    Prior to meeting with the patient I reviewed the medical chart in [facility]. This included reviewing the previous progress notes from our office, review of the patient's last appointment with their primary care provider, review of any visits to the emergency room, and review of any pain management appointments or procedures.   Kary comes in today for follow-up for her lower back pain.  This is something we have been managing conservatively for 6-9 months now.  She is tried various NSAIDs and physical therapy without much relief of her symptoms.  She has had an MRI of her lumbar spine comes in today with those results.  She denies any radicular symptoms.  She denies any bowel or bladder changes.  She isolates the pain across her lower back only.    Current Medications  Current Outpatient Medications   Medication Sig Dispense Refill    FLUoxetine 20 MG capsule TAKE 1 CAPSULE(20 MG) BY MOUTH EVERY EVENING 90 capsule 2    meloxicam (MOBIC) 7.5 MG tablet Take 7.5 mg by mouth.      multivitamin (THERAGRAN) tablet Take 1 tablet by mouth once daily.      pantoprazole (PROTONIX) 40 MG tablet Take 1 tablet (40 mg total) by mouth once daily. 90 tablet 2    pramipexole (MIRAPEX) 1 MG tablet Take 1.5 tablets (1.5 mg total) by mouth every evening. 135 tablet 1    traZODone (DESYREL) 100 MG tablet Take 1 tablet (100 mg total) by mouth every evening. 30 tablet 11     No current facility-administered medications for this visit.       Allergies  Review of patient's allergies indicates:  No Known Allergies    Past Medical History  Past Medical History:   Diagnosis Date    ADHD (attention deficit hyperactivity disorder)     Allergy     Anemia     Anxiety     Depression     Fatty liver     History of  endoscopy 2021    Dr. Toney Cuevas-Z-line is irregular and was found 37 cm from the incisors.  Scattered ulcerations noted between 36-37 cm. 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  The gastrojejunal anastomosis was characterized by moderate stenosis and ulceration.  This was traversed.  The pouch to jejunum limb was characterized by erythema, friable mucosa, inflammation and ulceration.  The examine    Insomnia     Migraines     Osteoporosis     Restless leg syndrome        Surgical History  Past Surgical History:   Procedure Laterality Date    BREAST BIOPSY Left     benign 15 +years ago    BREAST SURGERY  2017    breast reduction    BUNIONECTOMY Right 2018     SECTION  1987    FRACTURE SURGERY  2016    ankle    GASTRIC RESTRICTION SURGERY      GASTRIC SLEEVE 2012    LAPAROSCOPIC CHOLECYSTECTOMY N/A 10/10/2022    Procedure: CHOLECYSTECTOMY, LAPAROSCOPIC;  Surgeon: Henrietta Joseph MD;  Location: [facility] OR;  Service: General;  Laterality: N/A;    REPAIR OF EXTENSOR TENDON  2021    Procedure: REPAIR, TENDON, EXTENSOR;  Surgeon: Aly Zimmerman DPM;  Location: [facility] OR;  Service: Podiatry;;    SURGICAL REMOVAL OF METATARSAL HEAD Right 2021    Procedure: OSTECTOMY, METATARSAL BONE, HEAD;  Surgeon: Aly Zimmerman DPM;  Location: [facility] OR;  Service: Podiatry;  Laterality: Right;    TOTAL REDUCTION MAMMOPLASTY             Family History:   Family History   Problem Relation Age of Onset    Arthritis Mother     Colon cancer Mother     Diabetes Mother     Early death Mother     Miscarriages / Stillbirths Mother     Arthritis Father     Heart disease Father     Hypertension Father     Stroke Father     Vision loss Father     Breast cancer Maternal Aunt 52    Vaginal cancer Paternal Grandmother     Heart disease Paternal Grandfather        Social History:   Social History     Socioeconomic History    Marital status:    Tobacco Use    Smoking status: Never    Smokeless  tobacco: Never   Substance and Sexual Activity    Alcohol use: Yes     Comment: occassional    Drug use: No    Sexual activity: Yes       Hospitalization/Major Diagnostic Procedure:     Review of Systems     General/Constitutional:  Chills denies. Fatigue denies. Fever denies. Weight gain denies. Weight loss denies.    Respiratory:  Shortness of breath denies.    Cardiovascular:  Chest pain denies.    Gastrointestinal:  Constipation denies. Diarrhea denies. Nausea denies. Vomiting denies.     Hematology:  Easy bruising denies. Prolonged bleeding denies.     Genitourinary:  Frequent urination denies. Pain in lower back denies. Painful urination denies.     Musculoskeletal:  See HPI for details    Skin:  Rash denies.    Neurologic:  Dizziness denies. Gait abnormalities denies. Seizures denies. Tingling/Numbess denies.    Psychiatric:  Anxiety denies. Depressed mood denies.     Objective:   Vital Signs: There were no vitals filed for this visit.     Physical Exam      General Examination:     Constitutional: The patient is alert and oriented to lace person and time. Mood is pleasant.     Head/Face: Normal facial features normal eyebrows    Eyes: Normal extraocular motion bilaterally    Lungs: Respirations are equal and unlabored    Gait is coordinated.    Cardiovascular: There are no swelling or varicosities present.    Lymphatic: Negative for adenopathy    Skin: Normal    Neurological: Level of consciousness normal. Oriented to place person and time and situation    Psychiatric: Oriented to time place person and situation    Lumbar exam:  Skin throughout her lower back is clean dry and intact.  There is no erythema or ecchymosis.  There are no signs or symptoms of infection.  Patient is neurovascularly intact throughout bilateral lower extremities.  She does have diffuse tenderness to palpation about bilateral lower lumbar paraspinous muscles.  This extends into the lumbosacral junction.  She has a negative straight  leg raise bilaterally.  She is nontender over bilateral greater trochanters.  She is well-preserved internal/external rotation of bilateral hips without pain.  She has a nonantalgic sit to stand.  She ambulates without difficulty.  She can weightbear as tolerated on bilateral lower extremities.      XRAY Report/ Interpretation:  No new radiographs were taken on today's clinic visit.  However, did review images report of her lumbar MRI which was significant primarily for severe degenerative facet changes at L4-5 and moderate spinal stenosis.  There is anterolisthesis at L4-5.  It does list multilevel foraminal narrowing worse at L4-5 but she has no radicular symptoms.  She does have some facet hypertrophy and arthrosis at T12-L1, L1-2, L3-4 and L5-S1.    Assessment:       1. Spondylolisthesis at L4-L5 level    2. Disc degeneration, lumbar    3. Facet arthritis, degenerative, lumbar spine    4. Spondylosis without myelopathy or radiculopathy, lumbosacral region    5. Spinal stenosis, lumbosacral region        Plan:       Mary was seen today for pain.    Diagnoses and all orders for this visit:    Spondylolisthesis at L4-L5 level  -     Ambulatory referral/consult to Pain Clinic; Future    Disc degeneration, lumbar    Facet arthritis, degenerative, lumbar spine  -     Ambulatory referral/consult to Pain Clinic; Future    Spondylosis without myelopathy or radiculopathy, lumbosacral region    Spinal stenosis, lumbosacral region  -     Ambulatory referral/consult to Pain Clinic; Future         Follow up in about 4 weeks (around 4/12/2023) for //XR-Flex/Ext// Lumbar , MBB/Rhizotomy f/up w/Davis.    I believe the majority of her symptoms are facet joint mediated.  Of course, her degenerative disc at L4-5 with anterolisthesis could be a pain generator as well.  Regardless of the stenotic findings at L4-5, she does not have any radicular symptoms.  She does not elicit any radicular symptoms on physical exam.  We will refer  her to Dr. Delonte Davis for bilateral medial branch blocks at L3-4 and L4-5 with progression to rhizotomy.  When she follows up with us after for procedures with Dr. Davis and we will obtain flexion/extension radiographs of her lumbar spine.    Treatment options were discussed with regards to the nature of the medical condition. Conservative pain intervention and surgical options were discussed in detail. The probability of success of each separate treatment option was discussed. The patient expressed a clear understanding of the treatment options. With regards to surgery, the procedure risk, benefits, complications, and outcomes were discussed. No guarantees were given with regards to surgical outcome.   The risk of complications, morbidity, and mortality of patient management decisions have been made at the time of this visit. These are associated with the patient's problems, diagnostic procedures and treatment options. This includes the possible management options selected and those considered but not selected by the patient after shared medical decision making we discussed with the patient.     This note was created using Dragon voice recognition software that occasionally misinterpreted phrases or words.

## 2023-03-23 LAB — PANTOTHENATE SERPLBLD-MCNC: 99.79 UG/L

## 2023-04-03 DIAGNOSIS — M47.812 SPONDYLOSIS WITHOUT MYELOPATHY OR RADICULOPATHY, CERVICAL REGION: ICD-10-CM

## 2023-04-03 DIAGNOSIS — Z79.891 LONG-TERM CURRENT USE OF OPIATE ANALGESIC: ICD-10-CM

## 2023-04-03 DIAGNOSIS — M54.12 RADICULOPATHY, CERVICAL REGION: ICD-10-CM

## 2023-04-03 DIAGNOSIS — M47.817 SPONDYLOSIS WITHOUT MYELOPATHY OR RADICULOPATHY, LUMBOSACRAL REGION: Primary | ICD-10-CM

## 2023-04-03 DIAGNOSIS — M43.16 SPONDYLOLISTHESIS OF LUMBAR REGION: ICD-10-CM

## 2023-04-13 DIAGNOSIS — M51.36 DISC DEGENERATION, LUMBAR: Primary | ICD-10-CM

## 2023-04-13 RX ORDER — MELOXICAM 7.5 MG/1
7.5 TABLET ORAL DAILY
Qty: 30 TABLET | Refills: 1 | Status: SHIPPED | OUTPATIENT
Start: 2023-04-13 | End: 2023-05-13

## 2023-04-13 NOTE — TELEPHONE ENCOUNTER
----- Message from FRANCES Viera sent at 11/9/2020  4:13 PM CST -----  CT scan is normal.   Armando is a 22 year old who is being evaluated via a billable video visit.      How would you like to obtain your AVS? MyChart  If the video visit is dropped, the invitation should be resent by: Text to cell phone: [phone]  Will anyone else be joining your video visit? No        Assessment & Plan     Anxiety and depression  She is currently in partial inpatient treatment.   She is taking the lexapro at 10 mg daily and will continue.   Adjustments to be made by her providers in Psychiatry treatment if needed.   - escitalopram (LEXAPRO) 10 MG tablet; Take 1 tablet (10 mg) by mouth daily             Depression Screening Follow Up        4/13/2023    11:40 AM   PHQ   PHQ-9 Total Score 16   Q9: Thoughts of better off dead/self-harm past 2 weeks Not at all         4/13/2023    11:40 AM   Last PHQ-9   1.  Little interest or pleasure in doing things 2   2.  Feeling down, depressed, or hopeless 1   3.  Trouble falling or staying asleep, or sleeping too much 3   4.  Feeling tired or having little energy 3   5.  Poor appetite or overeating 3   6.  Feeling bad about yourself 1   7.  Trouble concentrating 3   8.  Moving slowly or restless 0   Q9: Thoughts of better off dead/self-harm past 2 weeks 0   PHQ-9 Total Score 16       Follow Up Actions Taken           Kristen M. Kehr, PA-C  [facility]   Armando is a 22 year old, presenting for the following health issues:  Anxiety and Depression        4/13/2023    11:41 AM   Additional Questions   Roomed by Kasandra GONZALEZ   Accompanied by self     Anxiety    History of Present Illness       Mental Health Follow-up:  Patient presents to follow-up on Depression & Anxiety.Patient's depression since last visit has been:  Better  The patient is having other symptoms associated with depression.  Patient's anxiety since last visit has been:  No change  The patient is having other symptoms associated with anxiety.  Any significant life events: other  Patient is  feeling anxious or having panic attacks.  Patient has concerns about alcohol or drug use.    She eats 0-1 servings of fruits and vegetables daily.She consumes 0 sweetened beverage(s) daily.She exercises with enough effort to increase her heart rate 30 to 60 minutes per day.  She exercises with enough effort to increase her heart rate 5 days per week.   She is taking medications regularly.    Today's PHQ-9         PHQ-9 Total Score: 16    PHQ-9 Q9 Thoughts of better off dead/self-harm past 2 weeks :   Not at all    How difficult have these problems made it for you to do your work, take care of things at home, or get along with other people: Very difficult  Today's CAMMIE-7 Score: 19               Review of Systems   Psychiatric/Behavioral: The patient is nervous/anxious.       Constitutional, HEENT, cardiovascular, pulmonary, GI, , musculoskeletal, neuro, skin, endocrine and psych systems are negative, except as otherwise noted.      Objective           Vitals:  No vitals were obtained today due to virtual visit.    Physical Exam   GENERAL: Healthy, alert and no distress  EYES: Eyes grossly normal to inspection.  No discharge or erythema, or obvious scleral/conjunctival abnormalities.  RESP: No audible wheeze, cough, or visible cyanosis.  No visible retractions or increased work of breathing.    SKIN: Visible skin clear. No significant rash, abnormal pigmentation or lesions.  NEURO: Cranial nerves grossly intact.  Mentation and speech appropriate for age.  PSYCH: Mentation appears normal, affect normal/bright, judgement and insight intact, normal speech and appearance well-groomed.                Video-Visit Details    Type of service:  Video Visit     Originating Location (pt. Location): Other inpatient treatment center  Distant Location (provider location):  On-site  Platform used for Video Visit: [employer]

## 2023-04-25 ENCOUNTER — INFUSION (OUTPATIENT)
Dept: INFUSION THERAPY | Facility: HOSPITAL | Age: 63
End: 2023-04-25
Attending: INTERNAL MEDICINE
Payer: COMMERCIAL

## 2023-04-25 VITALS
WEIGHT: 138.69 LBS | TEMPERATURE: 98 F | DIASTOLIC BLOOD PRESSURE: 74 MMHG | SYSTOLIC BLOOD PRESSURE: 150 MMHG | OXYGEN SATURATION: 97 % | HEART RATE: 68 BPM | BODY MASS INDEX: 26.19 KG/M2 | HEIGHT: 61 IN | RESPIRATION RATE: 18 BRPM

## 2023-04-25 DIAGNOSIS — D51.3 OTHER DIETARY VITAMIN B12 DEFICIENCY ANEMIA: Primary | ICD-10-CM

## 2023-04-25 PROCEDURE — 63600175 PHARM REV CODE 636 W HCPCS: Performed by: NURSE PRACTITIONER

## 2023-04-25 PROCEDURE — 96372 THER/PROPH/DIAG INJ SC/IM: CPT

## 2023-04-25 RX ORDER — CYANOCOBALAMIN 1000 UG/ML
1000 INJECTION, SOLUTION INTRAMUSCULAR; SUBCUTANEOUS
Status: COMPLETED | OUTPATIENT
Start: 2023-04-25 | End: 2023-04-25

## 2023-04-25 RX ORDER — CYANOCOBALAMIN 1000 UG/ML
1000 INJECTION, SOLUTION INTRAMUSCULAR; SUBCUTANEOUS
Status: CANCELLED | OUTPATIENT
Start: 2023-04-25

## 2023-04-25 RX ADMIN — CYANOCOBALAMIN 1000 MCG: 1000 INJECTION, SOLUTION INTRAMUSCULAR at 01:04

## 2023-04-25 NOTE — PLAN OF CARE
Problem: Fatigue  Goal: Improved Activity Tolerance  Outcome: Ongoing, Progressing  Intervention: Promote Improved Energy  Flowsheets (Taken 4/25/2023 1302)  Fatigue Management: fatigue-related activity identified  Sleep/Rest Enhancement: noise level reduced

## 2023-04-30 ENCOUNTER — HOSPITAL ENCOUNTER (EMERGENCY)
Facility: HOSPITAL | Age: 63
Discharge: HOME | End: 2023-04-30
Attending: EMERGENCY MEDICINE
Payer: COMMERCIAL

## 2023-04-30 VITALS
DIASTOLIC BLOOD PRESSURE: 74 MMHG | BODY MASS INDEX: 26.06 KG/M2 | OXYGEN SATURATION: 97 % | SYSTOLIC BLOOD PRESSURE: 128 MMHG | RESPIRATION RATE: 20 BRPM | TEMPERATURE: 98 F | HEIGHT: 61 IN | HEART RATE: 67 BPM | WEIGHT: 138 LBS

## 2023-04-30 DIAGNOSIS — T14.8XXA ABRASION: ICD-10-CM

## 2023-04-30 DIAGNOSIS — R52 PAIN: ICD-10-CM

## 2023-04-30 DIAGNOSIS — S63.502A WRIST SPRAIN, LEFT, INITIAL ENCOUNTER: Primary | ICD-10-CM

## 2023-04-30 PROCEDURE — 99283 EMERGENCY DEPT VISIT LOW MDM: CPT

## 2023-04-30 RX ORDER — MUPIROCIN 20 MG/G
OINTMENT TOPICAL 3 TIMES DAILY
Qty: 1 G | Refills: 0 | Status: SHIPPED | OUTPATIENT
Start: 2023-04-30 | End: 2023-07-24

## 2023-04-30 NOTE — ED PROVIDER NOTES
Encounter Date: 2023    SCRIBE #1 NOTE: I, Jean Pierre Loaiza, am scribing for, and in the presence of,  Uzma Hsu NP.     History     Chief Complaint   Patient presents with    Fall    Arm Injury     Left arm      Time seen by provider: 1:22 PM on 2023    Mary Mike is a 62 y.o. female who presents to the ED with an onset of pain to her left forearm that began yesterday after a fall, as well as a small abrasion to her left elbow. She states she was dancing with her niece, when her niece bumped her and she fell with an outstretched left arm. She states she only feels pain when she moves her left arm in certain positions. The patient denies any other symptoms at this time. PMHx of anemia. There is no pertinent PSHx.    The history is provided by the patient.   Review of patient's allergies indicates:  No Known Allergies  Past Medical History:   Diagnosis Date    ADHD (attention deficit hyperactivity disorder)     Allergy     Anemia     Anxiety     Depression     Fatty liver     History of endoscopy 2021    Dr. Toney Cuevas-Z-line is irregular and was found 37 cm from the incisors.  Scattered ulcerations noted between 36-37 cm. 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  The gastrojejunal anastomosis was characterized by moderate stenosis and ulceration.  This was traversed.  The pouch to jejunum limb was characterized by erythema, friable mucosa, inflammation and ulceration.  The examine    Insomnia     Migraines     Osteoporosis     Restless leg syndrome      Past Surgical History:   Procedure Laterality Date    BREAST BIOPSY Left     benign 15 +years ago    BREAST SURGERY  2017    breast reduction    BUNIONECTOMY Right 2018     SECTION  1987    FRACTURE SURGERY  2016    ankle    GASTRIC RESTRICTION SURGERY      GASTRIC SLEEVE 2012    LAPAROSCOPIC CHOLECYSTECTOMY N/A 10/10/2022    Procedure: CHOLECYSTECTOMY, LAPAROSCOPIC;  Surgeon: Henrietta Joseph MD;   Location: [facility] OR;  Service: General;  Laterality: N/A;    REPAIR OF EXTENSOR TENDON  6/24/2021    Procedure: REPAIR, TENDON, EXTENSOR;  Surgeon: Aly Zimmerman DPM;  Location: [facility] OR;  Service: Podiatry;;    SURGICAL REMOVAL OF METATARSAL HEAD Right 6/24/2021    Procedure: OSTECTOMY, METATARSAL BONE, HEAD;  Surgeon: Aly Zimmerman DPM;  Location: [facility] OR;  Service: Podiatry;  Laterality: Right;    TOTAL REDUCTION MAMMOPLASTY      2016     Family History   Problem Relation Age of Onset    Arthritis Mother     Colon cancer Mother     Diabetes Mother     Early death Mother     Miscarriages / Stillbirths Mother     Arthritis Father     Heart disease Father     Hypertension Father     Stroke Father     Vision loss Father     Breast cancer Maternal Aunt 52    Vaginal cancer Paternal Grandmother     Heart disease Paternal Grandfather      Social History     Tobacco Use    Smoking status: Never    Smokeless tobacco: Never   Substance Use Topics    Alcohol use: Yes     Comment: occassional    Drug use: No     Review of Systems   Constitutional:  Negative for fever.   HENT:  Negative for sore throat.    Respiratory:  Negative for shortness of breath.    Cardiovascular:  Negative for chest pain.   Gastrointestinal:  Negative for nausea.   Genitourinary:  Negative for dysuria.   Musculoskeletal:  Positive for myalgias. Negative for back pain.   Skin:  Positive for wound. Negative for rash.   Neurological:  Negative for weakness.   Hematological:  Does not bruise/bleed easily.     Physical Exam     Initial Vitals [04/30/23 1307]   BP Pulse Resp Temp SpO2   128/74 67 20 98.3 °F (36.8 °C) 97 %      MAP       --         Physical Exam    Nursing note and vitals reviewed.  Constitutional: Vital signs are normal. She appears well-developed and well-nourished.   HENT:   Head: Normocephalic and atraumatic.   Eyes: Pupils are equal, round, and reactive to light.   Neck: Neck supple.   Cardiovascular:  Normal rate, regular rhythm, normal  heart sounds and intact distal pulses.     Exam reveals no gallop and no friction rub.       No murmur heard.  Pulmonary/Chest: Breath sounds normal. She has no wheezes. She has no rhonchi. She has no rales.   Musculoskeletal:      Left elbow: Normal. No swelling or deformity. Normal range of motion. No tenderness.      Left forearm: Bony tenderness (radial and ulnar tenderness to the proximal forearm) present. No swelling or deformity.      Left wrist: Normal. No swelling, deformity or tenderness. Normal range of motion.      Cervical back: Neck supple.     Neurological: She is alert and oriented to person, place, and time. She has normal strength.   Skin: Skin is warm and dry. Abrasion (.5 cm by .5 cm abrasion to the proximal forearm) noted.   Psychiatric: She has a normal mood and affect. Her speech is normal and behavior is normal.       ED Course   Procedures  Labs Reviewed - No data to display       Imaging Results              X-Ray Forearm Left (Final result)  Result time 04/30/23 14:08:31      Final result by Lane Ruiz MD (04/30/23 14:08:31)                   Impression:      No radiographically evident acute fracture or dislocation.      Electronically signed by: Lane Ruiz  Date:    04/30/2023  Time:    14:08               Narrative:    EXAMINATION:  XR FOREARM LEFT    CLINICAL HISTORY:  Pain, unspecified    TECHNIQUE:  AP and lateral views of the left forearm were performed.    COMPARISON:  None    FINDINGS:  Osseous structures demonstrate diffuse demineralization. No radiographically evident acute fracture or dislocation.  No radiopaque foreign body or subcutaneous emphysema.                                       Medications - No data to display  Medical Decision Making:   History:   Old Medical Records: I decided to obtain old medical records.  Differential Diagnosis:   Fracture  Contusion  dislocation  Clinical Tests:   Radiological Study: Ordered and Reviewed     APC / Resident Notes:   Patient  is a 62 y.o. female who presents to the ED 04/30/2023 who underwent emergent evaluation for pain in her left forearm after injury that occurred last evening.  Patient is able to flex and extend normally at the elbow without pain.  Normal flexion extension of the left wrist.  She has poorly localized tenderness over the left forearm with small superficial abrasion.  Plus two radial pulse left upper extremity.  X-ray without evidence of acute fracture or dislocation.  I doubt acute fracture dislocation.  Patient is given Velcro wrist splint for comfort and recommend orthopedic follow-up.  Recommended anti-inflammatories as needed for pain. Based on my clinical evaluation, I do not appreciate any immediate, emergent, or life threatening condition or etiology that warrants additional workup today and feel that the patient can be discharged with close follow up care. Follow up and return precautions discussed; patient verbalized understanding and is agreeable to plan of care. Patient discharged home in stable condition.              Scribe Attestation:   Scribe #1: I performed the above scribed service and the documentation accurately describes the services I performed. I attest to the accuracy of the note.    Attending Attestation:           Physician Attestation for Scribe:  Physician Attestation Statement for Scribe #1: I, Uzma Hsu, reviewed documentation, as scribed by in my presence, and it is both accurate and complete.     Comments: I, CHRISTINE Sexton, personally performed the services described in this documentation. All medical record entries made by the scribe were at my direction and in my presence.  I have reviewed the chart and agree that the record reflects my personal performance and is accurate and complete. CHRISTINE Sexton.  2:36 PM 04/30/2023        ED Course as of 04/30/23 1439   Sun Apr 30, 2023   1411 X-Ray Forearm Left [EF]      ED Course User Index  [EF] Jareth Patrick MD                  Clinical Impression:   Final diagnoses:  [R52] Pain  [S63.502A] Wrist sprain, left, initial encounter (Primary)  [T14.8XXA] Abrasion        ED Disposition Condition    Discharge Stable          ED Prescriptions       Medication Sig Dispense Start Date End Date Auth. Provider    mupirocin (BACTROBAN) 2 % ointment Apply topically 3 (three) times daily. 1 g 4/30/2023 -- Uzma Hsu NP          Follow-up Information       Follow up With Specialties Details Why Contact Info    Darek Garcia II, MD Orthopedic Surgery In 3 days  [address] [place] [address] DR Priya MCNEAL [id number]  [phone]      [facility] Emergency Medicine  As needed, If symptoms worsen [address] [place] [id number] [phone]             Uzma Hsu NP  04/30/23 6358

## 2023-05-04 DIAGNOSIS — F32.A MOOD DISORDER OF DEPRESSED TYPE: ICD-10-CM

## 2023-05-04 RX ORDER — FLUOXETINE HYDROCHLORIDE 20 MG/1
CAPSULE ORAL
Qty: 90 CAPSULE | Refills: 2 | Status: SHIPPED | OUTPATIENT
Start: 2023-05-04 | End: 2024-02-28 | Stop reason: SDUPTHER

## 2023-05-15 ENCOUNTER — TELEPHONE (OUTPATIENT)
Dept: ENDOCRINOLOGY | Facility: CLINIC | Age: 63
End: 2023-05-15
Payer: COMMERCIAL

## 2023-05-15 NOTE — TELEPHONE ENCOUNTER
----- Message from Terry Moralez MD sent at 5/10/2023 11:19 AM CDT -----  Kindly confirm with the patient that she has got her dental clearance from her dentist. If she has then I can sign of on the Reclast therapy plan for her to get her first infusion.  Thanks    Terry Moralez MD  ----- Message -----  From: Linda Jackson RN  Sent: 5/9/2023   5:18 PM CDT  To: Terry Moralez MD      ----- Message -----  From: Osvaldo Marquez  Sent: 5/8/2023   3:54 PM CDT  To: Kirt Stewart Staff      Please advise if patient is ready to be contacted to schedule the Reclast infusion.   There was a therapy plan entered in February,  but the order in it was not yet signed.     New labs are also needed:  serum calcium and serum creatinine        Thank you,  Osvaldo  [facility]

## 2023-05-15 NOTE — TELEPHONE ENCOUNTER
Spoke with Ms. Mike who reports that she does not have the Dental Clearance Letter yet because she forgot to get it when she went to the dentist. I instructed her to contact the dentist office & have them fax it to us @ [phone] & she verbalized an understanding. Th efax # was sent to her patient portal as well.

## 2023-05-31 ENCOUNTER — TELEPHONE (OUTPATIENT)
Dept: ENDOCRINOLOGY | Facility: CLINIC | Age: 63
End: 2023-05-31
Payer: COMMERCIAL

## 2023-05-31 RX ORDER — HEPARIN 100 UNIT/ML
500 SYRINGE INTRAVENOUS
Status: CANCELLED | OUTPATIENT
Start: 2023-05-31

## 2023-05-31 RX ORDER — ZOLEDRONIC ACID 5 MG/100ML
5 INJECTION, SOLUTION INTRAVENOUS
Status: CANCELLED | OUTPATIENT
Start: 2023-05-31

## 2023-05-31 RX ORDER — SODIUM CHLORIDE 0.9 % (FLUSH) 0.9 %
10 SYRINGE (ML) INJECTION
Status: CANCELLED | OUTPATIENT
Start: 2023-05-31

## 2023-05-31 RX ORDER — ACETAMINOPHEN 500 MG
500 TABLET ORAL
Status: CANCELLED | OUTPATIENT
Start: 2023-05-31

## 2023-05-31 NOTE — TELEPHONE ENCOUNTER
----- Message from Terry Moralez MD sent at 5/31/2023 12:36 PM CDT -----  Thanks for the information update. Regarding her lab bill that is very unfortunate. Most of the time when this sort of thing happens it turns out that the insurance plan bills labs done in [facility] as though it was done in a hospital rather than an outpatient facility. Going forward have her check with her plan what their preferred lab location is for them to have her get her labs at the lowest out of pocket price ([facility], [facility] or some other location).  Thanks    Terry Moralez MD   ----- Message -----  From: Linda Jackson RN  Sent: 5/30/2023   3:31 PM CDT  To: Terry Moralez MD, Osvaldo Marquez    Good afternoon!  Ms. Mike reports that she will go to the dentist soon & once she receives the dental clearance she will have the dental office fax it to us. She will not have any surgery on her back & says she is still paying on a 1000.00 lab bill from previous lab draws & will not have any more labs drawn at this time.  ----- Message -----  From: Terry Moralez MD  Sent: 5/30/2023   3:02 PM CDT  To: Kirt Stewart Staff    As long as she is not planning to have spine surgery in the near future and has got her dental clearance done she should proceed to get the reclast infusion which I have signed of on. There are labs that Dr Ndiaye had put in which are non fasting. She needs to get those done as soon as possible before the date of the scheduled infusion.  Thanks    Terry Moralez MD  ----- Message -----  From: Linda Jackson RN  Sent: 5/30/2023   2:45 PM CDT  To: Terry Moralez MD    Good afternoon!  Please see message above/below & reply. Thanks!  ----- Message -----  From: Osvaldo Marquez  Sent: 5/30/2023   2:11 PM CDT  To: Linda Jackson RN, [facility] Staff        We have not received a response from your clnic since the last message below.  Please confirm if Reclast is no longer needed at this time.              ----- Message -----  From: Osvaldo Marquez  Sent: 5/8/2023   3:54 PM CDT  To: Kirt Stewart Staff      Please advise if patient is ready to be contacted to schedule the Reclast infusion.   There was a therapy plan entered in February,  but the order in it was not yet signed.     New labs are also needed:  serum calcium and serum creatinine        Thank you,  Osvaldo  [facility]

## 2023-06-09 DIAGNOSIS — M81.0 OSTEOPOROSIS WITHOUT CURRENT PATHOLOGICAL FRACTURE, UNSPECIFIED OSTEOPOROSIS TYPE: Primary | ICD-10-CM

## 2023-06-12 ENCOUNTER — TELEPHONE (OUTPATIENT)
Dept: ENDOCRINOLOGY | Facility: CLINIC | Age: 63
End: 2023-06-12
Payer: COMMERCIAL

## 2023-06-12 NOTE — TELEPHONE ENCOUNTER
Sent message to pt via portal regarding faxed lab order to [facility].    ----- Message from Terry Moralez MD sent at 6/9/2023  6:03 PM CDT -----  I have put in the order for her non fasting BMP. Kindly arrange for it to be sent to [facility] as she requested.  Thanks    Terry Moralez MD  ----- Message -----  From: Julianna Elizondo LPN  Sent: 6/9/2023   1:03 PM CDT  To: Terry Moralez MD    Please advise on blood work.    ----- Message -----  From: Osvaldo Maruqez  Sent: 6/9/2023  12:54 PM CDT  To: Kirt Stewart Staff      Patient scheduled her Reclast infusion for 6/20/23.  Serum calcium and creatinine labs are needed prior to Reclast, patient is aware.      Please send lab orders to [facility], per patient's request.       Thank you,  [facility]

## 2023-06-20 ENCOUNTER — DOCUMENTATION ONLY (OUTPATIENT)
Dept: ENDOCRINOLOGY | Facility: CLINIC | Age: 63
End: 2023-06-20
Payer: COMMERCIAL

## 2023-06-20 ENCOUNTER — INFUSION (OUTPATIENT)
Dept: INFUSION THERAPY | Facility: HOSPITAL | Age: 63
End: 2023-06-20
Attending: INTERNAL MEDICINE
Payer: COMMERCIAL

## 2023-06-20 VITALS
HEIGHT: 61 IN | BODY MASS INDEX: 24.7 KG/M2 | WEIGHT: 130.81 LBS | SYSTOLIC BLOOD PRESSURE: 122 MMHG | OXYGEN SATURATION: 97 % | DIASTOLIC BLOOD PRESSURE: 80 MMHG | HEART RATE: 73 BPM | TEMPERATURE: 98 F | RESPIRATION RATE: 17 BRPM

## 2023-06-20 DIAGNOSIS — M81.0 OSTEOPOROSIS WITHOUT CURRENT PATHOLOGICAL FRACTURE, UNSPECIFIED OSTEOPOROSIS TYPE: ICD-10-CM

## 2023-06-20 DIAGNOSIS — D51.3 OTHER DIETARY VITAMIN B12 DEFICIENCY ANEMIA: Primary | ICD-10-CM

## 2023-06-20 LAB
BUN SERPL-MCNC: 8 MG/DL (ref 7–25)
BUN/CREAT SERPL: ABNORMAL (CALC) (ref 6–22)
CALCIUM SERPL-MCNC: 8.9 MG/DL (ref 8.6–10.4)
CHLORIDE SERPL-SCNC: 104 MMOL/L (ref 98–110)
CO2 SERPL-SCNC: 28 MMOL/L (ref 20–32)
CREAT SERPL-MCNC: 0.7 MG/DL (ref 0.5–1.05)
EGFR: 99 ML/MIN/1.73M2
GLUCOSE SERPL-MCNC: 111 MG/DL (ref 65–99)
POTASSIUM SERPL-SCNC: 3.8 MMOL/L (ref 3.5–5.3)
SODIUM SERPL-SCNC: 140 MMOL/L (ref 135–146)

## 2023-06-20 PROCEDURE — 96372 THER/PROPH/DIAG INJ SC/IM: CPT | Mod: 59

## 2023-06-20 PROCEDURE — 63600175 PHARM REV CODE 636 W HCPCS: Performed by: INTERNAL MEDICINE

## 2023-06-20 PROCEDURE — 96365 THER/PROPH/DIAG IV INF INIT: CPT

## 2023-06-20 PROCEDURE — 25000003 PHARM REV CODE 250: Performed by: INTERNAL MEDICINE

## 2023-06-20 PROCEDURE — 63600175 PHARM REV CODE 636 W HCPCS: Performed by: NURSE PRACTITIONER

## 2023-06-20 RX ORDER — SODIUM CHLORIDE 0.9 % (FLUSH) 0.9 %
10 SYRINGE (ML) INJECTION
OUTPATIENT
Start: 2023-06-20

## 2023-06-20 RX ORDER — ACETAMINOPHEN 500 MG
500 TABLET ORAL
OUTPATIENT
Start: 2023-06-20

## 2023-06-20 RX ORDER — HEPARIN 100 UNIT/ML
500 SYRINGE INTRAVENOUS
OUTPATIENT
Start: 2023-06-20

## 2023-06-20 RX ORDER — ZOLEDRONIC ACID 5 MG/100ML
5 INJECTION, SOLUTION INTRAVENOUS
OUTPATIENT
Start: 2023-06-20

## 2023-06-20 RX ORDER — SODIUM CHLORIDE 0.9 % (FLUSH) 0.9 %
10 SYRINGE (ML) INJECTION
Status: DISCONTINUED | OUTPATIENT
Start: 2023-06-20 | End: 2023-06-20 | Stop reason: HOSPADM

## 2023-06-20 RX ORDER — CYANOCOBALAMIN 1000 UG/ML
1000 INJECTION, SOLUTION INTRAMUSCULAR; SUBCUTANEOUS
Status: COMPLETED | OUTPATIENT
Start: 2023-06-20 | End: 2023-06-20

## 2023-06-20 RX ORDER — ZOLEDRONIC ACID 5 MG/100ML
5 INJECTION, SOLUTION INTRAVENOUS
Status: COMPLETED | OUTPATIENT
Start: 2023-06-20 | End: 2023-06-20

## 2023-06-20 RX ORDER — CYANOCOBALAMIN 1000 UG/ML
1000 INJECTION, SOLUTION INTRAMUSCULAR; SUBCUTANEOUS
Status: CANCELLED | OUTPATIENT
Start: 2023-06-20

## 2023-06-20 RX ORDER — ACETAMINOPHEN 500 MG
500 TABLET ORAL
Status: DISCONTINUED | OUTPATIENT
Start: 2023-06-20 | End: 2023-06-20 | Stop reason: HOSPADM

## 2023-06-20 RX ADMIN — CYANOCOBALAMIN 1000 MCG: 1000 INJECTION, SOLUTION INTRAMUSCULAR at 03:06

## 2023-06-20 RX ADMIN — ZOLEDRONIC ACID 5 MG: 5 INJECTION, SOLUTION INTRAVENOUS at 03:06

## 2023-06-20 RX ADMIN — ACETAMINOPHEN 500 MG: 500 TABLET ORAL at 03:06

## 2023-06-20 NOTE — PROGRESS NOTES
I have reviewed the recent lab test results from her recent blood draw @ [facility]. This was obtained on 06/19/23 and the full detailed are scanned into the media tab section of her chart.  BUN/cr; 9/0.7, eGFR; 99  Calcium; 8.9, sodium; 140, potassium; 3.8  and glc; 111.  Results are normal other than mild elevated fasting blood glucose.

## 2023-06-20 NOTE — PLAN OF CARE
Problem: Fatigue  Goal: Improved Activity Tolerance  Intervention: Promote Improved Energy  Flowsheets (Taken 6/20/2023 0492)  Fatigue Management: fatigue-related activity identified  Activity Management: Ambulated -L4

## 2023-06-21 ENCOUNTER — DOCUMENTATION ONLY (OUTPATIENT)
Dept: ENDOCRINOLOGY | Facility: CLINIC | Age: 63
End: 2023-06-21
Payer: COMMERCIAL

## 2023-06-21 NOTE — PROGRESS NOTES
The Patient received RECLAST infusion on:  6/20/23     THE NEXT YEARLY DOSE IS SCHEDULED FOR :  6/21/2024.  This is her first infusion for the initial planned set of 3 annually for the next 3 yrs.

## 2023-07-03 DIAGNOSIS — F51.01 PRIMARY INSOMNIA: ICD-10-CM

## 2023-07-03 RX ORDER — TRAZODONE HYDROCHLORIDE 100 MG/1
100 TABLET ORAL NIGHTLY
Qty: 30 TABLET | Refills: 11 | Status: SHIPPED | OUTPATIENT
Start: 2023-07-03 | End: 2024-07-02

## 2023-07-12 ENCOUNTER — TELEPHONE (OUTPATIENT)
Dept: HEMATOLOGY/ONCOLOGY | Facility: CLINIC | Age: 63
End: 2023-07-12

## 2023-07-12 NOTE — TELEPHONE ENCOUNTER
I spoke to pt and reminded her to have labs done @ [facility] prior to appt here on 7/19/23 pt verbalized understanding

## 2023-07-18 PROBLEM — D53.9 ANEMIA ASSOCIATED WITH NUTRITIONAL DEFICIENCY: Status: ACTIVE | Noted: 2023-07-18

## 2023-07-18 NOTE — PROGRESS NOTES
[facility] Hematology/Oncology  PROGRESS NOTE -  Follow-up Visit      Subjective:       Patient ID:   NAME: Mary Mike : 1960     62 y.o. female    Referring Doc: Jimbo Muniz MD  Other Physicians: Tia Cuevas            Chief Complaint: Iron Deficiency Anemia f/u       History of Present Illness:     Patient returns today for a regularly scheduled follow-up visit.  The patient is here today to go over the results of the recently ordered labs, tests and studies.   She is here by herself.       She previously had had IV iron and was started on B12.     She plans to see cardiologist    She is breathing ok but some PATIÑO , no CP, SOB, HA's or N/V. Chronic back issues and s/p PT and is followed by Dr Joseph with pain management.    General lack of energy           Discussed covid19 precautions ans she has been vaccinated          ROS:   GEN: normal without any fever, night sweats or weight loss; general lack of energy  ; chronic back issues  HEENT: normal with no HA's, sore throat, stiff neck, changes in vision  CV: normal with no CP, SOB, PND, PATIÑO or orthopnea  PULM: normal with no SOB, cough, hemoptysis, sputum or pleuritic pain  GI: normal with no abdominal pain, nausea, vomiting, no current constipation, diarrhea, melanotic stools, BRBPR, or hematemesis  : normal with no hematuria, dysuria  BREAST: normal with no mass, discharge, pain  SKIN: normal with no rash, erythema, bruising, or swelling    Pain Scale:  0    Allergies:  Review of patient's allergies indicates:  No Known Allergies    Medications:    Current Outpatient Medications:     FLUoxetine 20 MG capsule, TAKE 1 CAPSULE(20 MG) BY MOUTH EVERY EVENING, Disp: 90 capsule, Rfl: 2    multivitamin (THERAGRAN) tablet, Take 1 tablet by mouth once daily., Disp: , Rfl:     mupirocin (BACTROBAN) 2 % ointment, Apply topically 3 (three) times daily., Disp: 1 g, Rfl: 0    pantoprazole (PROTONIX) 40 MG tablet, Take 1 tablet (40 mg total) by mouth once  "daily., Disp: 90 tablet, Rfl: 2    pramipexole (MIRAPEX) 1 MG tablet, Take 1.5 tablets (1.5 mg total) by mouth every evening., Disp: 135 tablet, Rfl: 1    traZODone (DESYREL) 100 MG tablet, Take 1 tablet (100 mg total) by mouth every evening., Disp: 30 tablet, Rfl: 11    PMHx/PSHx Updates:  See patient's last visit with me on 1/18/2023  See H&P on 7/20/2022        Pathology:   Cancer Staging   No matching staging information was found for the patient.          Objective:     Vitals:  Blood pressure 128/83, pulse 69, temperature 98.2 °F (36.8 °C), resp. rate 18, height 5' 1" (1.549 m), weight 58.9 kg (129 lb 12.8 oz).    Physical Examination:   GEN: no apparent distress, comfortable; AAOx3  HEAD: atraumatic and normocephalic  EYES: no pallor, no icterus, PERRLA  ENT: OMM, no pharyngeal erythema, external ears WNL; no nasal discharge; no thrush  NECK: no masses, thyroid normal, trachea midline, no LAD/LN's, supple  CV: RRR with no murmur; normal pulse; normal S1 and S2; no pedal edema  CHEST: Normal respiratory effort; CTAB; normal breath sounds; no wheeze or crackles  ABDOM: nontender and nondistended; soft; normal bowel sounds; no rebound/guarding  MUSC/Skeletal: ROM normal; no crepitus; joints normal; no deformities or arthropathy  EXTREM: no clubbing, cyanosis, inflammation or swelling  SKIN: no rashes, lesions, ulcers, petechiae or subcutaneous nodules  : no aguiar  NEURO: grossly intact; motor/sensory WNL; AAOx3; no tremors  PSYCH: normal mood, affect and behavior  LYMPH: normal cervical, supraclavicular, axillary and groin LN's            Labs:     Lab Results   Component Value Date    WBC 2.6 (L) 07/13/2023    HGB 12.3 07/13/2023    HCT 38.8 07/13/2023    MCV 88.6 07/13/2023     07/13/2023     Neutrophils, Abs 1,500 - 7,800 cells/uL 1,349 Low      CMP  Sodium   Date Value Ref Range Status   07/13/2023 139 135 - 146 mmol/L Final     Potassium   Date Value Ref Range Status   07/13/2023 4.0 3.5 - 5.3 " mmol/L Final     Chloride   Date Value Ref Range Status   07/13/2023 107 98 - 110 mmol/L Final     CO2   Date Value Ref Range Status   07/13/2023 26 20 - 32 mmol/L Final     Glucose   Date Value Ref Range Status   07/13/2023 95 65 - 139 mg/dL Final     Comment:               Non-fasting reference interval          BUN   Date Value Ref Range Status   07/13/2023 7 7 - 25 mg/dL Final     Creatinine   Date Value Ref Range Status   07/13/2023 0.60 0.50 - 1.05 mg/dL Final     Calcium   Date Value Ref Range Status   07/13/2023 8.3 (L) 8.6 - 10.4 mg/dL Final     Total Protein   Date Value Ref Range Status   07/13/2023 6.1 6.1 - 8.1 g/dL Final     Albumin   Date Value Ref Range Status   07/13/2023 3.9 3.6 - 5.1 g/dL Final     Total Bilirubin   Date Value Ref Range Status   07/13/2023 0.6 0.2 - 1.2 mg/dL Final     Alkaline Phosphatase   Date Value Ref Range Status   10/20/2022 109 55 - 135 U/L Final     AST   Date Value Ref Range Status   07/13/2023 16 10 - 35 U/L Final     ALT   Date Value Ref Range Status   07/13/2023 11 6 - 29 U/L Final     Anion Gap   Date Value Ref Range Status   10/20/2022 9 8 - 16 mmol/L Final     eGFR if    Date Value Ref Range Status   05/19/2022 103 > OR = 60 mL/min/1.73m2 Final     eGFR if non    Date Value Ref Range Status   05/19/2022 89 > OR = 60 mL/min/1.73m2 Final       Lab Results   Component Value Date    IRON 116 07/13/2023    TIBC 281 07/13/2023    FERRITIN 112 07/13/2023       Lab Results   Component Value Date    GSBFNDPT35 >2000 (H) 08/17/2022     Lab Results   Component Value Date    FOLATE 10.0 08/17/2022         Radiology/Diagnostic Studies:    No results found.    I have reviewed all available lab results and radiology reports.    Assessment/Plan:   (1) 62 y.o. female with diagnosis of iron deficiency anemia. She was referred to us by Dr. uMniz and has failed oral iron therapy. She does have a history of gastric by pass and need for IV iron infusions.    - reviewed iron labs   - set up with IV iron    - education given   - consent signed   - orders added   - recheck labs after Iron infusions and meet back with Dr. Ndiaye     8/17/2022:  - check up to date labs since getting the IV iron x2  - continued on b12 monthly    1/18/2023:  - latest hgb WNL  - iron panel adequate  - continue B12 monthly    7/19/2023:  - latest hgb WNL and iron panel adequate     (2) B 12 deficiency   - B 12 level at 198 - start B12 injections weekly x 4 and then monthly   - recheck level in 3 months     7/19/2023:  - she has not been taking the B12 monthly  - needs up to date B12 and folate levels     (3) history of Gastric by pass    -continue the bariatric supplement   - continue to follow up with GI as needed      (4) Family history of cervical cancer   - up to date on mmg   - referral to gyn to establish care     (5) Chronic leucopenia     7/19/2023:  - latest WBC at 2.6 and is a little lower than her usual range    VISIT DIAGNOSES:      Iron deficiency anemia following bariatric surgery    Other dietary vitamin B12 deficiency anemia    History of Destini-en-Y gastric bypass    Bariatric surgery status    Anemia associated with nutritional deficiency          PLAN:  1. check repeat labs every 3 months - needs repeat B12/folate levels  2. resume IV iron as needed  3. resume with the b12 injections monthly and encouraged compliance  4. F/u with PCP and GI        RTC in 6 months  Fax note to [facility]    Discussion:     COVID-19 Discussion:    I had long discussion with patient and any applicable family about the COVID-19 coronavirus epidemic and the recommended precautions with regard to cancer and/or hematology patients. I have re-iterated the CDC recommendations for adequate hand washing, use of hand -like products, and coughing into elbow, etc. In addition, especially for our patients who are on chemotherapy and/or our otherwise immunocompromised patients, I have  recommended avoidance of crowds, including movie theaters, restaurants, churches, etc. I have recommended avoidance of any sick or symptomatic family members and/or friends. I have also recommended avoidance of any raw and unwashed food products, and general avoidance of food items that have not been prepared by themselves. The patient has been asked to call us immediately with any symptom developments, issues, questions or other general concerns.       Iron Infusion Therapy Discussion:     I provided literature/learning materials on the particular IV iron regimen and discussed the potential side-effect profiles of the drug(s). I discussed the importance of compliance with obtaining and monitoring requested lab work, and went over the potential risk for the development of anaphylactic shock, bronchospasm, dysrhythmia, liver and/or kidney damage, and respiratory/cardiovascular arrest and/or failure. I discussed the potential risks for development of alopecia, fevers, itching, chills and/or rigors, cold sensory issues, ringing in ears, vertigo and neuropathy, all of which are usually acute but sometimes could end up being chronic and life-long. I discussed the risks of hand-foot syndrome and rashes, and development of other autoimmune mediated processes such as pneumonitis and colitis which could be life threatening.     The patient's consent has been obtained to proceed with the IV iron therapy.The patient will be referred to [facility] for training and education on IV iron therapy, use of antiemetics and/or anti-diarrheals, use of NSAID's, potential IV iron therapy side-effects, and any specific recommendations and precautions with the particular IV iron agents.      I answered all of the patient's (and family's, if applicable) questions to the best of my ability and to their complete satisfaction. The patient acknowledged full understanding of the risks, recommendations and plan(s).        I spent over 25 mins of time with the patient. Reviewed results of the recently ordered labs, tests and studies; made directives with regards to the results. Over half of this time was spent couseling and coordinating care.    I have explained all of the above in detail and the patient understands all of the current recommendation(s). I have answered all of their questions to the best of my ability and to their complete satisfaction.   The patient is to continue with the current management plan.            Electronically signed by Jamie Ndiaye MD             Answers submitted by the patient for this visit:  Review of Systems Questionnaire (Submitted on 7/15/2023)  appetite change : No  unexpected weight change: No  mouth sores: No  visual disturbance: No  cough: No  shortness of breath: Yes  chest pain: No  abdominal pain: No  diarrhea: No  frequency: No  back pain: Yes  rash: No  headaches: Yes  adenopathy: No  nervous/ anxious: No

## 2023-07-19 ENCOUNTER — INFUSION (OUTPATIENT)
Dept: INFUSION THERAPY | Facility: HOSPITAL | Age: 63
End: 2023-07-19
Attending: INTERNAL MEDICINE
Payer: COMMERCIAL

## 2023-07-19 ENCOUNTER — OFFICE VISIT (OUTPATIENT)
Dept: HEMATOLOGY/ONCOLOGY | Facility: CLINIC | Age: 63
End: 2023-07-19
Payer: COMMERCIAL

## 2023-07-19 VITALS
TEMPERATURE: 98 F | DIASTOLIC BLOOD PRESSURE: 83 MMHG | HEART RATE: 69 BPM | HEIGHT: 61 IN | WEIGHT: 129.38 LBS | BODY MASS INDEX: 24.43 KG/M2 | RESPIRATION RATE: 18 BRPM | SYSTOLIC BLOOD PRESSURE: 128 MMHG

## 2023-07-19 VITALS
DIASTOLIC BLOOD PRESSURE: 83 MMHG | SYSTOLIC BLOOD PRESSURE: 128 MMHG | RESPIRATION RATE: 18 BRPM | WEIGHT: 129.81 LBS | HEIGHT: 61 IN | HEART RATE: 69 BPM | TEMPERATURE: 98 F | BODY MASS INDEX: 24.51 KG/M2

## 2023-07-19 DIAGNOSIS — K95.89 IRON DEFICIENCY ANEMIA FOLLOWING BARIATRIC SURGERY: Primary | ICD-10-CM

## 2023-07-19 DIAGNOSIS — D50.8 IRON DEFICIENCY ANEMIA FOLLOWING BARIATRIC SURGERY: Primary | ICD-10-CM

## 2023-07-19 DIAGNOSIS — Z98.84 HISTORY OF ROUX-EN-Y GASTRIC BYPASS: ICD-10-CM

## 2023-07-19 DIAGNOSIS — D51.3 OTHER DIETARY VITAMIN B12 DEFICIENCY ANEMIA: ICD-10-CM

## 2023-07-19 DIAGNOSIS — Z98.84 BARIATRIC SURGERY STATUS: ICD-10-CM

## 2023-07-19 DIAGNOSIS — D51.3 OTHER DIETARY VITAMIN B12 DEFICIENCY ANEMIA: Primary | ICD-10-CM

## 2023-07-19 DIAGNOSIS — D53.9 ANEMIA ASSOCIATED WITH NUTRITIONAL DEFICIENCY: ICD-10-CM

## 2023-07-19 PROCEDURE — 1160F PR REVIEW ALL MEDS BY PRESCRIBER/CLIN PHARMACIST DOCUMENTED: ICD-10-PCS | Mod: CPTII,S$GLB,, | Performed by: INTERNAL MEDICINE

## 2023-07-19 PROCEDURE — 3079F PR MOST RECENT DIASTOLIC BLOOD PRESSURE 80-89 MM HG: ICD-10-PCS | Mod: CPTII,S$GLB,, | Performed by: INTERNAL MEDICINE

## 2023-07-19 PROCEDURE — 99213 PR OFFICE/OUTPT VISIT, EST, LEVL III, 20-29 MIN: ICD-10-PCS | Mod: S$GLB,,, | Performed by: INTERNAL MEDICINE

## 2023-07-19 PROCEDURE — 1159F PR MEDICATION LIST DOCUMENTED IN MEDICAL RECORD: ICD-10-PCS | Mod: CPTII,S$GLB,, | Performed by: INTERNAL MEDICINE

## 2023-07-19 PROCEDURE — 1159F MED LIST DOCD IN RCRD: CPT | Mod: CPTII,S$GLB,, | Performed by: INTERNAL MEDICINE

## 2023-07-19 PROCEDURE — 3008F PR BODY MASS INDEX (BMI) DOCUMENTED: ICD-10-PCS | Mod: CPTII,S$GLB,, | Performed by: INTERNAL MEDICINE

## 2023-07-19 PROCEDURE — 3074F PR MOST RECENT SYSTOLIC BLOOD PRESSURE < 130 MM HG: ICD-10-PCS | Mod: CPTII,S$GLB,, | Performed by: INTERNAL MEDICINE

## 2023-07-19 PROCEDURE — 96372 THER/PROPH/DIAG INJ SC/IM: CPT

## 2023-07-19 PROCEDURE — 3079F DIAST BP 80-89 MM HG: CPT | Mod: CPTII,S$GLB,, | Performed by: INTERNAL MEDICINE

## 2023-07-19 PROCEDURE — 3008F BODY MASS INDEX DOCD: CPT | Mod: CPTII,S$GLB,, | Performed by: INTERNAL MEDICINE

## 2023-07-19 PROCEDURE — 63600175 PHARM REV CODE 636 W HCPCS: Performed by: INTERNAL MEDICINE

## 2023-07-19 PROCEDURE — 99213 OFFICE O/P EST LOW 20 MIN: CPT | Mod: S$GLB,,, | Performed by: INTERNAL MEDICINE

## 2023-07-19 PROCEDURE — 1160F RVW MEDS BY RX/DR IN RCRD: CPT | Mod: CPTII,S$GLB,, | Performed by: INTERNAL MEDICINE

## 2023-07-19 PROCEDURE — 3074F SYST BP LT 130 MM HG: CPT | Mod: CPTII,S$GLB,, | Performed by: INTERNAL MEDICINE

## 2023-07-19 RX ORDER — CYANOCOBALAMIN 1000 UG/ML
1000 INJECTION, SOLUTION INTRAMUSCULAR; SUBCUTANEOUS
Status: CANCELLED | OUTPATIENT
Start: 2023-07-19

## 2023-07-19 RX ORDER — CYANOCOBALAMIN 1000 UG/ML
1000 INJECTION, SOLUTION INTRAMUSCULAR; SUBCUTANEOUS
Status: DISCONTINUED | OUTPATIENT
Start: 2023-07-19 | End: 2023-07-19 | Stop reason: HOSPADM

## 2023-07-19 RX ADMIN — CYANOCOBALAMIN 1000 MCG: 1000 INJECTION, SOLUTION INTRAMUSCULAR at 11:07

## 2023-07-20 LAB
ALBUMIN SERPL-MCNC: 4 G/DL (ref 3.6–5.1)
ALBUMIN/GLOB SERPL: 1.8 (CALC) (ref 1–2.5)
ALP SERPL-CCNC: 56 U/L (ref 37–153)
ALT SERPL-CCNC: 12 U/L (ref 6–29)
AST SERPL-CCNC: 15 U/L (ref 10–35)
BASOPHILS # BLD AUTO: 40 CELLS/UL (ref 0–200)
BASOPHILS NFR BLD AUTO: 1.1 %
BILIRUB SERPL-MCNC: 0.6 MG/DL (ref 0.2–1.2)
BUN SERPL-MCNC: 6 MG/DL (ref 7–25)
BUN/CREAT SERPL: 8 (CALC) (ref 6–22)
CALCIUM SERPL-MCNC: 8.7 MG/DL (ref 8.6–10.4)
CHLORIDE SERPL-SCNC: 107 MMOL/L (ref 98–110)
CO2 SERPL-SCNC: 27 MMOL/L (ref 20–32)
CREAT SERPL-MCNC: 0.76 MG/DL (ref 0.5–1.05)
EGFR: 90 ML/MIN/1.73M2
EOSINOPHIL # BLD AUTO: 198 CELLS/UL (ref 15–500)
EOSINOPHIL NFR BLD AUTO: 5.5 %
ERYTHROCYTE [DISTWIDTH] IN BLOOD BY AUTOMATED COUNT: 13.1 % (ref 11–15)
FERRITIN SERPL-MCNC: 105 NG/ML (ref 16–232)
FOLATE SERPL-MCNC: 9.1 NG/ML
GLOBULIN SER CALC-MCNC: 2.2 G/DL (CALC) (ref 1.9–3.7)
GLUCOSE SERPL-MCNC: 82 MG/DL (ref 65–139)
HCT VFR BLD AUTO: 40.4 % (ref 35–45)
HGB BLD-MCNC: 12.5 G/DL (ref 11.7–15.5)
IRON SATN MFR SERPL: 42 % (CALC) (ref 16–45)
IRON SERPL-MCNC: 138 MCG/DL (ref 45–160)
LYMPHOCYTES # BLD AUTO: 997 CELLS/UL (ref 850–3900)
LYMPHOCYTES NFR BLD AUTO: 27.7 %
MCH RBC QN AUTO: 28 PG (ref 27–33)
MCHC RBC AUTO-ENTMCNC: 30.9 G/DL (ref 32–36)
MCV RBC AUTO: 90.4 FL (ref 80–100)
MONOCYTES # BLD AUTO: 356 CELLS/UL (ref 200–950)
MONOCYTES NFR BLD AUTO: 9.9 %
NEUTROPHILS # BLD AUTO: 2009 CELLS/UL (ref 1500–7800)
NEUTROPHILS NFR BLD AUTO: 55.8 %
PLATELET # BLD AUTO: 222 THOUSAND/UL (ref 140–400)
PMV BLD REES-ECKER: 10.7 FL (ref 7.5–12.5)
POTASSIUM SERPL-SCNC: 4.5 MMOL/L (ref 3.5–5.3)
PROT SERPL-MCNC: 6.2 G/DL (ref 6.1–8.1)
RBC # BLD AUTO: 4.47 MILLION/UL (ref 3.8–5.1)
SODIUM SERPL-SCNC: 142 MMOL/L (ref 135–146)
TIBC SERPL-MCNC: 325 MCG/DL (CALC) (ref 250–450)
VIT B12 SERPL-MCNC: >2000 PG/ML (ref 200–1100)
WBC # BLD AUTO: 3.6 THOUSAND/UL (ref 3.8–10.8)

## 2023-07-24 ENCOUNTER — OFFICE VISIT (OUTPATIENT)
Dept: CARDIOLOGY | Facility: CLINIC | Age: 63
End: 2023-07-24
Payer: COMMERCIAL

## 2023-07-24 VITALS
HEART RATE: 62 BPM | RESPIRATION RATE: 16 BRPM | SYSTOLIC BLOOD PRESSURE: 120 MMHG | BODY MASS INDEX: 24.73 KG/M2 | OXYGEN SATURATION: 98 % | DIASTOLIC BLOOD PRESSURE: 74 MMHG | WEIGHT: 131 LBS | HEIGHT: 61 IN

## 2023-07-24 DIAGNOSIS — R53.83 OTHER FATIGUE: Primary | ICD-10-CM

## 2023-07-24 DIAGNOSIS — K25.9 MULTIPLE GASTRIC ULCERS: ICD-10-CM

## 2023-07-24 DIAGNOSIS — R00.1 SINUS BRADYCARDIA: ICD-10-CM

## 2023-07-24 PROCEDURE — 1160F RVW MEDS BY RX/DR IN RCRD: CPT | Mod: CPTII,S$GLB,, | Performed by: INTERNAL MEDICINE

## 2023-07-24 PROCEDURE — 1159F PR MEDICATION LIST DOCUMENTED IN MEDICAL RECORD: ICD-10-PCS | Mod: CPTII,S$GLB,, | Performed by: INTERNAL MEDICINE

## 2023-07-24 PROCEDURE — 1160F PR REVIEW ALL MEDS BY PRESCRIBER/CLIN PHARMACIST DOCUMENTED: ICD-10-PCS | Mod: CPTII,S$GLB,, | Performed by: INTERNAL MEDICINE

## 2023-07-24 PROCEDURE — 99999 PR PBB SHADOW E&M-EST. PATIENT-LVL III: CPT | Mod: PBBFAC,,, | Performed by: INTERNAL MEDICINE

## 2023-07-24 PROCEDURE — 3078F PR MOST RECENT DIASTOLIC BLOOD PRESSURE < 80 MM HG: ICD-10-PCS | Mod: CPTII,S$GLB,, | Performed by: INTERNAL MEDICINE

## 2023-07-24 PROCEDURE — 3008F BODY MASS INDEX DOCD: CPT | Mod: CPTII,S$GLB,, | Performed by: INTERNAL MEDICINE

## 2023-07-24 PROCEDURE — 93000 EKG 12-LEAD: ICD-10-PCS | Mod: S$GLB,,, | Performed by: INTERNAL MEDICINE

## 2023-07-24 PROCEDURE — 3078F DIAST BP <80 MM HG: CPT | Mod: CPTII,S$GLB,, | Performed by: INTERNAL MEDICINE

## 2023-07-24 PROCEDURE — 99204 PR OFFICE/OUTPT VISIT, NEW, LEVL IV, 45-59 MIN: ICD-10-PCS | Mod: S$GLB,,, | Performed by: INTERNAL MEDICINE

## 2023-07-24 PROCEDURE — 3074F PR MOST RECENT SYSTOLIC BLOOD PRESSURE < 130 MM HG: ICD-10-PCS | Mod: CPTII,S$GLB,, | Performed by: INTERNAL MEDICINE

## 2023-07-24 PROCEDURE — 3008F PR BODY MASS INDEX (BMI) DOCUMENTED: ICD-10-PCS | Mod: CPTII,S$GLB,, | Performed by: INTERNAL MEDICINE

## 2023-07-24 PROCEDURE — 99204 OFFICE O/P NEW MOD 45 MIN: CPT | Mod: S$GLB,,, | Performed by: INTERNAL MEDICINE

## 2023-07-24 PROCEDURE — 99999 PR PBB SHADOW E&M-EST. PATIENT-LVL III: ICD-10-PCS | Mod: PBBFAC,,, | Performed by: INTERNAL MEDICINE

## 2023-07-24 PROCEDURE — 3074F SYST BP LT 130 MM HG: CPT | Mod: CPTII,S$GLB,, | Performed by: INTERNAL MEDICINE

## 2023-07-24 PROCEDURE — 93000 ELECTROCARDIOGRAM COMPLETE: CPT | Mod: S$GLB,,, | Performed by: INTERNAL MEDICINE

## 2023-07-24 PROCEDURE — 1159F MED LIST DOCD IN RCRD: CPT | Mod: CPTII,S$GLB,, | Performed by: INTERNAL MEDICINE

## 2023-07-24 NOTE — ASSESSMENT & PLAN NOTE
EKG shows sinus rhythm at rate 61 beats per minute on borderline criteria for LVH is noted.  Will obtain echocardiogram to rule out any structural abnormalities.

## 2023-07-24 NOTE — PROGRESS NOTES
Subjective:    Patient ID:  Mary Mike is a 62 y.o. female patient here for evaluation Establish Care, Shortness of Breath, Fatigue, Abnormal ECG, Chest Pain (She has a pressure on occasion ), and Neck Pain (Left sided neck pain)      History of Present Illness:     Ms. Hernandez:  62-year-old lady with history of gastric previous gastric bypass surgery about 2 years ago had been experience a lot of fatigue tiredness and shortness of breath.  She had been noticing this about initiating about 6 months ago since then has been more progressive.  Denies having chest discomfort but effort capacity is markedly reduced.  No swelling in the lower extremities no significant palpitations are noted.  She has no cough or congestion but noticeable change in her effort capacity.  She has nonspecific abdominal pains since surgery she had complications post gastric bypass.  This was associated with moderate stenosis in the anastomotic lesion with ulcerations previously.    Family history of premature heart disease sister of age and she had recent stent placement.  She also has a brother of 66 years of age had myocardial infarction and stent placement as well.  Father also had heart disease.  Mother had colon cancer arm she  when the patient is 19 years of age.  Social history  Tobacco arm never used.    Alcohol:  Seldom usage  She has been retired now.      Review of patient's allergies indicates:  No Known Allergies    Past Medical History:   Diagnosis Date    ADHD (attention deficit hyperactivity disorder)     Allergy     Anemia     Anemia associated with nutritional deficiency 2023    Anxiety     Depression     Fatty liver     History of endoscopy 2021    Dr. Toney Cuevas-Z-line is irregular and was found 37 cm from the incisors.  Scattered ulcerations noted between 36-37 cm. 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  The gastrojejunal anastomosis was characterized by moderate stenosis and  ulceration.  This was traversed.  The pouch to jejunum limb was characterized by erythema, friable mucosa, inflammation and ulceration.  The examine    Insomnia     Migraines     Osteoporosis     Restless leg syndrome      Past Surgical History:   Procedure Laterality Date    BREAST BIOPSY Left     benign 15 +years ago    BREAST SURGERY  2017    breast reduction    BUNIONECTOMY Right 2018     SECTION  1987    FRACTURE SURGERY  2016    ankle    GASTRIC RESTRICTION SURGERY      GASTRIC SLEEVE 2012    LAPAROSCOPIC CHOLECYSTECTOMY N/A 10/10/2022    Procedure: CHOLECYSTECTOMY, LAPAROSCOPIC;  Surgeon: Henrietta Joseph MD;  Location: [facility] OR;  Service: General;  Laterality: N/A;    REPAIR OF EXTENSOR TENDON  2021    Procedure: REPAIR, TENDON, EXTENSOR;  Surgeon: Aly Zimmerman DPM;  Location: [facility] OR;  Service: Podiatry;;    SURGICAL REMOVAL OF METATARSAL HEAD Right 2021    Procedure: OSTECTOMY, METATARSAL BONE, HEAD;  Surgeon: Aly Zimmerman DPM;  Location: [facility] OR;  Service: Podiatry;  Laterality: Right;    TOTAL REDUCTION MAMMOPLASTY           Social History     Tobacco Use    Smoking status: Never    Smokeless tobacco: Never   Substance Use Topics    Alcohol use: Yes     Comment: occassional    Drug use: No        Review of Systems   As noted in HPI in addition     Constitutional: Negative for chills, no fever but lot of fatigue and tiredness even with minimal to moderate activity.  Eyes: No double vision, No blurred vision  Neuro: No headaches, No dizziness  Respiratory:  With effort noted.    Cardiovascular: Negative for chest pain. Negative for palpitations and leg swelling.   Gastrointestinal: Negative for abdominal pain, No melena, diarrhea, nausea and vomiting.   Genitourinary: Negative for dysuria and frequency, Negative for hematuria  Skin: Negative for bruising, Negative for edema or discoloration noted.   Endocrine: Negative for polyphagia, Negative for heat intolerance,  Negative for cold intolerance  Psychiatric: Negative for depression, Negative for anxiety, Negative for memory loss  Musculoskeletal: Negative for neck pain, Negative for muscle weakness, Negative for back pain   Patient does suffer from insomnia has average sleep 3-4 hours a night an average night.       Objective        Vitals:    07/24/23 0858   BP: 120/74   Pulse: 62   Resp: 16       LIPIDS - LAST 2   Lab Results   Component Value Date    CHOL 174 02/22/2022    HDL 76 02/22/2022    LDLCALC 82 02/22/2022    TRIG 79 02/22/2022    CHOLHDL 2.3 02/22/2022       CBC - LAST 2  Lab Results   Component Value Date    WBC 3.6 (L) 07/19/2023    WBC 2.6 (L) 07/13/2023    RBC 4.47 07/19/2023    RBC 4.38 07/13/2023    HGB 12.5 07/19/2023    HGB 12.3 07/13/2023    HCT 40.4 07/19/2023    HCT 38.8 07/13/2023    MCV 90.4 07/19/2023    MCV 88.6 07/13/2023    MCH 28.0 07/19/2023    MCH 28.1 07/13/2023    MCHC 30.9 (L) 07/19/2023    MCHC 31.7 (L) 07/13/2023    RDW 13.1 07/19/2023    RDW 13.1 07/13/2023     07/19/2023     07/13/2023    MPV 10.7 07/19/2023    MPV 10.7 07/13/2023    GRAN 3.4 10/18/2022    GRAN 62.4 10/18/2022    LYMPH 997 07/19/2023    LYMPH 27.7 07/19/2023    MONO 356 07/19/2023    MONO 9.9 07/19/2023    BASO 40 07/19/2023    BASO 42 07/13/2023    NRBC 0 10/18/2022    NRBC 0 10/14/2022       CHEMISTRY & LIVER FUNCTION - LAST 2  Lab Results   Component Value Date     07/19/2023     07/13/2023    K 4.5 07/19/2023    K 4.0 07/13/2023     07/19/2023     07/13/2023    CO2 27 07/19/2023    CO2 26 07/13/2023    ANIONGAP 9 10/20/2022    ANIONGAP 11 10/18/2022    BUN 6 (L) 07/19/2023    BUN 7 07/13/2023    CREATININE 0.76 07/19/2023    CREATININE 0.60 07/13/2023    GLU 82 07/19/2023    GLU 95 07/13/2023    CALCIUM 8.7 07/19/2023    CALCIUM 8.3 (L) 07/13/2023    MG 1.9 10/11/2022    MG 2.2 02/22/2022    ALBUMIN 4.0 07/19/2023    ALBUMIN 3.9 07/13/2023    PROT 6.2 07/19/2023    PROT 6.1  07/13/2023    ALKPHOS 109 10/20/2022    ALKPHOS 122 10/18/2022    ALT 12 07/19/2023    ALT 11 07/13/2023    AST 15 07/19/2023    AST 16 07/13/2023    BILITOT 0.6 07/19/2023    BILITOT 0.6 07/13/2023        CARDIAC PROFILE - LAST 2  No results found for: BNP, CPK, CPKMB, LDH, TROPONINI, TROPONINIHS     COAGULATION - LAST 2  Lab Results   Component Value Date    INR 1.0 03/02/2023    APTT 30.7 03/02/2023       ENDOCRINE & PSA - LAST 2  Lab Results   Component Value Date    HGBA1C 4.9 07/26/2018    TSH 0.55 02/26/2020    TSH 0.668 05/14/2010        ECHOCARDIOGRAM RESULTS  No results found for this or any previous visit.      CURRENT/PREVIOUS VISIT EKG  Results for orders placed or performed during the hospital encounter of 06/23/21   EKG 12-lead    Collection Time: 06/23/21  3:44 PM    Narrative    Test Reason : M77.8,M79.674,    Vent. Rate : 074 BPM     Atrial Rate : 074 BPM     P-R Int : 182 ms          QRS Dur : 086 ms      QT Int : 396 ms       P-R-T Axes : 064 071 069 degrees     QTc Int : 439 ms    Normal sinus rhythm  Possible Left atrial enlargement  Borderline Abnormal ECG  No previous ECGs available  Confirmed by Marco Antonio Gamble MD ([id number]) on 6/24/2021 11:42:30 AM    Referred By:  JAIDEN           Confirmed By:Marco Antonio Gamble MD     No valid procedures specified.   No results found for this or any previous visit.    No valid procedures specified.          PREVIOUS STRESS TEST              PREVIOUS ANGIOGRAM        PHYSICAL EXAM    GENERAL: well built, well nourished, well-developed in no apparent distress alert and oriented.   HEENT: Normocephalic. Pupils normal and conjunctivae normal.  Mucous membranes normal, no cyanosis or icterus, trachea central,no pallor or icterus is noted..   NECK: No JVD. No bruit..   THYROID: Thyroid not enlarged. No nodules present..   CARDIAC: Regular rate and rhythm. S1 is normal.S2 is normal.No gallops, clicks or murmurs noted at this time.  CHEST ANATOMY: normal.   LUNGS: Clear to  auscultation. No wheezing or rhonchi..   ABDOMEN: Soft no masses or organomegaly.  Nonspecific tenderness is present. No abdomen pulsations or bruits.  Normal bowel sounds. No pulsations and no masses felt, No guarding or rebound.   EXTREMITIES: No cyanosis, clubbing or edema noted at this time., no calf tenderness bilaterally.   PERIPHERAL VASCULAR SYSTEM: Good palpable distal pulses.   CENTRAL NERVOUS SYSTEM: No focal motor or sensory deficits noted.   SKIN: Skin without lesions, moist, well perfused.   MUSCLE STRENGTH & TONE: No noteable weakness, atrophy or abnormal movement.     I HAVE REVIEWED :    The vital signs, nurses notes, and all the pertinent radiology and labs.        Current Outpatient Medications   Medication Instructions    FLUoxetine 20 MG capsule TAKE 1 CAPSULE(20 MG) BY MOUTH EVERY EVENING    multivitamin (THERAGRAN) tablet 1 tablet, Oral, Daily    pantoprazole (PROTONIX) 40 mg, Oral, Daily    pramipexole (MIRAPEX) 1.5 mg, Oral, Nightly    traZODone (DESYREL) 100 mg, Oral, Nightly      07/24/2023 EKG shows normal sinus rhythm voltage criteria for LVH possible normal variant.    Assessment & Plan     Fatigue  Patient has been experiencing progressive fatigue disproportionate to level of exercise and activity.  And she has strong family history of premature heart disease recommend to 0 obtain further cardiac workup.    Recommend to obtain a symptom limited exercise stress test with an imaging study and also to obtain echocardiogram for LV function assessment in valve morphology  She is due for repeat lipid levels and risk assessment.    Multiple gastric ulcers  She has history of multiple gastric ulcers continue on Protonix 40 mg daily    Sinus bradycardia  EKG shows sinus rhythm at rate 61 beats per minute on borderline criteria for LVH is noted.  Will obtain echocardiogram to rule out any structural abnormalities.          Follow up in about 4 weeks (around 8/21/2023).

## 2023-07-24 NOTE — ASSESSMENT & PLAN NOTE
Patient has been experiencing progressive fatigue disproportionate to level of exercise and activity.  And she has strong family history of premature heart disease recommend to 0 obtain further cardiac workup.    Recommend to obtain a symptom limited exercise stress test with an imaging study and also to obtain echocardiogram for LV function assessment in valve morphology  She is due for repeat lipid levels and risk assessment.

## 2023-07-24 NOTE — PROGRESS NOTES
Subjective:    Patient ID:  Mary Mike is a 62 y.o. female patient here for evaluation No chief complaint on file.    History of Present Illness:               Most Recent Echocardiogram Results  No results found for this or any previous visit.      Most Recent Nuclear Stress Test Results  No results found for this or any previous visit.      Most Recent Cardiac PET Stress Test Results  No results found for this or any previous visit.      Most Recent Cardiovascular Angiogram results  No results found for this or any previous visit.      Other Most Recent Cardiology Results  Results for orders placed during the hospital encounter of 10/10/22    CARDIAC MONITORING STRIPS      REVIEW OF SYSTEMS: As noted in HPI   CARDIOVASCULAR: No recent chest pain, palpitations, arm/neck/jaw pain, or edema.  RESPIRATORY: No recent fever, cough, SOB.  : No blood in the urine  GI: No reflux, nausea, vomiting, or blood in stool.   MUSCULOSKELETAL: No falls.   NEURO: No headaches, syncope, or dizziness.  EYES: No sudden changes in vision.     Past Medical History:   Diagnosis Date    ADHD (attention deficit hyperactivity disorder)     Allergy     Anemia     Anemia associated with nutritional deficiency 7/18/2023    Anxiety     Depression     Fatty liver     History of endoscopy 8/26/2021    Dr. Toney Cuevas-Z-line is irregular and was found 37 cm from the incisors.  Scattered ulcerations noted between 36-37 cm. 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  The gastrojejunal anastomosis was characterized by moderate stenosis and ulceration.  This was traversed.  The pouch to jejunum limb was characterized by erythema, friable mucosa, inflammation and ulceration.  The examine    Insomnia     Migraines     Osteoporosis     Restless leg syndrome 2005     Past Surgical History:   Procedure Laterality Date    BREAST BIOPSY Left     benign 15 +years ago    BREAST SURGERY  2017    breast reduction    BUNIONECTOMY Right  2018     SECTION  1987    FRACTURE SURGERY  2016    ankle    GASTRIC RESTRICTION SURGERY      GASTRIC SLEEVE 2012    LAPAROSCOPIC CHOLECYSTECTOMY N/A 10/10/2022    Procedure: CHOLECYSTECTOMY, LAPAROSCOPIC;  Surgeon: Henrietta Joseph MD;  Location: [facility] OR;  Service: General;  Laterality: N/A;    REPAIR OF EXTENSOR TENDON  2021    Procedure: REPAIR, TENDON, EXTENSOR;  Surgeon: Aly Zimmerman DPM;  Location: [facility] OR;  Service: Podiatry;;    SURGICAL REMOVAL OF METATARSAL HEAD Right 2021    Procedure: OSTECTOMY, METATARSAL BONE, HEAD;  Surgeon: Aly Zimmerman DPM;  Location: [facility] OR;  Service: Podiatry;  Laterality: Right;    TOTAL REDUCTION MAMMOPLASTY           Social History     Tobacco Use    Smoking status: Never    Smokeless tobacco: Never   Substance Use Topics    Alcohol use: Yes     Comment: occassional    Drug use: No         Objective    There were no vitals filed for this visit.    LAST EKG  Results for orders placed or performed during the hospital encounter of 21   EKG 12-lead    Collection Time: 21  3:44 PM    Narrative    Test Reason : M77.8,M79.674,    Vent. Rate : 074 BPM     Atrial Rate : 074 BPM     P-R Int : 182 ms          QRS Dur : 086 ms      QT Int : 396 ms       P-R-T Axes : 064 071 069 degrees     QTc Int : 439 ms    Normal sinus rhythm  Possible Left atrial enlargement  Borderline Abnormal ECG  No previous ECGs available  Confirmed by Marco Antonio Gamble MD ([id number]) on 2021 11:42:30 AM    Referred By:  JAIDEN           Confirmed By:Marco Antonio Gamble MD     LIPIDS - LAST 2   Lab Results   Component Value Date    CHOL 174 2022    HDL 76 2022    LDLCALC 82 2022    TRIG 79 2022    CHOLHDL 2.3 2022     CARDIAC PROFILE - LAST 2  No results found for: BNP, CPK, CPKMB, LDH, TROPONINI   CBC - LAST 2  Lab Results   Component Value Date    WBC 3.6 (L) 2023    WBC 2.6 (L) 2023    RBC 4.47 2023    RBC 4.38 2023     HGB 12.5 07/19/2023    HGB 12.3 07/13/2023    HCT 40.4 07/19/2023    HCT 38.8 07/13/2023     07/19/2023     07/13/2023     Lab Results   Component Value Date    INR 1.0 03/02/2023    APTT 30.7 03/02/2023     CHEMISTRY - LAST 2  Lab Results   Component Value Date     07/19/2023     07/13/2023    K 4.5 07/19/2023    K 4.0 07/13/2023     07/19/2023     07/13/2023    CO2 27 07/19/2023    CO2 26 07/13/2023    ANIONGAP 9 10/20/2022    ANIONGAP 11 10/18/2022    BUN 6 (L) 07/19/2023    BUN 7 07/13/2023    CREATININE 0.76 07/19/2023    CREATININE 0.60 07/13/2023    GLU 82 07/19/2023    GLU 95 07/13/2023    CALCIUM 8.7 07/19/2023    CALCIUM 8.3 (L) 07/13/2023    MG 1.9 10/11/2022    MG 2.2 02/22/2022    ALBUMIN 4.0 07/19/2023    ALBUMIN 3.9 07/13/2023    PROT 6.2 07/19/2023    PROT 6.1 07/13/2023    ALKPHOS 109 10/20/2022    ALKPHOS 122 10/18/2022    ALT 12 07/19/2023    ALT 11 07/13/2023    AST 15 07/19/2023    AST 16 07/13/2023    BILITOT 0.6 07/19/2023    BILITOT 0.6 07/13/2023      ENDOCRINE - LAST 2  Lab Results   Component Value Date    HGBA1C 4.9 07/26/2018    TSH 0.55 02/26/2020    TSH 0.668 05/14/2010        PHYSICAL EXAM  CONSTITUTIONAL: Well built, well nourished in no apparent distress  NECK: no carotid bruit, no JVD  LUNGS: CTA  CHEST WALL: no tenderness  HEART: regular rate and rhythm, S1, S2 normal, no murmur, click, rub or gallop   ABDOMEN: soft, non-tender; bowel sounds normal; no masses,  no organomegaly  EXTREMITIES: Extremities normal, no edema, no calf tenderness noted  NEURO: AAO X 3    I HAVE REVIEWED :    The vital signs, most recent cardiac testing, and most recent pertinent non-cardiology provider notes.    Current Outpatient Medications   Medication Instructions    FLUoxetine 20 MG capsule TAKE 1 CAPSULE(20 MG) BY MOUTH EVERY EVENING    multivitamin (THERAGRAN) tablet 1 tablet, Oral, Daily    mupirocin (BACTROBAN) 2 % ointment Topical (Top), 3 times daily     pantoprazole (PROTONIX) 40 mg, Oral, Daily    pramipexole (MIRAPEX) 1.5 mg, Oral, Nightly    traZODone (DESYREL) 100 mg, Oral, Nightly      Assessment & Plan     No problem-specific Assessment & Plan notes found for this encounter.

## 2023-08-06 ENCOUNTER — HOSPITAL ENCOUNTER (EMERGENCY)
Facility: HOSPITAL | Age: 63
Discharge: HOME | End: 2023-08-06
Attending: EMERGENCY MEDICINE
Payer: COMMERCIAL

## 2023-08-06 VITALS
HEART RATE: 56 BPM | OXYGEN SATURATION: 97 % | HEIGHT: 61 IN | WEIGHT: 128 LBS | DIASTOLIC BLOOD PRESSURE: 57 MMHG | TEMPERATURE: 98 F | SYSTOLIC BLOOD PRESSURE: 119 MMHG | RESPIRATION RATE: 17 BRPM | BODY MASS INDEX: 24.17 KG/M2

## 2023-08-06 DIAGNOSIS — R07.9 CHEST PAIN: ICD-10-CM

## 2023-08-06 DIAGNOSIS — R10.10 UPPER ABDOMINAL PAIN: ICD-10-CM

## 2023-08-06 LAB
ALBUMIN SERPL BCP-MCNC: 3.8 G/DL (ref 3.5–5.2)
ALP SERPL-CCNC: 44 U/L (ref 55–135)
ALT SERPL W/O P-5'-P-CCNC: 16 U/L (ref 10–44)
ANION GAP SERPL CALC-SCNC: 6 MMOL/L (ref 8–16)
AST SERPL-CCNC: 19 U/L (ref 10–40)
BACTERIA #/AREA URNS HPF: NEGATIVE /HPF
BASOPHILS # BLD AUTO: 0.05 K/UL (ref 0–0.2)
BASOPHILS NFR BLD: 1.4 % (ref 0–1.9)
BILIRUB SERPL-MCNC: 0.8 MG/DL (ref 0.1–1)
BILIRUB UR QL STRIP: NEGATIVE
BNP SERPL-MCNC: 21 PG/ML (ref 0–99)
BUN SERPL-MCNC: 6 MG/DL (ref 6–20)
CALCIUM SERPL-MCNC: 8.6 MG/DL (ref 8.7–10.5)
CHLORIDE SERPL-SCNC: 107 MMOL/L (ref 95–110)
CLARITY UR: CLEAR
CO2 SERPL-SCNC: 25 MMOL/L (ref 23–29)
COLOR UR: COLORLESS
CREAT SERPL-MCNC: 0.7 MG/DL (ref 0.5–1.4)
DIFFERENTIAL METHOD: ABNORMAL
EOSINOPHIL # BLD AUTO: 0.2 K/UL (ref 0–0.5)
EOSINOPHIL NFR BLD: 5.4 % (ref 0–8)
ERYTHROCYTE [DISTWIDTH] IN BLOOD BY AUTOMATED COUNT: 13 % (ref 11.5–14.5)
EST. GFR  (NO RACE VARIABLE): >60 ML/MIN/1.73 M^2
GLUCOSE SERPL-MCNC: 92 MG/DL (ref 70–110)
GLUCOSE UR QL STRIP: NEGATIVE
HCT VFR BLD AUTO: 38.3 % (ref 37–48.5)
HGB BLD-MCNC: 12.7 G/DL (ref 12–16)
HGB UR QL STRIP: NEGATIVE
HYALINE CASTS #/AREA URNS LPF: 1 /LPF
IMM GRANULOCYTES # BLD AUTO: 0.01 K/UL (ref 0–0.04)
IMM GRANULOCYTES NFR BLD AUTO: 0.3 % (ref 0–0.5)
KETONES UR QL STRIP: NEGATIVE
LEUKOCYTE ESTERASE UR QL STRIP: ABNORMAL
LIPASE SERPL-CCNC: 28 U/L (ref 4–60)
LYMPHOCYTES # BLD AUTO: 1.5 K/UL (ref 1–4.8)
LYMPHOCYTES NFR BLD: 40.3 % (ref 18–48)
MCH RBC QN AUTO: 29 PG (ref 27–31)
MCHC RBC AUTO-ENTMCNC: 33.2 G/DL (ref 32–36)
MCV RBC AUTO: 87 FL (ref 82–98)
MICROSCOPIC COMMENT: NORMAL
MONOCYTES # BLD AUTO: 0.3 K/UL (ref 0.3–1)
MONOCYTES NFR BLD: 9.3 % (ref 4–15)
NEUTROPHILS # BLD AUTO: 1.6 K/UL (ref 1.8–7.7)
NEUTROPHILS NFR BLD: 43.3 % (ref 38–73)
NITRITE UR QL STRIP: NEGATIVE
NRBC BLD-RTO: 0 /100 WBC
PH UR STRIP: 7 [PH] (ref 5–8)
PLATELET # BLD AUTO: 232 K/UL (ref 150–450)
PMV BLD AUTO: 10.8 FL (ref 9.2–12.9)
POTASSIUM SERPL-SCNC: 3.8 MMOL/L (ref 3.5–5.1)
PROT SERPL-MCNC: 6.6 G/DL (ref 6–8.4)
PROT UR QL STRIP: NEGATIVE
RBC # BLD AUTO: 4.38 M/UL (ref 4–5.4)
RBC #/AREA URNS HPF: 1 /HPF (ref 0–4)
SODIUM SERPL-SCNC: 138 MMOL/L (ref 136–145)
SP GR UR STRIP: 1 (ref 1–1.03)
SQUAMOUS #/AREA URNS HPF: 1 /HPF
TROPONIN I SERPL HS-MCNC: <2.3 PG/ML (ref 0–14.9)
URN SPEC COLLECT METH UR: ABNORMAL
UROBILINOGEN UR STRIP-ACNC: NEGATIVE EU/DL
WBC # BLD AUTO: 3.67 K/UL (ref 3.9–12.7)
WBC #/AREA URNS HPF: 1 /HPF (ref 0–5)

## 2023-08-06 PROCEDURE — C9113 INJ PANTOPRAZOLE SODIUM, VIA: HCPCS | Performed by: EMERGENCY MEDICINE

## 2023-08-06 PROCEDURE — 93010 ELECTROCARDIOGRAM REPORT: CPT | Mod: ,,, | Performed by: INTERNAL MEDICINE

## 2023-08-06 PROCEDURE — 93010 EKG 12-LEAD: ICD-10-PCS | Mod: ,,, | Performed by: INTERNAL MEDICINE

## 2023-08-06 PROCEDURE — 80053 COMPREHEN METABOLIC PANEL: CPT | Performed by: EMERGENCY MEDICINE

## 2023-08-06 PROCEDURE — 85025 COMPLETE CBC W/AUTO DIFF WBC: CPT | Performed by: EMERGENCY MEDICINE

## 2023-08-06 PROCEDURE — 83690 ASSAY OF LIPASE: CPT | Performed by: EMERGENCY MEDICINE

## 2023-08-06 PROCEDURE — 25500020 PHARM REV CODE 255: Performed by: EMERGENCY MEDICINE

## 2023-08-06 PROCEDURE — 96361 HYDRATE IV INFUSION ADD-ON: CPT

## 2023-08-06 PROCEDURE — 25000003 PHARM REV CODE 250: Performed by: EMERGENCY MEDICINE

## 2023-08-06 PROCEDURE — 84484 ASSAY OF TROPONIN QUANT: CPT | Performed by: EMERGENCY MEDICINE

## 2023-08-06 PROCEDURE — 99285 EMERGENCY DEPT VISIT HI MDM: CPT | Mod: 25

## 2023-08-06 PROCEDURE — 83880 ASSAY OF NATRIURETIC PEPTIDE: CPT | Performed by: EMERGENCY MEDICINE

## 2023-08-06 PROCEDURE — 93005 ELECTROCARDIOGRAM TRACING: CPT | Performed by: INTERNAL MEDICINE

## 2023-08-06 PROCEDURE — 81001 URINALYSIS AUTO W/SCOPE: CPT | Performed by: EMERGENCY MEDICINE

## 2023-08-06 PROCEDURE — 96374 THER/PROPH/DIAG INJ IV PUSH: CPT

## 2023-08-06 PROCEDURE — 96375 TX/PRO/DX INJ NEW DRUG ADDON: CPT

## 2023-08-06 PROCEDURE — 63600175 PHARM REV CODE 636 W HCPCS: Performed by: EMERGENCY MEDICINE

## 2023-08-06 RX ORDER — ONDANSETRON 4 MG/1
4 TABLET, FILM COATED ORAL EVERY 6 HOURS
Qty: 12 TABLET | Refills: 0 | Status: SHIPPED | OUTPATIENT
Start: 2023-08-06 | End: 2023-11-16

## 2023-08-06 RX ORDER — PANTOPRAZOLE SODIUM 40 MG/10ML
40 INJECTION, POWDER, LYOPHILIZED, FOR SOLUTION INTRAVENOUS
Status: COMPLETED | OUTPATIENT
Start: 2023-08-06 | End: 2023-08-06

## 2023-08-06 RX ORDER — MORPHINE SULFATE 4 MG/ML
4 INJECTION, SOLUTION INTRAMUSCULAR; INTRAVENOUS
Status: COMPLETED | OUTPATIENT
Start: 2023-08-06 | End: 2023-08-06

## 2023-08-06 RX ORDER — HYDROCODONE BITARTRATE AND ACETAMINOPHEN 5; 325 MG/1; MG/1
1 TABLET ORAL EVERY 6 HOURS PRN
Qty: 12 TABLET | Refills: 0 | Status: SHIPPED | OUTPATIENT
Start: 2023-08-06 | End: 2023-11-16

## 2023-08-06 RX ORDER — NITROGLYCERIN 0.4 MG/1
0.4 TABLET SUBLINGUAL EVERY 5 MIN PRN
Status: DISCONTINUED | OUTPATIENT
Start: 2023-08-06 | End: 2023-08-06 | Stop reason: HOSPADM

## 2023-08-06 RX ORDER — PANTOPRAZOLE SODIUM 40 MG/1
40 TABLET, DELAYED RELEASE ORAL DAILY
Qty: 30 TABLET | Refills: 11 | Status: SHIPPED | OUTPATIENT
Start: 2023-08-06 | End: 2024-08-05

## 2023-08-06 RX ORDER — ONDANSETRON 2 MG/ML
4 INJECTION INTRAMUSCULAR; INTRAVENOUS
Status: COMPLETED | OUTPATIENT
Start: 2023-08-06 | End: 2023-08-06

## 2023-08-06 RX ADMIN — MORPHINE SULFATE 4 MG: 4 INJECTION, SOLUTION INTRAMUSCULAR; INTRAVENOUS at 03:08

## 2023-08-06 RX ADMIN — PANTOPRAZOLE SODIUM 40 MG: 40 INJECTION, POWDER, FOR SOLUTION INTRAVENOUS at 03:08

## 2023-08-06 RX ADMIN — ONDANSETRON 4 MG: 2 INJECTION INTRAMUSCULAR; INTRAVENOUS at 03:08

## 2023-08-06 RX ADMIN — SODIUM CHLORIDE 1000 ML: 0.9 INJECTION, SOLUTION INTRAVENOUS at 01:08

## 2023-08-06 RX ADMIN — IOHEXOL 100 ML: 350 INJECTION, SOLUTION INTRAVENOUS at 02:08

## 2023-08-06 NOTE — DISCHARGE INSTRUCTIONS
Return to the ER for worsening symptoms or for any other concerns.  Follow-up with your cardiologist and gastroenterologist for re-evaluation

## 2023-08-06 NOTE — ED PROVIDER NOTES
Encounter Date: 2023       History     Chief Complaint   Patient presents with    Abdominal Pain     UPPER. LEFT SIDE X 1 WEEK     8-year-old female presenting for evaluation of both upper abdominal pain.  The pain began 3 days ago.  Pain is dull and persistent.  She has had some associated lightheadedness and intermittent sweating.  She denies vomiting or diarrhea.  She is scheduled for an outpatient echocardiogram tomorrow.    The history is provided by the patient.     Review of patient's allergies indicates:  No Known Allergies  Past Medical History:   Diagnosis Date    ADHD (attention deficit hyperactivity disorder)     Allergy     Anemia     Anemia associated with nutritional deficiency 2023    Anxiety     Depression     Fatty liver     History of endoscopy 2021    Dr. Toney Cuevas-Z-line is irregular and was found 37 cm from the incisors.  Scattered ulcerations noted between 36-37 cm. 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  The gastrojejunal anastomosis was characterized by moderate stenosis and ulceration.  This was traversed.  The pouch to jejunum limb was characterized by erythema, friable mucosa, inflammation and ulceration.  The examine    Insomnia     Migraines     Osteoporosis     Restless leg syndrome      Past Surgical History:   Procedure Laterality Date    BREAST BIOPSY Left     benign 15 +years ago    BREAST SURGERY  2017    breast reduction    BUNIONECTOMY Right 2018     SECTION  1987    FRACTURE SURGERY  2016    ankle    GASTRIC RESTRICTION SURGERY      GASTRIC SLEEVE 2012    LAPAROSCOPIC CHOLECYSTECTOMY N/A 10/10/2022    Procedure: CHOLECYSTECTOMY, LAPAROSCOPIC;  Surgeon: Henrietta Joseph MD;  Location: [facility] OR;  Service: General;  Laterality: N/A;    REPAIR OF EXTENSOR TENDON  2021    Procedure: REPAIR, TENDON, EXTENSOR;  Surgeon: Aly Zimmerman DPM;  Location: [facility] OR;  Service: Podiatry;;    SURGICAL REMOVAL OF METATARSAL HEAD Right  6/24/2021    Procedure: OSTECTOMY, METATARSAL BONE, HEAD;  Surgeon: Aly Zimmerman DPM;  Location: [facility] OR;  Service: Podiatry;  Laterality: Right;    TOTAL REDUCTION MAMMOPLASTY      2016     Family History   Problem Relation Age of Onset    Arthritis Mother     Colon cancer Mother     Diabetes Mother     Early death Mother     Miscarriages / Stillbirths Mother     Arthritis Father     Heart disease Father     Hypertension Father     Stroke Father     Vision loss Father     Breast cancer Maternal Aunt 52    Vaginal cancer Paternal Grandmother     Heart disease Paternal Grandfather      Social History     Tobacco Use    Smoking status: Never    Smokeless tobacco: Never   Substance Use Topics    Alcohol use: Yes     Comment: occassional    Drug use: No     Review of Systems   Gastrointestinal:  Positive for abdominal pain.   All other systems reviewed and are negative.      Physical Exam     Initial Vitals [08/06/23 1244]   BP Pulse Resp Temp SpO2   117/68 61 16 99.1 °F (37.3 °C) 95 %      MAP       --         Physical Exam    Nursing note and vitals reviewed.  Constitutional: She appears well-developed and well-nourished. She is not diaphoretic. No distress.   HENT:   Head: Normocephalic.   Eyes: Conjunctivae are normal.   Neck: Neck supple.   Normal range of motion.  Cardiovascular:  Normal rate.           Pulmonary/Chest: No respiratory distress.   Abdominal: She exhibits no distension. There is abdominal tenderness.   Left upper abdominal tenderness to palpation   Musculoskeletal:         General: No edema.      Cervical back: Normal range of motion and neck supple.     Neurological: She is alert. She has normal strength. GCS eye subscore is 4. GCS verbal subscore is 5. GCS motor subscore is 6.   Skin: Skin is warm and dry.   Psychiatric: She has a normal mood and affect.         ED Course   Procedures  Labs Reviewed   CBC W/ AUTO DIFFERENTIAL - Abnormal; Notable for the following components:       Result Value     WBC 3.67 (*)     Gran # (ANC) 1.6 (*)     All other components within normal limits   COMPREHENSIVE METABOLIC PANEL - Abnormal; Notable for the following components:    Calcium 8.6 (*)     Alkaline Phosphatase 44 (*)     Anion Gap 6 (*)     All other components within normal limits   URINALYSIS, REFLEX TO URINE CULTURE - Abnormal; Notable for the following components:    Color, UA Colorless (*)     Leukocytes, UA Trace (*)     All other components within normal limits    Narrative:     Specimen Source->Urine   LIPASE   TROPONIN I HIGH SENSITIVITY   B-TYPE NATRIURETIC PEPTIDE   URINALYSIS MICROSCOPIC    Narrative:     Specimen Source->Urine        ECG Results              EKG 12-lead (In process)  Result time 08/06/23 15:16:05      In process by Interface, Lab In [facility] (08/06/23 15:16:05)                   Narrative:    Test Reason : R10.10,    Vent. Rate : 061 BPM     Atrial Rate : 061 BPM     P-R Int : 190 ms          QRS Dur : 092 ms      QT Int : 424 ms       P-R-T Axes : 056 050 045 degrees     QTc Int : 426 ms    Normal sinus rhythm  Normal ECG  When compared with ECG of 24-JUL-2023 09:08,  No significant change was found    Referred By: [email or web address]   SELF           Confirmed By:                                   Imaging Results              CT Abdomen Pelvis With Contrast (Final result)  Result time 08/06/23 14:55:18      Final result by Santino Sampson MD (08/06/23 14:55:18)                   Narrative:    CMS MANDATED QUALITY DATA - CT RADIATION  436    All CT scans at this facility utilize dose modulation, iterative reconstruction, and/or weight based dosing when appropriate to reduce radiation dose to as low as reasonably achievable.    CLINICAL HISTORY:  62 years (1960) Female LUQ abdominal pain    TECHNIQUE:  CT ABDOMEN PELVIS WITH IV CONTRAST. Axial CT images of the abdomen and pelvis were obtained from the dome of the diaphragm to the proximal thigh.    COMPARISON:  CT from October 18,  2022.    FINDINGS:.  Lower Thorax:  The lungs are essentially clear noting linear dependent atelectasis versus scarring. There is no pleural or pericardial effusion on these images. The heart is normal in size. Postoperative changes the GE junction with gastric bypass, with a patulous distal esophagus and small hiatal hernia. 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    CT Abdomen:  Liver: The liver is normal in size. There is an early enhancing lesion adjacent to the gallbladder fossa measuring 10 mm diameter (image 84), possibly representing a flash fill hemangioma, incompletely assessed on this nontargeted study.  Gallbladder: The gallbladder is surgically absent.  Biliary Tree: No intra or extrahepatic ductal dilation.  Spleen: Within normal limits.  Pancreas: The pancreas is normal.  Adrenal Glands: The adrenal glands appear within normal limits.  Kidneys: The kidneys are normal in imaging appearance without hydronephrosis or hydroureter.  Vasculature: The aorta is normal in course and caliber.  Lymph nodes: No abdominal lymphadenopathy is seen.  Intraperitoneal structures: There is no ascites.  Bowel: The bowel is normal in course and caliber with no finding of obstruction, intra-abdominal free air or abscess. The appendix is normal (image 126)  Abdominal wall: The abdominal wall and musculature are normal.  Musculoskeletal: There is severe disc height loss at L4-L5 with grade 1 anterolisthesis (4 mm). There is dextroscoliosis with mild additional scattered degenerative change. .    CT Pelvis:  Bladder: The urinary bladder is within normal limits.  Reproductive Organs: The uterus and ovaries are unremarkable.  Pelvic Lymph nodes: No pelvic lymphadenopathy or mass is identified.    IMPRESSION:  No acute abdominal or pelvic process.                    .    Electronically signed by:  Santino Sampson MD  8/6/2023 2:55 PM CDT  Workstation: [id number]                                     X-Ray Chest AP Portable (Final result)  Result time 08/06/23 13:21:45      Final result by Santino Sampson MD (08/06/23 13:21:45)                   Narrative:    CLINICAL HISTORY:  62 years (1960) Female Chest pain    TECHNIQUE:  Portable AP radiograph the chest. One view.    COMPARISON:  Most recent radiograph from  6.23.21    FINDINGS:  The lungs are clear. Costophrenic angles are seen without effusion. No pneumothorax is identified. The heart is normal in size. The mediastinum is within normal limits. Osseous structures show degenerative changes in the spine. The visualized upper abdomen is unremarkable.    IMPRESSION:  No acute cardiac or pulmonary process.                  .            Electronically signed by:  Santino Sampson MD  8/6/2023 1:21 PM CDT Workstation: [id number]                                     Medications   sodium chloride 0.9% bolus 1,000 mL 1,000 mL (0 mLs Intravenous Stopped 8/6/23 1403)   iohexoL (OMNIPAQUE 350) injection 100 mL (100 mLs Intravenous Given 8/6/23 1428)   morphine injection 4 mg (4 mg Intravenous Given 8/6/23 1537)   ondansetron injection 4 mg (4 mg Intravenous Given 8/6/23 1537)   pantoprazole injection 40 mg (40 mg Intravenous Given 8/6/23 1537)     Medical Decision Making:   Initial Assessment:   62-year-old female with 3 days of left upper abdominal pain.  She is tender on exam.  EKG shows no ischemic changes.  CXR appears clear.  WBCs normal.  Troponin is normal.  LFTs and lipase are normal.  CT abdomen obtained and shows no evidence of acute intra-abdominal process.  We will discharge with pain medication.  She was advised to keep her follow up with Cardiology, follow-up with gastroenterology as well for further evaluation.                          Clinical Impression:   Final diagnoses:  [R10.10] Upper abdominal pain  [R07.9] Chest pain        ED Disposition Condition    Discharge Stable           ED Prescriptions       Medication Sig Dispense Start Date End Date Auth. Provider    pantoprazole (PROTONIX) 40 MG tablet Take 1 tablet (40 mg total) by mouth once daily. 30 tablet 8/6/2023 8/5/2024 Santino Shirley MD    ondansetron (ZOFRAN) 4 MG tablet Take 1 tablet (4 mg total) by mouth every 6 (six) hours. 12 tablet 8/6/2023 -- Santino Shirley MD    HYDROcodone-acetaminophen (NORCO) 5-325 mg per tablet Take 1 tablet by mouth every 6 (six) hours as needed for Pain. 12 tablet 8/6/2023 -- Santino Shirley MD          Follow-up Information       Follow up With Specialties Details Why Contact Info    Jimbo Muniz MD Internal Medicine   [address] [place] [address]  [address]  [facility] [address]  [phone]      Toney Cuevas III, MD Gastroenterology   [address] [facility] [address] [phone]               Santino Shirley MD  08/06/23 9961

## 2023-08-15 ENCOUNTER — TELEPHONE (OUTPATIENT)
Dept: CARDIOLOGY | Facility: HOSPITAL | Age: 63
End: 2023-08-15

## 2023-08-15 ENCOUNTER — INFUSION (OUTPATIENT)
Dept: INFUSION THERAPY | Facility: HOSPITAL | Age: 63
End: 2023-08-15
Attending: INTERNAL MEDICINE
Payer: COMMERCIAL

## 2023-08-15 VITALS
RESPIRATION RATE: 18 BRPM | HEART RATE: 64 BPM | OXYGEN SATURATION: 98 % | TEMPERATURE: 98 F | BODY MASS INDEX: 24.47 KG/M2 | HEIGHT: 61 IN | SYSTOLIC BLOOD PRESSURE: 129 MMHG | WEIGHT: 129.63 LBS | DIASTOLIC BLOOD PRESSURE: 82 MMHG

## 2023-08-15 DIAGNOSIS — D51.3 OTHER DIETARY VITAMIN B12 DEFICIENCY ANEMIA: Primary | ICD-10-CM

## 2023-08-15 PROCEDURE — 63600175 PHARM REV CODE 636 W HCPCS: Performed by: INTERNAL MEDICINE

## 2023-08-15 PROCEDURE — 96372 THER/PROPH/DIAG INJ SC/IM: CPT

## 2023-08-15 RX ORDER — CYANOCOBALAMIN 1000 UG/ML
1000 INJECTION, SOLUTION INTRAMUSCULAR; SUBCUTANEOUS
Status: CANCELLED | OUTPATIENT
Start: 2023-08-15

## 2023-08-15 RX ORDER — CYANOCOBALAMIN 1000 UG/ML
1000 INJECTION, SOLUTION INTRAMUSCULAR; SUBCUTANEOUS
Status: DISCONTINUED | OUTPATIENT
Start: 2023-08-15 | End: 2023-08-15 | Stop reason: HOSPADM

## 2023-08-15 RX ADMIN — CYANOCOBALAMIN 1000 MCG: 1000 INJECTION, SOLUTION INTRAMUSCULAR at 02:08

## 2023-08-15 NOTE — TELEPHONE ENCOUNTER
Patient advised, test will be at [facility] ([address]  Will need to register on the first floor at the main entrance.  Patient advised that arrival time is 6:40.  Patient advised that she may be here about 3.5-4 hours, and may want to bring something to occupy their time, as there will be periods of waiting.    Patient advised, may take her medications prior to testing if you need to.   Advised if she needs to eat to take her medications, please keep it light, like toast and juice.    Patient advised to avoid all caffeine 12 hours prior to testing.  This includes decaf tea and coffee.    Will provide peanut butter crackers for a snack after stress test.  If patient would prefer something else, please bring a snack from home.    Wear comfortable clothing.  No lotions, oils, or powders to the upper chest area. May wear deodorant.    No metal jewelry, buttons, or zippers to the upper body.  Patient verbalizes understanding of instructions.

## 2023-08-16 ENCOUNTER — HOSPITAL ENCOUNTER (OUTPATIENT)
Dept: RADIOLOGY | Facility: HOSPITAL | Age: 63
Discharge: HOME | End: 2023-08-16
Attending: INTERNAL MEDICINE
Payer: COMMERCIAL

## 2023-08-16 ENCOUNTER — HOSPITAL ENCOUNTER (OUTPATIENT)
Dept: CARDIOLOGY | Facility: HOSPITAL | Age: 63
Discharge: HOME | End: 2023-08-16
Attending: INTERNAL MEDICINE
Payer: COMMERCIAL

## 2023-08-16 VITALS — HEIGHT: 61 IN | WEIGHT: 128 LBS | BODY MASS INDEX: 24.17 KG/M2

## 2023-08-16 DIAGNOSIS — R53.83 OTHER FATIGUE: ICD-10-CM

## 2023-08-16 DIAGNOSIS — R00.1 SINUS BRADYCARDIA: ICD-10-CM

## 2023-08-16 LAB
CV STRESS BASE HR: 62 BPM
DIASTOLIC BLOOD PRESSURE: 95 MMHG
EJECTION FRACTION- HIGH: 65 %
END DIASTOLIC INDEX-HIGH: 153 ML/M2
END DIASTOLIC INDEX-LOW: 93 ML/M2
END SYSTOLIC INDEX-HIGH: 71 ML/M2
END SYSTOLIC INDEX-LOW: 31 ML/M2
NUC REST DIASTOLIC VOLUME INDEX: 87
NUC REST EJECTION FRACTION: 62
NUC REST SYSTOLIC VOLUME INDEX: 33
NUC STRESS DIASTOLIC VOLUME INDEX: 79
NUC STRESS EJECTION FRACTION: 71 %
NUC STRESS SYSTOLIC VOLUME INDEX: 23
OHS CV CPX 1 MINUTE RECOVERY HEART RATE: 105 BPM
OHS CV CPX 85 PERCENT MAX PREDICTED HEART RATE MALE: 130
OHS CV CPX ESTIMATED METS: 7
OHS CV CPX MAX PREDICTED HEART RATE: 153
OHS CV CPX PATIENT IS FEMALE: 1
OHS CV CPX PATIENT IS MALE: 0
OHS CV CPX PEAK DIASTOLIC BLOOD PRESSURE: 64 MMHG
OHS CV CPX PEAK HEAR RATE: 134 BPM
OHS CV CPX PEAK RATE PRESSURE PRODUCT: NORMAL
OHS CV CPX PEAK SYSTOLIC BLOOD PRESSURE: 177 MMHG
OHS CV CPX PERCENT MAX PREDICTED HEART RATE ACHIEVED: 87
OHS CV CPX RATE PRESSURE PRODUCT PRESENTING: 9114
RETIRED EF AND QEF - SEE NOTES: 53 %
STRESS ECHO POST EXERCISE DUR MIN: 6 MINUTES
STRESS ECHO POST EXERCISE DUR SEC: 27 SECONDS
SYSTOLIC BLOOD PRESSURE: 147 MMHG

## 2023-08-16 PROCEDURE — 93016 CV STRESS TEST SUPVJ ONLY: CPT | Mod: ,,, | Performed by: NURSE PRACTITIONER

## 2023-08-16 PROCEDURE — 93306 ECHO (CUPID ONLY): ICD-10-PCS | Mod: 26,,, | Performed by: INTERNAL MEDICINE

## 2023-08-16 PROCEDURE — 93016 NUCLEAR STRESS - CARDIOLOGY INTERPRETED (CUPID ONLY): ICD-10-PCS | Mod: ,,, | Performed by: NURSE PRACTITIONER

## 2023-08-16 PROCEDURE — 93306 TTE W/DOPPLER COMPLETE: CPT | Mod: 26,,, | Performed by: INTERNAL MEDICINE

## 2023-08-16 PROCEDURE — 93018 NUCLEAR STRESS - CARDIOLOGY INTERPRETED (CUPID ONLY): ICD-10-PCS | Mod: ,,, | Performed by: INTERNAL MEDICINE

## 2023-08-16 PROCEDURE — 93306 TTE W/DOPPLER COMPLETE: CPT

## 2023-08-16 PROCEDURE — 78452 HT MUSCLE IMAGE SPECT MULT: CPT | Mod: 26,,, | Performed by: INTERNAL MEDICINE

## 2023-08-16 PROCEDURE — 93018 CV STRESS TEST I&R ONLY: CPT | Mod: ,,, | Performed by: INTERNAL MEDICINE

## 2023-08-16 PROCEDURE — 93017 CV STRESS TEST TRACING ONLY: CPT

## 2023-08-16 PROCEDURE — 78452 NUCLEAR STRESS - CARDIOLOGY INTERPRETED (CUPID ONLY): ICD-10-PCS | Mod: 26,,, | Performed by: INTERNAL MEDICINE

## 2023-08-17 LAB
AORTIC ROOT ANNULUS: 2.9 CM
AORTIC VALVE CUSP SEPERATION: 1.6 CM
AV INDEX (PROSTH): 0.79
AV MEAN GRADIENT: 4 MMHG
AV PEAK GRADIENT: 8 MMHG
AV VALVE AREA BY VELOCITY RATIO: 2.45 CM²
AV VALVE AREA: 2.49 CM²
AV VELOCITY RATIO: 0.78
BSA FOR ECHO PROCEDURE: 1.58 M2
CV ECHO LV RWT: 0.86 CM
DOP CALC AO PEAK VEL: 1.41 M/S
DOP CALC AO VTI: 31.6 CM
DOP CALC LVOT AREA: 3.1 CM2
DOP CALC LVOT DIAMETER: 2 CM
DOP CALC LVOT PEAK VEL: 1.1 M/S
DOP CALC LVOT STROKE VOLUME: 78.81 CM3
DOP CALCLVOT PEAK VEL VTI: 25.1 CM
E WAVE DECELERATION TIME: 222 MSEC
E/A RATIO: 1.18
E/E' RATIO: 8.46 M/S
ECHO LV POSTERIOR WALL: 1.41 CM (ref 0.6–1.1)
FRACTIONAL SHORTENING: 32 % (ref 28–44)
INTERVENTRICULAR SEPTUM: 1.51 CM (ref 0.6–1.1)
IVRT: 100 MSEC
LEFT ATRIUM SIZE: 3.6 CM
LEFT INTERNAL DIMENSION IN SYSTOLE: 2.23 CM (ref 2.1–4)
LEFT VENTRICLE DIASTOLIC VOLUME INDEX: 27.88 ML/M2
LEFT VENTRICLE DIASTOLIC VOLUME: 43.5 ML
LEFT VENTRICLE MASS INDEX: 109 G/M2
LEFT VENTRICLE SYSTOLIC VOLUME INDEX: 10.8 ML/M2
LEFT VENTRICLE SYSTOLIC VOLUME: 16.8 ML
LEFT VENTRICULAR INTERNAL DIMENSION IN DIASTOLE: 3.28 CM (ref 3.5–6)
LEFT VENTRICULAR MASS: 169.53 G
LV LATERAL E/E' RATIO: 7.86 M/S
LV SEPTAL E/E' RATIO: 9.17 M/S
LVOT MG: 2 MMHG
LVOT MV: 0.69 CM/S
MV PEAK A VEL: 0.93 M/S
MV PEAK E VEL: 1.1 M/S
MV STENOSIS PRESSURE HALF TIME: 59 MS
MV VALVE AREA P 1/2 METHOD: 3.73 CM2
PISA TR MAX VEL: 2.13 M/S
RA PRESSURE ESTIMATED: 3 MMHG
RIGHT VENTRICULAR END-DIASTOLIC DIMENSION: 1.56 CM
RV TB RVSP: 5 MMHG
TDI LATERAL: 0.14 M/S
TDI SEPTAL: 0.12 M/S
TDI: 0.13 M/S
TR MAX PG: 18 MMHG
TV REST PULMONARY ARTERY PRESSURE: 21 MMHG
Z-SCORE OF LEFT VENTRICULAR DIMENSION IN END DIASTOLE: -3.16
Z-SCORE OF LEFT VENTRICULAR DIMENSION IN END SYSTOLE: -1.78

## 2023-08-31 LAB — CRC RECOMMENDATION EXT: NORMAL

## 2023-09-05 ENCOUNTER — PATIENT OUTREACH (OUTPATIENT)
Dept: ADMINISTRATIVE | Facility: HOSPITAL | Age: 63
End: 2023-09-05
Payer: COMMERCIAL

## 2023-11-07 ENCOUNTER — INFUSION (OUTPATIENT)
Dept: INFUSION THERAPY | Facility: HOSPITAL | Age: 63
End: 2023-11-07
Attending: INTERNAL MEDICINE
Payer: COMMERCIAL

## 2023-11-07 VITALS
RESPIRATION RATE: 18 BRPM | OXYGEN SATURATION: 95 % | WEIGHT: 133.88 LBS | HEART RATE: 64 BPM | HEIGHT: 61 IN | TEMPERATURE: 97 F | SYSTOLIC BLOOD PRESSURE: 122 MMHG | DIASTOLIC BLOOD PRESSURE: 68 MMHG | BODY MASS INDEX: 25.28 KG/M2

## 2023-11-07 DIAGNOSIS — D51.3 OTHER DIETARY VITAMIN B12 DEFICIENCY ANEMIA: Primary | ICD-10-CM

## 2023-11-07 PROCEDURE — 96372 THER/PROPH/DIAG INJ SC/IM: CPT

## 2023-11-07 PROCEDURE — 63600175 PHARM REV CODE 636 W HCPCS: Performed by: INTERNAL MEDICINE

## 2023-11-07 RX ORDER — CYANOCOBALAMIN 1000 UG/ML
1000 INJECTION, SOLUTION INTRAMUSCULAR; SUBCUTANEOUS
Status: CANCELLED | OUTPATIENT
Start: 2023-11-07

## 2023-11-07 RX ORDER — CYANOCOBALAMIN 1000 UG/ML
1000 INJECTION, SOLUTION INTRAMUSCULAR; SUBCUTANEOUS
Status: DISCONTINUED | OUTPATIENT
Start: 2023-11-07 | End: 2023-11-07 | Stop reason: HOSPADM

## 2023-11-07 RX ADMIN — CYANOCOBALAMIN 1000 MCG: 1000 INJECTION, SOLUTION INTRAMUSCULAR at 03:11

## 2023-11-13 ENCOUNTER — OFFICE VISIT (OUTPATIENT)
Dept: ORTHOPEDICS | Facility: CLINIC | Age: 63
End: 2023-11-13
Payer: COMMERCIAL

## 2023-11-13 VITALS — BODY MASS INDEX: 24.55 KG/M2 | HEIGHT: 61 IN | WEIGHT: 130 LBS

## 2023-11-13 DIAGNOSIS — M47.816 FACET ARTHRITIS, DEGENERATIVE, LUMBAR SPINE: ICD-10-CM

## 2023-11-13 DIAGNOSIS — M51.36 DISC DEGENERATION, LUMBAR: ICD-10-CM

## 2023-11-13 DIAGNOSIS — M43.16 SPONDYLOLISTHESIS AT L4-L5 LEVEL: Primary | ICD-10-CM

## 2023-11-13 PROCEDURE — 1160F RVW MEDS BY RX/DR IN RCRD: CPT | Mod: CPTII,S$GLB,, | Performed by: ORTHOPAEDIC SURGERY

## 2023-11-13 PROCEDURE — 1159F PR MEDICATION LIST DOCUMENTED IN MEDICAL RECORD: ICD-10-PCS | Mod: CPTII,S$GLB,, | Performed by: ORTHOPAEDIC SURGERY

## 2023-11-13 PROCEDURE — 1160F PR REVIEW ALL MEDS BY PRESCRIBER/CLIN PHARMACIST DOCUMENTED: ICD-10-PCS | Mod: CPTII,S$GLB,, | Performed by: ORTHOPAEDIC SURGERY

## 2023-11-13 PROCEDURE — 3008F BODY MASS INDEX DOCD: CPT | Mod: CPTII,S$GLB,, | Performed by: ORTHOPAEDIC SURGERY

## 2023-11-13 PROCEDURE — 1159F MED LIST DOCD IN RCRD: CPT | Mod: CPTII,S$GLB,, | Performed by: ORTHOPAEDIC SURGERY

## 2023-11-13 PROCEDURE — 3008F PR BODY MASS INDEX (BMI) DOCUMENTED: ICD-10-PCS | Mod: CPTII,S$GLB,, | Performed by: ORTHOPAEDIC SURGERY

## 2023-11-13 PROCEDURE — 99213 PR OFFICE/OUTPT VISIT, EST, LEVL III, 20-29 MIN: ICD-10-PCS | Mod: S$GLB,,, | Performed by: ORTHOPAEDIC SURGERY

## 2023-11-13 PROCEDURE — 99213 OFFICE O/P EST LOW 20 MIN: CPT | Mod: S$GLB,,, | Performed by: ORTHOPAEDIC SURGERY

## 2023-11-13 NOTE — PROGRESS NOTES
Subjective:       Patient ID: Mary Mike is a 63 y.o. female.    Chief Complaint: Pain of the Lumbar Spine (Lumbar MBB/RFA f/I Dr Davis, the last RFA injection did not work at all, pain is constant, worse in mornings, pain to posterior hip, has some pain to right leg to knee, no num,bness)      History of Present Illness    Prior to meeting with the patient I reviewed the medical chart in [facility]. This included reviewing the previous progress notes from our office, review of the patient's last appointment with their primary care provider, review of any visits to the emergency room, and review of any pain management appointments or procedures.   Patient is here follow-up for low back pain and pain management evaluation and treatment.  Patient was referred to pain management for medial branch blocks and rhizotomy with Dr. Sanders, L3-4 and L4-5.  Per the treating physician's progress note, his plan was to proceed with L4-5 and L5-S1 medial branch blocks and the patient did not have success with this consider an L3-4 medial branch block with RFA if indicated.    When we referred her to Pain Management, she was not having radiculitis or radicular symptoms.  We thought that her primary pain generator was from her lower back and axial back pain facet arthritis.  She had an L4 on L5 anterolisthesis as well.    Today she is continuing to complain of low back and hip or buttocks pain with radiation into the right lower extremity to the knee.  To the best of her knowledge, she believes that all she had was the medial branch blocks and rhizotomy is.  She had a series of 4 procedures.  The 1st 3 seem to be helping, the last 1 did not help at all.  She last saw him she believes over the summertime so it has been a few months since she saw him last.    She continues to complain of primarily back greater than leg pain but she does complain of bilateral low back pain, bilateral hip and buttocks pain, and occasional right  lower extremity pain that does not radiate below the knee.    Current Medications  Current Outpatient Medications   Medication Sig Dispense Refill    FLUoxetine 20 MG capsule TAKE 1 CAPSULE(20 MG) BY MOUTH EVERY EVENING 90 capsule 2    HYDROcodone-acetaminophen (NORCO) 5-325 mg per tablet Take 1 tablet by mouth every 6 (six) hours as needed for Pain. 12 tablet 0    multivitamin (THERAGRAN) tablet Take 1 tablet by mouth once daily.      ondansetron (ZOFRAN) 4 MG tablet Take 1 tablet (4 mg total) by mouth every 6 (six) hours. 12 tablet 0    pantoprazole (PROTONIX) 40 MG tablet Take 1 tablet (40 mg total) by mouth once daily. 30 tablet 11    pramipexole (MIRAPEX) 1 MG tablet Take 1.5 tablets (1.5 mg total) by mouth every evening. 135 tablet 1    traZODone (DESYREL) 100 MG tablet Take 1 tablet (100 mg total) by mouth every evening. 30 tablet 11     No current facility-administered medications for this visit.       Allergies  Review of patient's allergies indicates:  No Known Allergies    Past Medical History  Past Medical History:   Diagnosis Date    ADHD (attention deficit hyperactivity disorder)     Allergy     Anemia     Anemia associated with nutritional deficiency 7/18/2023    Anxiety     Depression     Fatty liver     History of endoscopy 8/26/2021    Dr. Toney Cuevas-Z-line is irregular and was found 37 cm from the incisors.  Scattered ulcerations noted between 36-37 cm. 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  The gastrojejunal anastomosis was characterized by moderate stenosis and ulceration.  This was traversed.  The pouch to jejunum limb was characterized by erythema, friable mucosa, inflammation and ulceration.  The examine    Insomnia     Migraines     Osteoporosis     Restless leg syndrome 2005       Surgical History  Past Surgical History:   Procedure Laterality Date    BREAST BIOPSY Left     benign 15 +years ago    BREAST SURGERY  2017    breast reduction    BUNIONECTOMY Right 2018      SECTION  1987    FRACTURE SURGERY  2016    ankle    GASTRIC RESTRICTION SURGERY      GASTRIC SLEEVE 2012    LAPAROSCOPIC CHOLECYSTECTOMY N/A 10/10/2022    Procedure: CHOLECYSTECTOMY, LAPAROSCOPIC;  Surgeon: Henrietta Joseph MD;  Location: [facility] OR;  Service: General;  Laterality: N/A;    REPAIR OF EXTENSOR TENDON  2021    Procedure: REPAIR, TENDON, EXTENSOR;  Surgeon: Aly Zimmerman DPM;  Location: [facility] OR;  Service: Podiatry;;    SURGICAL REMOVAL OF METATARSAL HEAD Right 2021    Procedure: OSTECTOMY, METATARSAL BONE, HEAD;  Surgeon: Aly Zimmerman DPM;  Location: [facility] OR;  Service: Podiatry;  Laterality: Right;    TOTAL REDUCTION MAMMOPLASTY             Family History:   Family History   Problem Relation Age of Onset    Arthritis Mother     Colon cancer Mother     Diabetes Mother     Early death Mother     Miscarriages / Stillbirths Mother     Arthritis Father     Heart disease Father     Hypertension Father     Stroke Father     Vision loss Father     Breast cancer Maternal Aunt 52    Vaginal cancer Paternal Grandmother     Heart disease Paternal Grandfather        Social History:   Social History     Socioeconomic History    Marital status:    Tobacco Use    Smoking status: Never    Smokeless tobacco: Never   Substance and Sexual Activity    Alcohol use: Yes     Comment: occassional    Drug use: No    Sexual activity: Yes     Social Determinants of Health     Financial Resource Strain: Low Risk  (10/21/2021)    Overall Financial Resource Strain (CARDIA)     Difficulty of Paying Living Expenses: Not very hard   Food Insecurity: No Food Insecurity (10/21/2021)    Hunger Vital Sign     Worried About Running Out of Food in the Last Year: Never true     Ran Out of Food in the Last Year: Never true   Transportation Needs: No Transportation Needs (10/21/2021)    PRAPARE - Transportation     Lack of Transportation (Medical): No     Lack of Transportation (Non-Medical): No   Physical  Activity: Sufficiently Active (10/21/2021)    Exercise Vital Sign     Days of Exercise per Week: 7 days     Minutes of Exercise per Session: 30 min   Stress: Stress Concern Present (10/21/2021)    [employer] - Occupational Stress Questionnaire     Feeling of Stress : To some extent   Social Connections: Moderately Integrated (10/21/2021)    Social Connection and Isolation Panel [NHANES]     Frequency of Communication with Friends and Family: More than three times a week     Frequency of Social Gatherings with Friends and Family: More than three times a week     Attends Mandaeism Services: More than 4 times per year     Active Member of Clubs or Organizations: No     Attends Club or Organization Meetings: Never     Marital Status:    Housing Stability: Low Risk  (10/21/2021)    Housing Stability Vital Sign     Unable to Pay for Housing in the Last Year: No     Number of Places Lived in the Last Year: 1     Unstable Housing in the Last Year: No       Hospitalization/Major Diagnostic Procedure:     Review of Systems     General/Constitutional:  Chills denies. Fatigue denies. Fever denies. Weight gain denies. Weight loss denies.    Respiratory:  Shortness of breath denies.    Cardiovascular:  Chest pain denies.    Gastrointestinal:  Constipation denies. Diarrhea denies. Nausea denies. Vomiting denies.     Hematology:  Easy bruising denies. Prolonged bleeding denies.     Genitourinary:  Frequent urination denies. Pain in lower back denies. Painful urination denies.     Musculoskeletal:  See HPI for details    Skin:  Rash denies.    Neurologic:  Dizziness denies. Gait abnormalities denies. Seizures denies. Tingling/Numbess denies.    Psychiatric:  Anxiety denies. Depressed mood denies.     Objective:   Vital Signs: There were no vitals filed for this visit.     Physical Exam      General Examination:     Constitutional: The patient is alert and oriented to lace person and time. Mood is  "pleasant.     Head/Face: Normal facial features normal eyebrows    Eyes: Normal extraocular motion bilaterally    Lungs: Respirations are equal and unlabored    Gait is coordinated.    Cardiovascular: There are no swelling or varicosities present.    Lymphatic: Negative for adenopathy    Skin: Normal    Neurological: Level of consciousness normal. Oriented to place person and time and situation    Psychiatric: Oriented to time place person and situation    Examination lumbar spine, patient stands erect, she has a nonantalgic gait, non antalgic sit to stand.  Bilateral lower extremities neurovascularly intact.  She has a mildly positive right straight leg raise.    XRAY Report/ Interpretation:  AP and lateral views lumbar spine taken today in addition to lumbar flexion and extension views demonstrate degenerative changes at L4-5, she also has some anterolisthesis of L4 on L5.  Not significantly exacerbated or increased with flexion or extension views.  She also has significant facet arthritis L3-S1.  There is also some appearance of foraminal narrowing on the lateral view.      Assessment:       1. Spondylolisthesis at L4-L5 level    2. Disc degeneration, lumbar    3. Facet arthritis, degenerative, lumbar spine        Plan:       Mary was seen today for pain.    Diagnoses and all orders for this visit:    Spondylolisthesis at L4-L5 level  -     X-Ray Lumbar Spine Ap And Lateral  -     X-Ray Lumbar Spine Flexion And Extension Only  -     Ambulatory referral/consult to Pain Clinic; Future    Disc degeneration, lumbar  -     X-Ray Lumbar Spine Ap And Lateral  -     X-Ray Lumbar Spine Flexion And Extension Only  -     Ambulatory referral/consult to Pain Clinic; Future    Facet arthritis, degenerative, lumbar spine         Follow up in about 2 months (around 1/13/2024) for 2 month f/u lumbar pain.  This is to attest that the physician's assistant Zack Hull served in the capacity as a scribe" for this patient " encounter.  This is also to verify that I have reviewed the patient's history and helped formulate the treatment plan and discussed it with the physician's assistant.  I have actively participated in the evaluation and treatment plan for this patient is visit.  The treatment plan and medical decision-making is outlined below:    63-year-old female, returns to clinic today to follow-up on complaints of chronic low back pain greater than lower extremity pain.  She does have bilateral lower back and buttocks pain with occasional pain into her right lower extremity.  Review of the MRI shows that she had some foraminal narrowing however this was predominantly left-sided.  Does not correlate clinically with her symptoms and her complaints of primarily back greater than leg pain.  We had a detailed discussion today about surgical intervention which would include more than likely lumbar fusion at L4-5 with interbody device.  In an effort to be truly thorough in our diagnostic efforts, I do not think that diskography would be of benefit as the patient is so degenerated at the L4-5 level.  However a trial of a interlaminar L4-5 epidural steroid injection if this is discogenic as opposed to facetogenic in nature and the exhaustive medial branch block and rhizotomy trials have failed.  This is what I would recommend next.  Now we had a detailed discussion that a series of 3 epidural steroid injections may be required.  That if 1 injection is not sufficient that a 2nd injection may be indicated.  However, if 2 of the 3 injections do not provide her any significant relief that we would revisit the conversation of surgery.  She will follow-up with us after the 1st of the year.    Treatment options were discussed with regards to the nature of the medical condition. Conservative pain intervention and surgical options were discussed in detail. The probability of success of each separate treatment option was discussed. The patient  expressed a clear understanding of the treatment options. With regards to surgery, the procedure risk, benefits, complications, and outcomes were discussed. No guarantees were given with regards to surgical outcome.   The risk of complications, morbidity, and mortality of patient management decisions have been made at the time of this visit. These are associated with the patient's problems, diagnostic procedures and treatment options. This includes the possible management options selected and those considered but not selected by the patient after shared medical decision making we discussed with the patient.     This note was created using Dragon voice recognition software that occasionally misinterpreted phrases or words.

## 2023-11-14 ENCOUNTER — PATIENT MESSAGE (OUTPATIENT)
Dept: FAMILY MEDICINE | Facility: CLINIC | Age: 63
End: 2023-11-14

## 2023-11-15 ENCOUNTER — PATIENT MESSAGE (OUTPATIENT)
Dept: ADMINISTRATIVE | Facility: HOSPITAL | Age: 63
End: 2023-11-15
Payer: COMMERCIAL

## 2023-11-16 ENCOUNTER — OFFICE VISIT (OUTPATIENT)
Dept: FAMILY MEDICINE | Facility: CLINIC | Age: 63
End: 2023-11-16
Payer: COMMERCIAL

## 2023-11-16 VITALS
BODY MASS INDEX: 24.98 KG/M2 | DIASTOLIC BLOOD PRESSURE: 70 MMHG | HEART RATE: 76 BPM | TEMPERATURE: 99 F | OXYGEN SATURATION: 96 % | RESPIRATION RATE: 18 BRPM | WEIGHT: 132.31 LBS | HEIGHT: 61 IN | SYSTOLIC BLOOD PRESSURE: 108 MMHG

## 2023-11-16 DIAGNOSIS — N30.01 ACUTE CYSTITIS WITH HEMATURIA: ICD-10-CM

## 2023-11-16 DIAGNOSIS — R30.0 DYSURIA: ICD-10-CM

## 2023-11-16 DIAGNOSIS — R21 DISCOID RASH: Primary | ICD-10-CM

## 2023-11-16 LAB
BILIRUB UR QL STRIP: NEGATIVE
GLUCOSE UR QL STRIP: NEGATIVE
KETONES UR QL STRIP: NEGATIVE
LEUKOCYTE ESTERASE UR QL STRIP: NEGATIVE
PH, POC UA: 6.5 (ref 5–8.5)
POC BLOOD, URINE: POSITIVE
POC NITRATES, URINE: NEGATIVE
PROT UR QL STRIP: NEGATIVE
SP GR UR STRIP: 1.01 (ref 1–1.03)
UROBILINOGEN UR STRIP-ACNC: NORMAL (ref 0.2–8)

## 2023-11-16 PROCEDURE — 99214 OFFICE O/P EST MOD 30 MIN: CPT | Mod: S$GLB,,, | Performed by: NURSE PRACTITIONER

## 2023-11-16 PROCEDURE — 3078F PR MOST RECENT DIASTOLIC BLOOD PRESSURE < 80 MM HG: ICD-10-PCS | Mod: CPTII,S$GLB,, | Performed by: NURSE PRACTITIONER

## 2023-11-16 PROCEDURE — 3074F SYST BP LT 130 MM HG: CPT | Mod: CPTII,S$GLB,, | Performed by: NURSE PRACTITIONER

## 2023-11-16 PROCEDURE — 1160F PR REVIEW ALL MEDS BY PRESCRIBER/CLIN PHARMACIST DOCUMENTED: ICD-10-PCS | Mod: CPTII,S$GLB,, | Performed by: NURSE PRACTITIONER

## 2023-11-16 PROCEDURE — 1160F RVW MEDS BY RX/DR IN RCRD: CPT | Mod: CPTII,S$GLB,, | Performed by: NURSE PRACTITIONER

## 2023-11-16 PROCEDURE — 3074F PR MOST RECENT SYSTOLIC BLOOD PRESSURE < 130 MM HG: ICD-10-PCS | Mod: CPTII,S$GLB,, | Performed by: NURSE PRACTITIONER

## 2023-11-16 PROCEDURE — 1159F MED LIST DOCD IN RCRD: CPT | Mod: CPTII,S$GLB,, | Performed by: NURSE PRACTITIONER

## 2023-11-16 PROCEDURE — 3008F BODY MASS INDEX DOCD: CPT | Mod: CPTII,S$GLB,, | Performed by: NURSE PRACTITIONER

## 2023-11-16 PROCEDURE — 81003 POCT URINALYSIS, DIPSTICK, AUTOMATED, W/O SCOPE: ICD-10-PCS | Mod: QW,S$GLB,, | Performed by: NURSE PRACTITIONER

## 2023-11-16 PROCEDURE — 3078F DIAST BP <80 MM HG: CPT | Mod: CPTII,S$GLB,, | Performed by: NURSE PRACTITIONER

## 2023-11-16 PROCEDURE — 1159F PR MEDICATION LIST DOCUMENTED IN MEDICAL RECORD: ICD-10-PCS | Mod: CPTII,S$GLB,, | Performed by: NURSE PRACTITIONER

## 2023-11-16 PROCEDURE — 3008F PR BODY MASS INDEX (BMI) DOCUMENTED: ICD-10-PCS | Mod: CPTII,S$GLB,, | Performed by: NURSE PRACTITIONER

## 2023-11-16 PROCEDURE — 99214 PR OFFICE/OUTPT VISIT, EST, LEVL IV, 30-39 MIN: ICD-10-PCS | Mod: S$GLB,,, | Performed by: NURSE PRACTITIONER

## 2023-11-16 PROCEDURE — 81003 URINALYSIS AUTO W/O SCOPE: CPT | Mod: QW,S$GLB,, | Performed by: NURSE PRACTITIONER

## 2023-11-16 RX ORDER — NITROFURANTOIN 25; 75 MG/1; MG/1
100 CAPSULE ORAL 2 TIMES DAILY
Qty: 14 CAPSULE | Refills: 0 | Status: SHIPPED | OUTPATIENT
Start: 2023-11-16

## 2023-11-16 NOTE — PROGRESS NOTES
Subjective:       Patient ID: Mary Mike is a 63 y.o. female.    Chief Complaint: Urticaria and Urinary Tract Infection    Patient is a new patient to me here today as an established patient of Dr. Muniz for evaluation of rash.  She states that she has had recurrent and worsening round areas of rash pop up on her body throughout multiple spots.  It has been waxing and waning condition.  She is tried over-the-counter itch creams that have not been helpful.  This started about 3 months ago patient reports.  She has no history of autoimmune disease she states.  She is had no other illnesses or acute conditions that could be contributors.  She is had no change in lotions, laundry detergent, topical skin products, soaps, etc..  No new oral medications.  Patient is a normal weight for height with a BMI of 25.00    Urticaria  This is a new problem. The current episode started more than 1 month ago. The problem has been waxing and waning since onset. The affected locations include the back, left lower leg, right upper leg, right shoulder and left shoulder. The rash is characterized by redness, scaling, peeling and itchiness. She was exposed to nothing. Pertinent negatives include no anorexia, congestion, cough, diarrhea, eye pain, facial edema, fatigue, fever, joint pain, nail changes, rhinorrhea, shortness of breath, sore throat or vomiting. Past treatments include anti-itch cream and moisturizer. The treatment provided no relief. There is no history of allergies, asthma, eczema or varicella.   Urinary Tract Infection   This is a new problem. The current episode started in the past 7 days. The problem occurs intermittently. The problem has been waxing and waning. The quality of the pain is described as burning. The pain is at a severity of 2/10. The pain is mild. Associated symptoms include hematuria, urgency and rash. Pertinent negatives include no vomiting or constipation. She has tried increased fluids for  the symptoms. The treatment provided mild relief. There is no history of diabetes mellitus or recurrent UTIs.     Review of Systems   Constitutional:  Negative for activity change, fatigue, fever and unexpected weight change.   HENT:  Negative for congestion, hearing loss, rhinorrhea, sore throat and trouble swallowing.    Eyes:  Negative for pain, discharge and visual disturbance.   Respiratory:  Negative for cough, chest tightness, shortness of breath and wheezing.    Cardiovascular:  Negative for chest pain and palpitations.   Gastrointestinal:  Negative for anorexia, blood in stool, constipation, diarrhea and vomiting.   Endocrine: Negative for polydipsia and polyuria.   Genitourinary:  Positive for difficulty urinating, hematuria and urgency. Negative for dysuria and menstrual problem.   Musculoskeletal:  Negative for arthralgias, joint pain, joint swelling and neck pain.   Skin:  Positive for color change and rash. Negative for nail changes.   Neurological:  Positive for headaches. Negative for weakness.   Psychiatric/Behavioral:  Negative for confusion and dysphoric mood.        Past Medical History:   Diagnosis Date    ADHD (attention deficit hyperactivity disorder)     Allergy     Anemia     Anemia associated with nutritional deficiency 7/18/2023    Anxiety     Depression     Fatty liver     History of endoscopy 8/26/2021    Dr. Toney Cuevas-Z-line is irregular and was found 37 cm from the incisors.  Scattered ulcerations noted between 36-37 cm. 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  The gastrojejunal anastomosis was characterized by moderate stenosis and ulceration.  This was traversed.  The pouch to jejunum limb was characterized by erythema, friable mucosa, inflammation and ulceration.  The examine    Insomnia     Migraines     Osteoporosis     Restless leg syndrome 2005      Past Surgical History:   Procedure Laterality Date    BREAST BIOPSY Left     benign 15 +years ago    BREAST  SURGERY  2017    breast reduction    BUNIONECTOMY Right 2018     SECTION  1987    FRACTURE SURGERY  2016    ankle    GASTRIC RESTRICTION SURGERY      GASTRIC SLEEVE 2012    LAPAROSCOPIC CHOLECYSTECTOMY N/A 10/10/2022    Procedure: CHOLECYSTECTOMY, LAPAROSCOPIC;  Surgeon: Henrietta Joseph MD;  Location: [facility] OR;  Service: General;  Laterality: N/A;    REPAIR OF EXTENSOR TENDON  2021    Procedure: REPAIR, TENDON, EXTENSOR;  Surgeon: Aly Zimmerman DPM;  Location: [facility] OR;  Service: Podiatry;;    SURGICAL REMOVAL OF METATARSAL HEAD Right 2021    Procedure: OSTECTOMY, METATARSAL BONE, HEAD;  Surgeon: Aly Zimmerman DPM;  Location: [facility] OR;  Service: Podiatry;  Laterality: Right;    TOTAL REDUCTION MAMMOPLASTY             Family History   Problem Relation Age of Onset    Arthritis Mother     Colon cancer Mother     Diabetes Mother     Early death Mother     Miscarriages / Stillbirths Mother     Arthritis Father     Heart disease Father     Hypertension Father     Stroke Father     Vision loss Father     Breast cancer Maternal Aunt 52    Vaginal cancer Paternal Grandmother     Heart disease Paternal Grandfather        Social History     Socioeconomic History    Marital status:    Tobacco Use    Smoking status: Never    Smokeless tobacco: Never   Substance and Sexual Activity    Alcohol use: Yes     Comment: occassional    Drug use: No    Sexual activity: Yes     Social Determinants of Health     Financial Resource Strain: Low Risk  (10/21/2021)    Overall Financial Resource Strain (CARDIA)     Difficulty of Paying Living Expenses: Not very hard   Food Insecurity: No Food Insecurity (10/21/2021)    Hunger Vital Sign     Worried About Running Out of Food in the Last Year: Never true     Ran Out of Food in the Last Year: Never true   Transportation Needs: No Transportation Needs (10/21/2021)    PRAPARE - Transportation     Lack of Transportation (Medical): No     Lack of Transportation  "(Non-Medical): No   Physical Activity: Sufficiently Active (10/21/2021)    Exercise Vital Sign     Days of Exercise per Week: 7 days     Minutes of Exercise per Session: 30 min   Stress: Stress Concern Present (10/21/2021)    [place] [employer] - Occupational Stress Questionnaire     Feeling of Stress : To some extent   Social Connections: Moderately Integrated (10/21/2021)    Social Connection and Isolation Panel [NHANES]     Frequency of Communication with Friends and Family: More than three times a week     Frequency of Social Gatherings with Friends and Family: More than three times a week     Attends Sikhism Services: More than 4 times per year     Active Member of Clubs or Organizations: No     Attends Club or Organization Meetings: Never     Marital Status:    Housing Stability: Low Risk  (10/21/2021)    Housing Stability Vital Sign     Unable to Pay for Housing in the Last Year: No     Number of Places Lived in the Last Year: 1     Unstable Housing in the Last Year: No       Current Outpatient Medications   Medication Sig Dispense Refill    FLUoxetine 20 MG capsule TAKE 1 CAPSULE(20 MG) BY MOUTH EVERY EVENING 90 capsule 2    multivitamin (THERAGRAN) tablet Take 1 tablet by mouth once daily.      pantoprazole (PROTONIX) 40 MG tablet Take 1 tablet (40 mg total) by mouth once daily. 30 tablet 11    pramipexole (MIRAPEX) 1 MG tablet Take 1.5 tablets (1.5 mg total) by mouth every evening. 135 tablet 1    traZODone (DESYREL) 100 MG tablet Take 1 tablet (100 mg total) by mouth every evening. 30 tablet 11    nitrofurantoin, macrocrystal-monohydrate, (MACROBID) 100 MG capsule Take 1 capsule (100 mg total) by mouth 2 (two) times daily. 14 capsule 0     No current facility-administered medications for this visit.       Review of patient's allergies indicates:  No Known Allergies  Objective:      Blood pressure 108/70, pulse 76, temperature 98.6 °F (37 °C), resp. rate 18, height 5' 1" " (1.549 m), weight 60 kg (132 lb 4.8 oz), SpO2 96 %. Body mass index is 25 kg/m².   Physical Exam  Vitals and nursing note reviewed.   Constitutional:       General: She is not in acute distress.     Appearance: Normal appearance. She is well-developed. She is not ill-appearing.   HENT:      Head: Normocephalic and atraumatic.      Right Ear: External ear normal.      Left Ear: External ear normal.      Nose: Nose normal.      Mouth/Throat:      Mouth: Mucous membranes are moist.      Pharynx: Oropharynx is clear. No oropharyngeal exudate or posterior oropharyngeal erythema.   Eyes:      General: Lids are normal. Lids are everted, no foreign bodies appreciated. No scleral icterus.        Right eye: No discharge.         Left eye: No discharge.      Extraocular Movements: Extraocular movements intact.      Conjunctiva/sclera: Conjunctivae normal.      Pupils: Pupils are equal, round, and reactive to light.      Right eye: Pupil is round and reactive.      Left eye: Pupil is round and reactive.   Neck:      Trachea: Trachea normal.   Cardiovascular:      Rate and Rhythm: Normal rate and regular rhythm.      Pulses: Normal pulses.      Heart sounds: Normal heart sounds, S1 normal and S2 normal. No murmur heard.  Pulmonary:      Effort: Pulmonary effort is normal. No respiratory distress.      Breath sounds: Normal breath sounds.   Abdominal:      General: Abdomen is flat. Bowel sounds are normal. There is no distension.      Palpations: Abdomen is soft. Abdomen is not rigid.      Tenderness: There is no abdominal tenderness. There is no guarding.   Musculoskeletal:         General: Normal range of motion.      Cervical back: Normal range of motion and neck supple. No muscular tenderness.   Lymphadenopathy:      Cervical: No cervical adenopathy.   Skin:     General: Skin is warm and dry.      Capillary Refill: Capillary refill takes less than 2 seconds.      Findings: Erythema and rash present. Rash is crusting, macular  and scaling.             Comments: Discoid lesions noted to several spots on the body.  See photos.  No facial rash noted   Neurological:      General: No focal deficit present.      Mental Status: She is alert and oriented to person, place, and time.   Psychiatric:         Mood and Affect: Mood normal.         Behavior: Behavior normal. Behavior is cooperative.         Thought Content: Thought content normal.         Judgment: Judgment normal.                           Assessment:       1. Discoid rash    2. Dysuria    3. Acute cystitis with hematuria    4. BMI 25.0-25.9,adult        Plan:       Mary was seen today for urticaria and urinary tract infection.    Diagnoses and all orders for this visit:    Discoid rash  -     Rheumatoid Factor; Future  -     Sedimentation Rate; Future  -     ANAchoice Specific Ab w/ Rfx ds-DNA; Future  -     DRVVT; Future  -     C-reactive protein; Future  -     CBC Auto Differential; Future  -     Comprehensive Metabolic Panel; Future  -     BRO Screen w/Reflex; Future  -     Rheumatoid Factor  -     Sedimentation Rate  -     ANAchoice Specific Ab w/ Rfx ds-DNA  -     DRVVT  -     C-reactive protein  -     CBC Auto Differential  -     Comprehensive Metabolic Panel  -     BRO Screen w/Reflex    Dysuria  -     POCT Urinalysis, Dipstick, Automated, W/O Scope  -     Urine culture    Acute cystitis with hematuria  -     nitrofurantoin, macrocrystal-monohydrate, (MACROBID) 100 MG capsule; Take 1 capsule (100 mg total) by mouth 2 (two) times daily.    BMI 25.0-25.9,adult  Healthy diet advised.    Obtain labs today or tomorrow. Urine culture sent.    Will notify of labs when available and reviewed.        I spent a total of 30 minutes on the day of the visit.  This includes face to face time and non-face to face time preparing to see the patient (eg, review of tests), obtaining and/or reviewing separately obtained history, documenting clinical information in the electronic or other health  record, independently interpreting results and communicating results to the patient/family/caregiver, or care coordinator.

## 2023-11-16 NOTE — Clinical Note
Please review at your convenience. I saw your patient today with a new onset rash and ordered some blood work to further investigate.

## 2023-11-20 ENCOUNTER — PATIENT MESSAGE (OUTPATIENT)
Dept: FAMILY MEDICINE | Facility: CLINIC | Age: 63
End: 2023-11-20

## 2023-11-20 DIAGNOSIS — R21 DISCOID RASH: Primary | ICD-10-CM

## 2023-11-20 DIAGNOSIS — Z12.31 SCREENING MAMMOGRAM FOR BREAST CANCER: ICD-10-CM

## 2023-11-20 LAB
ALBUMIN SERPL-MCNC: 4.1 G/DL (ref 3.6–5.1)
ALBUMIN/GLOB SERPL: 1.6 (CALC) (ref 1–2.5)
ALP SERPL-CCNC: 62 U/L (ref 37–153)
ALT SERPL-CCNC: 26 U/L (ref 6–29)
ANA SER QL: NEGATIVE
AST SERPL-CCNC: 24 U/L (ref 10–35)
BACTERIA UR CULT: ABNORMAL
BACTERIA UR CULT: ABNORMAL
BASOPHILS # BLD AUTO: 29 CELLS/UL (ref 0–200)
BASOPHILS NFR BLD AUTO: 1 %
BILIRUB SERPL-MCNC: 0.4 MG/DL (ref 0.2–1.2)
BUN SERPL-MCNC: 7 MG/DL (ref 7–25)
BUN/CREAT SERPL: NORMAL (CALC) (ref 6–22)
CALCIUM SERPL-MCNC: 8.8 MG/DL (ref 8.6–10.4)
CHLORIDE SERPL-SCNC: 105 MMOL/L (ref 98–110)
CO2 SERPL-SCNC: 28 MMOL/L (ref 20–32)
CREAT SERPL-MCNC: 0.82 MG/DL (ref 0.5–1.05)
CRP SERPL-MCNC: 0.8 MG/L
EGFR: 81 ML/MIN/1.73M2
EOSINOPHIL # BLD AUTO: 212 CELLS/UL (ref 15–500)
EOSINOPHIL NFR BLD AUTO: 7.3 %
ERYTHROCYTE [DISTWIDTH] IN BLOOD BY AUTOMATED COUNT: 12.7 % (ref 11–15)
ERYTHROCYTE [SEDIMENTATION RATE] IN BLOOD BY WESTERGREN METHOD: 2 MM/H
GLOBULIN SER CALC-MCNC: 2.6 G/DL (CALC) (ref 1.9–3.7)
GLUCOSE SERPL-MCNC: 81 MG/DL (ref 65–99)
HCT VFR BLD AUTO: 41.4 % (ref 35–45)
HGB BLD-MCNC: 13.3 G/DL (ref 11.7–15.5)
LA 2 SCREEN W REFLEX-IMP: NORMAL
LYMPHOCYTES # BLD AUTO: 1102 CELLS/UL (ref 850–3900)
LYMPHOCYTES NFR BLD AUTO: 38 %
MCH RBC QN AUTO: 28.6 PG (ref 27–33)
MCHC RBC AUTO-ENTMCNC: 32.1 G/DL (ref 32–36)
MCV RBC AUTO: 89 FL (ref 80–100)
MONOCYTES # BLD AUTO: 334 CELLS/UL (ref 200–950)
MONOCYTES NFR BLD AUTO: 11.5 %
NEUTROPHILS # BLD AUTO: 1224 CELLS/UL (ref 1500–7800)
NEUTROPHILS NFR BLD AUTO: 42.2 %
OTHER ANTIBIOTIC SUSC ISLT: ABNORMAL
PLATELET # BLD AUTO: 229 THOUSAND/UL (ref 140–400)
PMV BLD REES-ECKER: 11 FL (ref 7.5–12.5)
POTASSIUM SERPL-SCNC: 4.4 MMOL/L (ref 3.5–5.3)
PROT SERPL-MCNC: 6.7 G/DL (ref 6.1–8.1)
RBC # BLD AUTO: 4.65 MILLION/UL (ref 3.8–5.1)
RHEUMATOID FACT SERPL-ACNC: <14 IU/ML
SCREEN APTT: 37 SECONDS
SCREEN DRVVT: 40 SECONDS
SODIUM SERPL-SCNC: 140 MMOL/L (ref 135–146)
WBC # BLD AUTO: 2.9 THOUSAND/UL (ref 3.8–10.8)

## 2023-11-21 NOTE — TELEPHONE ENCOUNTER
"I have sent a referral to Dr. Zonia castaneda on urgent basis to review you for the rash and see what she says.    I have also sent a order for mammogram which will be valid after 12/06/2023 after completion of 1 calendar year.    Please also check with Dr. Zonia Castaneda's office in case they do not call you.    Dr. Cristy MONTILLA.    Reason for Exam  Priority: Urgent  Dx: Discoid rash [R21 (ICD-10-CM)]   Comments: Kindly evaluate this patient for new onset of rash and I am not sure if we are dealing with discoid lupus or psoriasis.  Bothersome.  Started about a week or so ago.  Lupus screening tests are negative so far.  Not much at risk for HIV though she was not tested.      Dr. Cristy MONTILLA     Referral Details    Referred By   Referred To   Jimbo Muniz MD  [address] [place] [address]  [address]  [place] [address]  Phone: [phone]  Fax: [phone] Diagnoses: Discoid rash  Order: Ambulatory referral/consult to Dermatology  Reason: Specialty Services Required Zonia Castaneda MD  [address] [place] [address] [address]  [address]  [place] [address]  Phone: [phone]  Fax: [phone]        ===View-only below this line===      ----- Message -----       From:Mary Mike "Kary"       Sent:11/21/2023  5:28 AM CST         To:Patient Medical Advice Request Message List    Subject:Test results    Its also time for my mammogram to get scheduled. I have it done at [facility]. Thanks       ----- Message -----       From:Mary Mike "Kary"       Sent:11/21/2023  4:41 AM CST         To:Patient Medical Advice Request Message List    Subject:Test results    Also no family history of psoriasis.that I know of       ----- Message -----       From:Mary Mike "Kary"       Sent:11/21/2023  4:39 AM CST         To:Patient Medical Advice Request Message List    Subject:Test results    Youve referred me to Dr. Zonia Castaneda in the past and I did see her for something else. As far as pain goes, I suffer so bad with my lower back pain " "that any other pain seems like nothing. I do feel a bit swollen. Im losing a lot of my hair and Im tired with no energy. Leonidas sent an updated picture of the spot on my arm. Thank you       ----- Message -----       From:Jimbo Muniz MD       Sent:11/20/2023  9:07 PM CST         To:Mary Coppola"    Subject:Test results    Correction-please read the "nail" as mail.    Also do you have any joint pains?  Any family history of psoriasis?  Your white cell counts are on the low side which is another thing which needs to be explored if there is any autoimmune condition afflicted your body and causing this skin rashes.      ----- Message -----       From:Jimbo Muniz MD       Sent:11/20/2023  9:06 PM CST         To:Mary Coppola"    Subject:Test results    Abelino Hernandez.  This nail was directed to me.  I did review your labs and saw your pictures of the rash.  The lupus screening test is negative at this point.  I am still thinking about a condition called discoid lupus verses psoriasis.  Does psoriasis run in your family.  Have you seen any dermatologist and I would like to make a referral to dermatologist.  Please see the pictures on your phone also.      ----- Message -----       From:Mary Mike "Kary"       Sent:11/20/2023 11:14 AM CST         To:FRANCES Truong    Subject:Test results    Jori Banks just checking to see whats next for me. I know we are still waiting on some of the blood tests to come back. Anyway, Im just anxious. Thanks so much. Kary   "

## 2023-11-24 ENCOUNTER — TELEPHONE (OUTPATIENT)
Dept: DERMATOLOGY | Facility: CLINIC | Age: 63
End: 2023-11-24
Payer: COMMERCIAL

## 2023-11-24 ENCOUNTER — PATIENT OUTREACH (OUTPATIENT)
Dept: ADMINISTRATIVE | Facility: HOSPITAL | Age: 63
End: 2023-11-24
Payer: COMMERCIAL

## 2023-11-24 NOTE — TELEPHONE ENCOUNTER
Attempted to contact patient, no answer, lvm for a return call.     ----- Message from Juan Escobar sent at 11/24/2023  2:50 PM CST -----  Good afternoon,    The pt listed above is being referred from Jimbo Muniz for (new onset of rash). This is an internal referral. The referral is marked urgent. The next appointment is 12/08/23. Please advise or contact pt to schedule earlier appt at your earliest convenience.    Thank You,    Juan Escobar  [facility]

## 2023-11-24 NOTE — PROGRESS NOTES
Population Health Chart Review & Patient Outreach Details    Outreach Performed: NO    Additional Pop Health Notes:           Updates Requested / Reviewed:      Updated Care Coordination Note, Care Everywhere, , and Immunizations Reconciliation Completed or Queried: [place]         Health Maintenance Topics Overdue:    Health Maintenance Due   Topic Date Due    Shingles Vaccine (1 of 2) Never done    Hemoglobin A1c (Diabetic Prevention Screening)  07/26/2021    RSV Vaccine (Age 60+ and Pregnant patients) (1 - 1-dose 60+ series) Never done    Influenza Vaccine (1) 09/01/2023    COVID-19 Vaccine (3 - 2023-24 season) 09/01/2023    Mammogram  12/07/2023         Health Maintenance Topic(s) Outreach Outcomes & Actions Taken:    Breast Cancer Screening - Outreach Outcomes & Actions Taken  : Mammogram Screening Scheduled

## 2023-11-28 ENCOUNTER — TELEPHONE (OUTPATIENT)
Dept: FAMILY MEDICINE | Facility: CLINIC | Age: 63
End: 2023-11-28

## 2023-11-28 NOTE — TELEPHONE ENCOUNTER
Called patient and informed of lab results. Patient has an appointment scheduled with the dermatologist soon for evaluation. Rash has improved and faded to a brownish color but not completely resolved. Encouraged patient to keep follow up with dermatology.

## 2023-11-30 DIAGNOSIS — G25.81 RESTLESS LEG SYNDROME: ICD-10-CM

## 2023-11-30 RX ORDER — PRAMIPEXOLE DIHYDROCHLORIDE 1 MG/1
TABLET ORAL
Qty: 135 TABLET | Refills: 1 | Status: SHIPPED | OUTPATIENT
Start: 2023-11-30

## 2023-12-12 ENCOUNTER — INFUSION (OUTPATIENT)
Dept: INFUSION THERAPY | Facility: HOSPITAL | Age: 63
End: 2023-12-12
Attending: INTERNAL MEDICINE
Payer: COMMERCIAL

## 2023-12-12 VITALS
HEART RATE: 79 BPM | OXYGEN SATURATION: 99 % | WEIGHT: 133.63 LBS | RESPIRATION RATE: 18 BRPM | TEMPERATURE: 98 F | BODY MASS INDEX: 25.23 KG/M2 | SYSTOLIC BLOOD PRESSURE: 146 MMHG | HEIGHT: 61 IN | DIASTOLIC BLOOD PRESSURE: 85 MMHG

## 2023-12-12 DIAGNOSIS — D51.3 OTHER DIETARY VITAMIN B12 DEFICIENCY ANEMIA: Primary | ICD-10-CM

## 2023-12-12 PROCEDURE — 96372 THER/PROPH/DIAG INJ SC/IM: CPT

## 2023-12-12 PROCEDURE — 63600175 PHARM REV CODE 636 W HCPCS: Performed by: INTERNAL MEDICINE

## 2023-12-12 RX ORDER — CYANOCOBALAMIN 1000 UG/ML
1000 INJECTION, SOLUTION INTRAMUSCULAR; SUBCUTANEOUS
OUTPATIENT
Start: 2023-12-12

## 2023-12-12 RX ORDER — CYANOCOBALAMIN 1000 UG/ML
1000 INJECTION, SOLUTION INTRAMUSCULAR; SUBCUTANEOUS
Status: DISCONTINUED | OUTPATIENT
Start: 2023-12-12 | End: 2023-12-12 | Stop reason: HOSPADM

## 2023-12-12 RX ADMIN — CYANOCOBALAMIN 1000 MCG: 1000 INJECTION, SOLUTION INTRAMUSCULAR at 10:12

## 2023-12-12 NOTE — PLAN OF CARE
Problem: Fall Injury Risk  Goal: Absence of Fall and Fall-Related Injury  Outcome: Ongoing, Progressing  Intervention: Identify and Manage Contributors  Flowsheets (Taken 12/12/2023 1037)  Self-Care Promotion: safe use of adaptive equipment encouraged  Medication Review/Management: medications reviewed  Intervention: Promote Injury-Free Environment  Flowsheets (Taken 12/12/2023 1037)  Safety Promotion/Fall Prevention: assistive device/personal item within reach

## 2023-12-14 ENCOUNTER — OFFICE VISIT (OUTPATIENT)
Dept: DERMATOLOGY | Facility: CLINIC | Age: 63
End: 2023-12-14
Payer: COMMERCIAL

## 2023-12-14 DIAGNOSIS — L27.1 FIXED DRUG ERUPTION: ICD-10-CM

## 2023-12-14 PROCEDURE — 1159F PR MEDICATION LIST DOCUMENTED IN MEDICAL RECORD: ICD-10-PCS | Mod: CPTII,S$GLB,, | Performed by: STUDENT IN AN ORGANIZED HEALTH CARE EDUCATION/TRAINING PROGRAM

## 2023-12-14 PROCEDURE — 1159F MED LIST DOCD IN RCRD: CPT | Mod: CPTII,S$GLB,, | Performed by: STUDENT IN AN ORGANIZED HEALTH CARE EDUCATION/TRAINING PROGRAM

## 2023-12-14 PROCEDURE — 99213 PR OFFICE/OUTPT VISIT, EST, LEVL III, 20-29 MIN: ICD-10-PCS | Mod: S$GLB,,, | Performed by: STUDENT IN AN ORGANIZED HEALTH CARE EDUCATION/TRAINING PROGRAM

## 2023-12-14 PROCEDURE — 1160F RVW MEDS BY RX/DR IN RCRD: CPT | Mod: CPTII,S$GLB,, | Performed by: STUDENT IN AN ORGANIZED HEALTH CARE EDUCATION/TRAINING PROGRAM

## 2023-12-14 PROCEDURE — 1160F PR REVIEW ALL MEDS BY PRESCRIBER/CLIN PHARMACIST DOCUMENTED: ICD-10-PCS | Mod: CPTII,S$GLB,, | Performed by: STUDENT IN AN ORGANIZED HEALTH CARE EDUCATION/TRAINING PROGRAM

## 2023-12-14 PROCEDURE — 99213 OFFICE O/P EST LOW 20 MIN: CPT | Mod: S$GLB,,, | Performed by: STUDENT IN AN ORGANIZED HEALTH CARE EDUCATION/TRAINING PROGRAM

## 2023-12-14 RX ORDER — TRIAMCINOLONE ACETONIDE 1 MG/G
CREAM TOPICAL 2 TIMES DAILY
Qty: 80 G | Refills: 1 | Status: SHIPPED | OUTPATIENT
Start: 2023-12-14

## 2023-12-14 NOTE — PROGRESS NOTES
Subjective:      Patient ID:  Mary Mike is a 63 y.o. female who presents for   Chief Complaint   Patient presents with    Rash     rash     LOV 05/23/2022    Patient coming in today for a rash x's 4 month. States rash initial started in May of 2022. Rash went away after use of triamcinolone acetonide 0.1% (KENALOG) 0.1 % cream. Patient got a uti 4 months ago. She took azo pills during that time. The rash is dormant right now but still has discoloration in area of the rash.        Component 1 yr ago  Final Pathologic Diagnosis 1. Skin, left posterior shoulder, punch biopsy:  - FEATURES SUGGESTIVE OF A FIXED DRUG ERUPTION.  - THE LESION IS EXCORIATED/ ULCERATED AND IMPETIGINIZED.  Comment: Interp By Modesto Barnett M.D., Signed on 06/09/2022 at 13:37        Current Outpatient Medications:   ·  FLUoxetine 20 MG capsule, TAKE 1 CAPSULE(20 MG) BY MOUTH EVERY EVENING, Disp: 90 capsule, Rfl: 2  ·  multivitamin (THERAGRAN) tablet, Take 1 tablet by mouth once daily., Disp: , Rfl:   ·  pantoprazole (PROTONIX) 40 MG tablet, Take 1 tablet (40 mg total) by mouth once daily., Disp: 30 tablet, Rfl: 11  ·  pramipexole (MIRAPEX) 1 MG tablet, TAKE 1.5 TABLET BY MOUTH EVERY EVENING, Disp: 135 tablet, Rfl: 1  ·  traZODone (DESYREL) 100 MG tablet, Take 1 tablet (100 mg total) by mouth every evening., Disp: 30 tablet, Rfl: 11       Derm hx:  Week of may 9 developed UTI symptoms- took otc azo tablets and then broke out on Friday in a rash. Did have similar rash in the past but not this bad. She stopped the azo tablets when rash started. It has slowly been healing. Denies ocular, oral and genital lesions.   Denies taking any other OTC medications including NSAIDS, tylenol.              Review of Systems   Constitutional:  Negative for fever and chills.   Respiratory:  Negative for cough and shortness of breath.    Gastrointestinal:  Negative for nausea and vomiting.   Skin:  Positive for rash.       Objective:   Physical  Exam   Constitutional: She appears well-developed and well-nourished.   Neurological: She is alert and oriented to person, place, and time.   Psychiatric: She has a normal mood and affect.   Skin:   Areas Examined (abnormalities noted in diagram):   Chest / Axilla Inspection Performed  Abdomen Inspection Performed  Back Inspection Performed  RUE Inspected  LUE Inspection Performed  RLE Inspected  LLE Inspection Performed            Diagram Legend     Erythematous scaling macule/papule c/w actinic keratosis       Vascular papule c/w angioma      Pigmented verrucoid papule/plaque c/w seborrheic keratosis      Yellow umbilicated papule c/w sebaceous hyperplasia      Irregularly shaped tan macule c/w lentigo     1-2 mm smooth white papules consistent with Milia      Movable subcutaneous cyst with punctum c/w epidermal inclusion cyst      Subcutaneous movable cyst c/w pilar cyst      Firm pink to brown papule c/w dermatofibroma      Pedunculated fleshy papule(s) c/w skin tag(s)      Evenly pigmented macule c/w junctional nevus     Mildly variegated pigmented, slightly irregular-bordered macule c/w mildly atypical nevus      Flesh colored to evenly pigmented papule c/w intradermal nevus       Pink pearly papule/plaque c/w basal cell carcinoma      Erythematous hyperkeratotic cursted plaque c/w SCC      Surgical scar with no sign of skin cancer recurrence      Open and closed comedones      Inflammatory papules and pustules      Verrucoid papule consistent consistent with wart     Erythematous eczematous patches and plaques     Dystrophic onycholytic nail with subungual debris c/w onychomycosis     Umbilicated papule    Erythematous-base heme-crusted tan verrucoid plaque consistent with inflamed seborrheic keratosis     Erythematous Silvery Scaling Plaque c/w Psoriasis     See annotation      Assessment / Plan:        Fixed drug eruption  -     Ambulatory referral/consult to Dermatology  -     triamcinolone acetonide 0.1%  (KENALOG) 0.1 % cream; Apply topically 2 (two) times daily.  Dispense: 80 g; Refill: 1  Biopsy proven  Occurred previously due to azo tablets, has not been avoiding azo tablets, must avoid this completely in the future, discussed that eruption will return everytime she takes the offending agent  Hand out on fixed drug eruption provided  Can use tac cream on lower leg BID x 10 days  Hyperpigmentation may fade over time            No follow-ups on file.

## 2023-12-18 ENCOUNTER — HOSPITAL ENCOUNTER (OUTPATIENT)
Dept: RADIOLOGY | Facility: HOSPITAL | Age: 63
Discharge: HOME | End: 2023-12-18
Attending: INTERNAL MEDICINE
Payer: COMMERCIAL

## 2023-12-18 VITALS — WEIGHT: 133.63 LBS | HEIGHT: 61 IN | BODY MASS INDEX: 25.23 KG/M2

## 2023-12-18 DIAGNOSIS — Z12.31 SCREENING MAMMOGRAM FOR BREAST CANCER: ICD-10-CM

## 2023-12-18 PROCEDURE — 77067 SCR MAMMO BI INCL CAD: CPT | Mod: TC,PO

## 2024-01-30 ENCOUNTER — TELEPHONE (OUTPATIENT)
Dept: HEMATOLOGY/ONCOLOGY | Facility: CLINIC | Age: 64
End: 2024-01-30

## 2024-01-30 NOTE — TELEPHONE ENCOUNTER
I spoke with pt and reminded her to have labs done prior to appt here on 2/7/24 pt states that she would like to cx it and will call back to r/s, I sent Uzma YANG a message to cx appt.

## 2024-02-13 ENCOUNTER — PATIENT MESSAGE (OUTPATIENT)
Dept: DERMATOLOGY | Facility: CLINIC | Age: 64
End: 2024-02-13
Payer: COMMERCIAL

## 2024-02-28 DIAGNOSIS — F32.A MOOD DISORDER OF DEPRESSED TYPE: ICD-10-CM

## 2024-02-28 RX ORDER — FLUOXETINE HYDROCHLORIDE 20 MG/1
CAPSULE ORAL
Qty: 90 CAPSULE | Refills: 2 | Status: SHIPPED | OUTPATIENT
Start: 2024-02-28

## 2024-05-09 ENCOUNTER — INFUSION (OUTPATIENT)
Dept: INFUSION THERAPY | Facility: HOSPITAL | Age: 64
End: 2024-05-09
Attending: INTERNAL MEDICINE
Payer: COMMERCIAL

## 2024-05-09 VITALS
BODY MASS INDEX: 25.45 KG/M2 | HEART RATE: 66 BPM | WEIGHT: 138.31 LBS | HEIGHT: 62 IN | RESPIRATION RATE: 18 BRPM | DIASTOLIC BLOOD PRESSURE: 82 MMHG | SYSTOLIC BLOOD PRESSURE: 137 MMHG | TEMPERATURE: 98 F

## 2024-05-09 DIAGNOSIS — D51.3 OTHER DIETARY VITAMIN B12 DEFICIENCY ANEMIA: Primary | ICD-10-CM

## 2024-05-09 PROCEDURE — 96372 THER/PROPH/DIAG INJ SC/IM: CPT

## 2024-05-09 PROCEDURE — 63600175 PHARM REV CODE 636 W HCPCS: Performed by: INTERNAL MEDICINE

## 2024-05-09 RX ORDER — CYANOCOBALAMIN 1000 UG/ML
1000 INJECTION, SOLUTION INTRAMUSCULAR; SUBCUTANEOUS
OUTPATIENT
Start: 2024-05-09

## 2024-05-09 RX ORDER — CYANOCOBALAMIN 1000 UG/ML
1000 INJECTION, SOLUTION INTRAMUSCULAR; SUBCUTANEOUS
Status: DISCONTINUED | OUTPATIENT
Start: 2024-05-09 | End: 2024-05-09 | Stop reason: HOSPADM

## 2024-05-09 RX ADMIN — CYANOCOBALAMIN 1000 MCG: 1000 INJECTION, SOLUTION INTRAMUSCULAR at 10:05

## 2024-05-09 NOTE — PLAN OF CARE
Problem: Fatigue  Goal: Improved Activity Tolerance  5/9/2024 1015 by Erin Espinoza, RN  Outcome: Met  5/9/2024 1015 by Erin Espinoza, RN  Outcome: Progressing

## 2024-06-14 ENCOUNTER — HOSPITAL ENCOUNTER (EMERGENCY)
Facility: HOSPITAL | Age: 64
Discharge: HOME | End: 2024-06-14
Attending: EMERGENCY MEDICINE
Payer: COMMERCIAL

## 2024-06-14 VITALS
OXYGEN SATURATION: 96 % | WEIGHT: 135 LBS | BODY MASS INDEX: 24.69 KG/M2 | SYSTOLIC BLOOD PRESSURE: 128 MMHG | RESPIRATION RATE: 18 BRPM | HEART RATE: 66 BPM | TEMPERATURE: 99 F | DIASTOLIC BLOOD PRESSURE: 74 MMHG

## 2024-06-14 DIAGNOSIS — S16.1XXA STRAIN OF NECK MUSCLE, INITIAL ENCOUNTER: ICD-10-CM
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
## 2024-06-14 DIAGNOSIS — M25.512 ACUTE PAIN OF LEFT SHOULDER: Primary | ICD-10-CM

## 2024-06-14 DIAGNOSIS — T14.8XXA SKIN AVULSION: ICD-10-CM

## 2024-06-14 LAB
OHS QRS DURATION: 92 MS
OHS QTC CALCULATION: 413 MS

## 2024-06-14 PROCEDURE — 93005 ELECTROCARDIOGRAM TRACING: CPT | Performed by: INTERNAL MEDICINE

## 2024-06-14 PROCEDURE — 99284 EMERGENCY DEPT VISIT MOD MDM: CPT | Mod: 25

## 2024-06-14 PROCEDURE — 25000003 PHARM REV CODE 250: Performed by: NURSE PRACTITIONER

## 2024-06-14 RX ORDER — OXYCODONE AND ACETAMINOPHEN 7.5; 325 MG/1; MG/1
1 TABLET ORAL
Status: COMPLETED | OUTPATIENT
Start: 2024-06-14 | End: 2024-06-14

## 2024-06-14 RX ORDER — METHOCARBAMOL 500 MG/1
500 TABLET, FILM COATED ORAL 3 TIMES DAILY
Qty: 15 TABLET | Refills: 0 | Status: SHIPPED | OUTPATIENT
Start: 2024-06-14 | End: 2024-06-19

## 2024-06-14 RX ORDER — METHOCARBAMOL 500 MG/1
500 TABLET, FILM COATED ORAL
Status: COMPLETED | OUTPATIENT
Start: 2024-06-14 | End: 2024-06-14

## 2024-06-14 RX ORDER — DICLOFENAC SODIUM 50 MG/1
50 TABLET, DELAYED RELEASE ORAL 3 TIMES DAILY PRN
Qty: 20 TABLET | Refills: 2 | Status: SHIPPED | OUTPATIENT
Start: 2024-06-14 | End: 2024-06-14 | Stop reason: CLARIF

## 2024-06-14 RX ORDER — OXYCODONE AND ACETAMINOPHEN 5; 325 MG/1; MG/1
1 TABLET ORAL EVERY 6 HOURS PRN
Qty: 12 TABLET | Refills: 0 | Status: SHIPPED | OUTPATIENT
Start: 2024-06-14

## 2024-06-14 RX ORDER — MUPIROCIN 20 MG/G
OINTMENT TOPICAL 2 TIMES DAILY
Qty: 22 G | Refills: 0 | Status: SHIPPED | OUTPATIENT
Start: 2024-06-14

## 2024-06-14 RX ORDER — BACITRACIN 500 [USP'U]/G
OINTMENT TOPICAL
Status: COMPLETED | OUTPATIENT
Start: 2024-06-14 | End: 2024-06-14

## 2024-06-14 RX ORDER — LIDOCAINE HYDROCHLORIDE 10 MG/ML
10 INJECTION, SOLUTION EPIDURAL; INFILTRATION; INTRACAUDAL; PERINEURAL
Status: COMPLETED | OUTPATIENT
Start: 2024-06-14 | End: 2024-06-14

## 2024-06-14 RX ADMIN — BACITRACIN: 500 OINTMENT TOPICAL at 04:06

## 2024-06-14 RX ADMIN — METHOCARBAMOL 500 MG: 500 TABLET ORAL at 03:06

## 2024-06-14 RX ADMIN — LIDOCAINE HYDROCHLORIDE 100 MG: 10 INJECTION, SOLUTION EPIDURAL; INFILTRATION; INTRACAUDAL; PERINEURAL at 04:06

## 2024-06-14 RX ADMIN — OXYCODONE HYDROCHLORIDE AND ACETAMINOPHEN 1 TABLET: 7.5; 325 TABLET ORAL at 03:06

## 2024-06-14 NOTE — DISCHARGE INSTRUCTIONS
Taking medication as prescribed.  Return to the ED for any worsening symptoms or any other concerns otherwise follow up with the primary care doctor.  Keep your wounds clean and dry.
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
## 2024-06-22 LAB
OHS QRS DURATION: 92 MS
OHS QTC CALCULATION: 413 MS

## 2024-06-27 DIAGNOSIS — G25.81 RESTLESS LEG SYNDROME: ICD-10-CM

## 2024-06-28 RX ORDER — PRAMIPEXOLE DIHYDROCHLORIDE 1 MG/1
1.5 TABLET ORAL NIGHTLY
Qty: 135 TABLET | Refills: 0 | Status: SHIPPED | OUTPATIENT
Start: 2024-06-28

## 2024-07-08 ENCOUNTER — HOSPITAL ENCOUNTER (OUTPATIENT)
Dept: RADIOLOGY | Facility: HOSPITAL | Age: 64
Discharge: HOME | End: 2024-07-08
Attending: ORTHOPAEDIC SURGERY
Payer: COMMERCIAL

## 2024-07-08 ENCOUNTER — OFFICE VISIT (OUTPATIENT)
Dept: ORTHOPEDICS | Facility: CLINIC | Age: 64
End: 2024-07-08
Payer: COMMERCIAL

## 2024-07-08 VITALS
WEIGHT: 130 LBS | DIASTOLIC BLOOD PRESSURE: 72 MMHG | HEIGHT: 62 IN | SYSTOLIC BLOOD PRESSURE: 124 MMHG | BODY MASS INDEX: 23.92 KG/M2

## 2024-07-08 DIAGNOSIS — M53.2X6 LUMBAR SPINE INSTABILITY: ICD-10-CM

## 2024-07-08 DIAGNOSIS — M70.61 GREATER TROCHANTERIC BURSITIS OF RIGHT HIP: ICD-10-CM

## 2024-07-08 DIAGNOSIS — M51.36 DISC DEGENERATION, LUMBAR: ICD-10-CM

## 2024-07-08 DIAGNOSIS — M43.16 SPONDYLOLISTHESIS AT L4-L5 LEVEL: Primary | ICD-10-CM

## 2024-07-08 PROCEDURE — 72120 X-RAY BEND ONLY L-S SPINE: CPT | Mod: TC,PN

## 2024-07-08 PROCEDURE — 72100 X-RAY EXAM L-S SPINE 2/3 VWS: CPT | Mod: TC,PN

## 2024-07-08 PROCEDURE — 72120 X-RAY BEND ONLY L-S SPINE: CPT | Mod: 26,,, | Performed by: RADIOLOGY

## 2024-07-08 PROCEDURE — 72100 X-RAY EXAM L-S SPINE 2/3 VWS: CPT | Mod: 26,,, | Performed by: RADIOLOGY

## 2024-07-08 PROCEDURE — 99999 PR PBB SHADOW E&M-EST. PATIENT-LVL III: CPT | Mod: PBBFAC,,, | Performed by: ORTHOPAEDIC SURGERY

## 2024-07-08 NOTE — PROGRESS NOTES
Subjective:       Patient ID: Mary Mike is a 63 y.o. female.    Chief Complaint: Pain of the Lumbar Spine (Patient is here for lumbar pain, states pain has gotten worse since last visit. Has constant burning and shooting pain as well as painful and limited ROM )      History of Present Illness    Prior to meeting with the patient I reviewed the medical chart in [facility]. This included reviewing the previous progress notes from our office, review of the patient's last appointment with their primary care provider, review of any visits to the emergency room, and review of any pain management appointments or procedures.   Kary comes in today for follow-up for her low back.  She has had low back pain for many years.  She has tried various conservative treatment measures to include multiple NSAIDs, therapy, and has had pain management procedures with Dr. Adonis Davis.  Sounds as if she had L4-5 and L5-S1 medial branch blocks with progression to rhizotomy.  Those were not particularly efficacious.  She does have known degenerative disc disease more so at L4-5 with anterolisthesis at 4 on 5.  She does have occasional left and right lower extremity symptoms but primarily her bilateral low back pain has what is most symptomatic for her.    Current Medications  Current Outpatient Medications   Medication Sig Dispense Refill    FLUoxetine 20 MG capsule TAKE 1 CAPSULE(20 MG) BY MOUTH EVERY EVENING 90 capsule 2    multivitamin (THERAGRAN) tablet Take 1 tablet by mouth once daily.      pantoprazole (PROTONIX) 40 MG tablet Take 1 tablet (40 mg total) by mouth once daily. 30 tablet 11    pramipexole (MIRAPEX) 1 MG tablet TAKE 1 AND 1/2 TABLETS BY MOUTH EVERY EVENING 135 tablet 0    traZODone (DESYREL) 100 MG tablet Take 1 tablet (100 mg total) by mouth every evening. 30 tablet 11     No current facility-administered medications for this visit.       Allergies  Review of patient's allergies indicates:  No Known Allergies    Past  Medical History  Past Medical History:   Diagnosis Date    ADHD (attention deficit hyperactivity disorder)     Allergy     Anemia     Anemia associated with nutritional deficiency 2023    Anxiety     Depression     Fatty liver     History of endoscopy 2021    Dr. Toney Cuevas-Z-line is irregular and was found 37 cm from the incisors.  Scattered ulcerations noted between 36-37 cm. 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  The gastrojejunal anastomosis was characterized by moderate stenosis and ulceration.  This was traversed.  The pouch to jejunum limb was characterized by erythema, friable mucosa, inflammation and ulceration.  The examine    Insomnia     Migraines     Osteoporosis     Restless leg syndrome        Surgical History  Past Surgical History:   Procedure Laterality Date    BREAST BIOPSY Left     benign 15 +years ago    BREAST SURGERY  2017    breast reduction    BUNIONECTOMY Right 2018     SECTION  1987    FRACTURE SURGERY  2016    ankle    GASTRIC RESTRICTION SURGERY      GASTRIC SLEEVE 2012    LAPAROSCOPIC CHOLECYSTECTOMY N/A 10/10/2022    Procedure: CHOLECYSTECTOMY, LAPAROSCOPIC;  Surgeon: Henrietta Joseph MD;  Location: [facility] OR;  Service: General;  Laterality: N/A;    REPAIR OF EXTENSOR TENDON  2021    Procedure: REPAIR, TENDON, EXTENSOR;  Surgeon: Aly Zimmerman DPM;  Location: [facility] OR;  Service: Podiatry;;    SURGICAL REMOVAL OF METATARSAL HEAD Right 2021    Procedure: OSTECTOMY, METATARSAL BONE, HEAD;  Surgeon: Aly Zimmerman DPM;  Location: [facility] OR;  Service: Podiatry;  Laterality: Right;    TOTAL REDUCTION MAMMOPLASTY             Family History:   Family History   Problem Relation Name Age of Onset    Arthritis Mother      Colon cancer Mother      Diabetes Mother      Early death Mother      Miscarriages / Stillbirths Mother      Arthritis Father      Heart disease Father      Hypertension Father      Stroke Father      Vision loss  Father      Breast cancer Maternal Aunt  52    Vaginal cancer Paternal Grandmother      Heart disease Paternal Grandfather         Social History:   Social History     Socioeconomic History    Marital status:    Tobacco Use    Smoking status: Never    Smokeless tobacco: Never   Substance and Sexual Activity    Alcohol use: Yes     Comment: occassional    Drug use: No    Sexual activity: Yes     Social Determinants of Health     Financial Resource Strain: Low Risk  (10/21/2021)    Overall Financial Resource Strain (CARDIA)     Difficulty of Paying Living Expenses: Not very hard   Food Insecurity: No Food Insecurity (10/21/2021)    Hunger Vital Sign     Worried About Running Out of Food in the Last Year: Never true     Ran Out of Food in the Last Year: Never true   Transportation Needs: No Transportation Needs (10/21/2021)    PRAPARE - Transportation     Lack of Transportation (Medical): No     Lack of Transportation (Non-Medical): No   Physical Activity: Sufficiently Active (10/21/2021)    Exercise Vital Sign     Days of Exercise per Week: 7 days     Minutes of Exercise per Session: 30 min   Stress: Stress Concern Present (10/21/2021)    [employer] [place] [employer] - Occupational Stress Questionnaire     Feeling of Stress : To some extent   Housing Stability: Low Risk  (10/21/2021)    Housing Stability Vital Sign     Unable to Pay for Housing in the Last Year: No     Number of Places Lived in the Last Year: 1     Unstable Housing in the Last Year: No       Hospitalization/Major Diagnostic Procedure:     Review of Systems     General/Constitutional:  Chills denies. Fatigue denies. Fever denies. Weight gain denies. Weight loss denies.    Respiratory:  Shortness of breath denies.    Cardiovascular:  Chest pain denies.    Gastrointestinal:  Constipation denies. Diarrhea denies. Nausea denies. Vomiting denies.     Hematology:  Easy bruising denies. Prolonged bleeding denies.     Genitourinary:   Frequent urination denies. Pain in lower back denies. Painful urination denies.     Musculoskeletal:  See HPI for details    Skin:  Rash denies.    Neurologic:  Dizziness denies. Gait abnormalities denies. Seizures denies. Tingling/Numbess denies.    Psychiatric:  Anxiety denies. Depressed mood denies.     Objective:   Vital Signs:   Vitals:    07/08/24 1106   BP: 124/72        Physical Exam      General Examination:     Constitutional: The patient is alert and oriented to lace person and time. Mood is pleasant.     Head/Face: Normal facial features normal eyebrows    Eyes: Normal extraocular motion bilaterally    Lungs: Respirations are equal and unlabored    Gait is coordinated.    Cardiovascular: There are no swelling or varicosities present.    Lymphatic: Negative for adenopathy    Skin: Normal    Neurological: Level of consciousness normal. Oriented to place person and time and situation    Psychiatric: Oriented to time place person and situation    Exam: Skin throughout the lower back is clean dry and intact there is no erythema or ecchymosis.  There are no signs or symptoms of infection.  Active range of motion is mildly restricted extension is 10° flexion 45° limited by pain bending to either side is normal without pain mild bilateral paraspinous muscle spasm L4-S1 straight leg raising maneuver weakly positive on the left side.  She does stand erect.  Mildly antalgic gait.  Nonantalgic sit to stand.    Nontender over the left greater trochanter.  She is tender to palpation over the right greater trochanter.  Negative Homans sign bilaterally.  Diffuse tenderness about bilateral lower vertebrals extending into her lumbosacral junction.  She ambulates without an aid.  Motor exam grossly normal all major muscle groups both lower extremities    XRAY Report/ Interpretation:  Multiple views were taken of the lumbar spine today: AP, lateral, and lateral flexion/extension views.  Marked disc space narrowing at the  L4-5 level with associated facet joint sclerosis noted on lateral views.  Patient has severe degenerative disc disease at L4-5 with anterolisthesis at 4 on 5 of approximately 4-5 mm.  The spondylolisthesis is noted to reduce with active extension of the lumbar spine      Prior lumbar MRI again was reviewed with the patient I agree with the radiologist's interpretation significant bilateral foraminal stenosis at L4-5  William Garcia MD on 3/20/2023 13:44     MRI Lumbar Spine Without Contrast  Order: [id number]  Status: Final result       Visible to patient: Yes (seen)       Next appt: 07/18/2024 at 02:40 PM in Chemotherapy (INJECTION CHAIR, [facility])       Dx: Disc degeneration, lumbar; Spondyloli...    0 Result Notes  Details    Reading Physician Reading Date Result Priority   Ebenezer Marinelli MD  [phone] 3/9/2023      Narrative & Impression  MRI lumbar spine without contrast     CLINICAL DATA: Lower back, right hip and leg pain.     FINDINGS: Multiplanar noncontrast imaging was performed. Comparison is made to lumbar radiographs from January 2023.     There is no evidence of lumbar fracture or osseous destructive lesion. There is 5 mm degenerative anterolisthesis of L4. Alignment at all other levels is normal. 12 degrees lumbar dextroscoliotic curve is noted. Signal within the conus medullaris is within normal limits.     T12-L1: Mild bilateral degenerative facet hypertrophy without significant central canal or foraminal stenosis.     L1-2: Mild broad-based disc bulging and degenerative facet hypertrophy, with minimal narrowing of the spinal canal. No significant foraminal stenosis.     L2-3: Mild broad-based disc bulge and mild ligamentum flavum thickening result in minor narrowing of the spinal canal, without foraminal stenosis.     L3-4: Mild broad-based disc bulge and facet/ligamentum flavum hypertrophy result in minor narrowing of the spinal canal, without significant foraminal stenosis.     L4-5:  "There is severe degenerative facet hypertrophy. In combination with degenerative L4 anterolisthesis and mild broad-based disc/osteophyte complex, there is moderate spinal stenosis. There is mild right-sided foraminal narrowing, with moderate to severe foraminal stenosis on the left and probable mild left L4 nerve root impingement. There is evidence of probable impingement of the L5 nerve roots at the lateral recesses, left greater than right.     L5-S1: Mild bilateral degenerative facet hypertrophy.     Mild facet joint effusions are noted at multiple levels.     IMPRESSION:  1. Multilevel lumbar degenerative disc/facet disease. Findings are most pronounced at L4-5, where there is severe degenerative facet hypertrophy and degenerative L4 anterolisthesis. In combination with mild disc/osteophyte complex, there is moderate spinal stenosis with probable impingement of the left L4 nerve root. There is also evidence of impingement of the L5 nerve roots at the lateral recesses, left greater than right.  2. Please see additional details as noted at each individual level.     Electronically signed by:  Ebenezer Marinelli MD  3/9/2023 2:06 PM CST Workstation: [id number]             Specimen Collected: 03/09/23 13:10 CST Last Resulted: 03/09/23 14:06 CST       Order Details        View Encounter        Lab and Collection Details        Routing        Result History                   Assessment:       1. Spondylolisthesis at L4-L5 level    2. Lumbar spine instability    3. Disc degeneration, lumbar    4. Greater trochanteric bursitis of right hip        Plan:       Mary Coppola" was seen today for pain.    Diagnoses and all orders for this visit:    Spondylolisthesis at L4-L5 level  -     X-Ray Lumbar Spine Ap And Lateral  -     X-Ray Lumbar Spine Flexion And Extension Only    Lumbar spine instability  Comments:  L4-5 spondylolisthesis    Disc degeneration, lumbar  -     X-Ray Lumbar Spine Ap And Lateral    Greater trochanteric " bursitis of right hip         Follow up for Surgery.    I had a long discussion with the patient today about her treatment options.  She has failed multiple conservative treatment measures to include various NSAIDs, topicals, physical therapy, and interventional pain management procedures.  Unfortunately she continues to have pain on a daily basis that does wax and wane.  It does interfere with some of her ADLs and her sleep.     Flexion-extension lateral x-rays show a translational instability at L4-5 reducing with active extension.  She feels the symptoms are intolerable and we would recommend surgical intervention since she has failed all other reasonable conservative measures.  Surgery would require a laminectomy of L4 with decompression of the exiting L4 and traversing L5 nerve roots followed by stabilization of this level because of the instability demonstrated on flexion-extension lateral views this would consist of posterior nonsegmental instrumentation at L4-5 with the insertion of an interbody device at L4-5 as well  We discuss the intraoperative and postoperative risks and expectations with posterior instrumentation of L4-5 with interbody placement today to stabilize her symptomatic spondylolisthesis at L4-5.   The technical aspects of the surgery were discussed in detail with the patient today. The patient was able to verbalize an understanding. The procedure risk benefits alternatives and possible complications of the surgical procedure was discussed including expected outcomes. No guarantees were given regards to outcomes. Consent forms were will be signed at a later date. All questions regarding the surgery itself were answered. The patient wishes to proceed with surgery and will be scheduled. The patient may require preoperative medical clearance.    All of her questions were answered.  She understood and wished to proceed.  Surgical consents were obtained today.    Treatment options were discussed  with regards to the nature of the medical condition. Conservative pain intervention and surgical options were discussed in detail. The probability of success of each separate treatment option was discussed. The patient expressed a clear understanding of the treatment options. With regards to surgery, the procedure risk, benefits, complications, and outcomes were discussed. No guarantees were given with regards to surgical outcome.   The risk of complications, morbidity, and mortality of patient management decisions have been made at the time of this visit. These are associated with the patient's problems, diagnostic procedures and treatment options. This includes the possible management options selected and those considered but not selected by the patient after shared medical decision making we discussed with the patient.     This note was created using Dragon voice recognition software that occasionally misinterpreted phrases or words.

## 2024-07-08 NOTE — PROGRESS NOTES
Subjective:       Patient ID: Mary Mike is a 63 y.o. female.    Chief Complaint: Pain of the Lumbar Spine (Patient is here for lumbar pain, states pain has gotten worse since last visit. Has constant burning and shooting pain as well as painful and limited ROM )      History of Present Illness    Prior to meeting with the patient I reviewed the medical chart in [facility]. This included reviewing the previous progress notes from our office, review of the patient's last appointment with their primary care provider, review of any visits to the emergency room, and review of any pain management appointments or procedures.   ***    Current Medications  Current Outpatient Medications   Medication Sig Dispense Refill    FLUoxetine 20 MG capsule TAKE 1 CAPSULE(20 MG) BY MOUTH EVERY EVENING 90 capsule 2    multivitamin (THERAGRAN) tablet Take 1 tablet by mouth once daily.      pantoprazole (PROTONIX) 40 MG tablet Take 1 tablet (40 mg total) by mouth once daily. 30 tablet 11    pramipexole (MIRAPEX) 1 MG tablet TAKE 1 AND 1/2 TABLETS BY MOUTH EVERY EVENING 135 tablet 0    traZODone (DESYREL) 100 MG tablet Take 1 tablet (100 mg total) by mouth every evening. 30 tablet 11    mupirocin (BACTROBAN) 2 % ointment Apply topically 2 (two) times daily. Applied she wounds twice a day for 3 days only. 22 g 0    nitrofurantoin, macrocrystal-monohydrate, (MACROBID) 100 MG capsule Take 1 capsule (100 mg total) by mouth 2 (two) times daily. (Patient not taking: Reported on 12/14/2023) 14 capsule 0    oxyCODONE-acetaminophen (PERCOCET) 5-325 mg per tablet Take 1 tablet by mouth every 6 (six) hours as needed. 12 tablet 0    triamcinolone acetonide 0.1% (KENALOG) 0.1 % cream Apply topically 2 (two) times daily. 80 g 1     No current facility-administered medications for this visit.       Allergies  Review of patient's allergies indicates:  No Known Allergies    Past Medical History  Past Medical History:   Diagnosis Date    ADHD  (attention deficit hyperactivity disorder)     Allergy     Anemia     Anemia associated with nutritional deficiency 2023    Anxiety     Depression     Fatty liver     History of endoscopy 2021    Dr. Toney Cuevas-Z-line is irregular and was found 37 cm from the incisors.  Scattered ulcerations noted between 36-37 cm. 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  The gastrojejunal anastomosis was characterized by moderate stenosis and ulceration.  This was traversed.  The pouch to jejunum limb was characterized by erythema, friable mucosa, inflammation and ulceration.  The examine    Insomnia     Migraines     Osteoporosis     Restless leg syndrome        Surgical History  Past Surgical History:   Procedure Laterality Date    BREAST BIOPSY Left     benign 15 +years ago    BREAST SURGERY  2017    breast reduction    BUNIONECTOMY Right 2018     SECTION  1987    FRACTURE SURGERY  2016    ankle    GASTRIC RESTRICTION SURGERY      GASTRIC SLEEVE 2012    LAPAROSCOPIC CHOLECYSTECTOMY N/A 10/10/2022    Procedure: CHOLECYSTECTOMY, LAPAROSCOPIC;  Surgeon: Henrietta Joseph MD;  Location: [facility] OR;  Service: General;  Laterality: N/A;    REPAIR OF EXTENSOR TENDON  2021    Procedure: REPAIR, TENDON, EXTENSOR;  Surgeon: Aly Zimmerman DPM;  Location: [facility] OR;  Service: Podiatry;;    SURGICAL REMOVAL OF METATARSAL HEAD Right 2021    Procedure: OSTECTOMY, METATARSAL BONE, HEAD;  Surgeon: Aly Zimmerman DPM;  Location: [facility] OR;  Service: Podiatry;  Laterality: Right;    TOTAL REDUCTION MAMMOPLASTY             Family History:   Family History   Problem Relation Name Age of Onset    Arthritis Mother      Colon cancer Mother      Diabetes Mother      Early death Mother      Miscarriages / Stillbirths Mother      Arthritis Father      Heart disease Father      Hypertension Father      Stroke Father      Vision loss Father      Breast cancer Maternal Aunt  52    Vaginal cancer  Paternal Grandmother      Heart disease Paternal Grandfather         Social History:   Social History     Socioeconomic History    Marital status:    Tobacco Use    Smoking status: Never    Smokeless tobacco: Never   Substance and Sexual Activity    Alcohol use: Yes     Comment: occassional    Drug use: No    Sexual activity: Yes     Social Determinants of Health     Financial Resource Strain: Low Risk  (10/21/2021)    Overall Financial Resource Strain (CARDIA)     Difficulty of Paying Living Expenses: Not very hard   Food Insecurity: No Food Insecurity (10/21/2021)    Hunger Vital Sign     Worried About Running Out of Food in the Last Year: Never true     Ran Out of Food in the Last Year: Never true   Transportation Needs: No Transportation Needs (10/21/2021)    PRAPARE - Transportation     Lack of Transportation (Medical): No     Lack of Transportation (Non-Medical): No   Physical Activity: Sufficiently Active (10/21/2021)    Exercise Vital Sign     Days of Exercise per Week: 7 days     Minutes of Exercise per Session: 30 min   Stress: Stress Concern Present (10/21/2021)    [employer] [place] [employer] - Occupational Stress Questionnaire     Feeling of Stress : To some extent   Housing Stability: Low Risk  (10/21/2021)    Housing Stability Vital Sign     Unable to Pay for Housing in the Last Year: No     Number of Places Lived in the Last Year: 1     Unstable Housing in the Last Year: No       Hospitalization/Major Diagnostic Procedure:     Review of Systems     General/Constitutional:  Chills denies. Fatigue denies. Fever denies. Weight gain denies. Weight loss denies.    Respiratory:  Shortness of breath denies.    Cardiovascular:  Chest pain denies.    Gastrointestinal:  Constipation denies. Diarrhea denies. Nausea denies. Vomiting denies.     Hematology:  Easy bruising denies. Prolonged bleeding denies.     Genitourinary:  Frequent urination denies. Pain in lower back denies. Painful  "urination denies.     Musculoskeletal:  See HPI for details    Skin:  Rash denies.    Neurologic:  Dizziness denies. Gait abnormalities denies. Seizures denies. Tingling/Numbess denies.    Psychiatric:  Anxiety denies. Depressed mood denies.     Objective:   Vital Signs:   Vitals:    07/08/24 1106   BP: 124/72        Physical Exam      General Examination:     Constitutional: The patient is alert and oriented to lace person and time. Mood is pleasant.     Head/Face: Normal facial features normal eyebrows    Eyes: Normal extraocular motion bilaterally    Lungs: Respirations are equal and unlabored    Gait is coordinated.    Cardiovascular: There are no swelling or varicosities present.    Lymphatic: Negative for adenopathy    Skin: Normal    Neurological: Level of consciousness normal. Oriented to place person and time and situation    Psychiatric: Oriented to time place person and situation    ***  XRAY Report/ Interpretation:***      Assessment:       1. Spondylolisthesis at L4-L5 level    2. Disc degeneration, lumbar        Plan:       Mary Coppola" was seen today for pain.    Diagnoses and all orders for this visit:    Spondylolisthesis at L4-L5 level  -     X-Ray Lumbar Spine Ap And Lateral    Disc degeneration, lumbar  -     X-Ray Lumbar Spine Ap And Lateral         No follow-ups on file.    ***  Treatment options were discussed with regards to the nature of the medical condition. Conservative pain intervention and surgical options were discussed in detail. The probability of success of each separate treatment option was discussed. The patient expressed a clear understanding of the treatment options. With regards to surgery, the procedure risk, benefits, complications, and outcomes were discussed. No guarantees were given with regards to surgical outcome.   The risk of complications, morbidity, and mortality of patient management decisions have been made at the time of this visit. These are associated with the " patient's problems, diagnostic procedures and treatment options. This includes the possible management options selected and those considered but not selected by the patient after shared medical decision making we discussed with the patient.     This note was created using Dragon voice recognition software that occasionally misinterpreted phrases or words.

## 2024-07-12 DIAGNOSIS — M43.16 SPONDYLOLISTHESIS AT L4-L5 LEVEL: Primary | ICD-10-CM

## 2024-07-12 DIAGNOSIS — M51.36 DISC DEGENERATION, LUMBAR: ICD-10-CM

## 2024-07-12 DIAGNOSIS — M53.2X6 LUMBAR SPINE INSTABILITY: ICD-10-CM

## 2024-07-12 RX ORDER — CEFAZOLIN SODIUM 2 G/50ML
2 SOLUTION INTRAVENOUS
OUTPATIENT
Start: 2024-07-12

## 2024-07-19 ENCOUNTER — HOSPITAL ENCOUNTER (OUTPATIENT)
Dept: PREADMISSION TESTING | Facility: HOSPITAL | Age: 64
Discharge: HOME | End: 2024-07-19
Payer: COMMERCIAL

## 2024-07-19 NOTE — DISCHARGE INSTRUCTIONS
To confirm, Your doctor has instructed you that surgery is scheduled for: 7/30/24 with Dr. Garcia    Please report to [facility], Registration the morning of surgery. You must check-in and receive a wristband before going to your procedure.  [address] [place] [address] [place] [address]    Pre-Op will call the afternoon prior to surgery between 1:00 and 6:00 PM with the final arrival time.  Phone number: [phone]    PLEASE NOTE:  The surgery schedule has many variables which may affect the time of your surgery case.  Family members should be available if your surgery time changes.  Plan to be here the day of your procedure between 4-6 hours.    MEDICATIONS:  TAKE ONLY THESE MEDICATIONS WITH A SMALL SIP OF WATER THE MORNING OF YOUR PROCEDURE:  See list      DO NOT TAKE THESE MEDICATIONS 5-7 DAYS PRIOR to your procedure or per your surgeon's request:   ASPIRIN, ALEVE, ADVIL, IBUPROFEN, FISH OIL VITAMIN E, HERBALS  (May take Tylenol)    ONLY if you are prescribed any types of blood thinners such as:  Aspirin, Coumadin, Plavix, Pradaxa, Xarelto, Aggrenox, Effient, Eliquis, Savasya, Brilinta, or any other, ask your surgeon whether you should stop taking them and how long before surgery you should stop.  You may also need to verify with the prescribing physician if it is ok to stop your medication.      INSTRUCTIONS IMPORTANT!!  Do not eat or drink anything between midnight and the time of your procedure- this includes gum, mints, and candy.  Do not smoke or drink alcoholic beverages 24 hours prior to your procedure.  Shower the night before AND the morning of your procedure with a Chlorhexidine wash such as Hibiclens or Dial antibacterial soap from the neck down.  Do not get it on your face or in your eyes.  You may use your own shampoo and face wash. This helps your skin to be as bacteria free as possible.    If you wear contact lenses, dentures, hearing aids or glasses, bring a container to put them  in during surgery and give to a family member for safe keeping.  Please leave all jewelry, piercing's and valuables at home. You must remove your false eyelashes prior to surgery.    DO NOT remove hair from the surgery site.  Do not shave the incision site unless you are given specific instructions to do so.    ONLY if you have been diagnosed with sleep apnea please bring your C-PAP machine.  ONLY if you wear home oxygen please bring your portable oxygen tank the day of your procedure.  ONLY if you have a history of OPEN HEART SURGERY you will need a clearance from your Cardiologist per Anesthesia.      ONLY for patients requiring bowel prep, written instructions will be given by your doctor's office.  ONLY if you have a neuro stimulator, please bring the controller with you the morning of surgery  ONLY if a type and screen test is needed before surgery, please return:  If your doctor has scheduled you for an overnight stay, bring a small overnight bag with any personal items you need.  Make arrangements in advance for transportation home by a responsible adult. You can not go home in an uber or a cab per hospital policy.  It is not safe to drive a vehicle during the 24 hours after anesthesia.          All  facilities and properties are tobacco free.  Smoking is NOT allowed.   If you have any questions about these instructions, call Pre-Op Admit  Nursing at [phone] or the Pre-Op Day Surgery Unit at [phone].

## 2024-07-22 ENCOUNTER — OFFICE VISIT (OUTPATIENT)
Dept: ORTHOPEDICS | Facility: CLINIC | Age: 64
End: 2024-07-22
Payer: COMMERCIAL

## 2024-07-22 VITALS
WEIGHT: 135 LBS | DIASTOLIC BLOOD PRESSURE: 72 MMHG | SYSTOLIC BLOOD PRESSURE: 118 MMHG | HEIGHT: 62 IN | BODY MASS INDEX: 24.84 KG/M2

## 2024-07-22 DIAGNOSIS — M43.16 SPONDYLOLISTHESIS AT L4-L5 LEVEL: Primary | ICD-10-CM

## 2024-07-22 DIAGNOSIS — M51.36 DISC DEGENERATION, LUMBAR: ICD-10-CM

## 2024-07-22 DIAGNOSIS — F32.A MOOD DISORDER OF DEPRESSED TYPE: ICD-10-CM

## 2024-07-22 DIAGNOSIS — M53.2X6 LUMBAR SPINE INSTABILITY: ICD-10-CM

## 2024-07-22 PROCEDURE — 1159F MED LIST DOCD IN RCRD: CPT | Mod: CPTII,S$GLB,, | Performed by: ORTHOPAEDIC SURGERY

## 2024-07-22 PROCEDURE — 99999 PR PBB SHADOW E&M-EST. PATIENT-LVL III: CPT | Mod: PBBFAC,,, | Performed by: ORTHOPAEDIC SURGERY

## 2024-07-22 PROCEDURE — 3078F DIAST BP <80 MM HG: CPT | Mod: CPTII,S$GLB,, | Performed by: ORTHOPAEDIC SURGERY

## 2024-07-22 PROCEDURE — 3074F SYST BP LT 130 MM HG: CPT | Mod: CPTII,S$GLB,, | Performed by: ORTHOPAEDIC SURGERY

## 2024-07-22 PROCEDURE — 3008F BODY MASS INDEX DOCD: CPT | Mod: CPTII,S$GLB,, | Performed by: ORTHOPAEDIC SURGERY

## 2024-07-22 PROCEDURE — 99213 OFFICE O/P EST LOW 20 MIN: CPT | Mod: S$GLB,,, | Performed by: ORTHOPAEDIC SURGERY

## 2024-07-22 PROCEDURE — 1160F RVW MEDS BY RX/DR IN RCRD: CPT | Mod: CPTII,S$GLB,, | Performed by: ORTHOPAEDIC SURGERY

## 2024-07-22 RX ORDER — FLUOXETINE HYDROCHLORIDE 20 MG/1
CAPSULE ORAL
Qty: 30 CAPSULE | Refills: 1 | Status: SHIPPED | OUTPATIENT
Start: 2024-07-22

## 2024-07-23 ENCOUNTER — HOSPITAL ENCOUNTER (OUTPATIENT)
Dept: PREADMISSION TESTING | Facility: HOSPITAL | Age: 64
Discharge: HOME | End: 2024-07-23
Attending: ORTHOPAEDIC SURGERY
Payer: COMMERCIAL

## 2024-07-23 DIAGNOSIS — M53.2X6 LUMBAR SPINE INSTABILITY: ICD-10-CM

## 2024-07-23 DIAGNOSIS — M43.16 SPONDYLOLISTHESIS AT L4-L5 LEVEL: ICD-10-CM

## 2024-07-23 DIAGNOSIS — M51.36 DISC DEGENERATION, LUMBAR: ICD-10-CM

## 2024-07-23 DIAGNOSIS — Z01.818 PREOP TESTING: Primary | ICD-10-CM

## 2024-07-23 LAB
ABO + RH BLD: ABNORMAL
ANION GAP SERPL CALC-SCNC: 9 MMOL/L (ref 8–16)
BACTERIA #/AREA URNS HPF: ABNORMAL /HPF
BASOPHILS # BLD AUTO: 0.03 K/UL (ref 0–0.2)
BASOPHILS NFR BLD: 0.8 % (ref 0–1.9)
BILIRUB UR QL STRIP: NEGATIVE
BLD GP AB SCN CELLS X3 SERPL QL: ABNORMAL
BUN SERPL-MCNC: 7 MG/DL (ref 8–23)
CALCIUM SERPL-MCNC: 8.8 MG/DL (ref 8.7–10.5)
CHLORIDE SERPL-SCNC: 108 MMOL/L (ref 95–110)
CLARITY UR: CLEAR
CO2 SERPL-SCNC: 23 MMOL/L (ref 23–29)
COLOR UR: YELLOW
CREAT SERPL-MCNC: 0.8 MG/DL (ref 0.5–1.4)
DIFFERENTIAL METHOD BLD: ABNORMAL
EOSINOPHIL # BLD AUTO: 0.3 K/UL (ref 0–0.5)
EOSINOPHIL NFR BLD: 7 % (ref 0–8)
ERYTHROCYTE [DISTWIDTH] IN BLOOD BY AUTOMATED COUNT: 12.9 % (ref 11.5–14.5)
EST. GFR  (NO RACE VARIABLE): >60 ML/MIN/1.73 M^2
GLUCOSE SERPL-MCNC: 62 MG/DL (ref 70–110)
GLUCOSE UR QL STRIP: NEGATIVE
HCT VFR BLD AUTO: 39.9 % (ref 37–48.5)
HGB BLD-MCNC: 12.7 G/DL (ref 12–16)
HGB UR QL STRIP: NEGATIVE
IMM GRANULOCYTES # BLD AUTO: 0.01 K/UL (ref 0–0.04)
IMM GRANULOCYTES NFR BLD AUTO: 0.3 % (ref 0–0.5)
KETONES UR QL STRIP: NEGATIVE
LEUKOCYTE ESTERASE UR QL STRIP: ABNORMAL
LYMPHOCYTES # BLD AUTO: 1.3 K/UL (ref 1–4.8)
LYMPHOCYTES NFR BLD: 34.6 % (ref 18–48)
MCH RBC QN AUTO: 29.1 PG (ref 27–31)
MCHC RBC AUTO-ENTMCNC: 31.8 G/DL (ref 32–36)
MCV RBC AUTO: 91 FL (ref 82–98)
MICROSCOPIC COMMENT: ABNORMAL
MONOCYTES # BLD AUTO: 0.3 K/UL (ref 0.3–1)
MONOCYTES NFR BLD: 8.3 % (ref 4–15)
NEUTROPHILS # BLD AUTO: 1.9 K/UL (ref 1.8–7.7)
NEUTROPHILS NFR BLD: 49 % (ref 38–73)
NITRITE UR QL STRIP: NEGATIVE
NRBC BLD-RTO: 0 /100 WBC
PH UR STRIP: 5 [PH] (ref 5–8)
PLATELET # BLD AUTO: 214 K/UL (ref 150–450)
PMV BLD AUTO: 10.5 FL (ref 9.2–12.9)
POTASSIUM SERPL-SCNC: 3.7 MMOL/L (ref 3.5–5.1)
PROT UR QL STRIP: NEGATIVE
RBC # BLD AUTO: 4.37 M/UL (ref 4–5.4)
RBC #/AREA URNS HPF: 2 /HPF (ref 0–4)
SODIUM SERPL-SCNC: 140 MMOL/L (ref 136–145)
SP GR UR STRIP: 1.01 (ref 1–1.03)
SPECIMEN OUTDATE: ABNORMAL
SQUAMOUS #/AREA URNS HPF: 4 /HPF
URN SPEC COLLECT METH UR: ABNORMAL
UROBILINOGEN UR STRIP-ACNC: NEGATIVE EU/DL
WBC # BLD AUTO: 3.84 K/UL (ref 3.9–12.7)
WBC #/AREA URNS HPF: 9 /HPF (ref 0–5)

## 2024-07-23 PROCEDURE — 86850 RBC ANTIBODY SCREEN: CPT | Performed by: ORTHOPAEDIC SURGERY

## 2024-07-23 PROCEDURE — 86900 BLOOD TYPING SEROLOGIC ABO: CPT | Performed by: ORTHOPAEDIC SURGERY

## 2024-07-23 PROCEDURE — 36415 COLL VENOUS BLD VENIPUNCTURE: CPT | Performed by: ORTHOPAEDIC SURGERY

## 2024-07-23 PROCEDURE — 86901 BLOOD TYPING SEROLOGIC RH(D): CPT | Performed by: ORTHOPAEDIC SURGERY

## 2024-07-23 PROCEDURE — 81000 URINALYSIS NONAUTO W/SCOPE: CPT | Performed by: ORTHOPAEDIC SURGERY

## 2024-07-23 PROCEDURE — 85025 COMPLETE CBC W/AUTO DIFF WBC: CPT | Performed by: ORTHOPAEDIC SURGERY

## 2024-07-23 PROCEDURE — 80048 BASIC METABOLIC PNL TOTAL CA: CPT | Performed by: ORTHOPAEDIC SURGERY

## 2024-07-23 NOTE — DISCHARGE INSTRUCTIONS
To confirm, Your doctor has instructed you that surgery is scheduled for: 7/30/24    Please report to [facility], Registration the morning of surgery. You must check-in and receive a wristband before going to your procedure.  [address] [place] [address] [place] [address]    Pre-Op will call the afternoon prior to surgery between 1:00 and 6:00 PM with the final arrival time.  Phone number: [phone]    PLEASE NOTE:  The surgery schedule has many variables which may affect the time of your surgery case.  Family members should be available if your surgery time changes.  Plan to be here the day of your procedure between 4-6 hours.    MEDICATIONS:  TAKE ONLY THESE MEDICATIONS WITH A SMALL SIP OF WATER THE MORNING OF YOUR PROCEDURE:    SEE MED LIST          DO NOT TAKE THESE MEDICATIONS 5-7 DAYS PRIOR to your procedure or per your surgeon's request:   ASPIRIN, ALEVE, ADVIL, IBUPROFEN, FISH OIL VITAMIN E, HERBALS  (May take Tylenol)    ONLY if you are prescribed any types of blood thinners such as:  Aspirin, Coumadin, Plavix, Pradaxa, Xarelto, Aggrenox, Effient, Eliquis, Savasya, Brilinta, or any other, ask your surgeon whether you should stop taking them and how long before surgery you should stop.  You may also need to verify with the prescribing physician if it is ok to stop your medication.      INSTRUCTIONS IMPORTANT!!  Do not eat or drink anything between midnight and the time of your procedure- this includes gum, mints, and candy.  Do not smoke or drink alcoholic beverages 24 hours prior to your procedure.  Shower the night before AND the morning of your procedure with a Chlorhexidine wash such as Hibiclens or Dial antibacterial soap from the neck down.  Do not get it on your face or in your eyes.  You may use your own shampoo and face wash. This helps your skin to be as bacteria free as possible.    If you wear contact lenses, dentures, hearing aids or glasses, bring a container to put them in  during surgery and give to a family member for safe keeping.  Please leave all jewelry, piercing's and valuables at home. You must remove your false eyelashes prior to surgery.    DO NOT remove hair from the surgery site.  Do not shave the incision site unless you are given specific instructions to do so.    ONLY if you have been diagnosed with sleep apnea please bring your C-PAP machine.  ONLY if you wear home oxygen please bring your portable oxygen tank the day of your procedure.  ONLY if you have a history of OPEN HEART SURGERY you will need a clearance from your Cardiologist per Anesthesia.      ONLY for patients requiring bowel prep, written instructions will be given by your doctor's office.  ONLY if you have a neuro stimulator, please bring the controller with you the morning of surgery  ONLY if a type and screen test is needed before surgery, please return:  If your doctor has scheduled you for an overnight stay, bring a small overnight bag with any personal items you need.  Make arrangements in advance for transportation home by a responsible adult. You can not go home in an uber or a cab per hospital policy.  It is not safe to drive a vehicle during the 24 hours after anesthesia.          All  facilities and properties are tobacco free.  Smoking is NOT allowed.   If you have any questions about these instructions, call Pre-Op Admit  Nursing at [phone] or the Pre-Op Day Surgery Unit at [phone].

## 2024-07-27 ENCOUNTER — LAB VISIT (OUTPATIENT)
Dept: LAB | Facility: HOSPITAL | Age: 64
End: 2024-07-27
Attending: ORTHOPAEDIC SURGERY
Payer: COMMERCIAL

## 2024-07-27 DIAGNOSIS — Z01.818 PREOP TESTING: ICD-10-CM

## 2024-07-27 LAB
ABO + RH BLD: ABNORMAL
BLD GP AB SCN CELLS X3 SERPL QL: ABNORMAL
BLOOD GROUP ANTIBODIES SERPL: NORMAL
SPECIMEN OUTDATE: ABNORMAL

## 2024-07-27 PROCEDURE — 86900 BLOOD TYPING SEROLOGIC ABO: CPT | Performed by: ORTHOPAEDIC SURGERY

## 2024-07-27 PROCEDURE — 86902 BLOOD TYPE ANTIGEN DONOR EA: CPT | Performed by: ORTHOPAEDIC SURGERY

## 2024-07-27 PROCEDURE — 36415 COLL VENOUS BLD VENIPUNCTURE: CPT | Performed by: ORTHOPAEDIC SURGERY

## 2024-07-27 PROCEDURE — 86870 RBC ANTIBODY IDENTIFICATION: CPT | Performed by: ORTHOPAEDIC SURGERY

## 2024-07-27 PROCEDURE — 86850 RBC ANTIBODY SCREEN: CPT | Performed by: ORTHOPAEDIC SURGERY

## 2024-07-27 PROCEDURE — 86901 BLOOD TYPING SEROLOGIC RH(D): CPT | Performed by: ORTHOPAEDIC SURGERY

## 2024-07-27 PROCEDURE — 86905 BLOOD TYPING RBC ANTIGENS: CPT | Performed by: ORTHOPAEDIC SURGERY

## 2024-07-29 ENCOUNTER — ANESTHESIA EVENT (OUTPATIENT)
Dept: SURGERY | Facility: HOSPITAL | Age: 64
End: 2024-07-29
Payer: COMMERCIAL

## 2024-07-30 ENCOUNTER — ANESTHESIA (OUTPATIENT)
Dept: SURGERY | Facility: HOSPITAL | Age: 64
End: 2024-07-30
Payer: COMMERCIAL

## 2024-07-30 ENCOUNTER — HOSPITAL ENCOUNTER (OUTPATIENT)
Facility: HOSPITAL | Age: 64
Discharge: HOME | End: 2024-08-01
Attending: ORTHOPAEDIC SURGERY | Admitting: STUDENT IN AN ORGANIZED HEALTH CARE EDUCATION/TRAINING PROGRAM
Payer: COMMERCIAL

## 2024-07-30 DIAGNOSIS — M43.16 SPONDYLOLISTHESIS AT L4-L5 LEVEL: ICD-10-CM

## 2024-07-30 DIAGNOSIS — M53.2X6 LUMBAR SPINE INSTABILITY: ICD-10-CM

## 2024-07-30 DIAGNOSIS — Z98.1 S/P LUMBAR FUSION: Primary | ICD-10-CM

## 2024-07-30 DIAGNOSIS — F51.01 PRIMARY INSOMNIA: ICD-10-CM

## 2024-07-30 DIAGNOSIS — M51.36 DISC DEGENERATION, LUMBAR: ICD-10-CM

## 2024-07-30 PROBLEM — K21.9 GERD (GASTROESOPHAGEAL REFLUX DISEASE): Status: ACTIVE | Noted: 2022-10-10

## 2024-07-30 PROBLEM — M79.671 FOOT PAIN, RIGHT: Status: RESOLVED | Noted: 2019-01-14 | Resolved: 2024-07-30

## 2024-07-30 PROBLEM — R53.83 FATIGUE: Status: RESOLVED | Noted: 2023-07-24 | Resolved: 2024-07-30

## 2024-07-30 PROBLEM — M79.674 TOE PAIN, RIGHT: Status: RESOLVED | Noted: 2021-06-24 | Resolved: 2024-07-30

## 2024-07-30 PROBLEM — M25.512 ACUTE PAIN OF LEFT SHOULDER: Status: RESOLVED | Noted: 2024-06-14 | Resolved: 2024-07-30

## 2024-07-30 PROBLEM — R79.89 ELEVATED LFTS: Status: RESOLVED | Noted: 2022-10-11 | Resolved: 2024-07-30

## 2024-07-30 PROBLEM — R53.83 OTHER FATIGUE: Status: RESOLVED | Noted: 2020-05-18 | Resolved: 2024-07-30

## 2024-07-30 PROBLEM — M19.079 INFLAMMATION OF FOOT JOINT: Status: RESOLVED | Noted: 2019-03-26 | Resolved: 2024-07-30

## 2024-07-30 PROBLEM — D53.9 ANEMIA ASSOCIATED WITH NUTRITIONAL DEFICIENCY: Status: RESOLVED | Noted: 2023-07-18 | Resolved: 2024-07-30

## 2024-07-30 PROBLEM — M79.672 LEFT FOOT PAIN: Status: RESOLVED | Noted: 2018-07-26 | Resolved: 2024-07-30

## 2024-07-30 LAB
ANION GAP SERPL CALC-SCNC: 6 MMOL/L (ref 8–16)
BASOPHILS # BLD AUTO: 0.01 K/UL (ref 0–0.2)
BASOPHILS # BLD AUTO: 0.02 K/UL (ref 0–0.2)
BASOPHILS NFR BLD: 0.1 % (ref 0–1.9)
BASOPHILS NFR BLD: 0.2 % (ref 0–1.9)
BUN SERPL-MCNC: 7 MG/DL (ref 8–23)
CALCIUM SERPL-MCNC: 7.2 MG/DL (ref 8.7–10.5)
CHLORIDE SERPL-SCNC: 106 MMOL/L (ref 95–110)
CO2 SERPL-SCNC: 28 MMOL/L (ref 23–29)
CREAT SERPL-MCNC: 0.7 MG/DL (ref 0.5–1.4)
DIFFERENTIAL METHOD BLD: ABNORMAL
DIFFERENTIAL METHOD BLD: ABNORMAL
EOSINOPHIL # BLD AUTO: 0 K/UL (ref 0–0.5)
EOSINOPHIL # BLD AUTO: 0 K/UL (ref 0–0.5)
EOSINOPHIL NFR BLD: 0 % (ref 0–8)
EOSINOPHIL NFR BLD: 0.2 % (ref 0–8)
ERYTHROCYTE [DISTWIDTH] IN BLOOD BY AUTOMATED COUNT: 12.8 % (ref 11.5–14.5)
ERYTHROCYTE [DISTWIDTH] IN BLOOD BY AUTOMATED COUNT: 12.9 % (ref 11.5–14.5)
EST. GFR  (NO RACE VARIABLE): >60 ML/MIN/1.73 M^2
GLUCOSE SERPL-MCNC: 136 MG/DL (ref 70–110)
HCT VFR BLD AUTO: 32 % (ref 37–48.5)
HCT VFR BLD AUTO: 35.6 % (ref 37–48.5)
HGB BLD-MCNC: 10.4 G/DL (ref 12–16)
HGB BLD-MCNC: 11.3 G/DL (ref 12–16)
IMM GRANULOCYTES # BLD AUTO: 0.07 K/UL (ref 0–0.04)
IMM GRANULOCYTES # BLD AUTO: 0.09 K/UL (ref 0–0.04)
IMM GRANULOCYTES NFR BLD AUTO: 0.5 % (ref 0–0.5)
IMM GRANULOCYTES NFR BLD AUTO: 0.8 % (ref 0–0.5)
LYMPHOCYTES # BLD AUTO: 0.4 K/UL (ref 1–4.8)
LYMPHOCYTES # BLD AUTO: 0.6 K/UL (ref 1–4.8)
LYMPHOCYTES NFR BLD: 3.3 % (ref 18–48)
LYMPHOCYTES NFR BLD: 5.1 % (ref 18–48)
MCH RBC QN AUTO: 28.8 PG (ref 27–31)
MCH RBC QN AUTO: 29.6 PG (ref 27–31)
MCHC RBC AUTO-ENTMCNC: 31.7 G/DL (ref 32–36)
MCHC RBC AUTO-ENTMCNC: 32.5 G/DL (ref 32–36)
MCV RBC AUTO: 91 FL (ref 82–98)
MCV RBC AUTO: 91 FL (ref 82–98)
MONOCYTES # BLD AUTO: 0.2 K/UL (ref 0.3–1)
MONOCYTES # BLD AUTO: 0.5 K/UL (ref 0.3–1)
MONOCYTES NFR BLD: 1.4 % (ref 4–15)
MONOCYTES NFR BLD: 4.1 % (ref 4–15)
NEUTROPHILS # BLD AUTO: 10.6 K/UL (ref 1.8–7.7)
NEUTROPHILS # BLD AUTO: 11.9 K/UL (ref 1.8–7.7)
NEUTROPHILS NFR BLD: 92 % (ref 38–73)
NEUTROPHILS NFR BLD: 92.3 % (ref 38–73)
NRBC BLD-RTO: 0 /100 WBC
NRBC BLD-RTO: 0 /100 WBC
PLATELET # BLD AUTO: 163 K/UL (ref 150–450)
PLATELET # BLD AUTO: 174 K/UL (ref 150–450)
PMV BLD AUTO: 10.5 FL (ref 9.2–12.9)
PMV BLD AUTO: 11 FL (ref 9.2–12.9)
POTASSIUM SERPL-SCNC: 3.9 MMOL/L (ref 3.5–5.1)
RBC # BLD AUTO: 3.51 M/UL (ref 4–5.4)
RBC # BLD AUTO: 3.93 M/UL (ref 4–5.4)
SODIUM SERPL-SCNC: 140 MMOL/L (ref 136–145)
WBC # BLD AUTO: 11.5 K/UL (ref 3.9–12.7)
WBC # BLD AUTO: 12.91 K/UL (ref 3.9–12.7)

## 2024-07-30 PROCEDURE — 25000003 PHARM REV CODE 250: Performed by: ANESTHESIOLOGY

## 2024-07-30 PROCEDURE — 37000009 HC ANESTHESIA EA ADD 15 MINS: Performed by: ORTHOPAEDIC SURGERY

## 2024-07-30 PROCEDURE — 25000003 PHARM REV CODE 250: Performed by: ORTHOPAEDIC SURGERY

## 2024-07-30 PROCEDURE — 99900035 HC TECH TIME PER 15 MIN (STAT)

## 2024-07-30 PROCEDURE — 63600175 PHARM REV CODE 636 W HCPCS: Performed by: ORTHOPAEDIC SURGERY

## 2024-07-30 PROCEDURE — 22840 INSERT SPINE FIXATION DEVICE: CPT | Mod: ,,, | Performed by: ORTHOPAEDIC SURGERY

## 2024-07-30 PROCEDURE — 96361 HYDRATE IV INFUSION ADD-ON: CPT

## 2024-07-30 PROCEDURE — 36000711: Performed by: ORTHOPAEDIC SURGERY

## 2024-07-30 PROCEDURE — 63600175 PHARM REV CODE 636 W HCPCS: Performed by: STUDENT IN AN ORGANIZED HEALTH CARE EDUCATION/TRAINING PROGRAM

## 2024-07-30 PROCEDURE — 22633 ARTHRD CMBN 1NTRSPC LUMBAR: CPT | Mod: ,,, | Performed by: ORTHOPAEDIC SURGERY

## 2024-07-30 PROCEDURE — C1729 CATH, DRAINAGE: HCPCS | Performed by: ORTHOPAEDIC SURGERY

## 2024-07-30 PROCEDURE — 71000039 HC RECOVERY, EACH ADD'L HOUR: Performed by: ORTHOPAEDIC SURGERY

## 2024-07-30 PROCEDURE — 94761 N-INVAS EAR/PLS OXIMETRY MLT: CPT

## 2024-07-30 PROCEDURE — 36415 COLL VENOUS BLD VENIPUNCTURE: CPT | Performed by: STUDENT IN AN ORGANIZED HEALTH CARE EDUCATION/TRAINING PROGRAM

## 2024-07-30 PROCEDURE — C9290 INJ, BUPIVACAINE LIPOSOME: HCPCS | Performed by: ORTHOPAEDIC SURGERY

## 2024-07-30 PROCEDURE — 94799 UNLISTED PULMONARY SVC/PX: CPT

## 2024-07-30 PROCEDURE — 22853 INSJ BIOMECHANICAL DEVICE: CPT | Mod: ,,, | Performed by: ORTHOPAEDIC SURGERY

## 2024-07-30 PROCEDURE — 63600175 PHARM REV CODE 636 W HCPCS: Performed by: ANESTHESIOLOGY

## 2024-07-30 PROCEDURE — 63052 LAM FACETC/FRMT ARTHRD LUM 1: CPT | Mod: ,,, | Performed by: ORTHOPAEDIC SURGERY

## 2024-07-30 PROCEDURE — 80048 BASIC METABOLIC PNL TOTAL CA: CPT | Performed by: ORTHOPAEDIC SURGERY

## 2024-07-30 PROCEDURE — 25000003 PHARM REV CODE 250: Performed by: STUDENT IN AN ORGANIZED HEALTH CARE EDUCATION/TRAINING PROGRAM

## 2024-07-30 PROCEDURE — 27000221 HC OXYGEN, UP TO 24 HOURS

## 2024-07-30 PROCEDURE — 96366 THER/PROPH/DIAG IV INF ADDON: CPT | Mod: 59

## 2024-07-30 PROCEDURE — 71000033 HC RECOVERY, INTIAL HOUR: Performed by: ORTHOPAEDIC SURGERY

## 2024-07-30 PROCEDURE — 85025 COMPLETE CBC W/AUTO DIFF WBC: CPT | Mod: 91 | Performed by: ORTHOPAEDIC SURGERY

## 2024-07-30 PROCEDURE — 20936 SP BONE AGRFT LOCAL ADD-ON: CPT | Mod: ,,, | Performed by: ORTHOPAEDIC SURGERY

## 2024-07-30 PROCEDURE — 36000710: Performed by: ORTHOPAEDIC SURGERY

## 2024-07-30 PROCEDURE — 85025 COMPLETE CBC W/AUTO DIFF WBC: CPT | Performed by: STUDENT IN AN ORGANIZED HEALTH CARE EDUCATION/TRAINING PROGRAM

## 2024-07-30 PROCEDURE — 37000008 HC ANESTHESIA 1ST 15 MINUTES: Performed by: ORTHOPAEDIC SURGERY

## 2024-07-30 PROCEDURE — 96365 THER/PROPH/DIAG IV INF INIT: CPT

## 2024-07-30 PROCEDURE — C1713 ANCHOR/SCREW BN/BN,TIS/BN: HCPCS | Performed by: ORTHOPAEDIC SURGERY

## 2024-07-30 PROCEDURE — 27201423 OPTIME MED/SURG SUP & DEVICES STERILE SUPPLY: Performed by: ORTHOPAEDIC SURGERY

## 2024-07-30 PROCEDURE — 36415 COLL VENOUS BLD VENIPUNCTURE: CPT | Performed by: ORTHOPAEDIC SURGERY

## 2024-07-30 PROCEDURE — 61783 SCAN PROC SPINAL: CPT | Mod: 59,,, | Performed by: ORTHOPAEDIC SURGERY

## 2024-07-30 PROCEDURE — 94760 N-INVAS EAR/PLS OXIMETRY 1: CPT

## 2024-07-30 DEVICE — SET SCREW HORIZON SOLERA 5.5-6: Type: IMPLANTABLE DEVICE | Site: SPINE LUMBAR | Status: FUNCTIONAL

## 2024-07-30 DEVICE — IMPLANTABLE DEVICE: Type: IMPLANTABLE DEVICE | Site: SPINE LUMBAR | Status: FUNCTIONAL

## 2024-07-30 DEVICE — ROD CD HORIZON SOLERA 5.5-6X35: Type: IMPLANTABLE DEVICE | Site: SPINE LUMBAR | Status: FUNCTIONAL

## 2024-07-30 DEVICE — SPACER 3992608 26MM X 8MM
Type: IMPLANTABLE DEVICE | Site: SPINE LUMBAR | Status: FUNCTIONAL
Brand: CAPSTONE PTC™ SPINAL SYSTEM

## 2024-07-30 RX ORDER — LIDOCAINE HYDROCHLORIDE 20 MG/ML
INJECTION INTRAVENOUS
Status: DISCONTINUED | OUTPATIENT
Start: 2024-07-30 | End: 2024-07-30

## 2024-07-30 RX ORDER — ACETAMINOPHEN 10 MG/ML
1000 INJECTION, SOLUTION INTRAVENOUS ONCE
Status: COMPLETED | OUTPATIENT
Start: 2024-07-30 | End: 2024-07-30

## 2024-07-30 RX ORDER — AMOXICILLIN 250 MG
2 CAPSULE ORAL NIGHTLY PRN
Status: DISCONTINUED | OUTPATIENT
Start: 2024-07-30 | End: 2024-08-01 | Stop reason: HOSPADM

## 2024-07-30 RX ORDER — FENTANYL CITRATE 50 UG/ML
INJECTION, SOLUTION INTRAMUSCULAR; INTRAVENOUS
Status: DISCONTINUED | OUTPATIENT
Start: 2024-07-30 | End: 2024-07-30

## 2024-07-30 RX ORDER — DOCUSATE SODIUM 100 MG/1
100 CAPSULE, LIQUID FILLED ORAL DAILY
Status: DISCONTINUED | OUTPATIENT
Start: 2024-07-30 | End: 2024-08-01 | Stop reason: HOSPADM

## 2024-07-30 RX ORDER — HYDROMORPHONE HCL IN 0.9% NACL 6 MG/30 ML
PATIENT CONTROLLED ANALGESIA SYRINGE INTRAVENOUS CONTINUOUS
Status: DISCONTINUED | OUTPATIENT
Start: 2024-07-30 | End: 2024-07-31

## 2024-07-30 RX ORDER — ACETAMINOPHEN 325 MG/1
650 TABLET ORAL EVERY 6 HOURS PRN
Status: DISCONTINUED | OUTPATIENT
Start: 2024-07-31 | End: 2024-07-31 | Stop reason: SDUPTHER

## 2024-07-30 RX ORDER — SODIUM CHLORIDE, SODIUM LACTATE, POTASSIUM CHLORIDE, CALCIUM CHLORIDE 600; 310; 30; 20 MG/100ML; MG/100ML; MG/100ML; MG/100ML
INJECTION, SOLUTION INTRAVENOUS CONTINUOUS
Status: DISCONTINUED | OUTPATIENT
Start: 2024-07-30 | End: 2024-07-31

## 2024-07-30 RX ORDER — BISACODYL 10 MG/1
10 SUPPOSITORY RECTAL DAILY
Status: DISCONTINUED | OUTPATIENT
Start: 2024-07-30 | End: 2024-08-01 | Stop reason: HOSPADM

## 2024-07-30 RX ORDER — OXYCODONE HYDROCHLORIDE 5 MG/1
5 TABLET ORAL
Status: DISCONTINUED | OUTPATIENT
Start: 2024-07-30 | End: 2024-07-30 | Stop reason: HOSPADM

## 2024-07-30 RX ORDER — ONDANSETRON 8 MG/1
8 TABLET, ORALLY DISINTEGRATING ORAL EVERY 6 HOURS PRN
Status: DISCONTINUED | OUTPATIENT
Start: 2024-07-30 | End: 2024-08-01 | Stop reason: HOSPADM

## 2024-07-30 RX ORDER — ONDANSETRON HYDROCHLORIDE 2 MG/ML
4 INJECTION, SOLUTION INTRAVENOUS DAILY PRN
Status: DISCONTINUED | OUTPATIENT
Start: 2024-07-30 | End: 2024-07-30 | Stop reason: HOSPADM

## 2024-07-30 RX ORDER — FLUOXETINE HYDROCHLORIDE 20 MG/1
20 CAPSULE ORAL DAILY
Status: DISCONTINUED | OUTPATIENT
Start: 2024-07-30 | End: 2024-08-01 | Stop reason: HOSPADM

## 2024-07-30 RX ORDER — TRAZODONE HYDROCHLORIDE 50 MG/1
100 TABLET ORAL NIGHTLY
Status: DISCONTINUED | OUTPATIENT
Start: 2024-07-30 | End: 2024-08-01 | Stop reason: HOSPADM

## 2024-07-30 RX ORDER — ONDANSETRON HYDROCHLORIDE 2 MG/ML
INJECTION, SOLUTION INTRAVENOUS
Status: DISCONTINUED | OUTPATIENT
Start: 2024-07-30 | End: 2024-07-30

## 2024-07-30 RX ORDER — PHENYLEPHRINE HYDROCHLORIDE 10 MG/ML
INJECTION INTRAVENOUS
Status: DISCONTINUED | OUTPATIENT
Start: 2024-07-30 | End: 2024-07-30

## 2024-07-30 RX ORDER — METOCLOPRAMIDE HYDROCHLORIDE 5 MG/ML
10 INJECTION INTRAMUSCULAR; INTRAVENOUS EVERY 10 MIN PRN
Status: DISCONTINUED | OUTPATIENT
Start: 2024-07-30 | End: 2024-07-30 | Stop reason: HOSPADM

## 2024-07-30 RX ORDER — VANCOMYCIN HYDROCHLORIDE 1 G/20ML
INJECTION, POWDER, LYOPHILIZED, FOR SOLUTION INTRAVENOUS CODE/TRAUMA/SEDATION MEDICATION
Status: DISCONTINUED | OUTPATIENT
Start: 2024-07-30 | End: 2024-07-30 | Stop reason: HOSPADM

## 2024-07-30 RX ORDER — NALOXONE HCL 0.4 MG/ML
0.02 VIAL (ML) INJECTION
Status: DISCONTINUED | OUTPATIENT
Start: 2024-07-30 | End: 2024-08-01 | Stop reason: HOSPADM

## 2024-07-30 RX ORDER — OXYCODONE HYDROCHLORIDE 5 MG/1
5 TABLET ORAL EVERY 4 HOURS PRN
Status: DISCONTINUED | OUTPATIENT
Start: 2024-07-30 | End: 2024-08-01 | Stop reason: HOSPADM

## 2024-07-30 RX ORDER — ALUMINUM HYDROXIDE, MAGNESIUM HYDROXIDE, AND SIMETHICONE 2400; 240; 2400 MG/30ML; MG/30ML; MG/30ML
15 SUSPENSION ORAL EVERY 4 HOURS PRN
Status: DISCONTINUED | OUTPATIENT
Start: 2024-07-30 | End: 2024-08-01 | Stop reason: HOSPADM

## 2024-07-30 RX ORDER — DEXAMETHASONE SODIUM PHOSPHATE 4 MG/ML
INJECTION, SOLUTION INTRA-ARTICULAR; INTRALESIONAL; INTRAMUSCULAR; INTRAVENOUS; SOFT TISSUE
Status: DISCONTINUED | OUTPATIENT
Start: 2024-07-30 | End: 2024-07-30

## 2024-07-30 RX ORDER — SODIUM CHLORIDE, SODIUM LACTATE, POTASSIUM CHLORIDE, CALCIUM CHLORIDE 600; 310; 30; 20 MG/100ML; MG/100ML; MG/100ML; MG/100ML
INJECTION, SOLUTION INTRAVENOUS CONTINUOUS
Status: DISCONTINUED | OUTPATIENT
Start: 2024-07-30 | End: 2024-08-01 | Stop reason: HOSPADM

## 2024-07-30 RX ORDER — TALC
9 POWDER (GRAM) TOPICAL NIGHTLY PRN
Status: DISCONTINUED | OUTPATIENT
Start: 2024-07-30 | End: 2024-08-01 | Stop reason: HOSPADM

## 2024-07-30 RX ORDER — KETAMINE HCL IN 0.9 % NACL 50 MG/5 ML
SYRINGE (ML) INTRAVENOUS
Status: DISCONTINUED | OUTPATIENT
Start: 2024-07-30 | End: 2024-07-30

## 2024-07-30 RX ORDER — DIPHENHYDRAMINE HCL 25 MG
50 CAPSULE ORAL EVERY 6 HOURS PRN
Status: DISCONTINUED | OUTPATIENT
Start: 2024-07-30 | End: 2024-08-01 | Stop reason: HOSPADM

## 2024-07-30 RX ORDER — PROPOFOL 10 MG/ML
VIAL (ML) INTRAVENOUS CONTINUOUS PRN
Status: DISCONTINUED | OUTPATIENT
Start: 2024-07-30 | End: 2024-07-30

## 2024-07-30 RX ORDER — LIDOCAINE HYDROCHLORIDE 10 MG/ML
1 INJECTION, SOLUTION EPIDURAL; INFILTRATION; INTRACAUDAL; PERINEURAL ONCE
Status: DISCONTINUED | OUTPATIENT
Start: 2024-07-30 | End: 2024-07-30 | Stop reason: HOSPADM

## 2024-07-30 RX ORDER — MIDAZOLAM HYDROCHLORIDE 1 MG/ML
INJECTION INTRAMUSCULAR; INTRAVENOUS
Status: DISCONTINUED | OUTPATIENT
Start: 2024-07-30 | End: 2024-07-30

## 2024-07-30 RX ORDER — PROCHLORPERAZINE EDISYLATE 5 MG/ML
5 INJECTION INTRAMUSCULAR; INTRAVENOUS EVERY 6 HOURS PRN
Status: DISCONTINUED | OUTPATIENT
Start: 2024-07-30 | End: 2024-08-01 | Stop reason: HOSPADM

## 2024-07-30 RX ORDER — ROCURONIUM BROMIDE 10 MG/ML
INJECTION, SOLUTION INTRAVENOUS
Status: DISCONTINUED | OUTPATIENT
Start: 2024-07-30 | End: 2024-07-30

## 2024-07-30 RX ORDER — OXYCODONE HYDROCHLORIDE 10 MG/1
10 TABLET ORAL EVERY 4 HOURS PRN
Status: DISCONTINUED | OUTPATIENT
Start: 2024-07-30 | End: 2024-08-01 | Stop reason: HOSPADM

## 2024-07-30 RX ORDER — OXYCODONE AND ACETAMINOPHEN 10; 325 MG/1; MG/1
1 TABLET ORAL EVERY 4 HOURS PRN
Qty: 42 TABLET | Refills: 0 | Status: SHIPPED | OUTPATIENT
Start: 2024-07-30 | End: 2024-08-01 | Stop reason: SDUPTHER

## 2024-07-30 RX ORDER — HYDROMORPHONE HYDROCHLORIDE 2 MG/ML
INJECTION, SOLUTION INTRAMUSCULAR; INTRAVENOUS; SUBCUTANEOUS
Status: DISCONTINUED | OUTPATIENT
Start: 2024-07-30 | End: 2024-07-30

## 2024-07-30 RX ORDER — HYDROMORPHONE HYDROCHLORIDE 2 MG/ML
2 INJECTION, SOLUTION INTRAMUSCULAR; INTRAVENOUS; SUBCUTANEOUS
Status: DISCONTINUED | OUTPATIENT
Start: 2024-07-30 | End: 2024-07-31

## 2024-07-30 RX ORDER — PROPOFOL 10 MG/ML
VIAL (ML) INTRAVENOUS
Status: DISCONTINUED | OUTPATIENT
Start: 2024-07-30 | End: 2024-07-30

## 2024-07-30 RX ORDER — ACETAMINOPHEN 10 MG/ML
INJECTION, SOLUTION INTRAVENOUS
Status: DISCONTINUED | OUTPATIENT
Start: 2024-07-30 | End: 2024-07-30

## 2024-07-30 RX ORDER — SUCCINYLCHOLINE CHLORIDE 20 MG/ML
INJECTION INTRAMUSCULAR; INTRAVENOUS
Status: DISCONTINUED | OUTPATIENT
Start: 2024-07-30 | End: 2024-07-30

## 2024-07-30 RX ORDER — HYDROMORPHONE HYDROCHLORIDE 2 MG/ML
0.2 INJECTION, SOLUTION INTRAMUSCULAR; INTRAVENOUS; SUBCUTANEOUS EVERY 5 MIN PRN
Status: DISCONTINUED | OUTPATIENT
Start: 2024-07-30 | End: 2024-07-30 | Stop reason: HOSPADM

## 2024-07-30 RX ORDER — MUPIROCIN 20 MG/G
OINTMENT TOPICAL 2 TIMES DAILY
Status: DISCONTINUED | OUTPATIENT
Start: 2024-07-30 | End: 2024-08-01 | Stop reason: HOSPADM

## 2024-07-30 RX ORDER — BUPIVACAINE HYDROCHLORIDE 5 MG/ML
INJECTION, SOLUTION EPIDURAL; INTRACAUDAL CODE/TRAUMA/SEDATION MEDICATION
Status: DISCONTINUED | OUTPATIENT
Start: 2024-07-30 | End: 2024-07-30 | Stop reason: HOSPADM

## 2024-07-30 RX ADMIN — PHENYLEPHRINE HYDROCHLORIDE 0.1 MCG/KG/MIN: 10 INJECTION INTRAVENOUS at 07:07

## 2024-07-30 RX ADMIN — PHENYLEPHRINE HYDROCHLORIDE 100 MCG: 10 INJECTION INTRAVENOUS at 09:07

## 2024-07-30 RX ADMIN — HYDROMORPHONE HYDROCHLORIDE 0.2 MG: 2 INJECTION INTRAMUSCULAR; INTRAVENOUS; SUBCUTANEOUS at 10:07

## 2024-07-30 RX ADMIN — GLYCOPYRROLATE 0.2 MG: 0.2 INJECTION, SOLUTION INTRAMUSCULAR; INTRAVITREAL at 07:07

## 2024-07-30 RX ADMIN — PHENYLEPHRINE HYDROCHLORIDE 50 MCG: 10 INJECTION INTRAVENOUS at 09:07

## 2024-07-30 RX ADMIN — SODIUM CHLORIDE, SODIUM GLUCONATE, SODIUM ACETATE, POTASSIUM CHLORIDE AND MAGNESIUM CHLORIDE: 526; 502; 368; 37; 30 INJECTION, SOLUTION INTRAVENOUS at 06:07

## 2024-07-30 RX ADMIN — PRAMIPEXOLE DIHYDROCHLORIDE 1.5 MG: 1 TABLET ORAL at 10:07

## 2024-07-30 RX ADMIN — Medication 20 MG: at 07:07

## 2024-07-30 RX ADMIN — PHENYLEPHRINE HYDROCHLORIDE 100 MCG: 10 INJECTION INTRAVENOUS at 07:07

## 2024-07-30 RX ADMIN — SODIUM CHLORIDE, SODIUM GLUCONATE, SODIUM ACETATE, POTASSIUM CHLORIDE AND MAGNESIUM CHLORIDE: 526; 502; 368; 37; 30 INJECTION, SOLUTION INTRAVENOUS at 09:07

## 2024-07-30 RX ADMIN — PROPOFOL 100 MCG/KG/MIN: 10 INJECTION, EMULSION INTRAVENOUS at 07:07

## 2024-07-30 RX ADMIN — PHENYLEPHRINE HYDROCHLORIDE 50 MCG: 10 INJECTION INTRAVENOUS at 08:07

## 2024-07-30 RX ADMIN — CEFAZOLIN 2 G: 2 INJECTION, POWDER, FOR SOLUTION INTRAMUSCULAR; INTRAVENOUS at 07:07

## 2024-07-30 RX ADMIN — SODIUM CHLORIDE, SODIUM GLUCONATE, SODIUM ACETATE, POTASSIUM CHLORIDE AND MAGNESIUM CHLORIDE: 526; 502; 368; 37; 30 INJECTION, SOLUTION INTRAVENOUS at 08:07

## 2024-07-30 RX ADMIN — PHENYLEPHRINE HYDROCHLORIDE 100 MCG: 10 INJECTION INTRAVENOUS at 10:07

## 2024-07-30 RX ADMIN — SODIUM CHLORIDE, POTASSIUM CHLORIDE, SODIUM LACTATE AND CALCIUM CHLORIDE 1000 ML: 600; 310; 30; 20 INJECTION, SOLUTION INTRAVENOUS at 03:07

## 2024-07-30 RX ADMIN — FLUOXETINE 20 MG: 20 CAPSULE ORAL at 03:07

## 2024-07-30 RX ADMIN — HYDROMORPHONE HYDROCHLORIDE 0.2 MG: 2 INJECTION INTRAMUSCULAR; INTRAVENOUS; SUBCUTANEOUS at 11:07

## 2024-07-30 RX ADMIN — OXYCODONE HYDROCHLORIDE 10 MG: 10 TABLET ORAL at 08:07

## 2024-07-30 RX ADMIN — FENTANYL CITRATE 50 MCG: 50 INJECTION, SOLUTION INTRAMUSCULAR; INTRAVENOUS at 07:07

## 2024-07-30 RX ADMIN — HYDROMORPHONE HYDROCHLORIDE 0.4 MG: 2 INJECTION INTRAMUSCULAR; INTRAVENOUS; SUBCUTANEOUS at 10:07

## 2024-07-30 RX ADMIN — DEXAMETHASONE SODIUM PHOSPHATE 8 MG: 4 INJECTION, SOLUTION INTRA-ARTICULAR; INTRALESIONAL; INTRAMUSCULAR; INTRAVENOUS; SOFT TISSUE at 07:07

## 2024-07-30 RX ADMIN — MUPIROCIN 1 G: 20 OINTMENT TOPICAL at 10:07

## 2024-07-30 RX ADMIN — DOCUSATE SODIUM 100 MG: 100 CAPSULE, LIQUID FILLED ORAL at 03:07

## 2024-07-30 RX ADMIN — LIDOCAINE HYDROCHLORIDE 80 MG: 20 INJECTION, SOLUTION INTRAVENOUS at 07:07

## 2024-07-30 RX ADMIN — ROCURONIUM BROMIDE 10 MG: 10 INJECTION, SOLUTION INTRAVENOUS at 07:07

## 2024-07-30 RX ADMIN — Medication: at 11:07

## 2024-07-30 RX ADMIN — MIDAZOLAM HYDROCHLORIDE 2 MG: 1 INJECTION INTRAMUSCULAR; INTRAVENOUS at 06:07

## 2024-07-30 RX ADMIN — TRAZODONE HYDROCHLORIDE 100 MG: 50 TABLET ORAL at 10:07

## 2024-07-30 RX ADMIN — OXYCODONE 5 MG: 5 TABLET ORAL at 03:07

## 2024-07-30 RX ADMIN — SODIUM CHLORIDE, POTASSIUM CHLORIDE, SODIUM LACTATE AND CALCIUM CHLORIDE 1000 ML: 600; 310; 30; 20 INJECTION, SOLUTION INTRAVENOUS at 12:07

## 2024-07-30 RX ADMIN — PROPOFOL 150 MG: 10 INJECTION, EMULSION INTRAVENOUS at 07:07

## 2024-07-30 RX ADMIN — SODIUM CHLORIDE, POTASSIUM CHLORIDE, SODIUM LACTATE AND CALCIUM CHLORIDE: 600; 310; 30; 20 INJECTION, SOLUTION INTRAVENOUS at 11:07

## 2024-07-30 RX ADMIN — ACETAMINOPHEN 1000 MG: 10 INJECTION INTRAVENOUS at 01:07

## 2024-07-30 RX ADMIN — ONDANSETRON 4 MG: 2 INJECTION INTRAMUSCULAR; INTRAVENOUS at 07:07

## 2024-07-30 RX ADMIN — ACETAMINOPHEN 1000 MG: 10 INJECTION, SOLUTION INTRAVENOUS at 07:07

## 2024-07-30 RX ADMIN — CEFAZOLIN 2 G: 2 INJECTION, POWDER, FOR SOLUTION INTRAMUSCULAR; INTRAVENOUS at 04:07

## 2024-07-30 RX ADMIN — CEFAZOLIN 2 G: 2 INJECTION, POWDER, FOR SOLUTION INTRAMUSCULAR; INTRAVENOUS at 10:07

## 2024-07-30 RX ADMIN — SUCCINYLCHOLINE CHLORIDE 140 MG: 20 INJECTION, SOLUTION INTRAMUSCULAR; INTRAVENOUS at 07:07

## 2024-07-30 NOTE — ANESTHESIA PREPROCEDURE EVALUATION
07/30/2024  Mary Mike is a 63 y.o., female.      Pre-op Assessment    I have reviewed the Patient Summary Reports.     I have reviewed the Nursing Notes. I have reviewed the NPO Status.   I have reviewed the Medications.     Review of Systems  Anesthesia Hx:             Denies Family Hx of Anesthesia complications.    Denies Personal Hx of Anesthesia complications.                    Hematology/Oncology:       -- Anemia:                                  Cardiovascular:                   ECG has been reviewed.  Normal myocardial perfusion scan. There is no evidence of myocardial ischemia or infarction.  ·  The gated perfusion images showed an ejection fraction of 62% at rest. The gated perfusion images showed an ejection fraction of 71% post stress. Normal ejection fraction is greater than 53%.  ·  There is normal wall motion at rest and post stress.  ·  LV cavity size is normal at rest and normal at stress.  ·  The ECG portion of the study is negative for ischemia.  ·  The patient reported chest pain during the stress test.  ·  There were no arrhythmias during stress.  ·  The patient exercised for 6 minutes 27 seconds on a Jose Angel protocol, corresponding to a functional capacity of 7 METS, achieving a peak heart rate of 134 bpm, which is 87 % of the age predicted maximum heart rate.                           Hepatic/GI:   PUD,   Liver Disease,  GI bypass status, malabsorption, cholelithiasis/cholecystitis          Neurological:      Headaches                                 Psych:  Psychiatric History                  Physical Exam  General: Cooperative, Alert and Oriented    Airway:  Mallampati: II   Mouth Opening: Normal  TM Distance: Normal  Tongue: Normal  Neck ROM: Normal ROM    Dental:  Intact    Chest/Lungs:  Normal Respiratory Rate    Heart:  Rate: Normal  Rhythm: Regular  Rhythm        Anesthesia Plan  Type of Anesthesia, risks & benefits discussed:    Anesthesia Type: Gen ETT  Intra-op Monitoring Plan: Standard ASA Monitors  Post Op Pain Control Plan: multimodal analgesia and IV/PO Opioids PRN  Induction:  IV  Airway Plan: Direct and Video, Post-Induction  Informed Consent: Informed consent signed with the Patient and all parties understand the risks and agree with anesthesia plan.  All questions answered.   ASA Score: 2    Ready For Surgery From Anesthesia Perspective.     .

## 2024-07-30 NOTE — ANESTHESIA PROCEDURE NOTES
Intubation    Date/Time: 7/30/2024 7:08 AM    Performed by: Olesya Newberry CRNA  Authorized by: Joss Maxwell MD    Intubation:     Induction:  Intravenous    Intubated:  Postinduction    Mask Ventilation:  Easy mask    Attempts:  1    Attempted By:  Student (Sadaf Sebastian)    Method of Intubation:  Video laryngoscopy    Blade:  West 3    Laryngeal View Grade: Grade IIA - cords partially seen      Difficult Airway Encountered?: No      Complications:  None    Airway Device:  Oral endotracheal tube    Airway Device Size:  7.0    Style/Cuff Inflation:  Cuffed (inflated to minimal occlusive pressure)    Tube secured:  21    Secured at:  The lips    Placement Verified By:  Capnometry    Complicating Factors:  None    Findings Post-Intubation:  BS equal bilateral and atraumatic/condition of teeth unchanged

## 2024-07-30 NOTE — TRANSFER OF CARE
"Anesthesia Transfer of Care Note    Patient: Mary Mike    Procedure(s) Performed: Procedure(s) (LRB):  FUSION,SPINE,LUMBAR,POSTERIOR APPROACH L4-5 (Bilateral)    Patient location: PACU    Anesthesia Type: general    Transport from OR: Transported from OR on 6-10 L/min O2 by face mask with adequate spontaneous ventilation    Post pain: adequate analgesia    Post assessment: no apparent anesthetic complications    Post vital signs: stable    Level of consciousness: responds to stimulation and lethargic    Nausea/Vomiting: no nausea/vomiting    Complications: none    Transfer of care protocol was followed      Last vitals: Visit Vitals  BP (!) 96/55   Pulse 64   Temp 36.3 °C (97.3 °F) (Skin)   Resp 11   Ht 5' 2" (1.575 m)   Wt 61.2 kg (135 lb)   SpO2 97%   Breastfeeding No   BMI 24.69 kg/m²     "

## 2024-07-30 NOTE — ANESTHESIA POSTPROCEDURE EVALUATION
Anesthesia Post Evaluation    Patient: Mary Mike    Procedure(s) Performed: Procedure(s) (LRB):  FUSION,SPINE,LUMBAR,POSTERIOR APPROACH (Bilateral)    Final Anesthesia Type: general      Patient location during evaluation: PACU  Patient participation: Yes- Able to Participate  Level of consciousness: sedated and awake  Post-procedure vital signs: reviewed and stable  Pain management: adequate  Airway patency: patent    PONV status at discharge: No PONV  Anesthetic complications: no    Jenny-operative Events Comments: IVF's in PACU.  Hypotension with IV narcotics.  VSS - prolonged monitoring.  To monitored bed on floor with PCA.   Cardiovascular status: blood pressure returned to baseline and hemodynamically stable  Respiratory status: spontaneous ventilation  Hydration status: euvolemic  Follow-up not needed.              Vitals Value Taken Time   BP 97/55 07/30/24 1333   Temp 36.6 °C (97.9 °F) 07/30/24 1310   Pulse 62 07/30/24 1335   Resp 16 07/30/24 1335   SpO2 99 % 07/30/24 1335   Vitals shown include unfiled device data.      No case tracking events are documented in the log.      Pain/Marcela Score: Pain Rating Prior to Med Admin: 5 (7/30/2024 11:06 AM)  Pain Rating Post Med Admin: 3 (7/30/2024 11:15 AM)  Marcela Score: 8 (7/30/2024  1:15 PM)

## 2024-07-31 PROBLEM — I95.81 POSTOPERATIVE HYPOTENSION: Status: ACTIVE | Noted: 2024-07-31

## 2024-07-31 PROBLEM — D64.9 POSTOPERATIVE ANEMIA: Status: ACTIVE | Noted: 2024-07-31

## 2024-07-31 LAB
ANION GAP SERPL CALC-SCNC: 7 MMOL/L (ref 8–16)
BASOPHILS # BLD AUTO: 0.01 K/UL (ref 0–0.2)
BASOPHILS NFR BLD: 0.1 % (ref 0–1.9)
BLD PROD TYP BPU: NORMAL
BLD PROD TYP BPU: NORMAL
BLOOD UNIT EXPIRATION DATE: NORMAL
BLOOD UNIT EXPIRATION DATE: NORMAL
BLOOD UNIT TYPE CODE: 600
BLOOD UNIT TYPE CODE: 600
BLOOD UNIT TYPE: NORMAL
BLOOD UNIT TYPE: NORMAL
BUN SERPL-MCNC: 7 MG/DL (ref 8–23)
CALCIUM SERPL-MCNC: 7.9 MG/DL (ref 8.7–10.5)
CHLORIDE SERPL-SCNC: 107 MMOL/L (ref 95–110)
CO2 SERPL-SCNC: 26 MMOL/L (ref 23–29)
CODING SYSTEM: NORMAL
CODING SYSTEM: NORMAL
CREAT SERPL-MCNC: 0.6 MG/DL (ref 0.5–1.4)
CROSSMATCH INTERPRETATION: NORMAL
CROSSMATCH INTERPRETATION: NORMAL
DIFFERENTIAL METHOD BLD: ABNORMAL
DISPENSE STATUS: NORMAL
DISPENSE STATUS: NORMAL
EOSINOPHIL # BLD AUTO: 0 K/UL (ref 0–0.5)
EOSINOPHIL NFR BLD: 0 % (ref 0–8)
ERYTHROCYTE [DISTWIDTH] IN BLOOD BY AUTOMATED COUNT: 12.9 % (ref 11.5–14.5)
EST. GFR  (NO RACE VARIABLE): >60 ML/MIN/1.73 M^2
GLUCOSE SERPL-MCNC: 121 MG/DL (ref 70–110)
HCT VFR BLD AUTO: 29.4 % (ref 37–48.5)
HGB BLD-MCNC: 9.4 G/DL (ref 12–16)
IMM GRANULOCYTES # BLD AUTO: 0.06 K/UL (ref 0–0.04)
IMM GRANULOCYTES NFR BLD AUTO: 0.5 % (ref 0–0.5)
LYMPHOCYTES # BLD AUTO: 0.8 K/UL (ref 1–4.8)
LYMPHOCYTES NFR BLD: 7.3 % (ref 18–48)
MCH RBC QN AUTO: 29 PG (ref 27–31)
MCHC RBC AUTO-ENTMCNC: 32 G/DL (ref 32–36)
MCV RBC AUTO: 91 FL (ref 82–98)
MONOCYTES # BLD AUTO: 0.9 K/UL (ref 0.3–1)
MONOCYTES NFR BLD: 7.7 % (ref 4–15)
NEUTROPHILS # BLD AUTO: 9.5 K/UL (ref 1.8–7.7)
NEUTROPHILS NFR BLD: 84.4 % (ref 38–73)
NRBC BLD-RTO: 0 /100 WBC
NUM UNITS TRANS PACKED RBC: NORMAL
NUM UNITS TRANS PACKED RBC: NORMAL
PLATELET # BLD AUTO: 144 K/UL (ref 150–450)
PMV BLD AUTO: 10.9 FL (ref 9.2–12.9)
POTASSIUM SERPL-SCNC: 4.9 MMOL/L (ref 3.5–5.1)
RBC # BLD AUTO: 3.24 M/UL (ref 4–5.4)
SODIUM SERPL-SCNC: 140 MMOL/L (ref 136–145)
WBC # BLD AUTO: 11.22 K/UL (ref 3.9–12.7)

## 2024-07-31 PROCEDURE — 97116 GAIT TRAINING THERAPY: CPT

## 2024-07-31 PROCEDURE — 97110 THERAPEUTIC EXERCISES: CPT

## 2024-07-31 PROCEDURE — 25000003 PHARM REV CODE 250: Performed by: ORTHOPAEDIC SURGERY

## 2024-07-31 PROCEDURE — 96366 THER/PROPH/DIAG IV INF ADDON: CPT

## 2024-07-31 PROCEDURE — 63600175 PHARM REV CODE 636 W HCPCS: Performed by: STUDENT IN AN ORGANIZED HEALTH CARE EDUCATION/TRAINING PROGRAM

## 2024-07-31 PROCEDURE — 63600175 PHARM REV CODE 636 W HCPCS: Performed by: ORTHOPAEDIC SURGERY

## 2024-07-31 PROCEDURE — 63600175 PHARM REV CODE 636 W HCPCS: Performed by: NURSE PRACTITIONER

## 2024-07-31 PROCEDURE — 97162 PT EVAL MOD COMPLEX 30 MIN: CPT

## 2024-07-31 PROCEDURE — 94761 N-INVAS EAR/PLS OXIMETRY MLT: CPT

## 2024-07-31 PROCEDURE — G0378 HOSPITAL OBSERVATION PER HR: HCPCS

## 2024-07-31 PROCEDURE — 80048 BASIC METABOLIC PNL TOTAL CA: CPT | Performed by: ORTHOPAEDIC SURGERY

## 2024-07-31 PROCEDURE — 97165 OT EVAL LOW COMPLEX 30 MIN: CPT

## 2024-07-31 PROCEDURE — 36415 COLL VENOUS BLD VENIPUNCTURE: CPT | Performed by: ORTHOPAEDIC SURGERY

## 2024-07-31 PROCEDURE — 96376 TX/PRO/DX INJ SAME DRUG ADON: CPT

## 2024-07-31 PROCEDURE — 99900035 HC TECH TIME PER 15 MIN (STAT)

## 2024-07-31 PROCEDURE — 96361 HYDRATE IV INFUSION ADD-ON: CPT | Mod: 59

## 2024-07-31 PROCEDURE — 94799 UNLISTED PULMONARY SVC/PX: CPT

## 2024-07-31 PROCEDURE — 97535 SELF CARE MNGMENT TRAINING: CPT

## 2024-07-31 PROCEDURE — 96375 TX/PRO/DX INJ NEW DRUG ADDON: CPT

## 2024-07-31 PROCEDURE — 27000221 HC OXYGEN, UP TO 24 HOURS

## 2024-07-31 PROCEDURE — 25000003 PHARM REV CODE 250: Performed by: STUDENT IN AN ORGANIZED HEALTH CARE EDUCATION/TRAINING PROGRAM

## 2024-07-31 PROCEDURE — 85025 COMPLETE CBC W/AUTO DIFF WBC: CPT | Performed by: ORTHOPAEDIC SURGERY

## 2024-07-31 RX ORDER — ACETAMINOPHEN 325 MG/1
650 TABLET ORAL EVERY 6 HOURS PRN
Status: DISCONTINUED | OUTPATIENT
Start: 2024-07-31 | End: 2024-08-01 | Stop reason: HOSPADM

## 2024-07-31 RX ORDER — KETOROLAC TROMETHAMINE 30 MG/ML
15 INJECTION, SOLUTION INTRAMUSCULAR; INTRAVENOUS EVERY 6 HOURS PRN
Status: DISCONTINUED | OUTPATIENT
Start: 2024-07-31 | End: 2024-08-01 | Stop reason: HOSPADM

## 2024-07-31 RX ORDER — MIDODRINE HYDROCHLORIDE 5 MG/1
5 TABLET ORAL EVERY 8 HOURS
Status: DISCONTINUED | OUTPATIENT
Start: 2024-07-31 | End: 2024-08-01 | Stop reason: HOSPADM

## 2024-07-31 RX ORDER — HYDROMORPHONE HYDROCHLORIDE 2 MG/ML
2 INJECTION, SOLUTION INTRAMUSCULAR; INTRAVENOUS; SUBCUTANEOUS EVERY 4 HOURS PRN
Status: DISCONTINUED | OUTPATIENT
Start: 2024-07-31 | End: 2024-08-01 | Stop reason: HOSPADM

## 2024-07-31 RX ORDER — ATROPINE SULFATE 0.1 MG/ML
0.5 INJECTION INTRAVENOUS
Status: DISCONTINUED | OUTPATIENT
Start: 2024-07-31 | End: 2024-08-01 | Stop reason: HOSPADM

## 2024-07-31 RX ORDER — MORPHINE SULFATE 4 MG/ML
4 INJECTION, SOLUTION INTRAMUSCULAR; INTRAVENOUS ONCE
Status: COMPLETED | OUTPATIENT
Start: 2024-07-31 | End: 2024-07-31

## 2024-07-31 RX ADMIN — SODIUM CHLORIDE, POTASSIUM CHLORIDE, SODIUM LACTATE AND CALCIUM CHLORIDE: 600; 310; 30; 20 INJECTION, SOLUTION INTRAVENOUS at 07:07

## 2024-07-31 RX ADMIN — SODIUM CHLORIDE, POTASSIUM CHLORIDE, SODIUM LACTATE AND CALCIUM CHLORIDE 1000 ML: 600; 310; 30; 20 INJECTION, SOLUTION INTRAVENOUS at 01:07

## 2024-07-31 RX ADMIN — CEFAZOLIN 2 G: 2 INJECTION, POWDER, FOR SOLUTION INTRAMUSCULAR; INTRAVENOUS at 04:07

## 2024-07-31 RX ADMIN — KETOROLAC TROMETHAMINE 15 MG: 30 INJECTION, SOLUTION INTRAMUSCULAR at 04:07

## 2024-07-31 RX ADMIN — MORPHINE SULFATE 4 MG: 4 INJECTION, SOLUTION INTRAMUSCULAR; INTRAVENOUS at 02:07

## 2024-07-31 RX ADMIN — TRAZODONE HYDROCHLORIDE 100 MG: 50 TABLET ORAL at 09:07

## 2024-07-31 RX ADMIN — KETOROLAC TROMETHAMINE 15 MG: 30 INJECTION, SOLUTION INTRAMUSCULAR at 10:07

## 2024-07-31 RX ADMIN — KETOROLAC TROMETHAMINE 15 MG: 30 INJECTION, SOLUTION INTRAMUSCULAR at 09:07

## 2024-07-31 RX ADMIN — MIDODRINE HYDROCHLORIDE 5 MG: 5 TABLET ORAL at 01:07

## 2024-07-31 RX ADMIN — SODIUM CHLORIDE, POTASSIUM CHLORIDE, SODIUM LACTATE AND CALCIUM CHLORIDE 500 ML: 600; 310; 30; 20 INJECTION, SOLUTION INTRAVENOUS at 03:07

## 2024-07-31 RX ADMIN — DOCUSATE SODIUM 100 MG: 100 CAPSULE, LIQUID FILLED ORAL at 08:07

## 2024-07-31 RX ADMIN — ACETAMINOPHEN 650 MG: 325 TABLET ORAL at 07:07

## 2024-07-31 RX ADMIN — SODIUM CHLORIDE, POTASSIUM CHLORIDE, SODIUM LACTATE AND CALCIUM CHLORIDE: 600; 310; 30; 20 INJECTION, SOLUTION INTRAVENOUS at 02:07

## 2024-07-31 RX ADMIN — MUPIROCIN 1 G: 20 OINTMENT TOPICAL at 09:07

## 2024-07-31 RX ADMIN — MUPIROCIN 1 G: 20 OINTMENT TOPICAL at 08:07

## 2024-07-31 RX ADMIN — MIDODRINE HYDROCHLORIDE 5 MG: 5 TABLET ORAL at 09:07

## 2024-07-31 RX ADMIN — SODIUM CHLORIDE, POTASSIUM CHLORIDE, SODIUM LACTATE AND CALCIUM CHLORIDE 1000 ML: 600; 310; 30; 20 INJECTION, SOLUTION INTRAVENOUS at 06:07

## 2024-07-31 RX ADMIN — FLUOXETINE 20 MG: 20 CAPSULE ORAL at 08:07

## 2024-07-31 RX ADMIN — PRAMIPEXOLE DIHYDROCHLORIDE 1.5 MG: 1 TABLET ORAL at 09:07

## 2024-07-31 RX ADMIN — ACETAMINOPHEN 650 MG: 325 TABLET ORAL at 09:07

## 2024-07-31 RX ADMIN — ONDANSETRON 8 MG: 8 TABLET, ORALLY DISINTEGRATING ORAL at 12:07

## 2024-07-31 RX ADMIN — OXYCODONE 5 MG: 5 TABLET ORAL at 01:07

## 2024-08-01 DIAGNOSIS — M43.16 SPONDYLOLISTHESIS AT L4-L5 LEVEL: Primary | ICD-10-CM

## 2024-08-01 PROBLEM — T14.8XXA SKIN AVULSION: Status: RESOLVED | Noted: 2024-06-14 | Resolved: 2024-08-01

## 2024-08-01 PROBLEM — I95.81 POSTOPERATIVE HYPOTENSION: Status: RESOLVED | Noted: 2024-07-31 | Resolved: 2024-08-01

## 2024-08-01 LAB
ANION GAP SERPL CALC-SCNC: 3 MMOL/L (ref 8–16)
BASOPHILS # BLD AUTO: 0.02 K/UL (ref 0–0.2)
BASOPHILS # BLD AUTO: 0.03 K/UL (ref 0–0.2)
BASOPHILS NFR BLD: 0.3 % (ref 0–1.9)
BASOPHILS NFR BLD: 0.4 % (ref 0–1.9)
BUN SERPL-MCNC: 6 MG/DL (ref 8–23)
CALCIUM SERPL-MCNC: 8.1 MG/DL (ref 8.7–10.5)
CHLORIDE SERPL-SCNC: 107 MMOL/L (ref 95–110)
CO2 SERPL-SCNC: 31 MMOL/L (ref 23–29)
CREAT SERPL-MCNC: 0.7 MG/DL (ref 0.5–1.4)
DIFFERENTIAL METHOD BLD: ABNORMAL
DIFFERENTIAL METHOD BLD: ABNORMAL
EOSINOPHIL # BLD AUTO: 0.1 K/UL (ref 0–0.5)
EOSINOPHIL # BLD AUTO: 0.3 K/UL (ref 0–0.5)
EOSINOPHIL NFR BLD: 2.1 % (ref 0–8)
EOSINOPHIL NFR BLD: 3.9 % (ref 0–8)
ERYTHROCYTE [DISTWIDTH] IN BLOOD BY AUTOMATED COUNT: 13.2 % (ref 11.5–14.5)
ERYTHROCYTE [DISTWIDTH] IN BLOOD BY AUTOMATED COUNT: 13.3 % (ref 11.5–14.5)
EST. GFR  (NO RACE VARIABLE): >60 ML/MIN/1.73 M^2
GLUCOSE SERPL-MCNC: 90 MG/DL (ref 70–110)
HCT VFR BLD AUTO: 25.2 % (ref 37–48.5)
HCT VFR BLD AUTO: 26.2 % (ref 37–48.5)
HGB BLD-MCNC: 8.1 G/DL (ref 12–16)
HGB BLD-MCNC: 8.4 G/DL (ref 12–16)
IMM GRANULOCYTES # BLD AUTO: 0.02 K/UL (ref 0–0.04)
IMM GRANULOCYTES # BLD AUTO: 0.04 K/UL (ref 0–0.04)
IMM GRANULOCYTES NFR BLD AUTO: 0.3 % (ref 0–0.5)
IMM GRANULOCYTES NFR BLD AUTO: 0.6 % (ref 0–0.5)
LYMPHOCYTES # BLD AUTO: 1.6 K/UL (ref 1–4.8)
LYMPHOCYTES # BLD AUTO: 1.6 K/UL (ref 1–4.8)
LYMPHOCYTES NFR BLD: 23 % (ref 18–48)
LYMPHOCYTES NFR BLD: 25.1 % (ref 18–48)
MCH RBC QN AUTO: 29.5 PG (ref 27–31)
MCH RBC QN AUTO: 29.7 PG (ref 27–31)
MCHC RBC AUTO-ENTMCNC: 32.1 G/DL (ref 32–36)
MCHC RBC AUTO-ENTMCNC: 32.1 G/DL (ref 32–36)
MCV RBC AUTO: 92 FL (ref 82–98)
MCV RBC AUTO: 93 FL (ref 82–98)
MONOCYTES # BLD AUTO: 0.6 K/UL (ref 0.3–1)
MONOCYTES # BLD AUTO: 0.7 K/UL (ref 0.3–1)
MONOCYTES NFR BLD: 10.1 % (ref 4–15)
MONOCYTES NFR BLD: 10.4 % (ref 4–15)
NEUTROPHILS # BLD AUTO: 3.9 K/UL (ref 1.8–7.7)
NEUTROPHILS # BLD AUTO: 4.3 K/UL (ref 1.8–7.7)
NEUTROPHILS NFR BLD: 61.7 % (ref 38–73)
NEUTROPHILS NFR BLD: 62.1 % (ref 38–73)
NRBC BLD-RTO: 0 /100 WBC
NRBC BLD-RTO: 0 /100 WBC
PLATELET # BLD AUTO: 121 K/UL (ref 150–450)
PLATELET # BLD AUTO: 121 K/UL (ref 150–450)
PMV BLD AUTO: 10.1 FL (ref 9.2–12.9)
PMV BLD AUTO: 11.1 FL (ref 9.2–12.9)
POTASSIUM SERPL-SCNC: 4.4 MMOL/L (ref 3.5–5.1)
RBC # BLD AUTO: 2.75 M/UL (ref 4–5.4)
RBC # BLD AUTO: 2.83 M/UL (ref 4–5.4)
SODIUM SERPL-SCNC: 141 MMOL/L (ref 136–145)
WBC # BLD AUTO: 6.26 K/UL (ref 3.9–12.7)
WBC # BLD AUTO: 7.01 K/UL (ref 3.9–12.7)

## 2024-08-01 PROCEDURE — 80048 BASIC METABOLIC PNL TOTAL CA: CPT | Performed by: ORTHOPAEDIC SURGERY

## 2024-08-01 PROCEDURE — 36415 COLL VENOUS BLD VENIPUNCTURE: CPT | Performed by: ORTHOPAEDIC SURGERY

## 2024-08-01 PROCEDURE — 85025 COMPLETE CBC W/AUTO DIFF WBC: CPT | Performed by: ORTHOPAEDIC SURGERY

## 2024-08-01 PROCEDURE — 85025 COMPLETE CBC W/AUTO DIFF WBC: CPT | Mod: 91 | Performed by: STUDENT IN AN ORGANIZED HEALTH CARE EDUCATION/TRAINING PROGRAM

## 2024-08-01 PROCEDURE — 94761 N-INVAS EAR/PLS OXIMETRY MLT: CPT

## 2024-08-01 PROCEDURE — 63600175 PHARM REV CODE 636 W HCPCS: Performed by: ORTHOPAEDIC SURGERY

## 2024-08-01 PROCEDURE — 96361 HYDRATE IV INFUSION ADD-ON: CPT | Mod: 59

## 2024-08-01 PROCEDURE — G0378 HOSPITAL OBSERVATION PER HR: HCPCS

## 2024-08-01 PROCEDURE — 25000003 PHARM REV CODE 250: Performed by: STUDENT IN AN ORGANIZED HEALTH CARE EDUCATION/TRAINING PROGRAM

## 2024-08-01 PROCEDURE — 97116 GAIT TRAINING THERAPY: CPT | Mod: CQ

## 2024-08-01 PROCEDURE — 25000003 PHARM REV CODE 250: Performed by: ORTHOPAEDIC SURGERY

## 2024-08-01 PROCEDURE — 36415 COLL VENOUS BLD VENIPUNCTURE: CPT | Performed by: STUDENT IN AN ORGANIZED HEALTH CARE EDUCATION/TRAINING PROGRAM

## 2024-08-01 PROCEDURE — 96376 TX/PRO/DX INJ SAME DRUG ADON: CPT

## 2024-08-01 PROCEDURE — 94799 UNLISTED PULMONARY SVC/PX: CPT

## 2024-08-01 PROCEDURE — 63600175 PHARM REV CODE 636 W HCPCS: Performed by: STUDENT IN AN ORGANIZED HEALTH CARE EDUCATION/TRAINING PROGRAM

## 2024-08-01 RX ORDER — TRAZODONE HYDROCHLORIDE 100 MG/1
100 TABLET ORAL NIGHTLY
Qty: 30 TABLET | Refills: 11 | Status: SHIPPED | OUTPATIENT
Start: 2024-08-01 | End: 2025-08-01

## 2024-08-01 RX ORDER — OXYCODONE AND ACETAMINOPHEN 10; 325 MG/1; MG/1
1 TABLET ORAL EVERY 4 HOURS PRN
Qty: 42 TABLET | Refills: 0 | Status: SHIPPED | OUTPATIENT
Start: 2024-08-01 | End: 2024-08-08

## 2024-08-01 RX ADMIN — BISACODYL 10 MG: 10 SUPPOSITORY RECTAL at 08:08

## 2024-08-01 RX ADMIN — MUPIROCIN 1 G: 20 OINTMENT TOPICAL at 08:08

## 2024-08-01 RX ADMIN — MIDODRINE HYDROCHLORIDE 5 MG: 5 TABLET ORAL at 01:08

## 2024-08-01 RX ADMIN — DOCUSATE SODIUM 100 MG: 100 CAPSULE, LIQUID FILLED ORAL at 08:08

## 2024-08-01 RX ADMIN — SODIUM CHLORIDE, POTASSIUM CHLORIDE, SODIUM LACTATE AND CALCIUM CHLORIDE: 600; 310; 30; 20 INJECTION, SOLUTION INTRAVENOUS at 01:08

## 2024-08-01 RX ADMIN — MIDODRINE HYDROCHLORIDE 5 MG: 5 TABLET ORAL at 06:08

## 2024-08-01 RX ADMIN — KETOROLAC TROMETHAMINE 15 MG: 30 INJECTION, SOLUTION INTRAMUSCULAR at 03:08

## 2024-08-01 RX ADMIN — OXYCODONE 5 MG: 5 TABLET ORAL at 11:08

## 2024-08-01 RX ADMIN — ONDANSETRON 8 MG: 8 TABLET, ORALLY DISINTEGRATING ORAL at 09:08

## 2024-08-01 RX ADMIN — FLUOXETINE 20 MG: 20 CAPSULE ORAL at 08:08

## 2024-08-01 RX ADMIN — ACETAMINOPHEN 650 MG: 325 TABLET ORAL at 03:08

## 2024-08-01 RX ADMIN — OXYCODONE 5 MG: 5 TABLET ORAL at 07:08

## 2024-08-02 VITALS
HEIGHT: 62 IN | DIASTOLIC BLOOD PRESSURE: 55 MMHG | OXYGEN SATURATION: 94 % | BODY MASS INDEX: 24.84 KG/M2 | HEART RATE: 61 BPM | TEMPERATURE: 98 F | SYSTOLIC BLOOD PRESSURE: 105 MMHG | RESPIRATION RATE: 14 BRPM | WEIGHT: 135 LBS

## 2024-08-02 PROCEDURE — G0180 MD CERTIFICATION HHA PATIENT: HCPCS | Mod: ,,, | Performed by: ORTHOPAEDIC SURGERY

## 2024-08-04 ENCOUNTER — NURSE TRIAGE (OUTPATIENT)
Dept: ADMINISTRATIVE | Facility: CLINIC | Age: 64
End: 2024-08-04
Payer: COMMERCIAL

## 2024-08-05 ENCOUNTER — OFFICE VISIT (OUTPATIENT)
Dept: ORTHOPEDICS | Facility: CLINIC | Age: 64
End: 2024-08-05
Payer: COMMERCIAL

## 2024-08-05 ENCOUNTER — HOSPITAL ENCOUNTER (OUTPATIENT)
Dept: RADIOLOGY | Facility: HOSPITAL | Age: 64
Discharge: HOME | End: 2024-08-05
Attending: ORTHOPAEDIC SURGERY
Payer: COMMERCIAL

## 2024-08-05 VITALS — WEIGHT: 135 LBS | BODY MASS INDEX: 24.84 KG/M2 | HEIGHT: 62 IN

## 2024-08-05 DIAGNOSIS — Z98.1 S/P LUMBAR FUSION: Primary | ICD-10-CM

## 2024-08-05 DIAGNOSIS — M79.604 RIGHT LEG PAIN: ICD-10-CM

## 2024-08-05 DIAGNOSIS — M79.605 LEFT LEG PAIN: ICD-10-CM

## 2024-08-05 PROCEDURE — 1160F RVW MEDS BY RX/DR IN RCRD: CPT | Mod: CPTII,S$GLB,, | Performed by: ORTHOPAEDIC SURGERY

## 2024-08-05 PROCEDURE — 99024 POSTOP FOLLOW-UP VISIT: CPT | Mod: S$GLB,,, | Performed by: ORTHOPAEDIC SURGERY

## 2024-08-05 PROCEDURE — 93970 EXTREMITY STUDY: CPT | Mod: 26,,, | Performed by: RADIOLOGY

## 2024-08-05 PROCEDURE — 1159F MED LIST DOCD IN RCRD: CPT | Mod: CPTII,S$GLB,, | Performed by: ORTHOPAEDIC SURGERY

## 2024-08-05 PROCEDURE — 93970 EXTREMITY STUDY: CPT | Mod: TC

## 2024-08-05 PROCEDURE — 99999 PR PBB SHADOW E&M-EST. PATIENT-LVL III: CPT | Mod: PBBFAC,,, | Performed by: ORTHOPAEDIC SURGERY

## 2024-08-05 RX ORDER — SODIUM PICOSULFATE, MAGNESIUM OXIDE, AND ANHYDROUS CITRIC ACID 10; 3.5; 12 MG/160ML; G/160ML; G/160ML
LIQUID ORAL
Qty: 1 EACH | Refills: 0 | Status: SHIPPED | OUTPATIENT
Start: 2024-08-05

## 2024-08-08 ENCOUNTER — EXTERNAL HOME HEALTH (OUTPATIENT)
Dept: HOME HEALTH SERVICES | Facility: HOSPITAL | Age: 64
End: 2024-08-08
Payer: COMMERCIAL

## 2024-08-09 DIAGNOSIS — M43.16 SPONDYLOLISTHESIS AT L4-L5 LEVEL: ICD-10-CM

## 2024-08-09 RX ORDER — OXYCODONE AND ACETAMINOPHEN 10; 325 MG/1; MG/1
1 TABLET ORAL EVERY 6 HOURS PRN
Qty: 28 TABLET | Refills: 0 | Status: SHIPPED | OUTPATIENT
Start: 2024-08-09 | End: 2024-08-20

## 2024-08-14 ENCOUNTER — HOSPITAL ENCOUNTER (OUTPATIENT)
Dept: RADIOLOGY | Facility: HOSPITAL | Age: 64
Discharge: HOME | End: 2024-08-14
Attending: ORTHOPAEDIC SURGERY
Payer: COMMERCIAL

## 2024-08-14 ENCOUNTER — OFFICE VISIT (OUTPATIENT)
Dept: ORTHOPEDICS | Facility: CLINIC | Age: 64
End: 2024-08-14
Payer: COMMERCIAL

## 2024-08-14 VITALS — HEIGHT: 62 IN | WEIGHT: 134.94 LBS | BODY MASS INDEX: 24.83 KG/M2

## 2024-08-14 DIAGNOSIS — Z98.1 S/P LUMBAR FUSION: Primary | ICD-10-CM

## 2024-08-14 PROCEDURE — 99999 PR PBB SHADOW E&M-EST. PATIENT-LVL III: CPT | Mod: PBBFAC,,, | Performed by: ORTHOPAEDIC SURGERY

## 2024-08-14 PROCEDURE — 1159F MED LIST DOCD IN RCRD: CPT | Mod: CPTII,S$GLB,, | Performed by: ORTHOPAEDIC SURGERY

## 2024-08-14 PROCEDURE — 99024 POSTOP FOLLOW-UP VISIT: CPT | Mod: S$GLB,,, | Performed by: ORTHOPAEDIC SURGERY

## 2024-08-14 PROCEDURE — 72100 X-RAY EXAM L-S SPINE 2/3 VWS: CPT | Mod: TC,PN

## 2024-08-14 PROCEDURE — 1160F RVW MEDS BY RX/DR IN RCRD: CPT | Mod: CPTII,S$GLB,, | Performed by: ORTHOPAEDIC SURGERY

## 2024-08-14 PROCEDURE — 72100 X-RAY EXAM L-S SPINE 2/3 VWS: CPT | Mod: 26,,, | Performed by: RADIOLOGY

## 2024-08-14 NOTE — PROGRESS NOTES
Subjective:    Patient ID: Mary Mike is a 63 y.o. female.    Chief Complaint: Post-op Evaluation of the Lumbar Spine (Patient is here for a PO f/u on L4-5 TLIF/PLF 7.30.24. States pain has improved since last visit. Has occasional burning and shooting pain )      History of Present Illness    Prior to meeting with the patient I reviewed the medical chart in [facility]. This included reviewing the previous progress notes from our office, review of the patient's last appointment with their primary care provider, review of any visits to the emergency room, and review of any pain management appointments or procedures.  ***    Current Medications  Current Outpatient Medications   Medication Sig Dispense Refill    FLUoxetine 20 MG capsule TAKE 1 CAPSULE(20 MG) BY MOUTH EVERY EVENING 30 capsule 1    multivitamin (THERAGRAN) tablet Take 1 tablet by mouth once daily.      oxyCODONE-acetaminophen (PERCOCET)  mg per tablet Take 1 tablet by mouth every 6 (six) hours as needed for Pain. 28 tablet 0    pantoprazole (PROTONIX) 40 MG tablet Take 1 tablet (40 mg total) by mouth once daily. 30 tablet 11    pramipexole (MIRAPEX) 1 MG tablet TAKE 1 AND 1/2 TABLETS BY MOUTH EVERY EVENING 135 tablet 0    sod picosulf-mag ox-citric ac (CLENPIQ) 10 mg-3.5 gram- 12 gram/160 mL Soln DRINK 1 BOTTLE (175mL) ONCE, THEN DRINK AT LEAST 40 OUNCES OF CLEAR LIQUID OVER 5 HOURS. 1 each 0    traZODone (DESYREL) 100 MG tablet Take 1 tablet (100 mg total) by mouth every evening. 30 tablet 11     No current facility-administered medications for this visit.       Allergies  Review of patient's allergies indicates:  No Known Allergies    Past Medical History  Past Medical History:   Diagnosis Date    ADHD (attention deficit hyperactivity disorder)     Allergy     Anemia     Anemia associated with nutritional deficiency 07/18/2023    Anxiety     Depression     Fatty liver     History of endoscopy 08/26/2021    Dr. Toney Cuevas-Jose is  irregular and was found 37 cm from the incisors.  Scattered ulcerations noted between 36-37 cm. 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  The gastrojejunal anastomosis was characterized by moderate stenosis and ulceration.  This was traversed.  The pouch to jejunum limb was characterized by erythema, friable mucosa, inflammation and ulceration.  The examine    Insomnia     Migraines     Osteoporosis     Restless leg syndrome        Surgical History  Past Surgical History:   Procedure Laterality Date    BREAST BIOPSY Left     benign 15 +years ago    BREAST SURGERY  2017    breast reduction    BUNIONECTOMY Right 2018     SECTION  1987    FRACTURE SURGERY  2016    ankle    FUSION, SPINE, LUMBAR, POSTERIOR APPROACH Bilateral 2024    Procedure: FUSION,SPINE,LUMBAR,POSTERIOR APPROACH;  Surgeon: William Garcia MD;  Location: [facility] OR;  Service: Orthopedics;  Laterality: Bilateral;  NEED DT    GASTRIC RESTRICTION SURGERY      GASTRIC SLEEVE 2012    LAPAROSCOPIC CHOLECYSTECTOMY N/A 10/10/2022    Procedure: CHOLECYSTECTOMY, LAPAROSCOPIC;  Surgeon: Henrietta Joseph MD;  Location: [facility] OR;  Service: General;  Laterality: N/A;    REPAIR OF EXTENSOR TENDON  2021    Procedure: REPAIR, TENDON, EXTENSOR;  Surgeon: Aly Zimmerman DPM;  Location: [facility] OR;  Service: Podiatry;;    SURGICAL REMOVAL OF METATARSAL HEAD Right 2021    Procedure: OSTECTOMY, METATARSAL BONE, HEAD;  Surgeon: Aly Zimmerman DPM;  Location: [facility] OR;  Service: Podiatry;  Laterality: Right;    TOTAL REDUCTION MAMMOPLASTY             Family History:   Family History   Problem Relation Name Age of Onset    Arthritis Mother      Colon cancer Mother      Diabetes Mother      Early death Mother      Miscarriages / Stillbirths Mother      Arthritis Father      Heart disease Father      Hypertension Father      Stroke Father      Vision loss Father      Breast cancer Maternal Aunt  52    Vaginal cancer Paternal  Grandmother      Heart disease Paternal Grandfather         Social History:   Social History     Socioeconomic History    Marital status:    Tobacco Use    Smoking status: Never    Smokeless tobacco: Never   Substance and Sexual Activity    Alcohol use: Yes     Comment: occassional    Drug use: No    Sexual activity: Yes     Social Determinants of Health     Financial Resource Strain: Low Risk  (10/21/2021)    Overall Financial Resource Strain (CARDIA)     Difficulty of Paying Living Expenses: Not very hard   Food Insecurity: No Food Insecurity (10/21/2021)    Hunger Vital Sign     Worried About Running Out of Food in the Last Year: Never true     Ran Out of Food in the Last Year: Never true   Transportation Needs: No Transportation Needs (7/30/2024)    TRANSPORTATION NEEDS     Transportation : No   Physical Activity: Sufficiently Active (10/21/2021)    Exercise Vital Sign     Days of Exercise per Week: 7 days     Minutes of Exercise per Session: 30 min   Stress: Stress Concern Present (10/21/2021)    [employer] [place] [employer] - Occupational Stress Questionnaire     Feeling of Stress : To some extent   Housing Stability: Low Risk  (10/21/2021)    Housing Stability Vital Sign     Unable to Pay for Housing in the Last Year: No     Number of Places Lived in the Last Year: 1     Unstable Housing in the Last Year: No       Date of surgery:***    Review of Systems     General/Constitutional: Chills denies. Fatigue denies. Fever denies. Weight gain denies. Weight loss denies.    Musculoskeletal: Comments: See HPI for details    Skin: Rash denies.    Objective:   Vital Signs: There were no vitals filed for this visit.     Physical Exam    This a well-developed, well nourished patient in no acute distress.  They are alert and oriented and cooperative to examination.  Pt. walks without an antalgic gait.      General Examination:     Constitutional: The patient is alert and oriented to lace person and  "time. Mood is pleasant.     Head/Face: Normal facial features normal eyebrows    Eyes: Normal extraocular motion bilaterally    Lungs: Respirations are equal and unlabored    Gait is coordinated.    Cardiovascular: There are no swelling or varicosities present.    Lymphatic: Negative for adenopathy    Skin: Normal    Neurological: Level of consciousness normal. Oriented to place person and time and situation    Psychiatric: Oriented to time place person and situation    ***  XRAY Report/ Interpretation: ***      Assessment:       1. S/P lumbar fusion        Plan:       Mary Coppola" was seen today for post-op evaluation.    Diagnoses and all orders for this visit:    S/P lumbar fusion  -     X-Ray Lumbar Spine Ap And Lateral         No follow-ups on file.    ***  Treatment options were discussed with regards to the nature of the medical condition. Conservative pain intervention and surgical options were discussed in detail. The probability of success of each separate treatment option was discussed. The patient expressed a clear understanding of the treatment options. With regards to surgery, the procedure risk, benefits, complications, and outcomes were discussed. No guarantees were given with regards to surgical outcome.   The risk of complications, morbidity, and mortality of patient management decisions have been made at the time of this visit. These are associated with the patient's problems, diagnostic procedures and treatment options. This includes the possible management options selected and those considered but not selected by the patient after shared medical decision making we discussed with the patient.     This note was created using Dragon voice recognition software that occasionally misinterpreted phrases or words.               "

## 2024-08-14 NOTE — PROGRESS NOTES
Subjective:    Patient ID: Mary Mike is a 63 y.o. female.    Chief Complaint: Post-op Evaluation of the Lumbar Spine (Patient is here for a PO f/u on L4-5 TLIF/PLF 7.30.24. States pain has improved since last visit. Has occasional burning and shooting pain )      History of Present Illness    Prior to meeting with the patient I reviewed the medical chart in [facility]. This included reviewing the previous progress notes from our office, review of the patient's last appointment with their primary care provider, review of any visits to the emergency room, and review of any pain management appointments or procedures.  Ms. Preston comes in today for follow-up for her L4-5 lumbar fusion.  She is 2 weeks postop.  Her pain is much improved from where she was preoperatively.  She does have some mild back discomfort as expected at 2 weeks postoperatively.  Comes in today for wound check and staple removal and postoperative radiographs.    Current Medications  Current Outpatient Medications   Medication Sig Dispense Refill    FLUoxetine 20 MG capsule TAKE 1 CAPSULE(20 MG) BY MOUTH EVERY EVENING 30 capsule 1    multivitamin (THERAGRAN) tablet Take 1 tablet by mouth once daily.      oxyCODONE-acetaminophen (PERCOCET)  mg per tablet Take 1 tablet by mouth every 6 (six) hours as needed for Pain. 28 tablet 0    pantoprazole (PROTONIX) 40 MG tablet Take 1 tablet (40 mg total) by mouth once daily. 30 tablet 11    pramipexole (MIRAPEX) 1 MG tablet TAKE 1 AND 1/2 TABLETS BY MOUTH EVERY EVENING 135 tablet 0    sod picosulf-mag ox-citric ac (CLENPIQ) 10 mg-3.5 gram- 12 gram/160 mL Soln DRINK 1 BOTTLE (175mL) ONCE, THEN DRINK AT LEAST 40 OUNCES OF CLEAR LIQUID OVER 5 HOURS. 1 each 0    traZODone (DESYREL) 100 MG tablet Take 1 tablet (100 mg total) by mouth every evening. 30 tablet 11     No current facility-administered medications for this visit.       Allergies  Review of patient's allergies indicates:  No Known  Allergies    Past Medical History  Past Medical History:   Diagnosis Date    ADHD (attention deficit hyperactivity disorder)     Allergy     Anemia     Anemia associated with nutritional deficiency 2023    Anxiety     Depression     Fatty liver     History of endoscopy 2021    Dr. Toney Cuevas-Z-line is irregular and was found 37 cm from the incisors.  Scattered ulcerations noted between 36-37 cm. 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  The gastrojejunal anastomosis was characterized by moderate stenosis and ulceration.  This was traversed.  The pouch to jejunum limb was characterized by erythema, friable mucosa, inflammation and ulceration.  The examine    Insomnia     Migraines     Osteoporosis     Restless leg syndrome        Surgical History  Past Surgical History:   Procedure Laterality Date    BREAST BIOPSY Left     benign 15 +years ago    BREAST SURGERY  2017    breast reduction    BUNIONECTOMY Right 2018     SECTION  1987    FRACTURE SURGERY  2016    ankle    FUSION, SPINE, LUMBAR, POSTERIOR APPROACH Bilateral 2024    Procedure: FUSION,SPINE,LUMBAR,POSTERIOR APPROACH;  Surgeon: William Garcia MD;  Location: [facility] OR;  Service: Orthopedics;  Laterality: Bilateral;  NEED DT    GASTRIC RESTRICTION SURGERY      GASTRIC SLEEVE 2012    LAPAROSCOPIC CHOLECYSTECTOMY N/A 10/10/2022    Procedure: CHOLECYSTECTOMY, LAPAROSCOPIC;  Surgeon: Henrietta Joseph MD;  Location: [facility] OR;  Service: General;  Laterality: N/A;    REPAIR OF EXTENSOR TENDON  2021    Procedure: REPAIR, TENDON, EXTENSOR;  Surgeon: Aly Zimmerman DPM;  Location: [facility] OR;  Service: Podiatry;;    SURGICAL REMOVAL OF METATARSAL HEAD Right 2021    Procedure: OSTECTOMY, METATARSAL BONE, HEAD;  Surgeon: Aly Zimmerman DPM;  Location: [facility] OR;  Service: Podiatry;  Laterality: Right;    TOTAL REDUCTION MAMMOPLASTY             Family History:   Family History   Problem Relation Name Age of  Onset    Arthritis Mother      Colon cancer Mother      Diabetes Mother      Early death Mother      Miscarriages / Stillbirths Mother      Arthritis Father      Heart disease Father      Hypertension Father      Stroke Father      Vision loss Father      Breast cancer Maternal Aunt  52    Vaginal cancer Paternal Grandmother      Heart disease Paternal Grandfather         Social History:   Social History     Socioeconomic History    Marital status:    Tobacco Use    Smoking status: Never    Smokeless tobacco: Never   Substance and Sexual Activity    Alcohol use: Yes     Comment: occassional    Drug use: No    Sexual activity: Yes     Social Determinants of Health     Financial Resource Strain: Low Risk  (10/21/2021)    Overall Financial Resource Strain (CARDIA)     Difficulty of Paying Living Expenses: Not very hard   Food Insecurity: No Food Insecurity (10/21/2021)    Hunger Vital Sign     Worried About Running Out of Food in the Last Year: Never true     Ran Out of Food in the Last Year: Never true   Transportation Needs: No Transportation Needs (7/30/2024)    TRANSPORTATION NEEDS     Transportation : No   Physical Activity: Sufficiently Active (10/21/2021)    Exercise Vital Sign     Days of Exercise per Week: 7 days     Minutes of Exercise per Session: 30 min   Stress: Stress Concern Present (10/21/2021)    [employer] - Occupational Stress Questionnaire     Feeling of Stress : To some extent   Housing Stability: Low Risk  (10/21/2021)    Housing Stability Vital Sign     Unable to Pay for Housing in the Last Year: No     Number of Places Lived in the Last Year: 1     Unstable Housing in the Last Year: No       Date of surgery:  July 30, 2024    Review of Systems     General/Constitutional: Chills denies. Fatigue denies. Fever denies. Weight gain denies. Weight loss denies.    Musculoskeletal: Comments: See HPI for details    Skin: Rash denies.    Objective:   Vital Signs: There  "were no vitals filed for this visit.     Physical Exam    This a well-developed, well nourished patient in no acute distress.  They are alert and oriented and cooperative to examination.  Pt. walks without an antalgic gait.      General Examination:     Constitutional: The patient is alert and oriented to lace person and time. Mood is pleasant.     Head/Face: Normal facial features normal eyebrows    Eyes: Normal extraocular motion bilaterally    Lungs: Respirations are equal and unlabored    Gait is coordinated.    Cardiovascular: There are no swelling or varicosities present.    Lymphatic: Negative for adenopathy    Skin: Normal    Neurological: Level of consciousness normal. Oriented to place person and time and situation    Psychiatric: Oriented to time place person and situation    Lumbar exam: Skin to lower back clean dry and intact.  No erythema or ecchymosis.  No signs or symptoms of infection.  Posterior lumbar incision we will well healed without wound dehiscence or drainage.  She is neurovascularly intact throughout bilateral lower extremities.  Negative Homans bilaterally.  She stands erect.  She ambulates without an aid.    XRAY Report/ Interpretation:  Two views taken of the lumbar spine today: AP and lateral views.  No acute fractures or dislocations seen.  She has posterior instrumentation fusing L4-5 with interbody placement without evidence of hardware failure or subsidence.      Assessment:       1. S/P lumbar fusion        Plan:       Mary Coppola" was seen today for post-op evaluation.    Diagnoses and all orders for this visit:    S/P lumbar fusion  -     X-Ray Lumbar Spine Ap And Lateral         Follow up for 6 week f/u - lumbar fusion L4-5 07/30/24.  This is to attest that the medical assistant, Lane Oates served in the capacity as a scribe for this patient's encounter.  This is also verify that I have reviewed the patient's history and  formulated the treatment plan for this patient.  I " have  evaluated this patient  and formulated a treatment plan for this patient visit.  The treatment plan and medical decision-making is outlined below  Staples removed from her lumbar spine today.  She tolerated this well.  She can resume/continue her regular activities of daily living as pain tolerates.  This was explained to her today.  I do not believe she will need formal physical therapy.  She should walk for exercise.  We will check her back again in 6 weeks to assess her progress.  Did have a discussion with her today about her opioid induced constipation.  She has been taking Colace.  She will initiate the use of over-the-counter MiraLax.  If she does not find good relief, she will let us know.      Treatment options were discussed with regards to the nature of the medical condition. Conservative pain intervention and surgical options were discussed in detail. The probability of success of each separate treatment option was discussed. The patient expressed a clear understanding of the treatment options. With regards to surgery, the procedure risk, benefits, complications, and outcomes were discussed. No guarantees were given with regards to surgical outcome.   The risk of complications, morbidity, and mortality of patient management decisions have been made at the time of this visit. These are associated with the patient's problems, diagnostic procedures and treatment options. This includes the possible management options selected and those considered but not selected by the patient after shared medical decision making we discussed with the patient.     This note was created using Dragon voice recognition software that occasionally misinterpreted phrases or words.

## 2024-08-15 ENCOUNTER — TELEPHONE (OUTPATIENT)
Facility: CLINIC | Age: 64
End: 2024-08-15
Payer: COMMERCIAL

## 2024-08-19 ENCOUNTER — PATIENT MESSAGE (OUTPATIENT)
Dept: ORTHOPEDICS | Facility: CLINIC | Age: 64
End: 2024-08-19
Payer: COMMERCIAL

## 2024-08-19 DIAGNOSIS — M43.16 SPONDYLOLISTHESIS AT L4-L5 LEVEL: ICD-10-CM

## 2024-08-19 RX ORDER — OXYCODONE AND ACETAMINOPHEN 7.5; 325 MG/1; MG/1
1 TABLET ORAL EVERY 6 HOURS PRN
Qty: 28 TABLET | Refills: 0 | Status: SHIPPED | OUTPATIENT
Start: 2024-08-20

## 2024-08-19 RX ORDER — OXYCODONE AND ACETAMINOPHEN 10; 325 MG/1; MG/1
1 TABLET ORAL EVERY 6 HOURS PRN
Qty: 28 TABLET | Refills: 0 | Status: CANCELLED | OUTPATIENT
Start: 2024-08-19 | End: 2024-08-30

## 2024-08-21 DIAGNOSIS — M43.16 SPONDYLOLISTHESIS AT L4-L5 LEVEL: ICD-10-CM

## 2024-08-21 RX ORDER — OXYCODONE AND ACETAMINOPHEN 7.5; 325 MG/1; MG/1
1 TABLET ORAL EVERY 6 HOURS PRN
Qty: 28 TABLET | Refills: 0 | Status: CANCELLED | OUTPATIENT
Start: 2024-08-21

## 2024-08-22 ENCOUNTER — TELEPHONE (OUTPATIENT)
Dept: ORTHOPEDICS | Facility: CLINIC | Age: 64
End: 2024-08-22
Payer: COMMERCIAL

## 2024-08-22 NOTE — TELEPHONE ENCOUNTER
----- Message from John Oates PA-C sent at 8/21/2024  4:04 PM CDT -----  I assume getting auth is something you may handle?  I have no idea.  ----- Message -----  From: Ann-Marie Eller  Sent: 8/21/2024   3:03 PM CDT  To: AZUCENA Martinez auth for script for Oxycodone

## 2024-08-27 DIAGNOSIS — F32.A MOOD DISORDER OF DEPRESSED TYPE: ICD-10-CM

## 2024-08-29 RX ORDER — FLUOXETINE HYDROCHLORIDE 20 MG/1
CAPSULE ORAL
Qty: 15 CAPSULE | Refills: 1 | Status: SHIPPED | OUTPATIENT
Start: 2024-08-29

## 2024-09-03 DIAGNOSIS — Z98.1 S/P LUMBAR FUSION: Primary | ICD-10-CM

## 2024-09-03 RX ORDER — OXYCODONE AND ACETAMINOPHEN 7.5; 325 MG/1; MG/1
1 TABLET ORAL EVERY 8 HOURS PRN
Qty: 21 TABLET | Refills: 0 | Status: SHIPPED | OUTPATIENT
Start: 2024-09-03

## 2024-09-30 ENCOUNTER — HOSPITAL ENCOUNTER (OUTPATIENT)
Dept: RADIOLOGY | Facility: HOSPITAL | Age: 64
Discharge: HOME | End: 2024-09-30
Attending: ORTHOPAEDIC SURGERY
Payer: COMMERCIAL

## 2024-09-30 ENCOUNTER — OFFICE VISIT (OUTPATIENT)
Dept: ORTHOPEDICS | Facility: CLINIC | Age: 64
End: 2024-09-30
Payer: COMMERCIAL

## 2024-09-30 VITALS — BODY MASS INDEX: 24.83 KG/M2 | WEIGHT: 134.94 LBS | HEIGHT: 62 IN

## 2024-09-30 DIAGNOSIS — Z98.1 S/P LUMBAR FUSION: Primary | ICD-10-CM

## 2024-09-30 PROCEDURE — 1159F MED LIST DOCD IN RCRD: CPT | Mod: CPTII,S$GLB,, | Performed by: ORTHOPAEDIC SURGERY

## 2024-09-30 PROCEDURE — 99999 PR PBB SHADOW E&M-EST. PATIENT-LVL III: CPT | Mod: PBBFAC,,, | Performed by: ORTHOPAEDIC SURGERY

## 2024-09-30 PROCEDURE — 72100 X-RAY EXAM L-S SPINE 2/3 VWS: CPT | Mod: 26,,, | Performed by: RADIOLOGY

## 2024-09-30 PROCEDURE — 99024 POSTOP FOLLOW-UP VISIT: CPT | Mod: S$GLB,,, | Performed by: ORTHOPAEDIC SURGERY

## 2024-09-30 PROCEDURE — 72100 X-RAY EXAM L-S SPINE 2/3 VWS: CPT | Mod: TC,PN

## 2024-09-30 PROCEDURE — 1160F RVW MEDS BY RX/DR IN RCRD: CPT | Mod: CPTII,S$GLB,, | Performed by: ORTHOPAEDIC SURGERY

## 2024-09-30 RX ORDER — LINACLOTIDE 72 UG/1
72 CAPSULE, GELATIN COATED ORAL
COMMUNITY
Start: 2024-09-25

## 2024-09-30 NOTE — PROGRESS NOTES
Subjective:    Patient ID: Mary Mike is a 64 y.o. female.    Chief Complaint: Post-op Evaluation of the Lumbar Spine (Patient is here for a PO f/u on L4-5 TLIF/PLF 7.30.24, states pain has improved since last visit. Has occasional feeling of pressure when standing up )      History of Present Illness    Prior to meeting with the patient I reviewed the medical chart in [facility]. This included reviewing the previous progress notes from our office, review of the patient's last appointment with their primary care provider, review of any visits to the emergency room, and review of any pain management appointments or procedures.  Two months status post transforaminal lumbar interbody fusion L4-5 patient doing well she is quite pleased no leg pain.  Here for routine evaluation and checkup.    Current Medications  Current Outpatient Medications   Medication Sig Dispense Refill    FLUoxetine 20 MG capsule TAKE 1 CAPSULE(20 MG) BY MOUTH EVERY EVENING 15 capsule 1    LINZESS 72 mcg Cap capsule Take 72 mcg by mouth.      multivitamin (THERAGRAN) tablet Take 1 tablet by mouth once daily.      pantoprazole (PROTONIX) 40 MG tablet Take 1 tablet (40 mg total) by mouth once daily. 30 tablet 11    pramipexole (MIRAPEX) 1 MG tablet TAKE 1 AND 1/2 TABLETS BY MOUTH EVERY EVENING 135 tablet 0    sod picosulf-mag ox-citric ac (CLENPIQ) 10 mg-3.5 gram- 12 gram/160 mL Soln DRINK 1 BOTTLE (175mL) ONCE, THEN DRINK AT LEAST 40 OUNCES OF CLEAR LIQUID OVER 5 HOURS. 1 each 0    traZODone (DESYREL) 100 MG tablet Take 1 tablet (100 mg total) by mouth every evening. 30 tablet 11    oxyCODONE-acetaminophen (PERCOCET) 7.5-325 mg per tablet Take 1 tablet by mouth every 8 (eight) hours as needed for Pain. (Patient not taking: Reported on 9/30/2024) 21 tablet 0     No current facility-administered medications for this visit.       Allergies  Review of patient's allergies indicates:  No Known Allergies    Past Medical History  Past Medical  History:   Diagnosis Date    ADHD (attention deficit hyperactivity disorder)     Allergy     Anemia     Anemia associated with nutritional deficiency 2023    Anxiety     Depression     Fatty liver     History of endoscopy 2021    Dr. Toney Cuevas-Z-line is irregular and was found 37 cm from the incisors.  Scattered ulcerations noted between 36-37 cm. 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  The gastrojejunal anastomosis was characterized by moderate stenosis and ulceration.  This was traversed.  The pouch to jejunum limb was characterized by erythema, friable mucosa, inflammation and ulceration.  The examine    Insomnia     Migraines     Osteoporosis     Restless leg syndrome        Surgical History  Past Surgical History:   Procedure Laterality Date    BREAST BIOPSY Left     benign 15 +years ago    BREAST SURGERY  2017    breast reduction    BUNIONECTOMY Right 2018     SECTION  1987    FRACTURE SURGERY  2016    ankle    FUSION, SPINE, LUMBAR, POSTERIOR APPROACH Bilateral 2024    Procedure: FUSION,SPINE,LUMBAR,POSTERIOR APPROACH;  Surgeon: William Garcia MD;  Location: [facility] OR;  Service: Orthopedics;  Laterality: Bilateral;  NEED DT    GASTRIC RESTRICTION SURGERY      GASTRIC SLEEVE 2012    LAPAROSCOPIC CHOLECYSTECTOMY N/A 10/10/2022    Procedure: CHOLECYSTECTOMY, LAPAROSCOPIC;  Surgeon: Henrietta Joseph MD;  Location: [facility] OR;  Service: General;  Laterality: N/A;    REPAIR OF EXTENSOR TENDON  2021    Procedure: REPAIR, TENDON, EXTENSOR;  Surgeon: Aly Zimmerman DPM;  Location: [facility] OR;  Service: Podiatry;;    SURGICAL REMOVAL OF METATARSAL HEAD Right 2021    Procedure: OSTECTOMY, METATARSAL BONE, HEAD;  Surgeon: Aly Zimmerman DPM;  Location: [facility] OR;  Service: Podiatry;  Laterality: Right;    TOTAL REDUCTION MAMMOPLASTY             Family History:   Family History   Problem Relation Name Age of Onset    Arthritis Mother      Colon cancer Mother       Diabetes Mother      Early death Mother      Miscarriages / Stillbirths Mother      Arthritis Father      Heart disease Father      Hypertension Father      Stroke Father      Vision loss Father      Breast cancer Maternal Aunt  52    Vaginal cancer Paternal Grandmother      Heart disease Paternal Grandfather         Social History:   Social History     Socioeconomic History    Marital status:    Tobacco Use    Smoking status: Never    Smokeless tobacco: Never   Substance and Sexual Activity    Alcohol use: Yes     Comment: occassional    Drug use: No    Sexual activity: Yes     Social Drivers of Health     Financial Resource Strain: Low Risk  (10/21/2021)    Overall Financial Resource Strain (CARDIA)     Difficulty of Paying Living Expenses: Not very hard   Food Insecurity: No Food Insecurity (10/21/2021)    Hunger Vital Sign     Worried About Running Out of Food in the Last Year: Never true     Ran Out of Food in the Last Year: Never true   Transportation Needs: No Transportation Needs (7/30/2024)    TRANSPORTATION NEEDS     Transportation : No   Physical Activity: Sufficiently Active (10/21/2021)    Exercise Vital Sign     Days of Exercise per Week: 7 days     Minutes of Exercise per Session: 30 min   Stress: Stress Concern Present (10/21/2021)    [employer] - Occupational Stress Questionnaire     Feeling of Stress : To some extent   Housing Stability: Low Risk  (10/21/2021)    Housing Stability Vital Sign     Unable to Pay for Housing in the Last Year: No     Number of Places Lived in the Last Year: 1     Unstable Housing in the Last Year: No       Date of surgery:  July 30, 2024    Review of Systems     General/Constitutional: Chills denies. Fatigue denies. Fever denies. Weight gain denies. Weight loss denies.    Musculoskeletal: Comments: See HPI for details    Skin: Rash denies.    Objective:   Vital Signs: There were no vitals filed for this visit.     Physical  "Exam    This a well-developed, well nourished patient in no acute distress.  They are alert and oriented and cooperative to examination.  Pt. walks without an antalgic gait.      General Examination:     Constitutional: The patient is alert and oriented to lace person and time. Mood is pleasant.     Head/Face: Normal facial features normal eyebrows    Eyes: Normal extraocular motion bilaterally    Lungs: Respirations are equal and unlabored    Gait is coordinated.    Cardiovascular: There are no swelling or varicosities present.    Lymphatic: Negative for adenopathy    Skin: Normal    Neurological: Level of consciousness normal. Oriented to place person and time and situation    Psychiatric: Oriented to time place person and situation    Incision well healed nontender neurovascular status normal  XRAY Report/ Interpretation:  AP and lateral x-rays of lumbar spine will performed today. Indications low back pain. Findings:  Interbody fusion L4-5 hardware pedicle screws L4 and L5 no signs of hardware loosening early consolidation of the interbody device noted      Assessment:       1. S/P lumbar fusion        Plan:       Mary Coppola" was seen today for post-op evaluation.    Diagnoses and all orders for this visit:    S/P lumbar fusion  -     X-Ray Lumbar Spine Ap And Lateral         No follow-ups on file.    Progressive sedentary activity as tolerated advised return 2 months with x-rays lumbar spine  Treatment options were discussed with regards to the nature of the medical condition. Conservative pain intervention and surgical options were discussed in detail. The probability of success of each separate treatment option was discussed. The patient expressed a clear understanding of the treatment options. With regards to surgery, the procedure risk, benefits, complications, and outcomes were discussed. No guarantees were given with regards to surgical outcome.   The risk of complications, morbidity, and mortality of " patient management decisions have been made at the time of this visit. These are associated with the patient's problems, diagnostic procedures and treatment options. This includes the possible management options selected and those considered but not selected by the patient after shared medical decision making we discussed with the patient.     This note was created using Dragon voice recognition software that occasionally misinterpreted phrases or words.

## 2024-10-02 ENCOUNTER — TELEPHONE (OUTPATIENT)
Dept: FAMILY MEDICINE | Facility: CLINIC | Age: 64
End: 2024-10-02
Payer: COMMERCIAL

## 2024-10-02 NOTE — TELEPHONE ENCOUNTER
----- Message from Med Assistant Jiménez sent at 10/2/2024  3:40 PM CDT -----  Patient calling with c/o UTI symptoms, requesting to have abx sent to pharmacy.  Patient states she has an allergy to OTC azo.    Klever Sherman  Pt: [phone]

## 2024-10-03 ENCOUNTER — OFFICE VISIT (OUTPATIENT)
Dept: FAMILY MEDICINE | Facility: CLINIC | Age: 64
End: 2024-10-03
Payer: COMMERCIAL

## 2024-10-03 ENCOUNTER — PATIENT MESSAGE (OUTPATIENT)
Dept: FAMILY MEDICINE | Facility: CLINIC | Age: 64
End: 2024-10-03

## 2024-10-03 VITALS
SYSTOLIC BLOOD PRESSURE: 139 MMHG | WEIGHT: 138 LBS | HEIGHT: 62 IN | BODY MASS INDEX: 25.4 KG/M2 | DIASTOLIC BLOOD PRESSURE: 64 MMHG

## 2024-10-03 DIAGNOSIS — E83.51 LOW CALCIUM LEVELS: ICD-10-CM

## 2024-10-03 DIAGNOSIS — D64.9 NORMOCYTIC NORMOCHROMIC ANEMIA: ICD-10-CM

## 2024-10-03 DIAGNOSIS — R53.83 FATIGUE, UNSPECIFIED TYPE: ICD-10-CM

## 2024-10-03 DIAGNOSIS — R30.0 DYSURIA: Primary | ICD-10-CM

## 2024-10-03 DIAGNOSIS — Z98.84 HISTORY OF BARIATRIC SURGERY: ICD-10-CM

## 2024-10-03 DIAGNOSIS — Z23 NEED FOR INFLUENZA VACCINATION: ICD-10-CM

## 2024-10-03 LAB
BILIRUB UR QL STRIP: NEGATIVE
CLARITY UR: CLEAR
COLOR UR: NORMAL
GLUCOSE UR QL STRIP: NEGATIVE
KETONES UR QL STRIP: NEGATIVE
LEUKOCYTE ESTERASE UR QL STRIP: NEGATIVE
PH, POC UA: 6 (ref 5–8)
POC BLOOD, URINE: NEGATIVE
POC NITRATES, URINE: NEGATIVE
PROT UR QL STRIP: NEGATIVE
SP GR UR STRIP: 1.02 (ref 1–1.03)
UROBILINOGEN UR STRIP-ACNC: NORMAL (ref 0.1–1.1)

## 2024-10-03 PROCEDURE — 99999 PR PBB SHADOW E&M-EST. PATIENT-LVL III: CPT | Mod: PBBFAC,,, | Performed by: INTERNAL MEDICINE

## 2024-10-03 NOTE — PROGRESS NOTES
Urine test was normal in the office today.  Your symptoms could be due to something else or still probably an early infection.  Please follow the instructions of remaining hydrated, cranberry supplements, probiotics and timed voiding.  Let me know by Saturday how you doing.

## 2024-10-03 NOTE — PROGRESS NOTES
Subjective:       Patient ID: Mary Mike is a 64 y.o. female.    Chief Complaint: Urinary Tract Infection, Anemia, and Fatigue    Miss Preston is here for follow-up.  She comes for follow-up after almost a year now.      She has burning sensation and pressure in the urine.  Grace 2 days.    Underlying medical issues include the following:-    1.-reflux symptoms currently on pantoprazole 40 mg   2.-chronic constipation-72 mg Linzess every day -p.r.n.  3.-fluoxetine 20 mg for mood and depression and it does help her.  4.- trazodone helps her with the sleep.  5.-pramipexole for restless leg    Retractors for bladder infection include vaginal dryness.  She was not been intimate with the  in the last 7 or 10 days.  Bowel movements are managed regularly with Linzess use p.r.n..    Medical update includes a laminectomy with fusion in the lumbar spine done by Dr. William Garcia for chronic low back pain.  She is slowly and steadily recovering from it and feels better.    Some fatigue persists.      Range of motion is generally getting better.      She does feel fatigued and tired and no energy.  Prior labs did show some anemia and recent labs had shown a low H&H and low platelet counts.  She had some workup done by Hematology also in past.        Dysuria   This is a new problem. The current episode started yesterday. The problem occurs every urination. The problem has been waxing and waning. The pain is at a severity of 2/10. The patient is experiencing no pain. There has been no fever. She is Not sexually active. There is No history of pyelonephritis. Pertinent negatives include no behavior changes, chills, discharge, frequency, hematuria, nausea, vomiting, bubble bath use or rash. There is no history of catheterization, diabetes mellitus, hypertension, kidney stones, STD or urinary stasis.   Anemia  Presents for initial visit. Onset time: 2. There has been no abdominal pain, anorexia, fever, palpitations or pica  (no pica). Signs of blood loss that are not present include hematemesis, hematochezia, melena, menorrhagia and vaginal bleeding. There is no history of alcohol abuse, cancer, chronic liver disease, chronic renal disease, HIV/AIDS or inflammatory bowel disease.   Fatigue  This is a recurrent problem. The current episode started more than 1 month ago. The problem occurs constantly. The problem has been unchanged. Associated symptoms include fatigue. Pertinent negatives include no abdominal pain, anorexia, arthralgias, chest pain, chills, coughing, fever, joint swelling, nausea, numbness, rash or vomiting. Exacerbated by: Recent surgery and likely postop anemia. She has tried rest for the symptoms. The treatment provided mild relief.       Past Medical History:   Diagnosis Date    ADHD (attention deficit hyperactivity disorder)     Allergy     Anemia     Anemia associated with nutritional deficiency 07/18/2023    Anxiety     Depression     Fatty liver     History of endoscopy 08/26/2021    Dr. Toney Cuevas-Z-line is irregular and was found 37 cm from the incisors.  Scattered ulcerations noted between 36-37 cm. 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  The gastrojejunal anastomosis was characterized by moderate stenosis and ulceration.  This was traversed.  The pouch to jejunum limb was characterized by erythema, friable mucosa, inflammation and ulceration.  The examine    Insomnia     Migraines     Osteoporosis     Restless leg syndrome 2005     Social History     Socioeconomic History    Marital status:    Tobacco Use    Smoking status: Never    Smokeless tobacco: Never   Substance and Sexual Activity    Alcohol use: Yes     Comment: occassional    Drug use: No    Sexual activity: Yes     Social Drivers of Health     Financial Resource Strain: Low Risk  (10/21/2021)    Overall Financial Resource Strain (CARDIA)     Difficulty of Paying Living Expenses: Not very hard   Food Insecurity: No Food  Insecurity (10/21/2021)    Hunger Vital Sign     Worried About Running Out of Food in the Last Year: Never true     Ran Out of Food in the Last Year: Never true   Transportation Needs: No Transportation Needs (2024)    TRANSPORTATION NEEDS     Transportation : No   Physical Activity: Sufficiently Active (10/21/2021)    Exercise Vital Sign     Days of Exercise per Week: 7 days     Minutes of Exercise per Session: 30 min   Stress: Stress Concern Present (10/21/2021)    [employer] [place] - Occupational Stress Questionnaire     Feeling of Stress : To some extent   Housing Stability: Low Risk  (10/21/2021)    Housing Stability Vital Sign     Unable to Pay for Housing in the Last Year: No     Number of Places Lived in the Last Year: 1     Unstable Housing in the Last Year: No     Past Surgical History:   Procedure Laterality Date    BREAST BIOPSY Left     benign 15 +years ago    BREAST SURGERY  2017    breast reduction    BUNIONECTOMY Right 2018     SECTION  1987    FRACTURE SURGERY  2016    ankle    FUSION, SPINE, LUMBAR, POSTERIOR APPROACH Bilateral 2024    Procedure: FUSION,SPINE,LUMBAR,POSTERIOR APPROACH;  Surgeon: William Garcia MD;  Location: [facility] OR;  Service: Orthopedics;  Laterality: Bilateral;  NEED DT    GASTRIC RESTRICTION SURGERY      GASTRIC SLEEVE 2012    LAPAROSCOPIC CHOLECYSTECTOMY N/A 10/10/2022    Procedure: CHOLECYSTECTOMY, LAPAROSCOPIC;  Surgeon: Henrietta Joseph MD;  Location: [facility] OR;  Service: General;  Laterality: N/A;    REPAIR OF EXTENSOR TENDON  2021    Procedure: REPAIR, TENDON, EXTENSOR;  Surgeon: Aly Zimmerman DPM;  Location: [facility] OR;  Service: Podiatry;;    SURGICAL REMOVAL OF METATARSAL HEAD Right 2021    Procedure: OSTECTOMY, METATARSAL BONE, HEAD;  Surgeon: Aly Zimmerman DPM;  Location: [facility] OR;  Service: Podiatry;  Laterality: Right;    TOTAL REDUCTION MAMMOPLASTY      2016     Family History   Problem Relation Name Age of  "Onset    Arthritis Mother      Colon cancer Mother      Diabetes Mother      Early death Mother      Miscarriages / Stillbirths Mother      Arthritis Father      Heart disease Father      Hypertension Father      Stroke Father      Vision loss Father      Breast cancer Maternal Aunt  52    Vaginal cancer Paternal Grandmother      Heart disease Paternal Grandfather         Review of Systems   Constitutional:  Positive for fatigue. Negative for activity change, chills and fever.   Eyes:  Negative for redness and visual disturbance.   Respiratory:  Negative for apnea, cough, choking and wheezing.    Cardiovascular:  Negative for chest pain, palpitations and leg swelling.   Gastrointestinal:  Negative for abdominal distention, abdominal pain, anal bleeding, anorexia, hematemesis, hematochezia, melena, nausea and vomiting.   Endocrine: Negative for cold intolerance, polydipsia and polyuria.   Genitourinary:  Positive for dysuria. Negative for frequency, hematuria, menorrhagia and vaginal bleeding.   Musculoskeletal:  Negative for arthralgias and joint swelling.   Skin:  Negative for color change and rash.   Neurological:  Negative for dizziness, syncope and numbness.   Psychiatric/Behavioral:  Positive for sleep disturbance. Negative for agitation. Dysphoric mood: Mild anhedonia for which she takes fluoxetine which helps her..         Objective:      Blood pressure 139/64, pulse (P) 69, height 5' 2" (1.575 m), weight 62.6 kg (138 lb). Body mass index is 25.24 kg/m².  Physical Exam  Vitals and nursing note reviewed.   Constitutional:       General: She is not in acute distress.     Appearance: Normal appearance. She is well-developed.   HENT:      Head: Normocephalic and atraumatic.      Mouth/Throat:      Mouth: Mucous membranes are moist.   Eyes:      General: Lids are normal. Lids are everted, no foreign bodies appreciated.      Conjunctiva/sclera: Conjunctivae normal.      Pupils:      Right eye: Pupil is round and " reactive.      Left eye: Pupil is round and reactive.   Neck:      Trachea: Trachea normal.   Cardiovascular:      Rate and Rhythm: Normal rate and regular rhythm.      Pulses: Normal pulses.      Heart sounds: Normal heart sounds, S1 normal and S2 normal. No murmur heard.     No friction rub.   Pulmonary:      Effort: Pulmonary effort is normal.      Breath sounds: Normal breath sounds.   Abdominal:      General: Abdomen is flat. Bowel sounds are normal.      Palpations: Abdomen is soft. Abdomen is not rigid.      Tenderness: There is no guarding.   Musculoskeletal:      Cervical back: Neck supple. No muscular tenderness.      Right lower leg: No edema.      Left lower leg: No edema.   Lymphadenopathy:      Cervical: No cervical adenopathy.   Skin:     General: Skin is warm and dry.      Coloration: Skin is pale.   Neurological:      General: No focal deficit present.      Mental Status: She is alert. Mental status is at baseline.   Psychiatric:         Behavior: Behavior normal. Behavior is cooperative.           Assessment:           1. Dysuria  Comments:  Pressure while passing urine.  Urine dipstick in the office is negative.  Please try cranberry supplements, probiotics and increase fluids.  Orders:  -     POCT Urinalysis, Dipstick, Automated, W/O Scope    2. Normocytic normochromic anemia  Comments:  Anemia has been noted which could be multifactorial.  History of gastric bypass surgery noted.  Takes only 1 vitamin.  Check CBC and iron studies  Orders:  -     CBC Auto Differential; Future; Expected date: 10/03/2024  -     Iron and TIBC; Future; Expected date: 10/03/2024    3. Low calcium levels  Comments:  Low calcium level noted.  History of gastric bypass surgery.  May need a bariatric formula.  Orders:  -     Basic Metabolic Panel; Future; Expected date: 10/03/2024    4. Fatigue, unspecified type  Comments:  Generalized fatigue and not feeling better.  Does not have the energy and get go.  Probably due to  anemia and nutritional deficiencies  Orders:  -     CBC Auto Differential; Future; Expected date: 10/03/2024  -     Basic Metabolic Panel; Future; Expected date: 10/03/2024  -     TSH; Future; Expected date: 10/03/2024    5. Need for influenza vaccination  -     influenza (Flulaval, Fluzone, Fluarix) 45 mcg/0.5 mL IM vaccine (> or = 6 mo) 0.5 mL    6. History of bariatric surgery           Component Ref Range & Units 2 mo ago  (8/1/24) 2 mo ago  (8/1/24) 2 mo ago  (7/31/24) 2 mo ago  (7/30/24) 2 mo ago  (7/30/24) 2 mo ago  (7/23/24) 10 mo ago  (11/17/23)   WBC 3.90 - 12.70 K/uL 7.01 6.26 11.22 12.91 High  11.50 3.84 Low  2.9 Low  R   RBC 4.00 - 5.40 M/uL 2.83 Low  2.75 Low  3.24 Low  3.93 Low  3.51 Low  4.37 4.65 R   Hemoglobin 12.0 - 16.0 g/dL 8.4 Low  8.1 Low  9.4 Low  11.3 Low  10.4 Low  12.7 13.3 R   Hematocrit 37.0 - 48.5 % 26.2 Low  25.2 Low  29.4 Low  35.6 Low  32.0 Low  39.9 41.4 R   MCV 82 - 98 fL 93 92 91 91 91 91 89.0 R   MCH 27.0 - 31.0 pg 29.7 29.5 29.0 28.8 29.6 29.1 28.6 R   MCHC 32.0 - 36.0 g/dL 32.1 32.1 32.0 31.7 Low  32.5 31.8 Low  32.1   RDW 11.5 - 14.5 % 13.2 13.3 12.9 12.8 12.9 12.9 12.7 R   Platelets 150 - 450 K/uL 121 Low  121 Low  144 Low  174 163 214 229 R     Component Ref Range & Units 2 mo ago  (8/1/24) 2 mo ago  (7/31/24) 2 mo ago  (7/30/24) 2 mo ago  (7/23/24) 10 mo ago  (11/17/23) 1 yr ago  (8/6/23) 1 yr ago  (7/19/23)   Sodium 136 - 145 mmol/L 141 140 140 140 140 R 138 142 R   Potassium 3.5 - 5.1 mmol/L 4.4 4.9 3.9 3.7 4.4 R 3.8 4.5 R   Chloride 95 - 110 mmol/L 107 107 106 108 105 R 107 107 R   CO2 23 - 29 mmol/L 31 High  26 28 23 28 R 25 27 R   Glucose 70 - 110 mg/dL 90 121 High  136 High  62 Low  81 R, CM 92 82 R, CM   BUN 8 - 23 mg/dL 6 Low  7 Low  7 Low  7 Low  7 R 6 R 6 Low  R   Creatinine 0.5 - 1.4 mg/dL 0.7 0.6 0.7 0.8 0.82 R 0.7 0.76 R   Calcium 8.7 - 10.5 mg/dL 8.1 Low  7.9 Low  7.2 Low  8.8 8.8 R 8.6 Low  8.7 R       Plan:   Dysuria  Comments:  Pressure while passing urine.   Urine dipstick in the office is negative.  Please try cranberry supplements, probiotics and increase fluids.  Orders:  -     POCT Urinalysis, Dipstick, Automated, W/O Scope    Normocytic normochromic anemia  Comments:  Anemia has been noted which could be multifactorial.  History of gastric bypass surgery noted.  Takes only 1 vitamin.  Check CBC and iron studies  Orders:  -     CBC Auto Differential; Future; Expected date: 10/03/2024  -     Iron and TIBC; Future; Expected date: 10/03/2024    Low calcium levels  Comments:  Low calcium level noted.  History of gastric bypass surgery.  May need a bariatric formula.  Orders:  -     Basic Metabolic Panel; Future; Expected date: 10/03/2024    Fatigue, unspecified type  Comments:  Generalized fatigue and not feeling better.  Does not have the energy and get go.  Probably due to anemia and nutritional deficiencies  Orders:  -     CBC Auto Differential; Future; Expected date: 10/03/2024  -     Basic Metabolic Panel; Future; Expected date: 10/03/2024  -     TSH; Future; Expected date: 10/03/2024    Need for influenza vaccination  -     influenza (Flulaval, Fluzone, Fluarix) 45 mcg/0.5 mL IM vaccine (> or = 6 mo) 0.5 mL    History of bariatric surgery      Miss Kary comes in a year now.  Welcome back to the clinic.    Today's reason for visit is dysuria or pressure while passing urine.  I have not identified any risk factors accept that she might be anemic and more prone to any form of actions due to reduction in immunity.    The urine dipstick in the office is negative at this point.      Probably some other reason for her symptoms.      My suggestion would be to take some cranberry supplements or cranberry juice without sugar.  Also get some over-the-counter probiotics also.  Increase fluid intake.  Let us see how she does by Saturday and I will call the antibiotics in case she was still persists to have symptoms.    Keep bowels moving by using Linzess.  Also add fiber to  diet.      She has also complained of fatigue and I have noticed in anemia with low platelets.  While this was prevalent in past, I doubt it has improved to the level that it will give her boosted energy levels.  She looks a little bit pale and sallow also.    I will check the blood counts again including iron studies.      She had a low calcium also and history of bariatric surgery also has been noted.  She is currently just taking 1   Multivitamin and she may need more supplementation with calcium, vitamin D3 and other elements.   The supplementation needs to be reviewed again and she can try some bariatric supplements.      She did get the flu shot today.  Cognitive will be discussed next visit.  She should get updated on mammogram and Pap smear.  Follow up in about 6 weeks (around 11/14/2024), or if symptoms worsen or fail to improve, for Review of fatigue, anemia and chronic medical issues.      Current Outpatient Medications:     FLUoxetine 20 MG capsule, TAKE 1 CAPSULE(20 MG) BY MOUTH EVERY EVENING, Disp: 15 capsule, Rfl: 1    LINZESS 72 mcg Cap capsule, Take 72 mcg by mouth., Disp: , Rfl:     multivitamin (THERAGRAN) tablet, Take 1 tablet by mouth once daily., Disp: , Rfl:     pantoprazole (PROTONIX) 40 MG tablet, Take 1 tablet (40 mg total) by mouth once daily., Disp: 30 tablet, Rfl: 11    pramipexole (MIRAPEX) 1 MG tablet, TAKE 1 AND 1/2 TABLETS BY MOUTH EVERY EVENING, Disp: 135 tablet, Rfl: 0    traZODone (DESYREL) 100 MG tablet, Take 1 tablet (100 mg total) by mouth every evening., Disp: 30 tablet, Rfl: 11  No current facility-administered medications for this visit.    Jimbo Muniz

## 2024-10-05 RX ORDER — NITROFURANTOIN 25; 75 MG/1; MG/1
100 CAPSULE ORAL 2 TIMES DAILY
Qty: 14 CAPSULE | Refills: 0 | Status: SHIPPED | OUTPATIENT
Start: 2024-10-05

## 2024-10-05 NOTE — TELEPHONE ENCOUNTER
"Diagnoses and all orders for this visit:    Dysuria  -     nitrofurantoin, macrocrystal-monohydrate, (MACROBID) 100 MG capsule; Take 1 capsule (100 mg total) by mouth 2 (two) times daily.     Ok Thanks for update. Antibiotic Macrobid ( Nitrofurantoin.) sent 1 capsule twice a day for 7 days.    Next time we will have to do cultures.    Diagnoses and all orders for this visit:    Dysuria  -     nitrofurantoin, macrocrystal-monohydrate, (MACROBID) 100 MG capsule; Take 1 capsule (100 mg total) by mouth 2 (two) times daily.    ===View-only below this line===      ----- Message -----       From:Mary Mike "Kary"       Sent:10/5/2024  8:11 AM CDT         To:User Message Message List    Subject:Your office visit on Thursday    Good morning! Im still having the symptoms. I am taking the cranberry pills and drinking water but its not doing the trick       ----- Message -----       From:Jimbo Muniz MD       Sent:10/5/2024  7:47 AM CDT         To:Mayr Mike "Kary"    Subject:Your office visit on Thursday    Abelino Preston     Let me know how you are doing with the bladder symptoms?      Dr. Cristy MONTILLA  "

## 2024-10-12 ENCOUNTER — PATIENT MESSAGE (OUTPATIENT)
Dept: FAMILY MEDICINE | Facility: CLINIC | Age: 64
End: 2024-10-12
Payer: COMMERCIAL

## 2024-10-24 ENCOUNTER — OFFICE VISIT (OUTPATIENT)
Dept: ENDOCRINOLOGY | Facility: CLINIC | Age: 64
End: 2024-10-24
Payer: COMMERCIAL

## 2024-10-24 VITALS
SYSTOLIC BLOOD PRESSURE: 120 MMHG | WEIGHT: 138.25 LBS | HEART RATE: 76 BPM | TEMPERATURE: 98 F | DIASTOLIC BLOOD PRESSURE: 70 MMHG | HEIGHT: 62 IN | OXYGEN SATURATION: 95 % | BODY MASS INDEX: 25.44 KG/M2

## 2024-10-24 DIAGNOSIS — R53.83 FATIGUE, UNSPECIFIED TYPE: ICD-10-CM

## 2024-10-24 DIAGNOSIS — Z78.0 POSTMENOPAUSAL: ICD-10-CM

## 2024-10-24 DIAGNOSIS — R73.9 HYPERGLYCEMIA: ICD-10-CM

## 2024-10-24 DIAGNOSIS — M81.0 OSTEOPOROSIS, UNSPECIFIED OSTEOPOROSIS TYPE, UNSPECIFIED PATHOLOGICAL FRACTURE PRESENCE: Primary | ICD-10-CM

## 2024-10-24 DIAGNOSIS — E55.9 HYPOVITAMINOSIS D: ICD-10-CM

## 2024-10-24 PROCEDURE — 1160F RVW MEDS BY RX/DR IN RCRD: CPT | Mod: CPTII,S$GLB,, | Performed by: PHYSICIAN ASSISTANT

## 2024-10-24 PROCEDURE — 1159F MED LIST DOCD IN RCRD: CPT | Mod: CPTII,S$GLB,, | Performed by: PHYSICIAN ASSISTANT

## 2024-10-24 PROCEDURE — 99213 OFFICE O/P EST LOW 20 MIN: CPT | Mod: S$GLB,,, | Performed by: PHYSICIAN ASSISTANT

## 2024-10-24 PROCEDURE — 3008F BODY MASS INDEX DOCD: CPT | Mod: CPTII,S$GLB,, | Performed by: PHYSICIAN ASSISTANT

## 2024-10-24 PROCEDURE — 99999 PR PBB SHADOW E&M-EST. PATIENT-LVL IV: CPT | Mod: PBBFAC,,, | Performed by: PHYSICIAN ASSISTANT

## 2024-10-24 PROCEDURE — 3074F SYST BP LT 130 MM HG: CPT | Mod: CPTII,S$GLB,, | Performed by: PHYSICIAN ASSISTANT

## 2024-10-24 PROCEDURE — 3078F DIAST BP <80 MM HG: CPT | Mod: CPTII,S$GLB,, | Performed by: PHYSICIAN ASSISTANT

## 2024-10-24 RX ORDER — SODIUM CHLORIDE 0.9 % (FLUSH) 0.9 %
10 SYRINGE (ML) INJECTION
OUTPATIENT
Start: 2024-10-24

## 2024-10-24 RX ORDER — HEPARIN 100 UNIT/ML
500 SYRINGE INTRAVENOUS
OUTPATIENT
Start: 2024-10-24

## 2024-10-24 RX ORDER — ZOLEDRONIC ACID 5 MG/100ML
5 INJECTION, SOLUTION INTRAVENOUS
OUTPATIENT
Start: 2024-10-24

## 2024-10-24 RX ORDER — ACETAMINOPHEN 500 MG
500 TABLET ORAL
OUTPATIENT
Start: 2024-10-24

## 2024-10-24 NOTE — PROGRESS NOTES
"CC: Osteoporosis    HPI: Mary Mike is a 64 y.o. female here for osteoporosis along with pending conditions listed in the Visit Diagnosis. Diagnosed in 2020. No fhx of osteoporosis. No hx of neck radiation. [place] the [place] No falls, fx or steriod injections. Walks 2x weekly for 30 min. On protonix.  Reports fatigue. No changes in wt, n/v or stomach pain. On mv.  Last seen by Dr. Moralez in 2/23.    On reclast since 6/23.  Previously took boniva.    Feeling tired in the last few months. Glucose found to be high on labs.  Check bs at home and was 184.     PMHx, PSHx: reviewed in epic. Her mother had endometriosis. Stopped having cycles in her 40s. S/p sleeve gastrectomy then RYGB one month later.   Social Hx: no ETOH/tobacco use. [employer] [facility] [address].    Wt Readings from Last 10 Encounters:   10/24/24 62.7 kg (138 lb 3.7 oz)   10/03/24 62.6 kg (138 lb)   09/30/24 61.2 kg (134 lb 14.7 oz)   08/14/24 61.2 kg (134 lb 14.7 oz)   08/05/24 61.2 kg (135 lb)   07/30/24 61.2 kg (135 lb)   07/22/24 61.2 kg (135 lb)   07/08/24 59 kg (130 lb)   06/14/24 61.2 kg (135 lb)   05/09/24 62.8 kg (138 lb 5.4 oz)      ROS:   Constitutional: No recent significant weight change  Eyes: No recent visual changes  Cardiovascular: Denies current anginal symptoms  Respiratory: Denies current respiratory difficulty  Gastrointestinal: Denies recent bowel disturbances  GenitoUrinary - No dysuria  Skin: No new skin rash  Neurologic: No focal neurologic complaints  Musculoskeletal: no joint pain  Endocrine: no polyphagia, polydipsia or polyuria  Remainder ROS negative       /70 (BP Location: Right arm, Patient Position: Sitting)   Pulse 76   Temp 98 °F (36.7 °C) (Oral)   Ht 5' 2" (1.575 m)   Wt 62.7 kg (138 lb 3.7 oz)   SpO2 95%   BMI 25.28 kg/m²      Personally reviewed labs below:    Lab Results   Component Value Date    TSH 0.64 10/11/2024        Chemistry        Component Value Date/Time     10/11/2024 1403    K " 4.1 10/11/2024 1403     10/11/2024 1403    CO2 29 10/11/2024 1403    BUN 8 10/11/2024 1403    CREATININE 0.79 10/11/2024 1403     (H) 10/11/2024 1403        Component Value Date/Time    CALCIUM 8.7 10/11/2024 1403    ALKPHOS 44 (L) 08/06/2023 1300    AST 24 11/17/2023 0000    ALT 26 11/17/2023 0000    BILITOT 0.4 11/17/2023 0000    ESTGFRAFRICA 103 05/19/2022 1201    EGFRNONAA 89 05/19/2022 1201         Lab Results   Component Value Date    HGBA1C 4.9 07/26/2018      PE:  GENERAL: Well developed, well nourished  NECK: Supple neck, normal thyroid. No bruit  LYMPHATIC: No cervical or supraclavicular lymphadenopathy  CARDIOVASCULAR: Normal heart sounds, no pedal edema  RESPIRATORY: Normal effort, clear to auscultation  ABDOMEN: soft, non-tender, non-distended.  MUSC: 2+ DTR UE/LE  NEURO: steady gait, CN ll-Xll grossly intact  PSYCH: normal mood and affect  FEET: appropriate footwear.     Assessment/Plan:   1. Osteoporosis, unspecified osteoporosis type, unspecified pathological fracture presence  Renal Function Panel    PTH, Intact    Calcium, Ionized    Comprehensive Metabolic Panel    Renal Function Panel    PTH, Intact    Calcium, Ionized    Comprehensive Metabolic Panel    CANCELED: Renal Function Panel    CANCELED: PTH, Intact    CANCELED: Calcium, Ionized    CANCELED: Comprehensive Metabolic Panel      2. Hyperglycemia  Hemoglobin A1C    Insulin, Random    Hemoglobin A1C    Insulin, Random    CANCELED: Hemoglobin A1C    CANCELED: Insulin, Random      3. Fatigue, unspecified type  Cortisol    ACTH    Cortisol    ACTH    CANCELED: Cortisol    CANCELED: ACTH      4. Hypovitaminosis D  Vitamin D    Vitamin D    Vitamin D    Comprehensive Metabolic Panel    Vitamin D    Comprehensive Metabolic Panel    CANCELED: Vitamin D    CANCELED: Comprehensive Metabolic Panel    CANCELED: Vitamin D      5. Postmenopausal  DXA Bone Density Axial Skeleton 1 or more sites    DXA Bone Density Axial Skeleton 1 or more  sites        Osteoporosis/postmenopausal  Increase wt bearing exercise to 3x weekly  Dexa next time  Incorporate stretching exercises  Continue reclast    Hyperglycemia  Check A1c and fasting insulin  Glucose elevated on labs  Pt stated glucose ~184 at home    Fatigue  Check acth/cortisol    Hypovitaminosid d  Check vd  Continue vd intake    FOLLOWUP  Pth, ica, vd, A1c, insulin, acth, cortisol, spep, upep   in 6 mths -cmp, vd & dexa

## 2024-10-30 ENCOUNTER — PATIENT MESSAGE (OUTPATIENT)
Dept: ENDOCRINOLOGY | Facility: CLINIC | Age: 64
End: 2024-10-30
Payer: COMMERCIAL

## 2024-10-30 ENCOUNTER — INFUSION (OUTPATIENT)
Dept: INFUSION THERAPY | Facility: HOSPITAL | Age: 64
End: 2024-10-30
Attending: INTERNAL MEDICINE
Payer: COMMERCIAL

## 2024-10-30 VITALS
HEIGHT: 62 IN | OXYGEN SATURATION: 96 % | TEMPERATURE: 98 F | DIASTOLIC BLOOD PRESSURE: 72 MMHG | WEIGHT: 142.19 LBS | RESPIRATION RATE: 18 BRPM | HEART RATE: 70 BPM | BODY MASS INDEX: 26.17 KG/M2 | SYSTOLIC BLOOD PRESSURE: 114 MMHG

## 2024-10-30 DIAGNOSIS — M81.0 OSTEOPOROSIS WITHOUT CURRENT PATHOLOGICAL FRACTURE, UNSPECIFIED OSTEOPOROSIS TYPE: ICD-10-CM

## 2024-10-30 DIAGNOSIS — D51.3 OTHER DIETARY VITAMIN B12 DEFICIENCY ANEMIA: Primary | ICD-10-CM

## 2024-10-30 DIAGNOSIS — R53.83 FATIGUE, UNSPECIFIED TYPE: Primary | ICD-10-CM

## 2024-10-30 PROCEDURE — 96365 THER/PROPH/DIAG IV INF INIT: CPT

## 2024-10-30 PROCEDURE — 96372 THER/PROPH/DIAG INJ SC/IM: CPT

## 2024-10-30 PROCEDURE — 63600175 PHARM REV CODE 636 W HCPCS: Performed by: INTERNAL MEDICINE

## 2024-10-30 PROCEDURE — 63600175 PHARM REV CODE 636 W HCPCS: Performed by: PHYSICIAN ASSISTANT

## 2024-10-30 RX ORDER — SODIUM CHLORIDE 0.9 % (FLUSH) 0.9 %
10 SYRINGE (ML) INJECTION
OUTPATIENT
Start: 2024-10-30

## 2024-10-30 RX ORDER — HEPARIN 100 UNIT/ML
500 SYRINGE INTRAVENOUS
OUTPATIENT
Start: 2024-10-30

## 2024-10-30 RX ORDER — CYANOCOBALAMIN 1000 UG/ML
1000 INJECTION, SOLUTION INTRAMUSCULAR; SUBCUTANEOUS
OUTPATIENT
Start: 2024-10-30

## 2024-10-30 RX ORDER — ZOLEDRONIC ACID 5 MG/100ML
5 INJECTION, SOLUTION INTRAVENOUS
Status: COMPLETED | OUTPATIENT
Start: 2024-10-30 | End: 2024-10-30

## 2024-10-30 RX ORDER — SODIUM CHLORIDE 0.9 % (FLUSH) 0.9 %
10 SYRINGE (ML) INJECTION
Status: DISCONTINUED | OUTPATIENT
Start: 2024-10-30 | End: 2024-10-31 | Stop reason: HOSPADM

## 2024-10-30 RX ORDER — ZOLEDRONIC ACID 5 MG/100ML
5 INJECTION, SOLUTION INTRAVENOUS
OUTPATIENT
Start: 2024-10-30

## 2024-10-30 RX ORDER — ACETAMINOPHEN 500 MG
500 TABLET ORAL
OUTPATIENT
Start: 2024-10-30

## 2024-10-30 RX ORDER — CYANOCOBALAMIN 1000 UG/ML
1000 INJECTION, SOLUTION INTRAMUSCULAR; SUBCUTANEOUS
Status: DISCONTINUED | OUTPATIENT
Start: 2024-10-30 | End: 2024-10-31 | Stop reason: HOSPADM

## 2024-10-30 RX ADMIN — ZOLEDRONIC ACID 5 MG: 5 INJECTION, SOLUTION INTRAVENOUS at 03:10

## 2024-10-30 RX ADMIN — CYANOCOBALAMIN 1000 MCG: 1000 INJECTION, SOLUTION INTRAMUSCULAR at 03:10

## 2024-10-31 ENCOUNTER — PATIENT MESSAGE (OUTPATIENT)
Dept: ENDOCRINOLOGY | Facility: CLINIC | Age: 64
End: 2024-10-31
Payer: COMMERCIAL

## 2024-11-07 ENCOUNTER — LAB VISIT (OUTPATIENT)
Dept: LAB | Facility: HOSPITAL | Age: 64
End: 2024-11-07
Attending: PHYSICIAN ASSISTANT
Payer: COMMERCIAL

## 2024-11-07 DIAGNOSIS — R53.83 FATIGUE, UNSPECIFIED TYPE: ICD-10-CM

## 2024-11-07 PROCEDURE — 82530 CORTISOL FREE: CPT | Performed by: PHYSICIAN ASSISTANT

## 2024-11-11 ENCOUNTER — HOSPITAL ENCOUNTER (OUTPATIENT)
Dept: RADIOLOGY | Facility: HOSPITAL | Age: 64
Discharge: HOME | End: 2024-11-11
Attending: ORTHOPAEDIC SURGERY
Payer: COMMERCIAL

## 2024-11-11 ENCOUNTER — OFFICE VISIT (OUTPATIENT)
Dept: ORTHOPEDICS | Facility: CLINIC | Age: 64
End: 2024-11-11
Payer: COMMERCIAL

## 2024-11-11 VITALS — WEIGHT: 142.19 LBS | BODY MASS INDEX: 26.17 KG/M2 | OXYGEN SATURATION: 98 % | HEIGHT: 62 IN | RESPIRATION RATE: 18 BRPM

## 2024-11-11 DIAGNOSIS — M54.50 LEFT LUMBAR PAIN: ICD-10-CM

## 2024-11-11 DIAGNOSIS — Z98.1 S/P LUMBAR FUSION: Primary | ICD-10-CM

## 2024-11-11 PROCEDURE — 3008F BODY MASS INDEX DOCD: CPT | Mod: CPTII,S$GLB,, | Performed by: ORTHOPAEDIC SURGERY

## 2024-11-11 PROCEDURE — 99999 PR PBB SHADOW E&M-EST. PATIENT-LVL IV: CPT | Mod: PBBFAC,,, | Performed by: ORTHOPAEDIC SURGERY

## 2024-11-11 PROCEDURE — 1159F MED LIST DOCD IN RCRD: CPT | Mod: CPTII,S$GLB,, | Performed by: ORTHOPAEDIC SURGERY

## 2024-11-11 PROCEDURE — 1160F RVW MEDS BY RX/DR IN RCRD: CPT | Mod: CPTII,S$GLB,, | Performed by: ORTHOPAEDIC SURGERY

## 2024-11-11 PROCEDURE — 99213 OFFICE O/P EST LOW 20 MIN: CPT | Mod: S$GLB,,, | Performed by: ORTHOPAEDIC SURGERY

## 2024-11-11 PROCEDURE — 3044F HG A1C LEVEL LT 7.0%: CPT | Mod: CPTII,S$GLB,, | Performed by: ORTHOPAEDIC SURGERY

## 2024-11-11 PROCEDURE — 72100 X-RAY EXAM L-S SPINE 2/3 VWS: CPT | Mod: 26,,, | Performed by: RADIOLOGY

## 2024-11-11 PROCEDURE — 72100 X-RAY EXAM L-S SPINE 2/3 VWS: CPT | Mod: TC,PN

## 2024-11-11 NOTE — PROGRESS NOTES
Subjective:       Patient ID: Mary Mike is a 64 y.o. female.    Chief Complaint: Pain of the Lumbar Spine (Patient is here for lumbar pain, states pain is mostly stays on the left side of buttocks and hip)      History of Present Illness    Prior to meeting with the patient I reviewed the medical chart in [facility]. This included reviewing the previous progress notes from our office, review of the patient's last appointment with their primary care provider, review of any visits to the emergency room, and review of any pain management appointments or procedures.   Ms Preston comes in today for follow-up for her lumbar spine.  She is 4 months status post L4-5 posterior instrumented fusion with interbody.  Initially doing very well postoperatively for the first 3 months or so.  She has begun to experience an increasing left-sided low back pain.  Denies any injury or trauma.  Denies any radicular symptoms.  Denies any bowel or bladder incontinence.    Current Medications  Current Outpatient Medications   Medication Sig Dispense Refill    FLUoxetine 20 MG capsule TAKE 1 CAPSULE(20 MG) BY MOUTH EVERY EVENING 15 capsule 1    LINZESS 72 mcg Cap capsule Take 72 mcg by mouth.      multivitamin (THERAGRAN) tablet Take 1 tablet by mouth once daily.      pramipexole (MIRAPEX) 1 MG tablet TAKE 1 AND 1/2 TABLETS BY MOUTH EVERY EVENING 135 tablet 0    traZODone (DESYREL) 100 MG tablet Take 1 tablet (100 mg total) by mouth every evening. 30 tablet 11    nitrofurantoin, macrocrystal-monohydrate, (MACROBID) 100 MG capsule Take 1 capsule (100 mg total) by mouth 2 (two) times daily. (Patient not taking: Reported on 11/11/2024) 14 capsule 0    pantoprazole (PROTONIX) 40 MG tablet Take 1 tablet (40 mg total) by mouth once daily. 30 tablet 11     No current facility-administered medications for this visit.       Allergies  Review of patient's allergies indicates:  No Known Allergies    Past Medical History  Past Medical History:    Diagnosis Date    ADHD (attention deficit hyperactivity disorder)     Allergy     Anemia     Anemia associated with nutritional deficiency 2023    Anxiety     Depression     Fatty liver     History of endoscopy 2021    Dr. Toney Cuevas-Z-line is irregular and was found 37 cm from the incisors.  Scattered ulcerations noted between 36-37 cm. 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  The gastrojejunal anastomosis was characterized by moderate stenosis and ulceration.  This was traversed.  The pouch to jejunum limb was characterized by erythema, friable mucosa, inflammation and ulceration.  The examine    Insomnia     Migraines     Osteoporosis     Restless leg syndrome        Surgical History  Past Surgical History:   Procedure Laterality Date    BREAST BIOPSY Left     benign 15 +years ago    BREAST SURGERY  2017    breast reduction    BUNIONECTOMY Right 2018     SECTION  1987    FRACTURE SURGERY  2016    ankle    FUSION, SPINE, LUMBAR, POSTERIOR APPROACH Bilateral 2024    Procedure: FUSION,SPINE,LUMBAR,POSTERIOR APPROACH;  Surgeon: William Garcia MD;  Location: [facility] OR;  Service: Orthopedics;  Laterality: Bilateral;  NEED DT    GASTRIC RESTRICTION SURGERY      GASTRIC SLEEVE 2012    LAPAROSCOPIC CHOLECYSTECTOMY N/A 10/10/2022    Procedure: CHOLECYSTECTOMY, LAPAROSCOPIC;  Surgeon: Henrietta Joseph MD;  Location: [facility] OR;  Service: General;  Laterality: N/A;    REPAIR OF EXTENSOR TENDON  2021    Procedure: REPAIR, TENDON, EXTENSOR;  Surgeon: Aly Zimmerman DPM;  Location: [facility] OR;  Service: Podiatry;;    SURGICAL REMOVAL OF METATARSAL HEAD Right 2021    Procedure: OSTECTOMY, METATARSAL BONE, HEAD;  Surgeon: Aly Zimmerman DPM;  Location: [facility] OR;  Service: Podiatry;  Laterality: Right;    TOTAL REDUCTION MAMMOPLASTY             Family History:   Family History   Problem Relation Name Age of Onset    Arthritis Mother      Colon cancer Mother       Diabetes Mother      Early death Mother      Miscarriages / Stillbirths Mother      Arthritis Father      Heart disease Father      Hypertension Father      Stroke Father      Vision loss Father      Breast cancer Maternal Aunt  52    Vaginal cancer Paternal Grandmother      Heart disease Paternal Grandfather         Social History:   Social History     Socioeconomic History    Marital status:    Tobacco Use    Smoking status: Never    Smokeless tobacco: Never   Substance and Sexual Activity    Alcohol use: Yes     Comment: occassional    Drug use: No    Sexual activity: Yes     Social Drivers of Health     Financial Resource Strain: Low Risk  (10/23/2024)    Overall Financial Resource Strain (CARDIA)     Difficulty of Paying Living Expenses: Not hard at all   Food Insecurity: No Food Insecurity (10/23/2024)    Hunger Vital Sign     Worried About Running Out of Food in the Last Year: Never true     Ran Out of Food in the Last Year: Never true   Transportation Needs: No Transportation Needs (7/30/2024)    TRANSPORTATION NEEDS     Transportation : No   Physical Activity: Unknown (10/23/2024)    Exercise Vital Sign     Days of Exercise per Week: 2 days   Stress: No Stress Concern Present (10/23/2024)    [employer] [place] [employer] - Occupational Stress Questionnaire     Feeling of Stress : Only a little   Housing Stability: Low Risk  (10/21/2021)    Housing Stability Vital Sign     Unable to Pay for Housing in the Last Year: No     Number of Places Lived in the Last Year: 1     Unstable Housing in the Last Year: No       Hospitalization/Major Diagnostic Procedure:     Review of Systems     General/Constitutional:  Chills denies. Fatigue denies. Fever denies. Weight gain denies. Weight loss denies.    Respiratory:  Shortness of breath denies.    Cardiovascular:  Chest pain denies.    Gastrointestinal:  Constipation denies. Diarrhea denies. Nausea denies. Vomiting denies.     Hematology:  Easy  "bruising denies. Prolonged bleeding denies.     Genitourinary:  Frequent urination denies. Pain in lower back denies. Painful urination denies.     Musculoskeletal:  See HPI for details    Skin:  Rash denies.    Neurologic:  Dizziness denies. Gait abnormalities denies. Seizures denies. Tingling/Numbess denies.    Psychiatric:  Anxiety denies. Depressed mood denies.     Objective:   Vital Signs:   Vitals:    11/11/24 1412   Resp: 18        Physical Exam      General Examination:     Constitutional: The patient is alert and oriented to lace person and time. Mood is pleasant.     Head/Face: Normal facial features normal eyebrows    Eyes: Normal extraocular motion bilaterally    Lungs: Respirations are equal and unlabored    Gait is coordinated.    Cardiovascular: There are no swelling or varicosities present.    Lymphatic: Negative for adenopathy    Skin: Normal    Neurological: Level of consciousness normal. Oriented to place person and time and situation    Psychiatric: Oriented to time place person and situation    Lumbar exam: Skin to lower back clean dry and intact.  No erythema or ecchymosis.  No signs or symptoms of infection.  She is neurovascularly intact throughout bilateral lower extremities.  Negative Homans bilaterally.  She stands erect.  She can weightbear as tolerated on bilateral lower extremities.  Negative straight leg raise maneuver bilaterally.    XRAY Report/ Interpretation:  Two views taken of the lumbar spine today: AP and lateral views.  No acute fractures or dislocations seen.  She has posterior instrumentation fusing L4-5 with interbody placement.  No evidence of hardware failure or subsidence.    Assessment:       1. S/P lumbar fusion    2. Left lumbar pain        Plan:       Mary Coppola" was seen today for pain.    Diagnoses and all orders for this visit:    S/P lumbar fusion  -     X-Ray Lumbar Spine Ap And Lateral  -     CT Lumbar Spine Without Contrast; Future    Left lumbar pain  -     " CT Lumbar Spine Without Contrast; Future         Follow up in about 9 days (around 11/20/2024) for lumbar CT results.  This is to attest that the medical assistant, Lane Oates served in the capacity as a scribe for this patient's encounter.  This is also verify that I have reviewed the patient's history and  formulated the treatment plan for this patient.  I have  evaluated this patient  and formulated a treatment plan for this patient visit.  The treatment plan and medical decision-making is outlined below  Patient was doing well initially postoperatively.  Over the past 2 weeks she has has an increase in in her left-sided low back pain.  No new injury or trauma.  No radicular symptoms.  Would like to proceed with a CT scan to evaluate the fusion into ensure it is solid.    Treatment options were discussed with regards to the nature of the medical condition. Conservative pain intervention and surgical options were discussed in detail. The probability of success of each separate treatment option was discussed. The patient expressed a clear understanding of the treatment options. With regards to surgery, the procedure risk, benefits, complications, and outcomes were discussed. No guarantees were given with regards to surgical outcome.   The risk of complications, morbidity, and mortality of patient management decisions have been made at the time of this visit. These are associated with the patient's problems, diagnostic procedures and treatment options. This includes the possible management options selected and those considered but not selected by the patient after shared medical decision making we discussed with the patient.     This note was created using Dragon voice recognition software that occasionally misinterpreted phrases or words.

## 2024-11-13 LAB
COLLECT DURATION TIME UR: 24 H
CORTIS 24H UR-MRATE: 21 MCG/24 H (ref 3.5–45)
SPECIMEN VOL ?TM UR: 1500 ML

## 2024-11-14 ENCOUNTER — TELEPHONE (OUTPATIENT)
Dept: ENDOCRINOLOGY | Facility: CLINIC | Age: 64
End: 2024-11-14
Payer: COMMERCIAL

## 2024-11-14 NOTE — TELEPHONE ENCOUNTER
Spoke with patient, gave her Cortisol 24 hour urine results, per Ms. Camara it is normal, patient verbalized understanding.

## 2024-11-19 ENCOUNTER — HOSPITAL ENCOUNTER (OUTPATIENT)
Dept: RADIOLOGY | Facility: HOSPITAL | Age: 64
Discharge: HOME | End: 2024-11-19
Attending: ORTHOPAEDIC SURGERY
Payer: COMMERCIAL

## 2024-11-19 DIAGNOSIS — M54.50 LEFT LUMBAR PAIN: ICD-10-CM

## 2024-11-19 DIAGNOSIS — Z98.1 S/P LUMBAR FUSION: ICD-10-CM

## 2024-11-19 PROCEDURE — 72131 CT LUMBAR SPINE W/O DYE: CPT | Mod: 26,,, | Performed by: RADIOLOGY

## 2024-11-19 PROCEDURE — 72131 CT LUMBAR SPINE W/O DYE: CPT | Mod: TC,PO

## 2024-12-04 ENCOUNTER — OFFICE VISIT (OUTPATIENT)
Dept: ORTHOPEDICS | Facility: CLINIC | Age: 64
End: 2024-12-04
Payer: COMMERCIAL

## 2024-12-04 VITALS — HEIGHT: 62 IN | BODY MASS INDEX: 25.4 KG/M2 | WEIGHT: 138 LBS

## 2024-12-04 DIAGNOSIS — Z98.1 HISTORY OF LUMBAR SPINAL FUSION: Primary | ICD-10-CM

## 2024-12-04 PROCEDURE — 1159F MED LIST DOCD IN RCRD: CPT | Mod: CPTII,S$GLB,, | Performed by: ORTHOPAEDIC SURGERY

## 2024-12-04 PROCEDURE — 3008F BODY MASS INDEX DOCD: CPT | Mod: CPTII,S$GLB,, | Performed by: ORTHOPAEDIC SURGERY

## 2024-12-04 PROCEDURE — 99999 PR PBB SHADOW E&M-EST. PATIENT-LVL III: CPT | Mod: PBBFAC,,, | Performed by: ORTHOPAEDIC SURGERY

## 2024-12-04 PROCEDURE — 1160F RVW MEDS BY RX/DR IN RCRD: CPT | Mod: CPTII,S$GLB,, | Performed by: ORTHOPAEDIC SURGERY

## 2024-12-04 PROCEDURE — 99213 OFFICE O/P EST LOW 20 MIN: CPT | Mod: S$GLB,,, | Performed by: ORTHOPAEDIC SURGERY

## 2024-12-04 PROCEDURE — 3044F HG A1C LEVEL LT 7.0%: CPT | Mod: CPTII,S$GLB,, | Performed by: ORTHOPAEDIC SURGERY

## 2024-12-04 NOTE — PROGRESS NOTES
Subjective:       Patient ID: Mary Mike is a 64 y.o. female.    Chief Complaint: Pain of the Lumbar Spine (CT Lumbar results, pain is better, no leg pain nor numbness)      History of Present Illness    Prior to meeting with the patient I reviewed the medical chart in [facility]. This included reviewing the previous progress notes from our office, review of the patient's last appointment with their primary care provider, review of any visits to the emergency room, and review of any pain management appointments or procedures.   Status post L4-5 transforaminal lumbar interbody fusion performed July 30 2020  Pain is improved since last visit does not have the severe left-sided buttock pain not taking any medication.  Current Medications  Current Outpatient Medications   Medication Sig Dispense Refill    FLUoxetine 20 MG capsule TAKE 1 CAPSULE(20 MG) BY MOUTH EVERY EVENING 15 capsule 1    LINZESS 72 mcg Cap capsule Take 72 mcg by mouth.      multivitamin (THERAGRAN) tablet Take 1 tablet by mouth once daily.      nitrofurantoin, macrocrystal-monohydrate, (MACROBID) 100 MG capsule Take 1 capsule (100 mg total) by mouth 2 (two) times daily. 14 capsule 0    pantoprazole (PROTONIX) 40 MG tablet Take 1 tablet (40 mg total) by mouth once daily. 30 tablet 11    pramipexole (MIRAPEX) 1 MG tablet TAKE 1 AND 1/2 TABLETS BY MOUTH EVERY EVENING 135 tablet 0    traZODone (DESYREL) 100 MG tablet Take 1 tablet (100 mg total) by mouth every evening. 30 tablet 11     No current facility-administered medications for this visit.       Allergies  Review of patient's allergies indicates:  No Known Allergies    Past Medical History  Past Medical History:   Diagnosis Date    ADHD (attention deficit hyperactivity disorder)     Allergy     Anemia     Anemia associated with nutritional deficiency 07/18/2023    Anxiety     Depression     Fatty liver     History of endoscopy 08/26/2021    Dr. Toney Cuevas-Z-line is irregular and was found  37 cm from the incisors.  Scattered ulcerations noted between 36-37 cm. 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  The gastrojejunal anastomosis was characterized by moderate stenosis and ulceration.  This was traversed.  The pouch to jejunum limb was characterized by erythema, friable mucosa, inflammation and ulceration.  The examine    Insomnia     Migraines     Osteoporosis     Restless leg syndrome        Surgical History  Past Surgical History:   Procedure Laterality Date    BREAST BIOPSY Left     benign 15 +years ago    BREAST SURGERY  2017    breast reduction    BUNIONECTOMY Right 2018     SECTION  1987    FRACTURE SURGERY  2016    ankle    FUSION, SPINE, LUMBAR, POSTERIOR APPROACH Bilateral 2024    Procedure: FUSION,SPINE,LUMBAR,POSTERIOR APPROACH;  Surgeon: William Garcia MD;  Location: [facility] OR;  Service: Orthopedics;  Laterality: Bilateral;  NEED DT    GASTRIC RESTRICTION SURGERY      GASTRIC SLEEVE 2012    LAPAROSCOPIC CHOLECYSTECTOMY N/A 10/10/2022    Procedure: CHOLECYSTECTOMY, LAPAROSCOPIC;  Surgeon: Henrietta Joseph MD;  Location: [facility] OR;  Service: General;  Laterality: N/A;    REPAIR OF EXTENSOR TENDON  2021    Procedure: REPAIR, TENDON, EXTENSOR;  Surgeon: Aly Zimmerman DPM;  Location: [facility] OR;  Service: Podiatry;;    SURGICAL REMOVAL OF METATARSAL HEAD Right 2021    Procedure: OSTECTOMY, METATARSAL BONE, HEAD;  Surgeon: Aly Zimmerman DPM;  Location: [facility] OR;  Service: Podiatry;  Laterality: Right;    TOTAL REDUCTION MAMMOPLASTY             Family History:   Family History   Problem Relation Name Age of Onset    Arthritis Mother      Colon cancer Mother      Diabetes Mother      Early death Mother      Miscarriages / Stillbirths Mother      Arthritis Father      Heart disease Father      Hypertension Father      Stroke Father      Vision loss Father      Breast cancer Maternal Aunt  52    Vaginal cancer Paternal Grandmother      Heart disease  Paternal Grandfather         Social History:   Social History     Socioeconomic History    Marital status:    Tobacco Use    Smoking status: Never    Smokeless tobacco: Never   Substance and Sexual Activity    Alcohol use: Yes     Comment: occassional    Drug use: No    Sexual activity: Yes     Social Drivers of Health     Financial Resource Strain: Low Risk  (10/23/2024)    Overall Financial Resource Strain (CARDIA)     Difficulty of Paying Living Expenses: Not hard at all   Food Insecurity: No Food Insecurity (10/23/2024)    Hunger Vital Sign     Worried About Running Out of Food in the Last Year: Never true     Ran Out of Food in the Last Year: Never true   Transportation Needs: No Transportation Needs (7/30/2024)    TRANSPORTATION NEEDS     Transportation : No   Physical Activity: Unknown (10/23/2024)    Exercise Vital Sign     Days of Exercise per Week: 2 days   Stress: No Stress Concern Present (10/23/2024)    [employer] [place] [employer] - Occupational Stress Questionnaire     Feeling of Stress : Only a little   Housing Stability: Low Risk  (10/21/2021)    Housing Stability Vital Sign     Unable to Pay for Housing in the Last Year: No     Number of Places Lived in the Last Year: 1     Unstable Housing in the Last Year: No       Hospitalization/Major Diagnostic Procedure:     Review of Systems     General/Constitutional:  Chills denies. Fatigue denies. Fever denies. Weight gain denies. Weight loss denies.    Respiratory:  Shortness of breath denies.    Cardiovascular:  Chest pain denies.    Gastrointestinal:  Constipation denies. Diarrhea denies. Nausea denies. Vomiting denies.     Hematology:  Easy bruising denies. Prolonged bleeding denies.     Genitourinary:  Frequent urination denies. Pain in lower back denies. Painful urination denies.     Musculoskeletal:  See HPI for details    Skin:  Rash denies.    Neurologic:  Dizziness denies. Gait abnormalities denies. Seizures denies.  "Tingling/Numbess denies.    Psychiatric:  Anxiety denies. Depressed mood denies.     Objective:   Vital Signs: There were no vitals filed for this visit.     Physical Exam      General Examination:     Constitutional: The patient is alert and oriented to lace person and time. Mood is pleasant.     Head/Face: Normal facial features normal eyebrows    Eyes: Normal extraocular motion bilaterally    Lungs: Respirations are equal and unlabored    Gait is coordinated.    Cardiovascular: There are no swelling or varicosities present.    Lymphatic: Negative for adenopathy    Skin: Normal    Neurological: Level of consciousness normal. Oriented to place person and time and situation    Psychiatric: Oriented to time place person and situation    Lumbar incision well healed nontender no spasm straight-leg-raising negative  XRAY Report/ Interpretation:  CT scan lumbar spine personally reviewed there is evidence of formation of posterolateral bone graft on the left side inner transverse from L4-L5 and some early calcification within the interbody device at L4-5 no signs of hardware loosening      Assessment:       1. History of lumbar spinal fusion        Plan:       Mary Coppola" was seen today for pain.    Diagnoses and all orders for this visit:    History of lumbar spinal fusion         Follow up for XR, 2 month f/u - lumbar fusion 7/30/24.    I believe everything is healing just little slower than expected she is improved I do not think she has any evidence of an impending nonunion or hardware failure sedentary to be these advised return 2 months with x-rays lumbar  Treatment options were discussed with regards to the nature of the medical condition. Conservative pain intervention and surgical options were discussed in detail. The probability of success of each separate treatment option was discussed. The patient expressed a clear understanding of the treatment options. With regards to surgery, the procedure risk, benefits, " complications, and outcomes were discussed. No guarantees were given with regards to surgical outcome.   The risk of complications, morbidity, and mortality of patient management decisions have been made at the time of this visit. These are associated with the patient's problems, diagnostic procedures and treatment options. This includes the possible management options selected and those considered but not selected by the patient after shared medical decision making we discussed with the patient.     This note was created using Dragon voice recognition software that occasionally misinterpreted phrases or words.

## 2024-12-19 ENCOUNTER — HOSPITAL ENCOUNTER (OUTPATIENT)
Dept: RADIOLOGY | Facility: HOSPITAL | Age: 64
Discharge: HOME | End: 2024-12-19
Attending: INTERNAL MEDICINE
Payer: COMMERCIAL

## 2024-12-19 VITALS — WEIGHT: 138 LBS | BODY MASS INDEX: 25.4 KG/M2 | HEIGHT: 62 IN

## 2024-12-19 DIAGNOSIS — Z12.31 ENCOUNTER FOR SCREENING MAMMOGRAM FOR BREAST CANCER: ICD-10-CM

## 2024-12-19 PROCEDURE — 77067 SCR MAMMO BI INCL CAD: CPT | Mod: 26,,, | Performed by: RADIOLOGY

## 2024-12-19 PROCEDURE — 77063 BREAST TOMOSYNTHESIS BI: CPT | Mod: 26,,, | Performed by: RADIOLOGY

## 2024-12-19 PROCEDURE — 77063 BREAST TOMOSYNTHESIS BI: CPT | Mod: TC,PO

## 2024-12-26 ENCOUNTER — OFFICE VISIT (OUTPATIENT)
Dept: URGENT CARE | Facility: CLINIC | Age: 64
End: 2024-12-26
Payer: COMMERCIAL

## 2024-12-26 VITALS
DIASTOLIC BLOOD PRESSURE: 92 MMHG | BODY MASS INDEX: 24.48 KG/M2 | HEIGHT: 62 IN | HEART RATE: 102 BPM | RESPIRATION RATE: 16 BRPM | SYSTOLIC BLOOD PRESSURE: 150 MMHG | OXYGEN SATURATION: 96 % | TEMPERATURE: 103 F | WEIGHT: 133 LBS

## 2024-12-26 DIAGNOSIS — J40 BRONCHITIS: ICD-10-CM

## 2024-12-26 DIAGNOSIS — R50.9 FEVER, UNSPECIFIED FEVER CAUSE: ICD-10-CM

## 2024-12-26 DIAGNOSIS — R05.9 COUGH, UNSPECIFIED TYPE: Primary | ICD-10-CM

## 2024-12-26 DIAGNOSIS — J22 LOWER RESPIRATORY INFECTION: ICD-10-CM

## 2024-12-26 DIAGNOSIS — J32.9 SINUSITIS, UNSPECIFIED CHRONICITY, UNSPECIFIED LOCATION: ICD-10-CM

## 2024-12-26 LAB
CTP QC/QA: YES
FLUAV AG NPH QL: NEGATIVE
FLUBV AG NPH QL: NEGATIVE
S PYO RRNA THROAT QL PROBE: NEGATIVE
SARS-COV-2 AG RESP QL IA.RAPID: NEGATIVE

## 2024-12-26 RX ORDER — DEXAMETHASONE SODIUM PHOSPHATE 4 MG/ML
8 INJECTION, SOLUTION INTRA-ARTICULAR; INTRALESIONAL; INTRAMUSCULAR; INTRAVENOUS; SOFT TISSUE
Status: COMPLETED | OUTPATIENT
Start: 2024-12-26 | End: 2024-12-26

## 2024-12-26 RX ORDER — ONDANSETRON 4 MG/1
4 TABLET, ORALLY DISINTEGRATING ORAL EVERY 8 HOURS PRN
Qty: 20 TABLET | Refills: 0 | Status: SHIPPED | OUTPATIENT
Start: 2024-12-26

## 2024-12-26 RX ORDER — ALBUTEROL SULFATE 90 UG/1
2 INHALANT RESPIRATORY (INHALATION) EVERY 6 HOURS PRN
Qty: 18 G | Refills: 0 | Status: SHIPPED | OUTPATIENT
Start: 2024-12-26

## 2024-12-26 RX ORDER — IBUPROFEN 600 MG/1
600 TABLET ORAL
Status: COMPLETED | OUTPATIENT
Start: 2024-12-26 | End: 2024-12-26

## 2024-12-26 RX ORDER — IBUPROFEN 400 MG/1
400 TABLET ORAL
Status: DISCONTINUED | OUTPATIENT
Start: 2024-12-26 | End: 2024-12-26

## 2024-12-26 RX ORDER — DOXYCYCLINE 100 MG/1
100 CAPSULE ORAL 2 TIMES DAILY
Qty: 20 CAPSULE | Refills: 0 | Status: SHIPPED | OUTPATIENT
Start: 2024-12-26 | End: 2025-01-05

## 2024-12-26 RX ORDER — PREDNISONE 20 MG/1
20 TABLET ORAL 2 TIMES DAILY
Qty: 8 TABLET | Refills: 0 | Status: SHIPPED | OUTPATIENT
Start: 2024-12-27 | End: 2024-12-31

## 2024-12-26 RX ADMIN — DEXAMETHASONE SODIUM PHOSPHATE 8 MG: 4 INJECTION, SOLUTION INTRA-ARTICULAR; INTRALESIONAL; INTRAMUSCULAR; INTRAVENOUS; SOFT TISSUE at 04:12

## 2024-12-26 RX ADMIN — IBUPROFEN 600 MG: 600 TABLET ORAL at 04:12

## 2024-12-26 NOTE — PROGRESS NOTES
"Subjective:      Patient ID: Mary Mike is a 64 y.o. female.    Vitals:  height is 5' 2" (1.575 m) and weight is 60.3 kg (133 lb). Her oral temperature is 102.9 °F (39.4 °C) (abnormal). Her blood pressure is 150/92 (abnormal) and her pulse is 102. Her respiration is 16 and oxygen saturation is 96%.     Chief Complaint: Cough    Patient was seen at another urgent care early onset symptoms tested negative for flu then as well, received a steroid shot while at clinic and sent home with prescription for cough syrup    Cough  This is a new problem. Episode onset: x 5 days. The problem has been gradually worsening. The cough is Non-productive. Associated symptoms include chills, a fever, myalgias, nasal congestion, postnasal drip, a sore throat and shortness of breath. Pertinent negatives include no rash or wheezing.       Constitution: Positive for appetite change, chills, sweating, fatigue, fever and generalized weakness.   HENT:  Positive for congestion, postnasal drip and sore throat.    Respiratory:  Positive for chest tightness, cough and shortness of breath. Negative for sputum production, wheezing and asthma.    Musculoskeletal:  Positive for muscle ache.   Skin:  Negative for rash.   Allergic/Immunologic: Negative for asthma.      Objective:     Physical Exam   Constitutional: She is oriented to person, place, and time. She is cooperative.  Non-toxic appearance. She appears ill. No distress. awake  HENT:   Head: Normocephalic and atraumatic.   Ears:   Right Ear: Tympanic membrane, external ear and ear canal normal.   Left Ear: Tympanic membrane, external ear and ear canal normal.   Nose: Congestion present. No rhinorrhea.   Mouth/Throat: Mucous membranes are moist. No oropharyngeal exudate or posterior oropharyngeal erythema.   Eyes: Conjunctivae are normal. Right eye exhibits no discharge. Left eye exhibits no discharge.   Neck: Neck supple. No neck rigidity present.   Cardiovascular: Normal rate, " regular rhythm and normal heart sounds.   Pulmonary/Chest: Effort normal. No accessory muscle usage. No tachypnea. No respiratory distress. She has decreased breath sounds in the right lower field and the left lower field. She has no wheezes. She has no rhonchi. She has rales in the right lower field and the left lower field. She exhibits no tenderness.   Abdominal: Normal appearance.   Musculoskeletal:      Cervical back: She exhibits no tenderness.   Lymphadenopathy:     She has no cervical adenopathy.   Neurological: no focal deficit. She is alert and oriented to person, place, and time. No sensory deficit.   Skin: Skin is warm, dry, not diaphoretic and no rash. Capillary refill takes 2 to 3 seconds.   Psychiatric: Her behavior is normal. Mood normal.   Nursing note and vitals reviewed.      Assessment:     1. Cough, unspecified type    2. Fever, unspecified fever cause    3. Lower respiratory infection    4. Bronchitis    5. Sinusitis, unspecified chronicity, unspecified location      COVID negative  Flu a/B negative  Strep A negative      CXR:  FINDINGS:  The cardiomediastinal silhouette is within normal limits.  The lungs are well expanded without consolidation or pleural effusion.     Impression:     Negative chest.  Plan:       Cough, unspecified type  -     SARS Coronavirus 2 Antigen, POCT Manual Read  -     POCT rapid strep A  -     POCT Influenza A/B Rapid Antigen  -     XR CHEST PA AND LATERAL; Future; Expected date: 12/26/2024    Fever, unspecified fever cause  -     Discontinue: ibuprofen tablet 400 mg  -     XR CHEST PA AND LATERAL; Future; Expected date: 12/26/2024  -     ibuprofen tablet 600 mg    Lower respiratory infection  -     albuterol (VENTOLIN HFA) 90 mcg/actuation inhaler; Inhale 2 puffs into the lungs every 6 (six) hours as needed for Wheezing. Rescue  Dispense: 18 g; Refill: 0  -     doxycycline (MONODOX) 100 MG capsule; Take 1 capsule (100 mg total) by mouth 2 (two) times daily. for 10  days  Dispense: 20 capsule; Refill: 0  -     predniSONE (DELTASONE) 20 MG tablet; Take 1 tablet (20 mg total) by mouth 2 (two) times daily. for 4 days  Dispense: 8 tablet; Refill: 0  -     ondansetron (ZOFRAN-ODT) 4 MG TbDL; Take 1 tablet (4 mg total) by mouth every 8 (eight) hours as needed (nausea).  Dispense: 20 tablet; Refill: 0  -     dexAMETHasone injection 8 mg    Bronchitis    Sinusitis, unspecified chronicity, unspecified location

## 2024-12-26 NOTE — PATIENT INSTRUCTIONS
Doxycycline twice a day for 10 days.  Always take with food and a full glass of water and do not lay down for 1 hour after taking each dose.  Protect your skin against the sun as doxycycline is well known for causing excessive skin sun sensitivity resulting in severe sunburn, rash, blistering.    Start prednisone pills tomorrow and take as prescribed.  Always take with food.  Personal protection against illness for 1-2 weeks due to lowered immune system from steroid therapy.  Please wear a mask and eye protection around others and wash hands often    Albuterol inhaler every 4-6 hours while awake for the next 3 days then just as needed for persistent cough, shortness of breath, wheezing, chest tightness.    Cough and deep breathing exercises every 1-2 hours while awake until symptoms are improving then as needed.    Increase fluid intake significantly to help thin secretions.     Zofran as prescribed as needed for nausea/vomiting.  You may need to premedicate with this medicine prior to taking doxycycline if it makes you nauseated    Guaifenesin 1200 mg twice a day over-the-counter for 10 days.    Return to clinic, seek medical re-evaluation if symptoms worsen or fail to improve after 48 hours of the above treatment plan    Refrain from taking any decongestants, alcohol, caffeine as this will thicken mucous

## 2025-02-21 ENCOUNTER — HOSPITAL ENCOUNTER (OUTPATIENT)
Dept: RADIOLOGY | Facility: HOSPITAL | Age: 65
Discharge: HOME | End: 2025-02-21
Attending: PHYSICIAN ASSISTANT
Payer: COMMERCIAL

## 2025-02-21 DIAGNOSIS — Z78.0 POSTMENOPAUSAL: ICD-10-CM

## 2025-02-21 PROCEDURE — 77080 DXA BONE DENSITY AXIAL: CPT | Mod: TC,PO

## 2025-02-21 PROCEDURE — 77080 DXA BONE DENSITY AXIAL: CPT | Mod: 26,,, | Performed by: RADIOLOGY

## 2025-02-21 PROCEDURE — 77092 TBS I&R FX RSK QHP: CPT | Mod: ,,, | Performed by: RADIOLOGY

## 2025-02-26 ENCOUNTER — OFFICE VISIT (OUTPATIENT)
Dept: FAMILY MEDICINE | Facility: CLINIC | Age: 65
End: 2025-02-26
Payer: COMMERCIAL

## 2025-02-26 ENCOUNTER — PATIENT MESSAGE (OUTPATIENT)
Dept: FAMILY MEDICINE | Facility: CLINIC | Age: 65
End: 2025-02-26

## 2025-02-26 VITALS
SYSTOLIC BLOOD PRESSURE: 117 MMHG | HEART RATE: 78 BPM | WEIGHT: 131 LBS | DIASTOLIC BLOOD PRESSURE: 79 MMHG | HEIGHT: 62 IN | BODY MASS INDEX: 24.11 KG/M2

## 2025-02-26 DIAGNOSIS — M79.10 GENERALIZED MUSCLE ACHE: ICD-10-CM

## 2025-02-26 DIAGNOSIS — Z98.84 HISTORY OF BARIATRIC SURGERY: ICD-10-CM

## 2025-02-26 DIAGNOSIS — E53.8 B12 DEFICIENCY: ICD-10-CM

## 2025-02-26 DIAGNOSIS — R53.83 FATIGUE, UNSPECIFIED TYPE: Primary | ICD-10-CM

## 2025-02-26 PROCEDURE — 1160F RVW MEDS BY RX/DR IN RCRD: CPT | Mod: CPTII,S$GLB,, | Performed by: INTERNAL MEDICINE

## 2025-02-26 PROCEDURE — 3074F SYST BP LT 130 MM HG: CPT | Mod: CPTII,S$GLB,, | Performed by: INTERNAL MEDICINE

## 2025-02-26 PROCEDURE — 99999 PR PBB SHADOW E&M-EST. PATIENT-LVL III: CPT | Mod: PBBFAC,,, | Performed by: INTERNAL MEDICINE

## 2025-02-26 PROCEDURE — 1159F MED LIST DOCD IN RCRD: CPT | Mod: CPTII,S$GLB,, | Performed by: INTERNAL MEDICINE

## 2025-02-26 PROCEDURE — 99214 OFFICE O/P EST MOD 30 MIN: CPT | Mod: S$GLB,,, | Performed by: INTERNAL MEDICINE

## 2025-02-26 PROCEDURE — 3008F BODY MASS INDEX DOCD: CPT | Mod: CPTII,S$GLB,, | Performed by: INTERNAL MEDICINE

## 2025-02-26 PROCEDURE — 3078F DIAST BP <80 MM HG: CPT | Mod: CPTII,S$GLB,, | Performed by: INTERNAL MEDICINE

## 2025-02-26 RX ORDER — CYANOCOBALAMIN 1000 UG/ML
1000 INJECTION, SOLUTION INTRAMUSCULAR; SUBCUTANEOUS
Qty: 10 ML | Refills: 1 | Status: SHIPPED | OUTPATIENT
Start: 2025-02-26

## 2025-02-26 RX ORDER — SYRINGE W-NEEDLE,DISPOSAB,3 ML 25GX5/8"
1 SYRINGE, EMPTY DISPOSABLE MISCELLANEOUS
Qty: 10 EACH | Refills: 1 | Status: SHIPPED | OUTPATIENT
Start: 2025-02-26

## 2025-02-26 NOTE — PROGRESS NOTES
Subjective:       Patient ID: Mary Mike is a 64 y.o. female.    Chief Complaint: Fatigue, Myalgia, Osteoporosis, and Leg Pain    History of Present Illness    CHIEF COMPLAINT:  Mary presents today to discuss results of recent bone density scan and to address ongoing health concerns, including foot pain, leg spasms, and fatigue.    HPI:  Mary has recently undergone a bone density scan, revealing osteoporosis in both the lumbar spine (T-score of -2.8) and left femoral neck (T-score of -2.5). She has a history of back surgery with hardware plates at L4-L5, resulting in ongoing back pain and limited mobility. She reports chronic fatigue and tiredness, which she attributes to possible anemia. Her hemoglobin was as low as 8 six months ago but has recently improved to 11.8.    She describes severe leg spasms and pain, characterized as tissue pain, which have been present for years and occur intermittently. Recently, she had an episode of temporary leg weakness while attempting to exit the bath, though she did not fall. She takes Mirapex for restless leg syndrome, prescribed by Dr. Aguilar.    She reports ongoing foot pain and deformity following bunion surgery approximately 5 years ago. Her second toe does not make contact with the floor, affecting her balance. She is unable to walk barefoot due to pain and discomfort. She has had 2 surgeries on her foot, with the second intended to correct issues from the first, but the condition has worsened. She expresses concern about potential falls due to her foot condition and balance issues.    She receives annual Reclast injections for bone strengthening. She takes fluoxetine for stress and anxiety, which helps maintain her emotional stability, and trazodone to aid with sleep. She denies any pain in her abdomen, lower abdomen, chest, or chest muscles.    MEDICATIONS:  Mary is on Mirapex for restless leg syndrome and Trazodone to help with sleep. She is taking  Fluoxetine for stress and anxiety, which helps maintain her emotional stability. Pantoprazole is prescribed for reflux. Iron supplements are recommended for anemia, but she does not take them. She is on Vitamin D supplements as recommended by her doctor. Mary receives a yearly Reclast injection for bone strengthening. B12 shots have been discontinued.    MEDICAL HISTORY:  Mary has a history of osteoporosis, diagnosed based on a bone density scan showing T-scores of -2.8 in the lumbar spine and -2.5 in the left femoral neck. She has a history of anemia, with hemoglobin as low as 8 six months ago, which has recently improved to 11.8. Mary has Vitamin D deficiency with a level of 22 noted. She also has restless leg syndrome, stress, and anxiety. Mary received the influenza vaccine on 10/3/24.    FAMILY HISTORY:  Family history is significant for arthritis, which possibly runs in the family, affecting father, mother, and siblings.    SURGICAL HISTORY:  Mary underwent gastric sleeve surgery in 2012 and breast reduction surgery in 2017. She has had lumbar spine fusion and laparoscopic cholecystectomy. Approximately 5 years ago, she had bunion surgery, which resulted in complications leading to foot defo  rmity.    TEST RESULTS:  Mary's hemoglobin level in October 2024 was 11.8 g/dL, showing improvement from 8 g/dL 6 months prior. Her Vitamin D level was low at 22 ng/mL. A protein electrophoresis was performed. Two years ago, her B12 level was on the low side but later improved to 2000 pg/mL after supplementation.    IMAGING:  A Bone Density Scan on 2/21/25 revealed a Lumbar spine T-score of -2.8 and a Left femoral neck T-score of -2.5, indicating osteoporosis in both areas. An X-ray of the Lumbar Spine on 11/11/24 showed rotator curvature in the spine and the presence of hardware plates. A CT of the Lumbar Spine on the same date revealed post-surgical changes at L4-L5, no matured bridging mineralization about  the L4-L5 interbody cage, grade 1 anterolisthesis of L4 on L5, and arthritis changes in multiple places in the back.    ROS:  General: +fatigue  Cardiovascular: -chest pain  Musculoskeletal: -joint pain, +back pain, +muscle spasms  Neurological: -weakness         Past Medical History:   Diagnosis Date    ADHD (attention deficit hyperactivity disorder)     Allergy     Anemia     Anemia associated with nutritional deficiency 2023    Anxiety     Depression     Fatty liver     History of endoscopy 2021    Dr. Toney Cuevas-Z-line is irregular and was found 37 cm from the incisors.  Scattered ulcerations noted between 36-37 cm. 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  The gastrojejunal anastomosis was characterized by moderate stenosis and ulceration.  This was traversed.  The pouch to jejunum limb was characterized by erythema, friable mucosa, inflammation and ulceration.  The examine    Insomnia     Migraines     Osteoporosis     Restless leg syndrome      Social History[1]  Past Surgical History:   Procedure Laterality Date    BREAST BIOPSY Left     benign 15 +years ago    BREAST SURGERY  2017    breast reduction    BUNIONECTOMY Right 2018     SECTION  1987    FRACTURE SURGERY  2016    ankle    FUSION, SPINE, LUMBAR, POSTERIOR APPROACH Bilateral 2024    Procedure: FUSION,SPINE,LUMBAR,POSTERIOR APPROACH;  Surgeon: William Garcia MD;  Location: [facility] OR;  Service: Orthopedics;  Laterality: Bilateral;  NEED DT    GASTRIC RESTRICTION SURGERY      GASTRIC SLEEVE 2012    LAPAROSCOPIC CHOLECYSTECTOMY N/A 10/10/2022    Procedure: CHOLECYSTECTOMY, LAPAROSCOPIC;  Surgeon: Henrietta Joseph MD;  Location: [facility] OR;  Service: General;  Laterality: N/A;    REPAIR OF EXTENSOR TENDON  2021    Procedure: REPAIR, TENDON, EXTENSOR;  Surgeon: Aly Zimmerman DPM;  Location: [facility] OR;  Service: Podiatry;;    SURGICAL REMOVAL OF METATARSAL HEAD Right 2021    Procedure:  "OSTECTOMY, METATARSAL BONE, HEAD;  Surgeon: Aly Zimmerman DPM;  Location: [facility];  Service: Podiatry;  Laterality: Right;    TOTAL REDUCTION MAMMOPLASTY      2016     Family History   Problem Relation Name Age of Onset    Arthritis Mother      Colon cancer Mother      Diabetes Mother      Early death Mother      Miscarriages / Stillbirths Mother      Arthritis Father      Heart disease Father      Hypertension Father      Stroke Father      Vision loss Father      Breast cancer Maternal Aunt  52    Vaginal cancer Paternal Grandmother      Heart disease Paternal Grandfather         Objective:      Physical Exam  Constitutional:       General: She is not in acute distress.     Appearance: Normal appearance. She is not ill-appearing.   Musculoskeletal:      Right foot: Deformity present.        Legs:         Feet:       Comments: Tenderness in thigh and calf aches   Feet:      Comments: Tendrness at base of foot under 3rd 4th MTP joints on dorsum  Neurological:      Mental Status: She is alert.       Blood pressure 117/79, pulse 78, height 5' 2" (1.575 m), weight 59.4 kg (131 lb). Body mass index is 23.96 kg/m².  Physical Exam    General: No acute distress. Well-developed. Well-nourished.  Eyes: EOMI. Sclerae anicteric.  HENT: Normocephalic. Atraumatic. Nares patent. Moist oral mucosa.    Cardiovascular: Regular rate. Regular rhythm. No murmurs. No rubs. No gallops. Normal S1, S2.  Respiratory: Normal respiratory effort. Clear to auscultation bilaterally. No rales. No rhonchi. No wheezing.  Abdomen: Soft. Non-tender. Non-distended. Normoactive bowel sounds. Pulsation felt in abdomen.  Musculoskeletal: No  obvious deformity.  Extremities: No lower extremity edema.Pain in thigh and legs. Deformed 2nd toe right side  Neurological: Alert & oriented x3. No slurred speech. Normal gait.  Psychiatric: Appropriate  Skin: Warm. Dry. No rash.              Assessment:       Office Visit on 12/26/2024   Component Date Value Ref " Range Status    SARS Coronavirus 2 Antigen 12/26/2024 Negative  Negative Final     Acceptable 12/26/2024 Yes   Final    Rapid Strep A Screen 12/26/2024 Negative  Negative Final     Acceptable 12/26/2024 Yes   Final    Rapid Influenza A Ag 12/26/2024 Negative  Negative Final    Rapid Influenza B Ag 12/26/2024 Negative  Negative Final     Acceptable 12/26/2024 Yes   Final       Assessment & Plan    IMPRESSION:  - Reviewed bone density report showing osteoporosis in lumbar spine (T-score -2.8) and left femoral neck (T-score -2.5)  - Assessed CT of lumbar spine showing post-surgical changes at L4-L5, grade 1 anterolisthesis, and arthritis  - Evaluated foot deformity post-bunion surgery, recommending second opinion due to persistent issues  - Considered vitamin deficiencies as potential cause for muscle cramps and fatigue  - Assessed current medications, including Mirapax for restless leg syndrome  - Reviewed previous B12 deficiency and past supplementation  - Will review chart in detail and send a message to patient regarding fatigue causes    - Explained fracture risk assessment: 14% chance of fracture in 10 years for 64-year-old women with similar bone density.  - Discussed importance of hydration for spinal disc health.  - Educated on potential genetic factors in arthritis.  - Explained pro-inflammatory effects of Western diet and anti-inflammatory benefits of greens and organic foods.  - Mary to increase water intake during daytime for better hydration.  - Recommend consuming more greens, vegetables, salads, fruits.  - Recommend reducing intake of fried and fatty foods, sugars.  - Mary to attempt stretch exercises or yoga for improved flexibility.  - Mary can consider alternative hydration options like Pedialyte, soup, broth, or coconut water.  - Started vitamin B12 injections: 1 mL every 2 weeks, self-administered.  - Continued Reclast injection annually for bone  "strengthening.  - Decreased pantoprazole: Take on alternate days or every 3rd day, or half dose on alternate days.  - Started calcium and vitamin D3 supplements: Recommend Caltrate with D, Citracal with D, or OSCAL (chocolate version), 1 daily.  - Continued strazodone for sleep.  - Continued fluoxetine for stress and anxiety.  - B12 level test ordered.  - Referred to foot specialist for second opinion on persistent post-bunion surgery issues.  - Contact the office for a message regarding fatigue causes.         Plan:   Fatigue, unspecified type  -     cyanocobalamin 1,000 mcg/mL injection; Inject 1 mL (1,000 mcg total) into the muscle every 14 (fourteen) days.  Dispense: 10 mL; Refill: 1  -     syringe with needle (SYRINGE 3CC/25GX1") 3 mL 25 gauge x 1" Syrg; 1 Device by Misc.(Non-Drug; Combo Route) route every 14 (fourteen) days.  Dispense: 10 each; Refill: 1  -     Vitamin B12; Future; Expected date: 02/26/2025  -     Sedimentation rate; Future; Expected date: 02/27/2025  -     C-Reactive Protein; Future; Expected date: 02/26/2025    B12 deficiency  -     cyanocobalamin 1,000 mcg/mL injection; Inject 1 mL (1,000 mcg total) into the muscle every 14 (fourteen) days.  Dispense: 10 mL; Refill: 1  -     syringe with needle (SYRINGE 3CC/25GX1") 3 mL 25 gauge x 1" Syrg; 1 Device by Misc.(Non-Drug; Combo Route) route every 14 (fourteen) days.  Dispense: 10 each; Refill: 1  -     Vitamin B12; Future; Expected date: 02/26/2025  -     CK; Future; Expected date: 02/26/2025    Generalized muscle ache  -     CK; Future; Expected date: 02/26/2025  -     Sedimentation rate; Future; Expected date: 02/27/2025  -     C-Reactive Protein; Future; Expected date: 02/26/2025    History of bariatric surgery  Comments:  H/O Bariatric surgery noted. Take vitamins.      [facility]  Myalgias unexplained. ? Vitamin Deficiency.  Muscle aches- Check CK Sed rate and CRP  Consider - Bisphosphonates /reclast   Follow up in about 6 months (around " 8/26/2025) for Preventive Physical.    Current Medications[2]    This note was generated with the assistance of ambient listening technology. Verbal consent was obtained by the patient and accompanying visitor(s) for the recording of patient appointment to facilitate this note. I attest to having reviewed and edited the generated note for accuracy, though some syntax or spelling errors may persist. Please contact the author of this note for any clarification.      Jimbo Muniz    For a 64-year-old white female with osteoporosis who underwent gastric bypass surgery more than 10 years ago, the choice between bisphosphonates, Prolia (denosumab), and Forteo (teriparatide) should consider several factors:  Bisphosphonates: These drugs inhibit bone resorption and are effective in reducing fracture risk. However, they may not be ideal for patients with malabsorption issues post-gastric bypass, as their absorption could be impaired[1]. Additionally, bisphosphonates carry a risk of osteonecrosis of the jaw, although this is more common with intravenous forms[2].  Prolia (Denosumab): This monoclonal antibody also inhibits bone resorption but does not require absorption through the gastrointestinal tract, making it a viable option for patients with malabsorption issues. It is administered via injection, which bypasses gastrointestinal absorption issues.  Forteo (Teriparatide): This is an anabolic agent that stimulates bone formation rather than inhibiting resorption. It is typically used for severe osteoporosis and may be beneficial in cases where bone formation is desired. However, it requires daily injections and has a higher cost compared to other treatments.  Given the patient's history of gastric bypass, Prolia might be a more appropriate choice due to its mode of administration and effectiveness in patients with potential malabsorption issues. However, the decision should be tailored to the patient's specific clinical  needs and potential side effects of each medication.   Medication Mechanism of Action Administration Potential Issues Post-Gastric Bypass   Bisphosphonates Inhibit bone resorption Oral or IV Malabsorption, ONJ risk   Prolia (Denosumab) Inhibit bone resorption Subcutaneous injection No malabsorption issues   Forteo (Teriparatide) Stimulate bone formation Daily subcutaneous injection No malabsorption issues, higher cost   Sources:  Therapeutic Treatment Options for Osteoporosis in the Surgical Weight Loss Population (https://bariatrictimes.com/therapeutic-treatment-options-for-osteoporosis-in-the-surgical-weight-loss-population/)  www.science.gov (https://www.science.gov/topicpages/o/oral+bisphosphonate-related+osteonecrosis)  www.SkyDox.Sensicore (https://www.SkyDox.com/0068-2861/15/6/1302)  Dr. TATTOFF.org (https://Dr. TATTOFF.org/content/[email or web address]/[email or web address]_noSplash_[email or web address].pdf?t=q8kim8)  pubmed.ncbi.nlm.nih.gov (https://pubmed.ncbi.nlm.nih.gov/62874226/)         [1]   Social History  Socioeconomic History    Marital status:    Tobacco Use    Smoking status: Never    Smokeless tobacco: Never   Substance and Sexual Activity    Alcohol use: Yes     Comment: occassional    Drug use: No    Sexual activity: Yes     Social Drivers of Health     Financial Resource Strain: Low Risk  (2/26/2025)    Overall Financial Resource Strain (CARDIA)     Difficulty of Paying Living Expenses: Not very hard   Food Insecurity: No Food Insecurity (2/26/2025)    Hunger Vital Sign     Worried About Running Out of Food in the Last Year: Never true     Ran Out of Food in the Last Year: Never true   Transportation Needs: No Transportation Needs (2/25/2025)    PRAPARE - Transportation     Lack of Transportation (Medical): No     Lack of Transportation (Non-Medical): No   Physical Activity: Insufficiently Active (2/25/2025)    Exercise Vital Sign     Days of Exercise per Week: 1 day     Minutes of Exercise per Session:  "30 min   Stress: No Stress Concern Present (2/25/2025)    [employer] [place] [employer] - Occupational Stress Questionnaire     Feeling of Stress : Only a little   Housing Stability: Low Risk  (2/25/2025)    Housing Stability Vital Sign     Unable to Pay for Housing in the Last Year: No     Number of Times Moved in the Last Year: 0     Homeless in the Last Year: No   [2]   Current Outpatient Medications:     FLUoxetine 20 MG capsule, TAKE 1 CAPSULE(20 MG) BY MOUTH EVERY EVENING, Disp: 15 capsule, Rfl: 1    LINZESS 72 mcg Cap capsule, Take 72 mcg by mouth., Disp: , Rfl:     multivitamin (THERAGRAN) tablet, Take 1 tablet by mouth once daily., Disp: , Rfl:     pantoprazole (PROTONIX) 40 MG tablet, Take 1 tablet (40 mg total) by mouth once daily., Disp: 30 tablet, Rfl: 11    pramipexole (MIRAPEX) 1 MG tablet, TAKE 1 AND 1/2 TABLETS BY MOUTH EVERY EVENING, Disp: 135 tablet, Rfl: 0    traZODone (DESYREL) 100 MG tablet, Take 1 tablet (100 mg total) by mouth every evening., Disp: 30 tablet, Rfl: 11    cyanocobalamin 1,000 mcg/mL injection, Inject 1 mL (1,000 mcg total) into the muscle every 14 (fourteen) days., Disp: 10 mL, Rfl: 1    syringe with needle (SYRINGE 3CC/25GX1") 3 mL 25 gauge x 1" Syrg, 1 Device by Misc.(Non-Drug; Combo Route) route every 14 (fourteen) days., Disp: 10 each, Rfl: 1    "

## 2025-02-28 NOTE — PROGRESS NOTES
As we discussed you only have to follow-up with a surgeon if your hernia or gallstones are causing you pain or discomfort.    For your constipation please start taking daily MiraLAX as well as continue with a daily stool softener and follow-up with your gastroenterologist   Subjective:       Patient ID: Mary Mike is a 59 y.o. female.    Chief Complaint: Results (ct results )    Patient is a 59-year-old  female who comes for follow-up.  Previous visit we had address the issue of have left quadrant and left flank pain.  A CT scan was performed which did not show any significant findings like kidney stones, adrenal or kidney problems or any evidence of colitis.  It did show some diverticulosis without any diverticulitis.    She continues with on and off mild discomfort in the left lower quadrant.    She continues to feel somewhat fatigued and tired.  I have reviewed her medications which include trazodone, Mirapex, fluoxetine and Adderall at 10 mg.    She takes these medications for possibly some underlying depression and/anxiety with ADD.    Not sure about the benefits of trazodone and probably after including this medication she started feeling fatigued and tired.  She states that without taking Mirapex or pramipexole, she has worsening of restless leg.  She has been on Prozac for approximately 20 years.    She states that generally she is focused at work but still in spite of that she has to take dextroamphetamine/amphetamine for keeping her focus.  She states that without this medication she seems to be somewhat scattered and in all directions.  All these 4 medications and prescriptions were prescribed by her previous primary care provider who is no longer working.  (To be confirmed)    Abdominal Pain   Pertinent negatives include no arthralgias, constipation, diarrhea, dysuria, fever, frequency or headaches.   Fatigue   This is a chronic problem. The current episode started more than 1 month ago. The problem occurs constantly. The problem has been waxing and waning. Associated symptoms include abdominal pain (llq pain) and fatigue. Pertinent negatives include no arthralgias, chest pain, chills, congestion, coughing, fever, headaches, joint swelling or rash. The  symptoms are aggravated by exertion. She has tried relaxation for the symptoms. The treatment provided mild relief.       Past Medical History:   Diagnosis Date    ADHD (attention deficit hyperactivity disorder)     Allergy     Anemia     Anxiety     Depression     Fatty liver     Insomnia     Migraines     Osteoporosis     Restless leg syndrome      Social History     Socioeconomic History    Marital status:      Spouse name: Not on file    Number of children: Not on file    Years of education: Not on file    Highest education level: Not on file   Occupational History    Not on file   Social Needs    Financial resource strain: Not on file    Food insecurity:     Worry: Not on file     Inability: Not on file    Transportation needs:     Medical: Not on file     Non-medical: Not on file   Tobacco Use    Smoking status: Never Smoker    Smokeless tobacco: Never Used   Substance and Sexual Activity    Alcohol use: Yes     Comment: occassional    Drug use: No    Sexual activity: Yes   Lifestyle    Physical activity:     Days per week: Not on file     Minutes per session: Not on file    Stress: Not at all   Relationships    Social connections:     Talks on phone: Not on file     Gets together: Not on file     Attends Taoism service: Not on file     Active member of club or organization: Not on file     Attends meetings of clubs or organizations: Not on file     Relationship status: Not on file   Other Topics Concern    Not on file   Social History Narrative    Not on file     Past Surgical History:   Procedure Laterality Date    BREAST SURGERY      BUNIONECTOMY Right      SECTION      FRACTURE SURGERY      GASTRIC RESTRICTION SURGERY      GASTRIC SLEEVE        Family History   Problem Relation Age of Onset    Arthritis Mother     Colon cancer Mother     Diabetes Mother     Early death Mother     Miscarriages / Stillbirths Mother     Arthritis Father     Heart  "disease Father     Hypertension Father     Stroke Father     Vision loss Father     Vaginal cancer Paternal Grandmother     Heart disease Paternal Grandfather        Review of Systems   Constitutional: Positive for fatigue. Negative for activity change, chills, fever and unexpected weight change (Weight gain over years.).   HENT: Negative for congestion, postnasal drip and sinus pressure.    Eyes: Negative for pain, discharge and visual disturbance.   Respiratory: Negative for cough, chest tightness and shortness of breath.    Cardiovascular: Negative for chest pain, palpitations and leg swelling.   Gastrointestinal: Positive for abdominal pain (llq pain). Negative for abdominal distention, anal bleeding, constipation and diarrhea.        History of abdominoplasty.   Endocrine: Negative for cold intolerance, heat intolerance, polydipsia, polyphagia and polyuria.   Genitourinary: Positive for flank pain. Negative for difficulty urinating, dysuria, frequency, menstrual problem and pelvic pain.        History of ovarian cyst removal.  Side of removal is unclear.   Musculoskeletal: Positive for back pain. Negative for arthralgias and joint swelling.   Skin: Negative for color change, pallor and rash.   Allergic/Immunologic: Negative for environmental allergies, food allergies and immunocompromised state.   Neurological: Negative for dizziness, tremors, seizures, syncope, light-headedness and headaches.   Hematological: Negative for adenopathy. Does not bruise/bleed easily.   Psychiatric/Behavioral: Negative for agitation, confusion and dysphoric mood. The patient is not nervous/anxious.          Objective:      Blood pressure 129/89, pulse 81, temperature 98.7 °F (37.1 °C), height 5' 3" (1.6 m), weight 72.1 kg (159 lb). Body mass index is 28.17 kg/m².  Physical Exam   Constitutional: She is oriented to person, place, and time. She appears well-developed and well-nourished. She is cooperative. No distress.   HENT: "   Head: Normocephalic and atraumatic.   Right Ear: Tympanic membrane normal.   Left Ear: Tympanic membrane normal.   Eyes: Conjunctivae, EOM and lids are normal. Lids are everted and swept, no foreign bodies found. Right pupil is round and reactive. Left pupil is round and reactive.   Neck: Trachea normal and normal range of motion. Neck supple.   Cardiovascular: Normal rate, regular rhythm, S1 normal, S2 normal and normal heart sounds.   Pulmonary/Chest: Breath sounds normal.   Abdominal: Soft. Bowel sounds are normal. There is no rigidity and no guarding.       Musculoskeletal: Normal range of motion. She exhibits no edema or deformity.        Lumbar back: She exhibits tenderness. She exhibits normal range of motion, no bony tenderness, no swelling, no edema and no deformity.        Back:    Lymphadenopathy:     She has no cervical adenopathy.     She has no axillary adenopathy.   Neurological: She is alert and oriented to person, place, and time. She displays no atrophy and no tremor. No sensory deficit. She exhibits normal muscle tone.   Skin: Skin is warm and dry.   Psychiatric: She has a normal mood and affect. Her behavior is normal.   Nursing note and vitals reviewed.        Assessment:       1. Diverticulosis of intestine without perforation or abscess    2. Other fatigue    3. Chronic left-sided low back pain without sciatica           Office Visit on 02/26/2020   Component Date Value Ref Range Status    Glucose 02/26/2020 83  65 - 99 mg/dL Final    BUN, Bld 02/26/2020 13  7 - 25 mg/dL Final    Creatinine 02/26/2020 0.80  0.50 - 1.05 mg/dL Final    eGFR if non African American 02/26/2020 82  > OR = 60 mL/min/1.73m2 Final    eGFR if African American 02/26/2020 95  > OR = 60 mL/min/1.73m2 Final    BUN/Creatinine Ratio 02/26/2020 NOT APPLICABLE  6 - 22 (calc) Final    Sodium 02/26/2020 140  135 - 146 mmol/L Final    Potassium 02/26/2020 4.3  3.5 - 5.3 mmol/L Final    Chloride 02/26/2020 102  98 - 110  mmol/L Final    CO2 02/26/2020 31  20 - 32 mmol/L Final    Calcium 02/26/2020 9.6  8.6 - 10.4 mg/dL Final    Total Protein 02/26/2020 6.9  6.1 - 8.1 g/dL Final    Albumin 02/26/2020 4.1  3.6 - 5.1 g/dL Final    Globulin, Total 02/26/2020 2.8  1.9 - 3.7 g/dL (calc) Final    Albumin/Globulin Ratio 02/26/2020 1.5  1.0 - 2.5 (calc) Final    Total Bilirubin 02/26/2020 0.4  0.2 - 1.2 mg/dL Final    Alkaline Phosphatase 02/26/2020 63  37 - 153 U/L Final    AST 02/26/2020 15  10 - 35 U/L Final    ALT 02/26/2020 15  6 - 29 U/L Final    TSH 02/26/2020 0.55  0.40 - 4.50 mIU/L Final    WBC 02/26/2020 4.4  3.8 - 10.8 Thousand/uL Final    RBC 02/26/2020 4.77  3.80 - 5.10 Million/uL Final    Hemoglobin 02/26/2020 12.9  11.7 - 15.5 g/dL Final    Hematocrit 02/26/2020 40.0  35.0 - 45.0 % Final    Mean Corpuscular Volume 02/26/2020 83.9  80.0 - 100.0 fL Final    Mean Corpuscular Hemoglobin 02/26/2020 27.0  27.0 - 33.0 pg Final    Mean Corpuscular Hemoglobin Conc 02/26/2020 32.3  32.0 - 36.0 g/dL Final    RDW 02/26/2020 13.4  11.0 - 15.0 % Final    Platelets 02/26/2020 261  140 - 400 Thousand/uL Final    MPV 02/26/2020 10.9  7.5 - 12.5 fL Final    Neutrophils Absolute 02/26/2020 2,952  1,500 - 7,800 cells/uL Final    Lymph # 02/26/2020 911  850 - 3,900 cells/uL Final    Mono # 02/26/2020 405  200 - 950 cells/uL Final    Eos # 02/26/2020 101  15 - 500 cells/uL Final    Baso # 02/26/2020 31  0 - 200 cells/uL Final    Neutrophils Relative 02/26/2020 67.1  % Final    Lymph% 02/26/2020 20.7  % Final    Mono% 02/26/2020 9.2  % Final    Eosinophil% 02/26/2020 2.3  % Final    Basophil% 02/26/2020 0.7  % Final         Plan:           Diverticulosis of intestine without perforation or abscess    Other fatigue    Chronic left-sided low back pain without sciatica      The reason for left lower quadrant and the left low back pain is still unclear.  CT scan had shown diverticulosis.  No evidence of diverticulitis at  this point.  All other major organs including kidney are normal.  Spleen and liver also normal.  Pancreas unremarkable.      Because of low back pain might be secondary to degenerative disc disease.    I have also checked general chemistry with normal liver tests and blood counts which are normal.  No easy explanation for fatigue at this point.  She is on multiple medications and will slowly try to cut them down.    I will slowly begin with trazodone as follows.    From today she will take trazodone 50 mg on Monday, Wednesday and Friday and 25 mg on other days    .  Next week 25 mg at bedtime.    Third week 25 mg on alternate nights.    Fourth week 25 mg on Monday and Thursday and then stop.    Let us see how she does as far as fatigue is concerned.  Trazodone tends to make patient's somewhat drowsy and sleepy and may be the effects are lingering on to the next day.    Patient apparently has been diagnosed with ADD or ADHD.  Apparently she has been told that she cannot focus on 1 subject and flips over easily.  I have advised her that before she responds back to somebody, take 3 sec of gap for holding her thoughts and thinking.  Try to focus on 1 subject.    Cut down on excess of caffeine and stimulating beverages.    She has somewhat remorseful about her decision to have significant surgeries in past.  She did have mammoplasty and breast reduction but she is not regretting that.  Mostly she has regretting the abdominal surgery and abdominal plasty.    Give the name of local gynecologist also.  I would like to see her back in 1 month time to review her status with fatigue and sleep specifically.    Patient has some degree of ennui and some misgivings in life.  Probably these will also need to be addressed down the road.  I have advised her to cut down on the caffeine.      Again I have explained her med limitations as a primary care physician and difficulty managing 4 psych or CNS medications at the same  time.    Follow-up in 1 month.     Current Outpatient Medications:     dextroamphetamine-amphetamine 10 mg Tab, 1 tablet once daily., Disp: , Rfl:     ergocalciferol (ERGOCALCIFEROL) 50,000 unit Cap, TK 1 C PO 1 TIME A WK, Disp: , Rfl: 1    FLUoxetine (PROZAC) 20 MG capsule, Take 1 capsule by mouth nightly., Disp: , Rfl: 1    multivitamin (THERAGRAN) tablet, Take 1 tablet by mouth once daily., Disp: , Rfl:     pramipexole (MIRAPEX) 1 MG tablet, Take 2 tablets (2 mg total) by mouth every evening. 2 tabs qhs, Disp: 180 tablet, Rfl: 1    traZODone (DESYREL) 50 MG tablet, TAKE 1-2 TABLETS BY MOUTH AT NIGHT AS NEEDED FOR INSOMNIA, Disp: 60 tablet, Rfl: 0

## 2025-03-06 DIAGNOSIS — F32.A MOOD DISORDER OF DEPRESSED TYPE: ICD-10-CM

## 2025-03-06 RX ORDER — FLUOXETINE HYDROCHLORIDE 20 MG/1
20 CAPSULE ORAL NIGHTLY
Qty: 90 CAPSULE | Refills: 3 | Status: SHIPPED | OUTPATIENT
Start: 2025-03-06

## 2025-03-06 NOTE — TELEPHONE ENCOUNTER
No care due was identified.  [facility] Embedded Care Due Messages. Reference number: [id number].   3/06/2025 11:41:31 AM CST

## 2025-03-06 NOTE — TELEPHONE ENCOUNTER
----- Message from Denise sent at 3/6/2025 11:42 AM CST -----  Pt needs refill on fluoxetine 30 a month not 15 Klever perdue  [phone]

## 2025-03-06 NOTE — TELEPHONE ENCOUNTER
Refill Decision Note   Mary Cee  is requesting a refill authorization.  Brief Assessment and Rationale for Refill:  Approve     Medication Therapy Plan:         Comments:     Note composed:4:26 PM 03/06/2025

## 2025-03-31 DIAGNOSIS — G25.81 RESTLESS LEG SYNDROME: ICD-10-CM

## 2025-03-31 RX ORDER — PRAMIPEXOLE DIHYDROCHLORIDE 1 MG/1
1.5 TABLET ORAL NIGHTLY
Qty: 135 TABLET | Refills: 3 | Status: SHIPPED | OUTPATIENT
Start: 2025-03-31

## 2025-07-10 ENCOUNTER — OFFICE VISIT (OUTPATIENT)
Dept: FAMILY MEDICINE | Facility: CLINIC | Age: 65
End: 2025-07-10
Payer: COMMERCIAL

## 2025-07-10 ENCOUNTER — TELEPHONE (OUTPATIENT)
Dept: FAMILY MEDICINE | Facility: CLINIC | Age: 65
End: 2025-07-10
Payer: COMMERCIAL

## 2025-07-10 VITALS
DIASTOLIC BLOOD PRESSURE: 86 MMHG | SYSTOLIC BLOOD PRESSURE: 131 MMHG | HEIGHT: 62 IN | TEMPERATURE: 99 F | HEART RATE: 69 BPM | OXYGEN SATURATION: 97 % | BODY MASS INDEX: 23.93 KG/M2 | WEIGHT: 130.06 LBS

## 2025-07-10 DIAGNOSIS — R39.9 UTI SYMPTOMS: Primary | ICD-10-CM

## 2025-07-10 LAB
BILIRUB UR QL STRIP: NEGATIVE
GLUCOSE UR QL STRIP: NEGATIVE
KETONES UR QL STRIP: NEGATIVE
LEUKOCYTE ESTERASE UR QL STRIP: POSITIVE
PH, POC UA: 7.5 (ref 5–8)
POC BLOOD, URINE: POSITIVE
POC NITRATES, URINE: NEGATIVE
PROT UR QL STRIP: NEGATIVE
SP GR UR STRIP: 1.01 (ref 1–1.03)
UROBILINOGEN UR STRIP-ACNC: 0.2 (ref 0.1–1.1)

## 2025-07-10 PROCEDURE — 81003 URINALYSIS AUTO W/O SCOPE: CPT | Mod: QW,S$GLB,, | Performed by: NURSE PRACTITIONER

## 2025-07-10 PROCEDURE — 1159F MED LIST DOCD IN RCRD: CPT | Mod: CPTII,S$GLB,, | Performed by: NURSE PRACTITIONER

## 2025-07-10 PROCEDURE — 3079F DIAST BP 80-89 MM HG: CPT | Mod: CPTII,S$GLB,, | Performed by: NURSE PRACTITIONER

## 2025-07-10 PROCEDURE — 3075F SYST BP GE 130 - 139MM HG: CPT | Mod: CPTII,S$GLB,, | Performed by: NURSE PRACTITIONER

## 2025-07-10 PROCEDURE — 99214 OFFICE O/P EST MOD 30 MIN: CPT | Mod: S$GLB,,, | Performed by: NURSE PRACTITIONER

## 2025-07-10 PROCEDURE — 99999 PR PBB SHADOW E&M-EST. PATIENT-LVL III: CPT | Mod: PBBFAC,,, | Performed by: NURSE PRACTITIONER

## 2025-07-10 PROCEDURE — 3008F BODY MASS INDEX DOCD: CPT | Mod: CPTII,S$GLB,, | Performed by: NURSE PRACTITIONER

## 2025-07-10 RX ORDER — DOXYCYCLINE HYCLATE 100 MG
100 TABLET ORAL 2 TIMES DAILY
Qty: 14 TABLET | Refills: 0 | Status: SHIPPED | OUTPATIENT
Start: 2025-07-10

## 2025-07-10 RX ORDER — PHENTERMINE HYDROCHLORIDE 37.5 MG/1
37.5 TABLET ORAL
COMMUNITY
Start: 2025-07-08

## 2025-07-10 NOTE — TELEPHONE ENCOUNTER
----- Message from Sherry sent at 7/10/2025  1:37 PM CDT -----  Vm:120    Pt called because she has a UTI and would like something to be called in. Pt stated that she is allergic to the things in [place].    [phone]

## 2025-07-14 NOTE — PROGRESS NOTES
This dictation has been generated using Modal Fluency Dictation some phonetic errors may occur. Please contact author for clarification if needed.     1. UTI symptoms  -     POCT Urinalysis, Dipstick, Automated, W/O Scope    Other orders  -     doxycycline (VIBRA-TABS) 100 MG tablet; Take 1 tablet (100 mg total) by mouth 2 (two) times daily.  Dispense: 14 tablet; Refill: 0       Assessment & Plan                UTI symptoms point of service urinalysis UTI per my interpretation doxycycline as above.  No red flags on HPI physical examination    I will review all results and address accordingly.   No follow-ups on file.    ________________________________________________________________  ________________________________________________________________      No chief complaint on file.    History of present illness  History of Present Illness               UTI symptoms.  Patient notes pressure and burning for last 2 days.  She notes dysuria with urination.  Tried increasing her fluid intake.  Patient notes allergy to [place] 0.  Patient denies presence of rash.    Past Medical History:   Diagnosis Date    ADHD (attention deficit hyperactivity disorder)     Allergy     Anemia     Anemia associated with nutritional deficiency 07/18/2023    Anxiety     Depression     Fatty liver     History of endoscopy 08/26/2021    Dr. Toney Cuevas-Z-line is irregular and was found 37 cm from the incisors.  Scattered ulcerations noted between 36-37 cm. 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  The gastrojejunal anastomosis was characterized by moderate stenosis and ulceration.  This was traversed.  The pouch to jejunum limb was characterized by erythema, friable mucosa, inflammation and ulceration.  The examine    Insomnia     Migraines     Osteoporosis     Restless leg syndrome 2005       Past Surgical History:   Procedure Laterality Date    BREAST BIOPSY Left     benign 15 +years ago    BREAST SURGERY  2017    breast reduction     BUNIONECTOMY Right 2018     SECTION  1987    FRACTURE SURGERY  2016    ankle    FUSION, SPINE, LUMBAR, POSTERIOR APPROACH Bilateral 2024    Procedure: FUSION,SPINE,LUMBAR,POSTERIOR APPROACH;  Surgeon: William Garcia MD;  Location: [facility] OR;  Service: Orthopedics;  Laterality: Bilateral;  NEED DT    GASTRIC RESTRICTION SURGERY      GASTRIC SLEEVE 2012    LAPAROSCOPIC CHOLECYSTECTOMY N/A 10/10/2022    Procedure: CHOLECYSTECTOMY, LAPAROSCOPIC;  Surgeon: Henrietta Joseph MD;  Location: [facility] OR;  Service: General;  Laterality: N/A;    REPAIR OF EXTENSOR TENDON  2021    Procedure: REPAIR, TENDON, EXTENSOR;  Surgeon: Aly Zimmerman DPM;  Location: [facility] OR;  Service: Podiatry;;    SURGICAL REMOVAL OF METATARSAL HEAD Right 2021    Procedure: OSTECTOMY, METATARSAL BONE, HEAD;  Surgeon: Aly Zimmerman DPM;  Location: [facility] OR;  Service: Podiatry;  Laterality: Right;    TOTAL REDUCTION MAMMOPLASTY             Family History   Problem Relation Name Age of Onset    Arthritis Mother      Colon cancer Mother      Diabetes Mother      Early death Mother      Miscarriages / Stillbirths Mother      Arthritis Father      Heart disease Father      Hypertension Father      Stroke Father      Vision loss Father      Breast cancer Maternal Aunt  52    Vaginal cancer Paternal Grandmother      Heart disease Paternal Grandfather         Social History     Socioeconomic History    Marital status:    Tobacco Use    Smoking status: Never    Smokeless tobacco: Never   Substance and Sexual Activity    Alcohol use: Yes     Comment: occassional    Drug use: No    Sexual activity: Yes     Social Drivers of Health     Financial Resource Strain: Low Risk  (2025)    Overall Financial Resource Strain (CARDIA)     Difficulty of Paying Living Expenses: Not very hard   Food Insecurity: No Food Insecurity (2025)    Hunger Vital Sign     Worried About Running Out of Food in the Last Year: Never true      "Ran Out of Food in the Last Year: Never true   Transportation Needs: No Transportation Needs (2/25/2025)    PRAPARE - Transportation     Lack of Transportation (Medical): No     Lack of Transportation (Non-Medical): No   Physical Activity: Insufficiently Active (2/25/2025)    Exercise Vital Sign     Days of Exercise per Week: 1 day     Minutes of Exercise per Session: 30 min   Stress: No Stress Concern Present (2/25/2025)    [employer] - Occupational Stress Questionnaire     Feeling of Stress : Only a little   Housing Stability: Low Risk  (2/25/2025)    Housing Stability Vital Sign     Unable to Pay for Housing in the Last Year: No     Number of Times Moved in the Last Year: 0     Homeless in the Last Year: No       Current Medications[1]    Review of patient's allergies indicates:   Allergen Reactions    Bactrim [sulfamethoxazole-trimethoprim] Rash     itching       Physical examination  Vitals Reviewed  /86 (BP Location: Right arm, Patient Position: Sitting)   Pulse 69   Temp 98.8 °F (37.1 °C) (Oral)   Ht 5' 2" (1.575 m)   Wt 59 kg (130 lb 1.1 oz)   SpO2 97%   BMI 23.79 kg/m²  Body mass index is 23.79 kg/m².     BP Readings from Last 3 Encounters:   07/10/25 131/86   02/26/25 117/79   12/26/24 (!) 150/92       Wt Readings from Last 3 Encounters:   07/10/25 59 kg (130 lb 1.1 oz)   02/26/25 59.4 kg (131 lb)   12/26/24 60.3 kg (133 lb)         Physical Exam            Abdomen is soft and nondistended.  No CVA tenderness to percussion.    This note was generated with the assistance of ambient listening technology. Verbal consent was obtained by the patient and accompanying visitor(s) for the recording of patient appointment to facilitate this note. I attest to having reviewed and edited the generated note for accuracy, though some syntax or spelling errors may persist. Please contact the author of this note for any clarification.      Call or return to clinic prn if these " "symptoms worsen or fail to improve as anticipated.           [1]   Current Outpatient Medications   Medication Sig Dispense Refill    FLUoxetine 20 MG capsule Take 1 capsule (20 mg total) by mouth every evening. 90 capsule 3    LINZESS 72 mcg Cap capsule Take 72 mcg by mouth.      multivitamin (THERAGRAN) tablet Take 1 tablet by mouth once daily.      pramipexole (MIRAPEX) 1 MG tablet TAKE 1 AND 1/2 TABLETS BY MOUTH EVERY EVENING 135 tablet 3    traZODone (DESYREL) 100 MG tablet Take 1 tablet (100 mg total) by mouth every evening. 30 tablet 11    doxycycline (VIBRA-TABS) 100 MG tablet Take 1 tablet (100 mg total) by mouth 2 (two) times daily. 14 tablet 0    pantoprazole (PROTONIX) 40 MG tablet Take 1 tablet (40 mg total) by mouth once daily. 30 tablet 11    phentermine (ADIPEX-P) 37.5 mg tablet Take 37.5 mg by mouth.      syringe with needle (SYRINGE 3CC/25GX1") 3 mL 25 gauge x 1" Syrg 1 Device by Misc.(Non-Drug; Combo Route) route every 14 (fourteen) days. 10 each 1     No current facility-administered medications for this visit.     "

## 2025-07-30 ENCOUNTER — OFFICE VISIT (OUTPATIENT)
Dept: URGENT CARE | Facility: CLINIC | Age: 65
End: 2025-07-30
Payer: COMMERCIAL

## 2025-07-30 VITALS
OXYGEN SATURATION: 99 % | TEMPERATURE: 99 F | DIASTOLIC BLOOD PRESSURE: 81 MMHG | HEART RATE: 85 BPM | RESPIRATION RATE: 16 BRPM | BODY MASS INDEX: 23.92 KG/M2 | WEIGHT: 130 LBS | HEIGHT: 62 IN | SYSTOLIC BLOOD PRESSURE: 138 MMHG

## 2025-07-30 DIAGNOSIS — S99.911A ANKLE INJURY, RIGHT, INITIAL ENCOUNTER: Primary | ICD-10-CM

## 2025-07-30 DIAGNOSIS — M25.471 ANKLE SWELLING, RIGHT: ICD-10-CM

## 2025-07-30 DIAGNOSIS — M25.571 ACUTE RIGHT ANKLE PAIN: ICD-10-CM

## 2025-07-30 PROCEDURE — 99214 OFFICE O/P EST MOD 30 MIN: CPT | Mod: S$GLB,,,

## 2025-07-30 NOTE — PATIENT INSTRUCTIONS
Rest  ICE  Elevate  Compression with ACE wrap or ankle brace    NSAIDs for relief of pain and inflammation    Avoid aggravating affected area.

## 2025-07-30 NOTE — PROGRESS NOTES
"Subjective:      Patient ID: Mary Mike is a 64 y.o. female.    Vitals:  height is 5' 2" (1.575 m) and weight is 59 kg (130 lb). Her oral temperature is 98.5 °F (36.9 °C). Her blood pressure is 138/81 and her pulse is 85. Her respiration is 16 and oxygen saturation is 99%.     Chief Complaint: Ankle Pain    64-year-old female presents for evaluation of intermittent right ankle swelling.  She reports no new injuries but did have an injury several years ago to that ankle.  She denies pain but occasionally has not discomfort when it gets swollen.  She reports the swelling occurs after sitting for too long or walking for extended periods of time.  Discussed I believe this is related to the old injury which can occasionally flare-up in caused some swelling to the old injured sites.    Ankle Pain   Incident onset: x 6 months. Pertinent negatives include no numbness.       Constitution: Negative for chills, fatigue and fever.   Musculoskeletal:  Positive for joint swelling. Negative for trauma, joint pain and abnormal ROM of joint.   Neurological:  Negative for numbness and tingling.      Objective:     Physical Exam   Constitutional: She is oriented to person, place, and time. No distress. normal  HENT:   Head: Normocephalic and atraumatic.   Cardiovascular: Normal rate.   Pulmonary/Chest: Effort normal. No respiratory distress.   Abdominal: Normal appearance.   Musculoskeletal:         General: Swelling present. No tenderness or signs of injury.      Right ankle: She exhibits swelling. She exhibits normal range of motion. No tenderness.      Left ankle: She exhibits swelling.        Legs:    Neurological: She is alert and oriented to person, place, and time. She displays no weakness.   Skin: Skin is warm and dry. Capillary refill takes less than 2 seconds. bruising   Psychiatric: Her behavior is normal. Mood, judgment and thought content normal.   Nursing note and vitals reviewed.      Assessment:     1. Ankle " injury, right, initial encounter    2. Acute right ankle pain    3. Ankle swelling, right        Plan:       Ankle injury, right, initial encounter    Acute right ankle pain  -     XR ANKLE COMPLETE 3 VIEW RIGHT; Future; Expected date: 07/30/2025    Ankle swelling, right      Advised patient to use ankle compression sleeve for reduction of swelling as long as rest, ice, elevate.    Discussed medication with patient who acknowledges understanding and is agreeable to POC. Follow up with primary care. Increase fluid intake. Red flags for ER discussed.

## 2025-08-20 ENCOUNTER — PATIENT OUTREACH (OUTPATIENT)
Dept: ADMINISTRATIVE | Facility: HOSPITAL | Age: 65
End: 2025-08-20
Payer: COMMERCIAL

## (undated) DEVICE — SOLUTION IRRI NS BOTTLE 1000ML R5200-01

## (undated) DEVICE — TROCAR KII FIOS 12MM X 100MM

## (undated) DEVICE — WALKER ANKLE MEDIUM

## (undated) DEVICE — SUT MONOCRYL 4-0 SH UND MON

## (undated) DEVICE — LINER SUCTION 3000CC

## (undated) DEVICE — STRAP OR TABLE 5IN X 72IN

## (undated) DEVICE — APPLICATOR CHLORAPREP ORN 26ML

## (undated) DEVICE — SUT 0 VICRYL / UR6 (J603)

## (undated) DEVICE — ADHESIVE MASTISOL VIAL 0523-48

## (undated) DEVICE — GLOVE BIOGEL PI ULTRA TOUCH GRAY SZ7.5

## (undated) DEVICE — DRAIN SINGLE ROUND 3/16 15F

## (undated) DEVICE — ELECTRODE REM PLYHSV RETURN 9

## (undated) DEVICE — TUBE SUCTION YANKAUER HI CAP

## (undated) DEVICE — GAUZE VASELINE XEROFORM 5X9 8884433605

## (undated) DEVICE — APPLIER CLIP EPIX UNIV 5X34

## (undated) DEVICE — SPONGE GAUZE 10S 4X4  442214

## (undated) DEVICE — SYS LABEL CORRECT MED

## (undated) DEVICE — POWDER ARISTA AH 3G

## (undated) DEVICE — SYR 30CC LUER LOCK

## (undated) DEVICE — DRAPE STERI INSTRUMENT 1018

## (undated) DEVICE — BAG TISS RETRV MONARCH 10MM

## (undated) DEVICE — PACK CUSTOM ENDO CHOLO SLI

## (undated) DEVICE — ALCOHOL 70% ANTISEPTIC ISO 4OZ

## (undated) DEVICE — SLEEVE SCD EXPRESS KNEE MEDIUM

## (undated) DEVICE — SCISSOR 5MMX35CM DIRECT DRIVE

## (undated) DEVICE — TAPE SILK 3IN

## (undated) DEVICE — SPHERE NDI PASSIVE

## (undated) DEVICE — BLADE COARSE MILL PREP

## (undated) DEVICE — BLADE SURG CARBON STEEL #10

## (undated) DEVICE — PAD ABDOMINAL STERILE 8X10IN

## (undated) DEVICE — TOWEL OR DISP STRL BLUE 4/PK

## (undated) DEVICE — GLOVE SURG ULTRA TOUCH 7.5

## (undated) DEVICE — SUT CTD VICRYL 1 UND OS-8

## (undated) DEVICE — SWABSTICK BENZOIN S42450

## (undated) DEVICE — SUTURE VICRYL 3-0 RB-1 27 J215H

## (undated) DEVICE — GLOVE SENSICARE PI ALOE 8

## (undated) DEVICE — GOWN POLY REINF BRTH SLV 2XL

## (undated) DEVICE — NDL SPINAL 18GX3.5 SPINOCAN

## (undated) DEVICE — SYRINGE 10ML 302995

## (undated) DEVICE — CLOSURE SKIN STERI STRIP 1/2X4

## (undated) DEVICE — GOWN POLY REINF BRTH SLV XL

## (undated) DEVICE — GLOVE SENSICARE PI GRN 8

## (undated) DEVICE — SUTURE VICRYL 2-0 RB-1 27 J306H

## (undated) DEVICE — DRESSING GAUZE OIL EMUL 3X8

## (undated) DEVICE — TROCAR KII FIOS 5MM X 100MM

## (undated) DEVICE — BANDAGE ACE STERILE 4 REB3114

## (undated) DEVICE — SOL NACL IRR 1000ML BTL

## (undated) DEVICE — DRAPE THREE-QTR REINF 53X77IN

## (undated) DEVICE — KIT EVACUATOR 3-SPRING 1/8 DRN

## (undated) DEVICE — PAD BOVIE ADULT

## (undated) DEVICE — SUTURE VICRYL 4-0 RB-1 27 J214H

## (undated) DEVICE — SYRINGE 0.9% NACL 10MIL PREFIL

## (undated) DEVICE — SYR 50CC LL

## (undated) DEVICE — COVER TABLE 44X90 STERILE

## (undated) DEVICE — BANDAGE ESMARK 4X9 55514

## (undated) DEVICE — BLADE SCALPEL #15 371115

## (undated) DEVICE — DRAPE C-ARM (FITS NEW C-ARM) 07-CA108

## (undated) DEVICE — DRAPE INVISISHIELD TOWEL SMALL

## (undated) DEVICE — BLADE OSCILLATING KM3-111

## (undated) DEVICE — GLOVE BIOGEL PIMICRO INDIC 7.5

## (undated) DEVICE — DRAPE O-ARM STERILE

## (undated) DEVICE — SOL NORMAL USPCA 0.9%

## (undated) DEVICE — PACK CUSTOM LUMBAR LAM SLI

## (undated) DEVICE — SET TUBE PNEUMOCLEAR SE HI FLO

## (undated) DEVICE — SYRINGE HYPODERMC LL 22G 1.5 ECLIPSE 30

## (undated) DEVICE — PROBE 100MM PEDICLE SCREW

## (undated) DEVICE — TRAY LOWER EXTREMITY  SMHS029-05

## (undated) DEVICE — APPLICATOR ARISTA FLEX XL

## (undated) DEVICE — SUT VICRYL ANTI 2-0 27IN

## (undated) DEVICE — DRAPE INCISE IOBAN 2 23X17IN

## (undated) DEVICE — KIT C.A.T.S. FAST START

## (undated) DEVICE — BLADE SURG CARBON STEEL SZ11

## (undated) DEVICE — TRAY CATH FOL SIL URIMTR 16FR

## (undated) DEVICE — DISSECTOR CURVED 5DCD

## (undated) DEVICE — BANDAGE SOFTBAND 4 441506

## (undated) DEVICE — FORCEP BAYO INSU SGL USE STRGT

## (undated) DEVICE — SPONGE LAP 18X18 PREWASHED

## (undated) DEVICE — STERISTRIP 1/2 R1547

## (undated) DEVICE — SKINMARKER W/RULER DEVON

## (undated) DEVICE — SPONGE LAP 4X18 PREWASHED

## (undated) DEVICE — TAPE MEDIPORE 3 X 10YD

## (undated) DEVICE — CUFF TOURNIQUET 18DUAL PRT 5921-218-235

## (undated) DEVICE — SOL CLEARIFY VISUALIZATION LAP

## (undated) DEVICE — SPONGE BULKEE II ABSRB 6X6.75

## (undated) DEVICE — SOL NACL IRR 3000ML

## (undated) DEVICE — PACK SIRUS BASIC V SURG STRL

## (undated) DEVICE — GLOVE SENSICARE PI GRN 8.5